# Patient Record
Sex: MALE | Race: WHITE | NOT HISPANIC OR LATINO | Employment: OTHER | URBAN - METROPOLITAN AREA
[De-identification: names, ages, dates, MRNs, and addresses within clinical notes are randomized per-mention and may not be internally consistent; named-entity substitution may affect disease eponyms.]

---

## 2017-02-01 DIAGNOSIS — E11.40 TYPE 2 DIABETES, CONTROLLED, WITH NEUROPATHY: ICD-10-CM

## 2017-02-03 DIAGNOSIS — E78.5 HYPERLIPIDEMIA, UNSPECIFIED HYPERLIPIDEMIA TYPE: ICD-10-CM

## 2017-02-03 DIAGNOSIS — E11.40 TYPE 2 DIABETES, CONTROLLED, WITH NEUROPATHY: ICD-10-CM

## 2017-02-03 RX ORDER — PRAVASTATIN SODIUM 40 MG/1
40 TABLET ORAL DAILY
Qty: 30 TABLET | Refills: 11 | Status: SHIPPED | OUTPATIENT
Start: 2017-02-03 | End: 2017-02-24 | Stop reason: SDUPTHER

## 2017-02-03 NOTE — TELEPHONE ENCOUNTER
----- Message from Melany Marinelli sent at 2/3/2017 11:42 AM CST -----  Contact: Mrs. Junior /wife: [phone]   Wife is asking if you could pls. Send a 30 day supply of Januvia and Pravastatin Sodium to [facility]:  [phone].    Pls call when this has been received at the pharmacy so they can get the medications.    Pls call.

## 2017-02-06 DIAGNOSIS — E11.40 TYPE 2 DIABETES, CONTROLLED, WITH NEUROPATHY: ICD-10-CM

## 2017-02-17 ENCOUNTER — LAB VISIT (OUTPATIENT)
Dept: LAB | Facility: HOSPITAL | Age: 72
End: 2017-02-17
Attending: INTERNAL MEDICINE
Payer: MEDICARE

## 2017-02-17 LAB
ALBUMIN SERPL BCP-MCNC: 3.6 G/DL
ALP SERPL-CCNC: 89 U/L
ALT SERPL W/O P-5'-P-CCNC: 12 U/L
ANION GAP SERPL CALC-SCNC: 8 MMOL/L
AST SERPL-CCNC: 14 U/L
BILIRUB SERPL-MCNC: 0.9 MG/DL
BUN SERPL-MCNC: 27 MG/DL
CALCIUM SERPL-MCNC: 9.6 MG/DL
CHLORIDE SERPL-SCNC: 103 MMOL/L
CO2 SERPL-SCNC: 27 MMOL/L
CREAT SERPL-MCNC: 1.2 MG/DL
EST. GFR  (AFRICAN AMERICAN): >60 ML/MIN/1.73 M^2
EST. GFR  (NON AFRICAN AMERICAN): >60 ML/MIN/1.73 M^2
GLUCOSE SERPL-MCNC: 124 MG/DL
POTASSIUM SERPL-SCNC: 4.6 MMOL/L
PROT SERPL-MCNC: 7.2 G/DL
SODIUM SERPL-SCNC: 138 MMOL/L
TSH SERPL DL<=0.005 MIU/L-ACNC: 1.56 UIU/ML

## 2017-02-17 PROCEDURE — 80053 COMPREHEN METABOLIC PANEL: CPT

## 2017-02-17 PROCEDURE — 83036 HEMOGLOBIN GLYCOSYLATED A1C: CPT

## 2017-02-17 PROCEDURE — 84443 ASSAY THYROID STIM HORMONE: CPT

## 2017-02-17 PROCEDURE — 36415 COLL VENOUS BLD VENIPUNCTURE: CPT | Mod: PO

## 2017-02-18 LAB
ESTIMATED AVG GLUCOSE: 171 MG/DL
HBA1C MFR BLD HPLC: 7.6 %

## 2017-02-24 ENCOUNTER — OFFICE VISIT (OUTPATIENT)
Dept: ENDOCRINOLOGY | Facility: CLINIC | Age: 72
End: 2017-02-24
Payer: MEDICARE

## 2017-02-24 VITALS
HEART RATE: 69 BPM | HEIGHT: 73 IN | BODY MASS INDEX: 31.93 KG/M2 | WEIGHT: 240.94 LBS | SYSTOLIC BLOOD PRESSURE: 126 MMHG | DIASTOLIC BLOOD PRESSURE: 74 MMHG

## 2017-02-24 DIAGNOSIS — H90.5 SENSORINEURAL HEARING LOSS, UNSPECIFIED LATERALITY: ICD-10-CM

## 2017-02-24 DIAGNOSIS — M17.11 OSTEOARTHRITIS OF RIGHT KNEE, UNSPECIFIED OSTEOARTHRITIS TYPE: ICD-10-CM

## 2017-02-24 DIAGNOSIS — E78.5 HYPERLIPIDEMIA, UNSPECIFIED HYPERLIPIDEMIA TYPE: ICD-10-CM

## 2017-02-24 DIAGNOSIS — E11.40 TYPE 2 DIABETES, CONTROLLED, WITH NEUROPATHY: Primary | ICD-10-CM

## 2017-02-24 DIAGNOSIS — I10 ESSENTIAL HYPERTENSION: ICD-10-CM

## 2017-02-24 DIAGNOSIS — E66.09 OBESITY DUE TO EXCESS CALORIES, UNSPECIFIED OBESITY SEVERITY: ICD-10-CM

## 2017-02-24 LAB
CREAT UR-MCNC: 90 MG/DL
MICROALBUMIN UR DL<=1MG/L-MCNC: 5 UG/ML
MICROALBUMIN/CREATININE RATIO: 5.6 UG/MG

## 2017-02-24 PROCEDURE — 99213 OFFICE O/P EST LOW 20 MIN: CPT | Mod: PBBFAC | Performed by: INTERNAL MEDICINE

## 2017-02-24 PROCEDURE — 82570 ASSAY OF URINE CREATININE: CPT

## 2017-02-24 PROCEDURE — 99214 OFFICE O/P EST MOD 30 MIN: CPT | Mod: S$PBB,,, | Performed by: INTERNAL MEDICINE

## 2017-02-24 PROCEDURE — 99999 PR PBB SHADOW E&M-EST. PATIENT-LVL III: CPT | Mod: PBBFAC,,, | Performed by: INTERNAL MEDICINE

## 2017-02-24 RX ORDER — INSULIN GLARGINE 100 [IU]/ML
INJECTION, SOLUTION SUBCUTANEOUS
Qty: 1 BOX | Refills: 6 | Status: SHIPPED | OUTPATIENT
Start: 2017-02-24 | End: 2017-09-06 | Stop reason: SDUPTHER

## 2017-02-24 RX ORDER — LOSARTAN POTASSIUM AND HYDROCHLOROTHIAZIDE 12.5; 5 MG/1; MG/1
1 TABLET ORAL DAILY
Qty: 30 TABLET | Refills: 9 | Status: SHIPPED | OUTPATIENT
Start: 2017-02-24 | End: 2017-09-06 | Stop reason: SDUPTHER

## 2017-02-24 RX ORDER — AMLODIPINE BESYLATE 10 MG/1
10 TABLET ORAL DAILY
Qty: 30 TABLET | Refills: 11 | Status: SHIPPED | OUTPATIENT
Start: 2017-02-24 | End: 2017-09-06 | Stop reason: SDUPTHER

## 2017-02-24 RX ORDER — METFORMIN HYDROCHLORIDE 500 MG/1
TABLET, EXTENDED RELEASE ORAL
Qty: 120 TABLET | Refills: 11 | Status: SHIPPED | OUTPATIENT
Start: 2017-02-24 | End: 2017-09-06 | Stop reason: SDUPTHER

## 2017-02-24 RX ORDER — PRAVASTATIN SODIUM 40 MG/1
40 TABLET ORAL DAILY
Qty: 30 TABLET | Refills: 11 | Status: SHIPPED | OUTPATIENT
Start: 2017-02-24 | End: 2017-09-06 | Stop reason: SDUPTHER

## 2017-02-24 RX ORDER — PEN NEEDLE, DIABETIC 29 G X1/2"
NEEDLE, DISPOSABLE MISCELLANEOUS
Qty: 50 EACH | Refills: 11 | Status: SHIPPED | OUTPATIENT
Start: 2017-02-24 | End: 2020-06-16 | Stop reason: SDUPTHER

## 2017-02-24 NOTE — PROGRESS NOTES
Subjective:      Patient ID: Des Junior is a 71 y.o. male.    Chief Complaint:  Diabetes Mellitus      History of Present Illness    Diabetes Type 2 uncontrolled with neuropathy  Metformin 500 bid  Invokana daily  Jenuvia daily  Home monitoring -130  Nocturia 3  Eyes Recent eye exam  Feet tingle    HTN well controlled  Chol on statin    Right knee a problem  Cane left hand    Hip doing better left    Review of Systems   Constitutional: Positive for fatigue. Negative for activity change and unexpected weight change.   HENT: Positive for hearing loss. Negative for postnasal drip.    Eyes: Negative for visual disturbance.   Respiratory: Negative for cough, chest tightness, shortness of breath and wheezing.    Cardiovascular: Negative for chest pain, palpitations and leg swelling.   Gastrointestinal: Negative for abdominal pain, constipation, diarrhea and nausea.   Genitourinary: Negative for difficulty urinating and hematuria.   Musculoskeletal: Negative for arthralgias, back pain and neck pain.   Neurological: Negative for tremors, seizures, weakness and headaches.   Hematological: Does not bruise/bleed easily.   Psychiatric/Behavioral: Negative for agitation and confusion. The patient is not nervous/anxious.        Objective:   Physical Exam   Constitutional: He is oriented to person, place, and time. He appears well-developed and well-nourished.   Neck: No thyromegaly present.   Cardiovascular: Normal rate and regular rhythm.    Murmur heard.  Pulmonary/Chest: Effort normal and breath sounds normal.   Neurological: He is alert and oriented to person, place, and time. He has normal reflexes.   Comprehensive foot exam  Left and Right Feet  No ulcers  Normal temperature  Vibration mild deficit  Pulses intact   Vitals reviewed.      Lab Review:   Results for orders placed or performed in visit on 02/17/17   Hemoglobin A1c   Result Value Ref Range    Hemoglobin A1C 7.6 (H) 4.5 - 6.2 %    Estimated Avg  Glucose 171 (H) 68 - 131 mg/dL   Comprehensive metabolic panel   Result Value Ref Range    Sodium 138 136 - 145 mmol/L    Potassium 4.6 3.5 - 5.1 mmol/L    Chloride 103 95 - 110 mmol/L    CO2 27 23 - 29 mmol/L    Glucose 124 (H) 70 - 110 mg/dL    BUN, Bld 27 (H) 8 - 23 mg/dL    Creatinine 1.2 0.5 - 1.4 mg/dL    Calcium 9.6 8.7 - 10.5 mg/dL    Total Protein 7.2 6.0 - 8.4 g/dL    Albumin 3.6 3.5 - 5.2 g/dL    Total Bilirubin 0.9 0.1 - 1.0 mg/dL    Alkaline Phosphatase 89 55 - 135 U/L    AST 14 10 - 40 U/L    ALT 12 10 - 44 U/L    Anion Gap 8 8 - 16 mmol/L    eGFR if African American >60.0 >60 mL/min/1.73 m^2    eGFR if non African American >60.0 >60 mL/min/1.73 m^2   TSH   Result Value Ref Range    TSH 1.565 0.400 - 4.000 uIU/mL         Assessment:     Diabetes Type 2 and improved glucose control  Increased LAntus to 24 Continue other meds  No hypoglycemia  Neuropathy mild and improved    Right knee is the limited factor    HTN and HLP controlled    Obesity weightlloss 1/2 lb lb per week    Plan:     Orders Placed This Encounter   Procedures    Microalbumin/creatinine urine ratio     Order Specific Question:   Specimen Source     Answer:   Urine    Hemoglobin A1c     Standing Status:   Future     Standing Expiration Date:   2/24/2018    Lipid panel     Standing Status:   Future     Standing Expiration Date:   2/24/2018    Renal function panel     Standing Status:   Future     Standing Expiration Date:   2/24/2018

## 2017-02-24 NOTE — PATIENT INSTRUCTIONS
Diabetes Type 2 and improved glucose control  Increased LAntus to 24 Continue other meds  No hypoglycemia  Neuropathy mild and improved    Right knee is the limited factor    HTN and HLP controlled    Obesity weightlloss 1/2 lb lb per week

## 2017-02-24 NOTE — MR AVS SNAPSHOT
"    [facility]  [address]  [facility] [address]  Phone: [phone]  Fax: [phone]                  Des Junior   2017 9:30 AM   Office Visit    Description:  Male : 1945   Provider:  Mark Mchugh MD   Department:  [facility]           Reason for Visit     Diabetes Mellitus           Diagnoses this Visit        Comments    Type 2 diabetes, controlled, with neuropathy    -  Primary     Sensorineural hearing loss, unspecified laterality         Hyperlipidemia, unspecified hyperlipidemia type         Obesity due to excess calories, unspecified obesity severity         Osteoarthritis of right knee, unspecified osteoarthritis type         Essential hypertension                To Do List           Goals (5 Years of Data)     None      Follow-Up and Disposition     Follow-up and Disposition History       These Medications        Disp Refills Start End    SITagliptan (JANUVIA) 100 MG Tab 30 tablet 10 2017     Take 1 tablet (100 mg total) by mouth once daily. - Oral    Pharmacy: [facility] [place] - [address] [address] Ph #: [phone]       pravastatin (PRAVACHOL) 40 MG tablet 30 tablet 11 2017     Take 1 tablet (40 mg total) by mouth once daily. - Oral    Pharmacy: [facility] - [place] - [address] [address] Ph #: [phone]       pen needle, diabetic 29 gauge x 1/2" Ndle 50 each 2017     Inject insulin daily    Pharmacy: [facility] - [place] - [address] [address] Ph #: [phone]       metformin (GLUCOPHAGE-XR) 500 MG 24 hr tablet 120 tablet 11 2017     TAKE 2 TABLETS (1,000 MG TOTAL) BY MOUTH 2 (TWO) TIMES DAILY WITH MEALS.    Pharmacy: [facility] - [place], [place] - [address] [address] Ph #: [phone]       losartan-hydrochlorothiazide 50-12.5 mg (HYZAAR) 50-12.5 mg per tablet 30 tablet 9 2017     Take 1 tablet by mouth once daily. - Oral    Pharmacy: [facility] - [place] - [address] [address] Ph #: [phone]       insulin glargine (LANTUS SOLOSTAR) 100 unit/mL (3 mL) InPn pen 1 Box 6 2/24/2017     12 units subcu every night and if FBS greater than 130 for 3 days in a row increase next night by 2 units    Pharmacy: [facility] - [place], [place] - [address] [address] Ph #: [phone]       canagliflozin (INVOKANA) 100 mg Tab 30 tablet 11 2/24/2017     Take 1 tablet (100 mg total) by mouth once daily. - Oral    Pharmacy: [facility] [place] - [address] [address] Ph #: [phone]       blood sugar diagnostic Strp 100 strip 11 2/24/2017     1 strip by Misc.(Non-Drug; Combo Route) route 2 (two) times daily with meals. - Misc.(Non-Drug; Combo Route)    Pharmacy: [facility] - [place] - [address] [address] Ph #: [phone]       amlodipine (NORVASC) 10 MG tablet 30 tablet 11 2/24/2017     Take 1 tablet (10 mg total) by mouth once daily. - Oral    Pharmacy: [facility] - [place] - [address] [address] Ph #: [phone]         [facility]     [facility] - 24/7 Assistance  Registered nurses in the [facility] provide clinical advisement, health education, appointment booking, and other advisory services.  Call for this free service at [phone].             Medications           Message regarding Medications     Verify the changes and/or additions to your medication regime listed below are the same as discussed with your clinician today.  If any of these changes or additions are incorrect, please notify your healthcare provider.        CHANGE how you are taking these medications     Start Taking Instead of    blood sugar diagnostic Strp blood sugar diagnostic Strp    Dosage:  1 strip by Misc.(Non-Drug; Combo Route) route 2 (two) times daily with meals. Dosage:  1 strip by Misc.(Non-Drug; Combo Route) route 3 (three) times daily.    Reason for Change:  Reorder     amlodipine  (NORVASC) 10 MG tablet amlodipine (NORVASC) 10 MG tablet    Dosage:  Take 1 tablet (10 mg total) by mouth once daily. Dosage:  TAKE ONE TABLET ONCE DAILY    Reason for Change:  Reorder            Verify that the below list of medications is an accurate representation of the medications you are currently taking.  If none reported, the list may be blank. If incorrect, please contact your healthcare provider. Carry this list with you in case of emergency.           Current Medications     amlodipine (NORVASC) 10 MG tablet Take 1 tablet (10 mg total) by mouth once daily.    blood sugar diagnostic Strp 1 strip by Misc.(Non-Drug; Combo Route) route 2 (two) times daily with meals.    canagliflozin (INVOKANA) 100 mg Tab Take 1 tablet (100 mg total) by mouth once daily.    cholecalciferol, vitamin D3, (VITAMIN D3) 1,000 unit capsule Take 1,000 Units by mouth once daily.    CIALIS 20 mg Tab 1 TABLET PRIOR TO SEX    cyanocobalamin (VITAMIN B-12) 500 MCG tablet Take 2 tablets (1,000 mcg total) by mouth once daily.    desonide (DESOWEN) 0.05 % cream     diclofenac sodium 1 % Gel Apply 2 g topically 4 (four) times daily.    doxycycline (MONODOX) 100 MG capsule Take 1 capsule (100 mg total) by mouth 2 (two) times daily.    hydrocodone-acetaminophen 5-325mg (NORCO) 5-325 mg per tablet Take 1 tablet by mouth every 6 (six) hours as needed for Pain.    insulin glargine (LANTUS SOLOSTAR) 100 unit/mL (3 mL) InPn pen 12 units subcu every night and if FBS greater than 130 for 3 days in a row increase next night by 2 units    LANCETS & BLOOD GLUCOSE STRIPS MISC * * * Twice a day .  check glucose twice a day    levmefolate-B6 phos-methyl-B12 3-35-2 mg Tab Take 1 tablet by mouth 2 (two) times daily.    losartan-hydrochlorothiazide 50-12.5 mg (HYZAAR) 50-12.5 mg per tablet Take 1 tablet by mouth once daily.    metformin (GLUCOPHAGE-XR) 500 MG 24 hr tablet TAKE 2 TABLETS (1,000 MG TOTAL) BY MOUTH 2 (TWO) TIMES DAILY WITH MEALS.    omega-3  "fatty acids-vitamin E (OMEGA-3 FISH OIL) 1,000-5 mg-unit Cap Take by mouth. 1 Capsule Oral Every day    omeprazole (PRILOSEC OTC) 20 MG tablet Take by mouth. 1 Tablet, Delayed Release (E.C.) Oral Every day    pen needle, diabetic 29 gauge x 1/2" Ndle Inject insulin daily    pravastatin (PRAVACHOL) 40 MG tablet Take 1 tablet (40 mg total) by mouth once daily.    SITagliptan (JANUVIA) 100 MG Tab Take 1 tablet (100 mg total) by mouth once daily.    acyclovir (ZOVIRAX) 400 MG tablet Take 1 tablet (400 mg total) by mouth 3 (three) times daily.           Clinical Reference Information           Your Vitals Were     BP                   126/74 (BP Location: Right arm, Patient Position: Sitting)           Blood Pressure          Most Recent Value    BP  126/74      Allergies as of 2/24/2017     Codeine    Benicar  [Olmesartan]      Immunizations Administered on Date of Encounter - 2/24/2017     None      Orders Placed During Today's Visit      Normal Orders This Visit    Microalbumin/creatinine urine ratio     Future Labs/Procedures Expected by Expires    Hemoglobin A1c  2/24/2017 2/24/2018    Lipid panel  2/24/2017 2/24/2018    Renal function panel  2/24/2017 2/24/2018      Instructions    Diabetes Type 2 and improved glucose control  Increased LAntus to 24 Continue other meds  No hypoglycemia  Neuropathy mild and improved    Right knee is the limited factor    HTN and HLP controlled    Obesity weightlloss 1/2 lb lb per week       Language Assistance Services     ATTENTION: Language assistance services are available, free of charge. Please call [phone].      ATENCIÓN: Si habla maida, tiene a mcadams disposición servicios gratuitos de asistencia lingüística. Llame al [phone].     TINY Ý: N?u b?n nói Ti?ng Vi?t, có các d?ch v? h? tr? ngôn ng? mi?n phí dành cho b?n. G?i s? [phone].         [facility] complies with applicable Federal civil rights laws and does not discriminate on the basis of " race, color, national origin, age, disability, or sex.

## 2017-02-25 RX ORDER — SITAGLIPTIN 100 MG/1
TABLET, FILM COATED ORAL
Qty: 30 TABLET | Refills: 0
Start: 2017-02-25 | End: 2018-03-20 | Stop reason: SDUPTHER

## 2017-03-17 ENCOUNTER — OFFICE VISIT (OUTPATIENT)
Dept: ORTHOPEDICS | Facility: CLINIC | Age: 72
End: 2017-03-17
Payer: MEDICARE

## 2017-03-17 ENCOUNTER — HOSPITAL ENCOUNTER (OUTPATIENT)
Dept: RADIOLOGY | Facility: HOSPITAL | Age: 72
Discharge: HOME | End: 2017-03-17
Attending: ORTHOPAEDIC SURGERY
Payer: MEDICARE

## 2017-03-17 VITALS
DIASTOLIC BLOOD PRESSURE: 89 MMHG | HEART RATE: 90 BPM | BODY MASS INDEX: 32.26 KG/M2 | SYSTOLIC BLOOD PRESSURE: 154 MMHG | HEIGHT: 73 IN | WEIGHT: 243.38 LBS

## 2017-03-17 DIAGNOSIS — M25.561 RIGHT KNEE PAIN, UNSPECIFIED CHRONICITY: ICD-10-CM

## 2017-03-17 DIAGNOSIS — M17.11 PRIMARY OSTEOARTHRITIS OF RIGHT KNEE: Primary | ICD-10-CM

## 2017-03-17 PROCEDURE — 20610 DRAIN/INJ JOINT/BURSA W/O US: CPT | Mod: PBBFAC | Performed by: PHYSICIAN ASSISTANT

## 2017-03-17 PROCEDURE — 99203 OFFICE O/P NEW LOW 30 MIN: CPT | Mod: 25,S$PBB,, | Performed by: PHYSICIAN ASSISTANT

## 2017-03-17 PROCEDURE — 99999 PR PBB SHADOW E&M-EST. PATIENT-LVL IV: CPT | Mod: PBBFAC,,, | Performed by: PHYSICIAN ASSISTANT

## 2017-03-17 PROCEDURE — 73562 X-RAY EXAM OF KNEE 3: CPT | Mod: 26,RT,, | Performed by: RADIOLOGY

## 2017-03-17 PROCEDURE — 73560 X-RAY EXAM OF KNEE 1 OR 2: CPT | Mod: 26,59,LT, | Performed by: RADIOLOGY

## 2017-03-17 PROCEDURE — 20610 DRAIN/INJ JOINT/BURSA W/O US: CPT | Mod: S$PBB,RT,, | Performed by: PHYSICIAN ASSISTANT

## 2017-03-17 PROCEDURE — 99214 OFFICE O/P EST MOD 30 MIN: CPT | Mod: PBBFAC | Performed by: PHYSICIAN ASSISTANT

## 2017-03-17 RX ORDER — TRIAMCINOLONE ACETONIDE 40 MG/ML
60 INJECTION, SUSPENSION INTRA-ARTICULAR; INTRAMUSCULAR
Status: COMPLETED | OUTPATIENT
Start: 2017-03-17 | End: 2017-03-17

## 2017-03-17 RX ADMIN — TRIAMCINOLONE ACETONIDE 60 MG: 40 INJECTION, SUSPENSION INTRA-ARTICULAR; INTRAMUSCULAR at 12:03

## 2017-03-17 NOTE — PROGRESS NOTES
"  SUBJECTIVE:     Chief Complaint : right knee pain     History of Present Illness:  Des Junior is a 72 y.o. male seen in clinic today with a chief complaint of chronic right knee pain. Patient was seen ~ 5 years ago and had cortisone injection in knee. He was doing very well until he fell several months ago and has now had knee pain.  He is a  and has to kneel. He uses knee pads but still has some pain while kneeling. Denies swelling, instability. Pain is mild-moderate. He uses topical ointment and knee brace.  He also recently started to use a cane.     Past Medical History:   Diagnosis Date    Allergy     Arthritis     Cataract     Diabetes mellitus     Diabetes mellitus type II     Hay fever     Hyperlipidemia     Hypertension     Joint pain     Obesity     Ulcer        Review of Systems:  Constitutional: no fever or chills  ENT: no nasal congestion or sore throat  Respiratory: no cough or shortness of breath  Cardiovascular: no chest pain or palpitations  Gastrointestinal: no nausea or vomiting, tolerating diet  Genitourinary: no hematuria or dysuria  Integument/Breast: no rash or pruritis  Hematologic/Lymphatic: no easy bruising or lymphadenopathy  Musculoskeletal: see HPI  Neurological: no seizures or tremors  Behavioral/Psych: no auditory or visual hallucinations    OBJECTIVE:     PHYSICAL EXAM:  Blood pressure (!) 154/89, pulse 90, height 6' 1" (1.854 m), weight 110.4 kg (243 lb 6.2 oz).   General Appearance: WDWN, NAD  Gait: Antalgic  Neuro/Psych: Mood & affect appropriate  Lungs: Respirations equal and unlabored.   CV: 2+ bilateral upper and lower extremity pulses.   Skin: Intact throughout LE  Extremities: No LE edema    Right Knee Exam  Range of Motion:0-120 active   Effusion:none  Condition of skin:intact  Location of tenderness:Medial joint line   Strength:5 of 5 quadriceps strength and 5 of 5 hamstring strength  Stability:stable to testing  Kyle: " negative/negative    Left Knee Exam  Range of Motion:0-125 active   Effusion:none  Condition of skin:intact  Location of tenderness:None   Strength:5 of 5 quadriceps strength and 5 of 5 hamstring strength  Stability:stable to testing  Kyle: negative    Alignment: Moderate varus    Right Hip Examination: no pain with PROM     RADIOGRAPHS: AP, lateral and merchant right knee x-rays ordered and images reviewed today by me reveal advanced degenerative changes of right knee; less severe on left     ASSESSMENT/PLAN:   Osteoarthritis right knee  - Continue cane, brace, knee pads, topical analgesics   - Steroid injection today. He will carefully monitor glucose levels.  - RTC PRN    Knee Injection Procedure Note    Pre-operative Diagnosis: right knee degenerative arthritis    Post-operative Diagnosis: same    Indications: right knee pain    Anesthesia: none    Procedure Details     Verbal consent was obtained for the procedure. The injection site was identified and the skin was prepared with alcohol. The right knee was injected from an anterolateral approach with 1.5 ml of Kenalog and 3 ml Lidocaine under sterile technique using a 22 gauge needle. The needle was removed and the area cleansed and dressed.    Complications:  None; patient tolerated the procedure well.    he was advised to rest the knee today, using ice and elevation as needed for comfort and swelling. he did receive immediate relief of the knee pain. he was told this would be short lived and is secondary to the lidocaine. he may have an increase in discomfort tonight followed by steady improvement over the next several days. It may take 1-3 weeks following the injection to get the full benefit of the medication.

## 2017-08-25 ENCOUNTER — OFFICE VISIT (OUTPATIENT)
Dept: OPTOMETRY | Facility: CLINIC | Age: 72
End: 2017-08-25
Payer: MEDICARE

## 2017-08-25 DIAGNOSIS — I10 ESSENTIAL HYPERTENSION: ICD-10-CM

## 2017-08-25 DIAGNOSIS — H02.836 DERMATOCHALASIS OF BOTH EYELIDS: ICD-10-CM

## 2017-08-25 DIAGNOSIS — H02.833 DERMATOCHALASIS OF BOTH EYELIDS: ICD-10-CM

## 2017-08-25 DIAGNOSIS — H52.4 PRESBYOPIA: ICD-10-CM

## 2017-08-25 DIAGNOSIS — H25.13 NS (NUCLEAR SCLEROSIS), BILATERAL: ICD-10-CM

## 2017-08-25 PROCEDURE — 99999 PR PBB SHADOW E&M-EST. PATIENT-LVL II: CPT | Mod: PBBFAC,,, | Performed by: OPTOMETRIST

## 2017-08-25 PROCEDURE — 92014 COMPRE OPH EXAM EST PT 1/>: CPT | Mod: S$PBB,,, | Performed by: OPTOMETRIST

## 2017-08-25 PROCEDURE — 99212 OFFICE O/P EST SF 10 MIN: CPT | Mod: PBBFAC | Performed by: OPTOMETRIST

## 2017-08-25 PROCEDURE — 92015 DETERMINE REFRACTIVE STATE: CPT | Mod: ,,, | Performed by: OPTOMETRIST

## 2017-08-25 NOTE — PROGRESS NOTES
HPI     Patient's age: 72 y.o.    Approximate date of last eye examination:  8/22/16  Name of last eye doctor seen: Dr. Dow    Pt states that he is here for annual diabetic eye exam, not having any   trouble with eyes.    Wears glasses? yes     Wears CLs?:  no                          Headaches?  no  Eye pain/discomfort?  no                                                                                     Flashes?  no  Floaters?  no  Diplopia/Double vision?  no    Patient's Ocular History:         Any eye surgeries? no         Family history of eye disease?  no    Significant patient medical history:         1. Diabetes?  yes       If yes, IDDM or NIDDM? both   2. HBP?  no                 ! OTC eyedrops currently using:  none   ! Prescription eye meds currently using:  None    Hemoglobin A1C       Date                     Value               Ref Range             Status                02/17/2017               7.6 (H)             4.5 - 6.2 %           Final                     09/09/2016               8.2 (H)             4.5 - 6.2 %           Final                     06/06/2016               9.5 (H)             4.5 - 6.2 %           Final            ----------           Last edited by Jennifer Whyte MA on 8/25/2017  9:36 AM. (History)            Assessment /Plan     For exam results, see Encounter Report.          Type 2 diabetes, controlled, with neuropathy  DM type 2 without retinopathy  Essential hypertension                        No retinopathy, monitor yearly     NS (nuclear sclerosis), bilateral                        Mild, not visually significant     Presbyopia                        Rx specs     Blepharitis, unspecified laterality                         Lid scrubs and artificial tears QHS     RTC 1 year, sooner PRN

## 2017-08-30 ENCOUNTER — LAB VISIT (OUTPATIENT)
Dept: LAB | Facility: HOSPITAL | Age: 72
End: 2017-08-30
Attending: INTERNAL MEDICINE
Payer: MEDICARE

## 2017-08-30 DIAGNOSIS — E11.40 TYPE 2 DIABETES, CONTROLLED, WITH NEUROPATHY: ICD-10-CM

## 2017-08-30 LAB
ALBUMIN SERPL BCP-MCNC: 3.4 G/DL
ANION GAP SERPL CALC-SCNC: 9 MMOL/L
BUN SERPL-MCNC: 21 MG/DL
CALCIUM SERPL-MCNC: 9.7 MG/DL
CHLORIDE SERPL-SCNC: 104 MMOL/L
CO2 SERPL-SCNC: 25 MMOL/L
CREAT SERPL-MCNC: 1.1 MG/DL
EST. GFR  (AFRICAN AMERICAN): >60 ML/MIN/1.73 M^2
EST. GFR  (NON AFRICAN AMERICAN): >60 ML/MIN/1.73 M^2
GLUCOSE SERPL-MCNC: 132 MG/DL
PHOSPHATE SERPL-MCNC: 3.1 MG/DL
POTASSIUM SERPL-SCNC: 4.2 MMOL/L
SODIUM SERPL-SCNC: 138 MMOL/L

## 2017-08-30 PROCEDURE — 83036 HEMOGLOBIN GLYCOSYLATED A1C: CPT

## 2017-08-30 PROCEDURE — 80069 RENAL FUNCTION PANEL: CPT

## 2017-08-31 LAB
ESTIMATED AVG GLUCOSE: 177 MG/DL
HBA1C MFR BLD HPLC: 7.8 %

## 2017-09-06 ENCOUNTER — OFFICE VISIT (OUTPATIENT)
Dept: ENDOCRINOLOGY | Facility: CLINIC | Age: 72
End: 2017-09-06
Payer: MEDICARE

## 2017-09-06 VITALS
DIASTOLIC BLOOD PRESSURE: 82 MMHG | WEIGHT: 254.19 LBS | SYSTOLIC BLOOD PRESSURE: 126 MMHG | HEART RATE: 66 BPM | BODY MASS INDEX: 33.69 KG/M2 | HEIGHT: 73 IN

## 2017-09-06 DIAGNOSIS — I10 ESSENTIAL HYPERTENSION: ICD-10-CM

## 2017-09-06 DIAGNOSIS — E11.40 TYPE 2 DIABETES, CONTROLLED, WITH NEUROPATHY: ICD-10-CM

## 2017-09-06 DIAGNOSIS — E78.5 HYPERLIPIDEMIA, UNSPECIFIED HYPERLIPIDEMIA TYPE: ICD-10-CM

## 2017-09-06 PROCEDURE — 4010F ACE/ARB THERAPY RXD/TAKEN: CPT | Mod: ,,, | Performed by: INTERNAL MEDICINE

## 2017-09-06 PROCEDURE — 3079F DIAST BP 80-89 MM HG: CPT | Mod: ,,, | Performed by: INTERNAL MEDICINE

## 2017-09-06 PROCEDURE — 99214 OFFICE O/P EST MOD 30 MIN: CPT | Mod: S$PBB,,, | Performed by: INTERNAL MEDICINE

## 2017-09-06 PROCEDURE — 1125F AMNT PAIN NOTED PAIN PRSNT: CPT | Mod: ,,, | Performed by: INTERNAL MEDICINE

## 2017-09-06 PROCEDURE — 99999 PR PBB SHADOW E&M-EST. PATIENT-LVL III: CPT | Mod: PBBFAC,,, | Performed by: INTERNAL MEDICINE

## 2017-09-06 PROCEDURE — 3074F SYST BP LT 130 MM HG: CPT | Mod: ,,, | Performed by: INTERNAL MEDICINE

## 2017-09-06 PROCEDURE — 3045F PR MOST RECENT HEMOGLOBIN A1C LEVEL 7.0-9.0%: CPT | Mod: ,,, | Performed by: INTERNAL MEDICINE

## 2017-09-06 PROCEDURE — 99213 OFFICE O/P EST LOW 20 MIN: CPT | Mod: PBBFAC | Performed by: INTERNAL MEDICINE

## 2017-09-06 PROCEDURE — 1159F MED LIST DOCD IN RCRD: CPT | Mod: ,,, | Performed by: INTERNAL MEDICINE

## 2017-09-06 RX ORDER — ASPIRIN 81 MG/1
81 TABLET ORAL DAILY
COMMUNITY
End: 2018-02-27

## 2017-09-06 RX ORDER — METFORMIN HYDROCHLORIDE 500 MG/1
TABLET, EXTENDED RELEASE ORAL
Qty: 120 TABLET | Refills: 11 | Status: SHIPPED | OUTPATIENT
Start: 2017-09-06 | End: 2018-09-10 | Stop reason: SDUPTHER

## 2017-09-06 RX ORDER — PRAVASTATIN SODIUM 40 MG/1
40 TABLET ORAL DAILY
Qty: 30 TABLET | Refills: 11 | Status: SHIPPED | OUTPATIENT
Start: 2017-09-06 | End: 2018-09-06 | Stop reason: SDUPTHER

## 2017-09-06 RX ORDER — AMLODIPINE BESYLATE 10 MG/1
10 TABLET ORAL DAILY
Qty: 30 TABLET | Refills: 11 | Status: SHIPPED | OUTPATIENT
Start: 2017-09-06 | End: 2018-09-10 | Stop reason: SDUPTHER

## 2017-09-06 RX ORDER — INSULIN GLARGINE 100 [IU]/ML
INJECTION, SOLUTION SUBCUTANEOUS
Qty: 1 BOX | Refills: 11 | Status: SHIPPED | OUTPATIENT
Start: 2017-09-06 | End: 2018-07-24 | Stop reason: SDUPTHER

## 2017-09-06 RX ORDER — LOSARTAN POTASSIUM AND HYDROCHLOROTHIAZIDE 12.5; 5 MG/1; MG/1
1 TABLET ORAL DAILY
Qty: 30 TABLET | Refills: 11 | Status: SHIPPED | OUTPATIENT
Start: 2017-09-06 | End: 2018-09-10 | Stop reason: SDUPTHER

## 2017-09-06 RX ORDER — TADALAFIL 20 MG/1
TABLET ORAL
Qty: 8 TABLET | Refills: 6 | Status: SHIPPED | OUTPATIENT
Start: 2017-09-06 | End: 2018-03-20

## 2017-09-06 NOTE — PATIENT INSTRUCTIONS
Please cut back on you portions.    Continue metformin, lantus 24 units at bedtime and januvia.    Stay active    We will repeat your A1C levels in three months.     Results for orders placed or performed in visit on 08/30/17   Lipid panel   Result Value Ref Range    Cholesterol 124 120 - 199 mg/dL    Triglycerides 64 30 - 150 mg/dL    HDL 39 (L) 40 - 75 mg/dL    LDL Cholesterol 72.2 63.0 - 159.0 mg/dL    HDL/Chol Ratio 31.5 20.0 - 50.0 %    Total Cholesterol/HDL Ratio 3.2 2.0 - 5.0    Non-HDL Cholesterol 85 mg/dL     Hemoglobin A1C   Date Value Ref Range Status   08/30/2017 7.8 (H) 4.0 - 5.6 % Final     Comment:     According to ADA guidelines, hemoglobin A1c <7.0% represents  optimal control in non-pregnant diabetic patients. Different  metrics may apply to specific patient populations.   Standards of Medical Care in Diabetes-2016.  For the purpose of screening for the presence of diabetes:  <5.7%     Consistent with the absence of diabetes  5.7-6.4%  Consistent with increasing risk for diabetes   (prediabetes)  >or=6.5%  Consistent with diabetes  Currently, no consensus exists for use of hemoglobin A1c  for diagnosis of diabetes for children.  This Hemoglobin A1c assay has significant interference with fetal   hemoglobin   (HbF). The results are invalid for patients with abnormal amounts of   HbF,   including those with known Hereditary Persistence   of Fetal Hemoglobin. Heterozygous hemoglobin variants (HbAS, HbAC,   HbAD, HbAE, HbA2) do not significantly interfere with this assay;   however, presence of multiple variants in a sample may impact the %   interference.     02/17/2017 7.6 (H) 4.5 - 6.2 % Final     Comment:     According to ADA guidelines, hemoglobin A1C <7.0% represents  optimal control in non-pregnant diabetic patients.  Different  metrics may apply to specific populations.   Standards of Medical Care in Diabetes - 2016.  For the purpose of screening for the presence of diabetes:  <5.7%      Consistent with the absence of diabetes  5.7-6.4%  Consistent with increasing risk for diabetes   (prediabetes)  >or=6.5%  Consistent with diabetes  Currently no consensus exists for use of hemoglobin A1C  for diagnosis of diabetes for children.     09/09/2016 8.2 (H) 4.5 - 6.2 % Final     Comment:     According to ADA guidelines, hemoglobin A1C <7.0% represents  optimal control in non-pregnant diabetic patients.  Different  metrics may apply to specific populations.   Standards of Medical Care in Diabetes - 2016.  For the purpose of screening for the presence of diabetes:  <5.7%     Consistent with the absence of diabetes  5.7-6.4%  Consistent with increasing risk for diabetes   (prediabetes)  >or=6.5%  Consistent with diabetes  Currently no consensus exists for use of hemoglobin A1C  for diagnosis of diabetes for children.

## 2017-09-06 NOTE — PROGRESS NOTES
Subjective:     Patient ID: Des Junior is a 72 y.o. male.    Chief Complaint: No chief complaint on file.    HPI:   Mr. Junior is a 72 y.o. male who is here for a follow-up visit for evaluation type 2 diabetes that is controlled but complicated by neuropathy. Diagnosed over 15 years ago.     Medication regimen:  canagliflozin 100 mg daily - stopped due yeast infections  januvia 100 mg daily - no difficulities  Metformin  mg two tablets twice a day - denies diarrhea/loose stools or abdominal pain.     Had bad summer cold; he is a master .     Nocturia 3 times at night. Denies dry mouth or blurred vision.      ADA STANDARDS of CARE:        ACE inhibitor of angiotensin II receptor blocker:  losartan        Statin drug:  Pravastatin 40 mg daily         Low dose ASA: baby ASA        Eye exam within last year:  This year        Dental exam:         Flu shot: annually        Pneumonia vaccine: 2014        Microalbumin: 2/2017 - normal.     Review of Systems   Constitutional: Negative for chills and fever.   HENT: Negative for congestion and sinus pressure.    Eyes: Negative for visual disturbance.   Respiratory: Negative for chest tightness and shortness of breath.    Cardiovascular: Negative for chest pain and palpitations.   Gastrointestinal: Negative for abdominal distention, diarrhea, nausea and vomiting.   Genitourinary: Negative for dysuria and flank pain.   Musculoskeletal: Negative for back pain.   Skin: Negative for rash.   Neurological: Negative for weakness.   Hematological: Does not bruise/bleed easily.   Psychiatric/Behavioral: Negative for sleep disturbance.        Objective:     Physical Exam   Constitutional: He is oriented to person, place, and time. He appears well-developed and well-nourished. No distress.   HENT:   Head: Normocephalic and atraumatic.   Nose: Nose normal.   Mouth/Throat: Oropharynx is clear and moist. No oropharyngeal exudate.   Eyes: Conjunctivae and EOM  "are normal. Pupils are equal, round, and reactive to light. No scleral icterus.   Neck: Normal range of motion. Neck supple. No tracheal deviation present. No thyromegaly present.   Cardiovascular: Normal rate, regular rhythm, normal heart sounds and intact distal pulses.    Pulmonary/Chest: Effort normal and breath sounds normal.   Abdominal: Soft. Bowel sounds are normal. He exhibits no distension. There is no tenderness.   Normal sites of insulin administration.   Musculoskeletal: Normal range of motion. He exhibits no edema or tenderness.   Lymphadenopathy:     He has no cervical adenopathy.   Neurological: He is alert and oriented to person, place, and time. He has normal reflexes.   Skin: Skin is warm and dry.   FOOT EXAM:  Visual inspection reveals no abrasions, bruises or calluses.  Vibratory sense is diminished b/l.  Microfilament test is intact b/l.  Distal pulses present b/l.   Psychiatric: He has a normal mood and affect.      Vitals:    09/06/17 1309   BP: 126/82   BP Location: Left arm   Patient Position: Sitting   BP Method: Medium (Manual)   Pulse: 66   Weight: 115.3 kg (254 lb 3.1 oz)   Height: 6' 1" (1.854 m)       Results for CHAYITO BARBER (MRN [id number]) as of 9/6/2017 13:10   Ref. Range 2/24/2017 09:46   Microalbum.,U,Random Latest Units: ug/mL 5.0   Microalb Creat Ratio Latest Ref Range: 0.0 - 30.0 ug/mg 5.6       Hemoglobin A1C   Date Value Ref Range Status   08/30/2017 7.8 (H) 4.0 - 5.6 % Final     Comment:     According to ADA guidelines, hemoglobin A1c <7.0% represents  optimal control in non-pregnant diabetic patients. Different  metrics may apply to specific patient populations.   Standards of Medical Care in Diabetes-2016.  For the purpose of screening for the presence of diabetes:  <5.7%     Consistent with the absence of diabetes  5.7-6.4%  Consistent with increasing risk for diabetes   (prediabetes)  >or=6.5%  Consistent with diabetes  Currently, no consensus exists for use of " hemoglobin A1c  for diagnosis of diabetes for children.  This Hemoglobin A1c assay has significant interference with fetal   hemoglobin   (HbF). The results are invalid for patients with abnormal amounts of   HbF,   including those with known Hereditary Persistence   of Fetal Hemoglobin. Heterozygous hemoglobin variants (HbAS, HbAC,   HbAD, HbAE, HbA2) do not significantly interfere with this assay;   however, presence of multiple variants in a sample may impact the %   interference.     02/17/2017 7.6 (H) 4.5 - 6.2 % Final     Comment:     According to ADA guidelines, hemoglobin A1C <7.0% represents  optimal control in non-pregnant diabetic patients.  Different  metrics may apply to specific populations.   Standards of Medical Care in Diabetes - 2016.  For the purpose of screening for the presence of diabetes:  <5.7%     Consistent with the absence of diabetes  5.7-6.4%  Consistent with increasing risk for diabetes   (prediabetes)  >or=6.5%  Consistent with diabetes  Currently no consensus exists for use of hemoglobin A1C  for diagnosis of diabetes for children.     09/09/2016 8.2 (H) 4.5 - 6.2 % Final     Comment:     According to ADA guidelines, hemoglobin A1C <7.0% represents  optimal control in non-pregnant diabetic patients.  Different  metrics may apply to specific populations.   Standards of Medical Care in Diabetes - 2016.  For the purpose of screening for the presence of diabetes:  <5.7%     Consistent with the absence of diabetes  5.7-6.4%  Consistent with increasing risk for diabetes   (prediabetes)  >or=6.5%  Consistent with diabetes  Currently no consensus exists for use of hemoglobin A1C  for diagnosis of diabetes for children.       Results for CHAYITO BARBER (MRN [id number]) as of 9/6/2017 13:33   Ref. Range 8/30/2017 09:51   Sodium Latest Ref Range: 136 - 145 mmol/L 138   Potassium Latest Ref Range: 3.5 - 5.1 mmol/L 4.2   Chloride Latest Ref Range: 95 - 110 mmol/L 104   CO2 Latest Ref Range:  23 - 29 mmol/L 25   Anion Gap Latest Ref Range: 8 - 16 mmol/L 9   BUN, Bld Latest Ref Range: 8 - 23 mg/dL 21   Creatinine Latest Ref Range: 0.5 - 1.4 mg/dL 1.1   eGFR if non African American Latest Ref Range: >60 mL/min/1.73 m^2 >60.0   eGFR if African American Latest Ref Range: >60 mL/min/1.73 m^2 >60.0   Glucose Latest Ref Range: 70 - 110 mg/dL 132 (H)   Calcium Latest Ref Range: 8.7 - 10.5 mg/dL 9.7   Phosphorus Latest Ref Range: 2.7 - 4.5 mg/dL 3.1   Albumin Latest Ref Range: 3.5 - 5.2 g/dL 3.4 (L)   Triglycerides Latest Ref Range: 30 - 150 mg/dL 64   Cholesterol Latest Ref Range: 120 - 199 mg/dL 124   HDL Latest Ref Range: 40 - 75 mg/dL 39 (L)   LDL Cholesterol Latest Ref Range: 63.0 - 159.0 mg/dL 72.2   Total Cholesterol/HDL Ratio Latest Ref Range: 2.0 - 5.0  3.2   Hemoglobin A1C Latest Ref Range: 4.0 - 5.6 % 7.8 (H)   Estimated Avg Glucose Latest Ref Range: 68 - 131 mg/dL 177 (H)       Assessment/Plan:       1. Type 2 diabetes, uncontrolled, with neuropathy  - no change to regimen at this time, however discussed needs to cut back on his portions  - A1C should be closer to 7.5%  - continue januvia and metformin - Creat normal  - continue lantus 24 units, no hypoglycemia  - Hemoglobin A1c; Future    2. Hyperlipidemia, unspecified hyperlipidemia type  - on pravastatin 40 mg daily   - LDL 72    3. Essential hypertension  - continue norvasc, losartan and HCTZ  - normal creat, and K    Labs in three months  F/u in six months  Needs PNA vaccine

## 2017-09-15 ENCOUNTER — OFFICE VISIT (OUTPATIENT)
Dept: DERMATOLOGY | Facility: CLINIC | Age: 72
End: 2017-09-15
Payer: MEDICARE

## 2017-09-15 DIAGNOSIS — L72.0 EPIDERMAL INCLUSION CYST: ICD-10-CM

## 2017-09-15 DIAGNOSIS — L57.0 AK (ACTINIC KERATOSIS): Primary | ICD-10-CM

## 2017-09-15 DIAGNOSIS — D22.9 BENIGN NEVUS: ICD-10-CM

## 2017-09-15 DIAGNOSIS — Z12.83 SCREENING FOR MALIGNANT NEOPLASM OF THE SKIN: ICD-10-CM

## 2017-09-15 PROCEDURE — 99999 PR PBB SHADOW E&M-EST. PATIENT-LVL II: CPT | Mod: PBBFAC,,, | Performed by: DERMATOLOGY

## 2017-09-15 PROCEDURE — 17003 DESTRUCT PREMALG LES 2-14: CPT | Mod: S$PBB,,, | Performed by: DERMATOLOGY

## 2017-09-15 PROCEDURE — 99212 OFFICE O/P EST SF 10 MIN: CPT | Mod: PBBFAC,25 | Performed by: DERMATOLOGY

## 2017-09-15 PROCEDURE — 17003 DESTRUCT PREMALG LES 2-14: CPT | Mod: PBBFAC | Performed by: DERMATOLOGY

## 2017-09-15 PROCEDURE — 17000 DESTRUCT PREMALG LESION: CPT | Mod: PBBFAC | Performed by: DERMATOLOGY

## 2017-09-15 PROCEDURE — 1159F MED LIST DOCD IN RCRD: CPT | Mod: ,,, | Performed by: DERMATOLOGY

## 2017-09-15 PROCEDURE — 1126F AMNT PAIN NOTED NONE PRSNT: CPT | Mod: ,,, | Performed by: DERMATOLOGY

## 2017-09-15 PROCEDURE — 17000 DESTRUCT PREMALG LESION: CPT | Mod: S$PBB,,, | Performed by: DERMATOLOGY

## 2017-09-15 PROCEDURE — 99213 OFFICE O/P EST LOW 20 MIN: CPT | Mod: 25,S$PBB,, | Performed by: DERMATOLOGY

## 2017-09-15 NOTE — PROGRESS NOTES
Subjective:       Patient ID:  Des Junior is a 72 y.o. male who presents for   Chief Complaint   Patient presents with    Skin Check     UBSE     History of Present Illness: The patient presents for follow up of skin check.    The patient was last seen on: 12/6/16 for skin check.  H/o ak's  No h/o nmsc  Other skin complaints: none          Review of Systems   Skin: Positive for wears hat (100%). Negative for daily sunscreen use, activity-related sunscreen use and recent sunburn.   Hematologic/Lymphatic: Does not bruise/bleed easily (on asa).        Objective:    Physical Exam   Constitutional: He appears well-developed and well-nourished. No distress.   Neurological: He is alert and oriented to person, place, and time. He is not disoriented.   Psychiatric: He has a normal mood and affect.   Skin:   Areas Examined (abnormalities noted in diagram):   Scalp / Hair Palpated and Inspected  Head / Face Inspection Performed  Neck Inspection Performed  Chest / Axilla Inspection Performed  Abdomen Inspection Performed  Back Inspection Performed  RUE Inspected  LUE Inspection Performed  Nails and Digits Inspection Performed                       Diagram Legend     Erythematous scaling macule/papule c/w actinic keratosis       Vascular papule c/w angioma      Pigmented verrucoid papule/plaque c/w seborrheic keratosis      Yellow umbilicated papule c/w sebaceous hyperplasia      Irregularly shaped tan macule c/w lentigo     1-2 mm smooth white papules consistent with Milia      Movable subcutaneous cyst with punctum c/w epidermal inclusion cyst      Subcutaneous movable cyst c/w pilar cyst      Firm pink to brown papule c/w dermatofibroma      Pedunculated fleshy papule(s) c/w skin tag(s)      Evenly pigmented macule c/w junctional nevus     Mildly variegated pigmented, slightly irregular-bordered macule c/w mildly atypical nevus      Flesh colored to evenly pigmented papule c/w intradermal nevus       Pink  pearly papule/plaque c/w basal cell carcinoma      Erythematous hyperkeratotic cursted plaque c/w SCC      Surgical scar with no sign of skin cancer recurrence      Open and closed comedones      Inflammatory papules and pustules      Verrucoid papule consistent consistent with wart     Erythematous eczematous patches and plaques     Dystrophic onycholytic nail with subungual debris c/w onychomycosis     Umbilicated papule    Erythematous-base heme-crusted tan verrucoid plaque consistent with inflamed seborrheic keratosis     Erythematous Silvery Scaling Plaque c/w Psoriasis     See annotation      Assessment / Plan:        AK (actinic keratosis)  -     Photodynamic Therapy; Future PDT face (oren periphery) 90 min    Epidermal inclusion cyst   - stable and chronic      Benign nevus   - stable and chronic      Screening for malignant neoplasm of the skin  Upper body skin examination performed today including at least 6 points as noted in physical examination. No lesions suspicious for malignancy noted.        AK (actinic keratosis)  Today's Plan:      Cryosurgery Procedure Note    Verbal consent from the patient is obtained and the patient is aware of the precancerous quality and need for treatment of these lesions. Liquid nitrogen cryosurgery is applied to the 5 actinic keratoses, as detailed in the physical exam, to produce a freeze injury. The patient is aware that blisters may form and is instructed on wound care with gentle cleansing and use of vaseline ointment to keep moist until healed. The patient is supplied a handout on cryosurgery and is instructed to call if lesions do not completely resolve.  And  PDT face (oren periphery) 90 min    Cont wear hat always      Return in about 3 months (around 12/15/2017).

## 2017-09-15 NOTE — ASSESSMENT & PLAN NOTE
Today's Plan:      Cryosurgery Procedure Note    Verbal consent from the patient is obtained and the patient is aware of the precancerous quality and need for treatment of these lesions. Liquid nitrogen cryosurgery is applied to the 5 actinic keratoses, as detailed in the physical exam, to produce a freeze injury. The patient is aware that blisters may form and is instructed on wound care with gentle cleansing and use of vaseline ointment to keep moist until healed. The patient is supplied a handout on cryosurgery and is instructed to call if lesions do not completely resolve.  And  PDT face (oren periphery) 90 min    Cont wear hat always

## 2017-09-15 NOTE — PATIENT INSTRUCTIONS

## 2017-09-22 ENCOUNTER — TELEPHONE (OUTPATIENT)
Dept: DERMATOLOGY | Facility: CLINIC | Age: 72
End: 2017-09-22

## 2017-10-10 ENCOUNTER — OFFICE VISIT (OUTPATIENT)
Dept: ORTHOPEDICS | Facility: CLINIC | Age: 72
End: 2017-10-10
Payer: MEDICARE

## 2017-10-10 ENCOUNTER — CLINICAL SUPPORT (OUTPATIENT)
Dept: DERMATOLOGY | Facility: CLINIC | Age: 72
End: 2017-10-10
Payer: MEDICARE

## 2017-10-10 VITALS — WEIGHT: 252.63 LBS | HEIGHT: 73 IN | BODY MASS INDEX: 33.48 KG/M2

## 2017-10-10 DIAGNOSIS — M25.562 CHRONIC PAIN OF BOTH KNEES: Primary | ICD-10-CM

## 2017-10-10 DIAGNOSIS — L57.0 AK (ACTINIC KERATOSIS): ICD-10-CM

## 2017-10-10 DIAGNOSIS — M25.561 CHRONIC PAIN OF BOTH KNEES: Primary | ICD-10-CM

## 2017-10-10 DIAGNOSIS — G89.29 CHRONIC PAIN OF BOTH KNEES: Primary | ICD-10-CM

## 2017-10-10 DIAGNOSIS — M17.0 PRIMARY OSTEOARTHRITIS OF BOTH KNEES: ICD-10-CM

## 2017-10-10 PROCEDURE — 99212 OFFICE O/P EST SF 10 MIN: CPT | Mod: PBBFAC | Performed by: ORTHOPAEDIC SURGERY

## 2017-10-10 PROCEDURE — 20610 DRAIN/INJ JOINT/BURSA W/O US: CPT | Mod: 50,PBBFAC | Performed by: ORTHOPAEDIC SURGERY

## 2017-10-10 PROCEDURE — 99999 PR PBB SHADOW E&M-EST. PATIENT-LVL II: CPT | Mod: PBBFAC,,, | Performed by: ORTHOPAEDIC SURGERY

## 2017-10-10 PROCEDURE — 99214 OFFICE O/P EST MOD 30 MIN: CPT | Mod: 25,S$PBB,, | Performed by: ORTHOPAEDIC SURGERY

## 2017-10-10 PROCEDURE — 99213 OFFICE O/P EST LOW 20 MIN: CPT | Mod: PBBFAC,27

## 2017-10-10 PROCEDURE — 96567 PDT DSTR PRMLG LES SKN: CPT | Mod: PBBFAC

## 2017-10-10 PROCEDURE — 99499 UNLISTED E&M SERVICE: CPT | Mod: S$PBB,,, | Performed by: DERMATOLOGY

## 2017-10-10 PROCEDURE — 99999 PR PBB SHADOW E&M-EST. PATIENT-LVL III: CPT | Mod: PBBFAC,,,

## 2017-10-10 RX ADMIN — AMINOLEVULINIC ACID HYDROCHLORIDE 1.5 ML: KIT at 01:10

## 2017-10-10 RX ADMIN — TRIAMCINOLONE HEXACETONIDE 40 MG: 20 INJECTION, SUSPENSION INTRA-ARTICULAR; PARENTERAL at 11:10

## 2017-10-10 NOTE — PROGRESS NOTES
Subjective:      Patient ID: Des Junior is a 72 y.o. male.    Chief Complaint: Pain of the Left Knee and Pain of the Right Knee    HPI   Des Junior is a 72 y.o. male who presents to clinic today for bilateral knee pain, Left> right today. Pt reports pain is worse with standing and walking, better at rest. He is a  and pain occasionally interferes with his work. Last visit in March patient received CSI to R knee with months of relief. He is taking instaflexx supplements which he reports have helped his nighttime pain. He is able to ambulate without assistance but walks with a cane for safety. He would like bilateral knee CSI today.     ROS   Denies fever, chills, night sweats, nausea, vomiting.     Current Outpatient Prescriptions on File Prior to Visit   Medication Sig Dispense Refill    amlodipine (NORVASC) 10 MG tablet Take 1 tablet (10 mg total) by mouth once daily. 30 tablet 11    aspirin (ECOTRIN) 81 MG EC tablet Take 81 mg by mouth once daily.      blood sugar diagnostic Strp 1 strip by Misc.(Non-Drug; Combo Route) route 2 (two) times daily with meals. 100 strip 11    cholecalciferol, vitamin D3, (VITAMIN D3) 1,000 unit capsule Take 1,000 Units by mouth once daily.      cyanocobalamin (VITAMIN B-12) 500 MCG tablet Take 2 tablets (1,000 mcg total) by mouth once daily. 60 tablet 3    insulin glargine (LANTUS SOLOSTAR) 100 unit/mL (3 mL) InPn pen 24 units every nights 1 Box 11    JANUVIA 100 mg Tab TAKE 1 TABLET BY MOUTH ONCE DAILY. 30 tablet 0    LANCETS & BLOOD GLUCOSE STRIPS MISC * * * Twice a day .  check glucose twice a day      losartan-hydrochlorothiazide 50-12.5 mg (HYZAAR) 50-12.5 mg per tablet Take 1 tablet by mouth once daily. 30 tablet 11    metformin (GLUCOPHAGE-XR) 500 MG 24 hr tablet TAKE 2 TABLETS (1,000 MG TOTAL) BY MOUTH 2 (TWO) TIMES DAILY WITH MEALS. 120 tablet 11    NAPROXEN SODIUM (ALEVE ORAL) Take 2 tablets by mouth once daily.      omega-3  "fatty acids-vitamin E (OMEGA-3 FISH OIL) 1,000-5 mg-unit Cap Take by mouth. 1 Capsule Oral Every day      omeprazole (PRILOSEC OTC) 20 MG tablet Take by mouth. 1 Tablet, Delayed Release (E.C.) Oral Every day      pen needle, diabetic 29 gauge x 1/2" Ndle Inject insulin daily 50 each 11    pravastatin (PRAVACHOL) 40 MG tablet Take 1 tablet (40 mg total) by mouth once daily. 30 tablet 11    SITagliptin (JANUVIA) 100 MG Tab Take 1 tablet (100 mg total) by mouth once daily. 30 tablet 11    tadalafil (CIALIS) 20 MG Tab 1 TABLET PRIOR TO SEX 8 tablet 6     No current facility-administered medications on file prior to visit.      Past Medical History:   Diagnosis Date    Allergy     Arthritis     Cataract     Diabetes mellitus     Diabetes mellitus type II     Hay fever     Hyperlipidemia     Hypertension     Joint pain     Obesity     Ulcer      Social History     Social History    Marital status:      Spouse name: N/A    Number of children: N/A    Years of education: N/A     Occupational History    Self emplyed      Social History Main Topics    Smoking status: Never Smoker    Smokeless tobacco: Never Used    Alcohol use No    Drug use: No    Sexual activity: Not on file     Other Topics Concern    Not on file     Social History Narrative    No narrative on file     Family History   Problem Relation Age of Onset    Hypertension Father     Cancer Brother      colon    Cataracts Mother     Glaucoma Mother     Melanoma Neg Hx     Amblyopia Neg Hx     Blindness Neg Hx     Macular degeneration Neg Hx     Retinal detachment Neg Hx     Strabismus Neg Hx     Diabetes Neg Hx      Past Surgical History:   Procedure Laterality Date    COLONOSCOPY N/A 10/5/2015    Procedure: COLONOSCOPY;  Surgeon: Pro Jang MD;  Location: [address];  Service: Endoscopy;  Laterality: N/A;    thumb surgery           Review of patient's allergies indicates:   Allergen Reactions    " Codeine Nausea And Vomiting     Other reaction(s): Nausea    Benicar  [olmesartan]      Other reaction(s): SPOTS IN FRONT OF EYES           Objective:      Gen: NAD, AAOx3  Chest: symmetric,nonlabored repsirations  CV: 2+ DP    Ortho/SPM Exam   BLE:  Minimal effusion  No TTP medial or lateral jt line  SILT  5/5 strength throughout lower extremity  R knee PROM: 5-130  L knee ROM 5-130        No new imaging today    Assessment:       Encounter Diagnosis   Name Primary?    Chronic pain of both knees Yes          Plan:      71 y/o M with severe bilateral OA  - CSI given to B knees today  - RTC as needed

## 2017-10-10 NOTE — PROGRESS NOTES
Photodynamic Therapy Note.    PDT ordered per Dr. Suh    Patient here today for 2 nd treatment of actinic keratoses using photodynamic therapy.  Risks including but not limited to burning, stinging, redness, swelling, crusting or blistering of the skin of the area treated were discussed with patient.  Patient elects to proceed with photodynamic therapy.    Treatment area:  Face  Treatment area cleaned with rubbing alcohol, Levulan Kerastick (1) applied evenly to entire surface and allowed to absorb for 90 minutes.  Patient then placed under Raj-U light for 16 minutes 40 seconds.    Patient tolerated treatment well with only mild but tolerable symptoms of discomfort.  Area washed gently with mild soap and water; Zinc oxide sunscreen applied.  Patient advised to avoid any significant light exposure (sun and artificial) for next 48 hours.    RTC:  In 1 month or sooner if concern arises.

## 2017-10-10 NOTE — PROCEDURES
Large Joint Aspiration/Injection  Date/Time: 10/10/2017 11:53 AM  Performed by: OCHSNER, JOHN L. JR  Authorized by: OCHSNER, JOHN L. JR     Consent Done?:  Yes (Verbal)  Indications:  Pain  Procedure site marked: Yes    Timeout: Prior to procedure the correct patient, procedure, and site was verified      Location:  Knee  Site:  R knee and L knee  Prep: Patient was prepped and draped in usual sterile fashion    Ultrasonic Guidance for needle placement: No  Needle size:  22 G  Approach:  Anteromedial  Medications:  40 mg triamcinolone hexacetonide 20 mg/mL  Patient tolerance:  Patient tolerated the procedure well with no immediate complications

## 2017-12-06 ENCOUNTER — LAB VISIT (OUTPATIENT)
Dept: LAB | Facility: HOSPITAL | Age: 72
End: 2017-12-06
Attending: INTERNAL MEDICINE
Payer: MEDICARE

## 2017-12-06 LAB
ESTIMATED AVG GLUCOSE: 169 MG/DL
HBA1C MFR BLD HPLC: 7.5 %

## 2017-12-06 PROCEDURE — 83036 HEMOGLOBIN GLYCOSYLATED A1C: CPT

## 2017-12-06 PROCEDURE — 36415 COLL VENOUS BLD VENIPUNCTURE: CPT | Mod: PO

## 2018-01-12 ENCOUNTER — OFFICE VISIT (OUTPATIENT)
Dept: DERMATOLOGY | Facility: CLINIC | Age: 73
End: 2018-01-12
Payer: MEDICARE

## 2018-01-12 DIAGNOSIS — L57.0 AK (ACTINIC KERATOSIS): ICD-10-CM

## 2018-01-12 PROCEDURE — 17000 DESTRUCT PREMALG LESION: CPT | Mod: PBBFAC | Performed by: DERMATOLOGY

## 2018-01-12 PROCEDURE — 99999 PR PBB SHADOW E&M-EST. PATIENT-LVL II: CPT | Mod: PBBFAC,,, | Performed by: DERMATOLOGY

## 2018-01-12 PROCEDURE — 99212 OFFICE O/P EST SF 10 MIN: CPT | Mod: PBBFAC,25 | Performed by: DERMATOLOGY

## 2018-01-12 PROCEDURE — 99499 UNLISTED E&M SERVICE: CPT | Mod: S$PBB,,, | Performed by: DERMATOLOGY

## 2018-01-12 PROCEDURE — 17000 DESTRUCT PREMALG LESION: CPT | Mod: S$PBB,,, | Performed by: DERMATOLOGY

## 2018-01-12 NOTE — ASSESSMENT & PLAN NOTE
Today's Plan:      Cryosurgery Procedure Note    Verbal consent from the patient is obtained and the patient is aware of the precancerous quality and need for treatment of these lesions. Liquid nitrogen cryosurgery is applied to the 1 actinic keratoses, as detailed in the physical exam, to produce a freeze injury. The patient is aware that blisters may form and is instructed on wound care with gentle cleansing and use of vaseline ointment to keep moist until healed. The patient is supplied a handout on cryosurgery and is instructed to call if lesions do not completely resolve.

## 2018-01-12 NOTE — PROGRESS NOTES
Subjective:       Patient ID:  Des Junior is a 72 y.o. male who presents for   Chief Complaint   Patient presents with    Skin Check     follow up PDT     Actinic Keratosis  - Follow-up  Symptom course: improving  Currently using: PDT face 90 min.  Affected locations: face  Signs / symptoms: asymptomatic        Review of Systems   Skin: Positive for wears hat (100%). Negative for daily sunscreen use, activity-related sunscreen use and recent sunburn.   Hematologic/Lymphatic: Does not bruise/bleed easily (on asa).        Objective:    Physical Exam   Constitutional: He appears well-developed and well-nourished. No distress.   Neurological: He is alert and oriented to person, place, and time. He is not disoriented.   Psychiatric: He has a normal mood and affect.   Skin:   Areas Examined (abnormalities noted in diagram):   Scalp / Hair Palpated and Inspected  Head / Face Inspection Performed              Diagram Legend     Erythematous scaling macule/papule c/w actinic keratosis       Vascular papule c/w angioma      Pigmented verrucoid papule/plaque c/w seborrheic keratosis      Yellow umbilicated papule c/w sebaceous hyperplasia      Irregularly shaped tan macule c/w lentigo     1-2 mm smooth white papules consistent with Milia      Movable subcutaneous cyst with punctum c/w epidermal inclusion cyst      Subcutaneous movable cyst c/w pilar cyst      Firm pink to brown papule c/w dermatofibroma      Pedunculated fleshy papule(s) c/w skin tag(s)      Evenly pigmented macule c/w junctional nevus     Mildly variegated pigmented, slightly irregular-bordered macule c/w mildly atypical nevus      Flesh colored to evenly pigmented papule c/w intradermal nevus       Pink pearly papule/plaque c/w basal cell carcinoma      Erythematous hyperkeratotic cursted plaque c/w SCC      Surgical scar with no sign of skin cancer recurrence      Open and closed comedones      Inflammatory papules and pustules      Verrucoid  papule consistent consistent with wart     Erythematous eczematous patches and plaques     Dystrophic onycholytic nail with subungual debris c/w onychomycosis     Umbilicated papule    Erythematous-base heme-crusted tan verrucoid plaque consistent with inflamed seborrheic keratosis     Erythematous Silvery Scaling Plaque c/w Psoriasis     See annotation      Assessment / Plan:        AK (actinic keratosis)      AK (actinic keratosis)  Today's Plan:      Cryosurgery Procedure Note    Verbal consent from the patient is obtained and the patient is aware of the precancerous quality and need for treatment of these lesions. Liquid nitrogen cryosurgery is applied to the 1 actinic keratoses, as detailed in the physical exam, to produce a freeze injury. The patient is aware that blisters may form and is instructed on wound care with gentle cleansing and use of vaseline ointment to keep moist until healed. The patient is supplied a handout on cryosurgery and is instructed to call if lesions do not completely resolve.        Return in about 6 months (around 7/12/2018).

## 2018-01-22 ENCOUNTER — TELEPHONE (OUTPATIENT)
Dept: ENDOCRINOLOGY | Facility: CLINIC | Age: 73
End: 2018-01-22

## 2018-01-22 NOTE — TELEPHONE ENCOUNTER
----- Message from Brooke Johnson sent at 1/22/2018  9:46 AM CST -----  Contact: Self [phone]  If needed , pls add orders and schedule lab at [facility] on March 13.

## 2018-01-30 ENCOUNTER — TELEPHONE (OUTPATIENT)
Dept: ENDOCRINOLOGY | Facility: CLINIC | Age: 73
End: 2018-01-30

## 2018-02-27 ENCOUNTER — HOSPITAL ENCOUNTER (OUTPATIENT)
Dept: RADIOLOGY | Facility: HOSPITAL | Age: 73
Discharge: HOME | End: 2018-02-27
Attending: INTERNAL MEDICINE
Payer: MEDICARE

## 2018-02-27 ENCOUNTER — OFFICE VISIT (OUTPATIENT)
Dept: INTERNAL MEDICINE | Facility: CLINIC | Age: 73
End: 2018-02-27
Payer: MEDICARE

## 2018-02-27 VITALS
WEIGHT: 250.69 LBS | SYSTOLIC BLOOD PRESSURE: 130 MMHG | DIASTOLIC BLOOD PRESSURE: 80 MMHG | OXYGEN SATURATION: 98 % | HEART RATE: 98 BPM | HEIGHT: 73 IN | TEMPERATURE: 98 F | BODY MASS INDEX: 33.22 KG/M2

## 2018-02-27 DIAGNOSIS — I48.19 PERSISTENT ATRIAL FIBRILLATION: Primary | ICD-10-CM

## 2018-02-27 DIAGNOSIS — R05.9 COUGH: ICD-10-CM

## 2018-02-27 DIAGNOSIS — I49.9 IRREGULAR HEART RATE: ICD-10-CM

## 2018-02-27 PROCEDURE — 1159F MED LIST DOCD IN RCRD: CPT | Mod: ,,, | Performed by: INTERNAL MEDICINE

## 2018-02-27 PROCEDURE — 1126F AMNT PAIN NOTED NONE PRSNT: CPT | Mod: ,,, | Performed by: INTERNAL MEDICINE

## 2018-02-27 PROCEDURE — 93010 ELECTROCARDIOGRAM REPORT: CPT | Mod: ,,, | Performed by: INTERNAL MEDICINE

## 2018-02-27 PROCEDURE — 71046 X-RAY EXAM CHEST 2 VIEWS: CPT | Mod: TC

## 2018-02-27 PROCEDURE — 71046 X-RAY EXAM CHEST 2 VIEWS: CPT | Mod: 26,,, | Performed by: RADIOLOGY

## 2018-02-27 PROCEDURE — 99214 OFFICE O/P EST MOD 30 MIN: CPT | Mod: PBBFAC,25 | Performed by: INTERNAL MEDICINE

## 2018-02-27 PROCEDURE — 99999 PR PBB SHADOW E&M-EST. PATIENT-LVL IV: CPT | Mod: PBBFAC,,, | Performed by: INTERNAL MEDICINE

## 2018-02-27 PROCEDURE — 93005 ELECTROCARDIOGRAM TRACING: CPT | Mod: PBBFAC | Performed by: INTERNAL MEDICINE

## 2018-02-27 PROCEDURE — 99214 OFFICE O/P EST MOD 30 MIN: CPT | Mod: S$PBB,,, | Performed by: INTERNAL MEDICINE

## 2018-02-27 RX ORDER — METOPROLOL SUCCINATE 25 MG/1
25 TABLET, EXTENDED RELEASE ORAL DAILY
Qty: 30 TABLET | Refills: 11 | Status: SHIPPED | OUTPATIENT
Start: 2018-02-27 | End: 2018-02-27 | Stop reason: SDUPTHER

## 2018-02-27 RX ORDER — METOPROLOL SUCCINATE 25 MG/1
25 TABLET, EXTENDED RELEASE ORAL DAILY
Qty: 30 TABLET | Refills: 11 | Status: SHIPPED | OUTPATIENT
Start: 2018-02-27 | End: 2018-06-05

## 2018-02-27 NOTE — PATIENT INSTRUCTIONS
Understanding Atrial Fibrillation    An arrhythmia is any problem with the speed or pattern of the heartbeat. Atrial fibrillation (AFib) is a common type of arrhythmia. It causes fast, chaotic electrical signals in the atria. This leads to poor functioning of the heart. It also affects how much blood your heart can pump out to the body.  Afib may occur once in a while and go away on its own. Or it may continue for longer periods and need treatment.  AFib can lead to serious problems, such as stroke. Your healthcare provider will need to monitor and manage it.  What happens during atrial fibrillation?   The heart has an electrical system that sends signals to control the heartbeat. As signals move through the heart, they tell the hearts upper chambers (atria) and lower chambers (ventricles) when to squeeze (contract) and relax. This lets blood move through the heart and out to the body and lungs.  With AFib, the atria receive abnormal signals. This causes them to contract in a fast and irregular way, and out of sync with the ventricles. When this happens, the atria also have a harder time moving blood into the ventricles. Blood may then pool in the atria, which increases the risk for blood clots and stroke. The ventricles also may contract too quickly and irregularly. As a result, they may not pump blood to the body and lungs as well as they should. This can weaken the heart muscle over time and cause heart failure.  What causes atrial fibrillation?  AFib is more common in older adults. It has many possible causes including:  · Coronary artery disease  · Heart valve disease  · Heart attack  · Heart surgery  · High blood pressure  · Thyroid disease  · Diabetes  · Lung disease  · Sleep apnea  · Heavy alcohol use  In some cases of AFib, doctors do not know the cause.  What are the symptoms of atrial fibrillation?  AFib may or may not cause symptoms. If symptoms do occur, they may include:  · A fast, pounding,  irregular heartbeat  · Shortness of breath  · Tiredness  · Dizziness or fainting  · Chest pain  How is atrial fibrillation treated?  Treatments for AFib can include any of the options below.  · Medicines. You may be prescribed:  ¨ Heart rate medicines to help slow down the heartbeat  ¨ Heart rhythm medicines to help the heart beat more regularly  ¨ Anti-clotting medicines to help reduce the risk for blood clots and stroke  · Electrical cardioversion. Your healthcare provider uses special pads or paddles to send one or more brief electrical shocks to the heart. This can help reset the heartbeat to normal.  · Ablation. Long, thin tubes called catheters are threaded through a blood vessel to the heart. There, the catheters send out hot or cold energy to the areas causing the abnormal signals. This energy destroys the problem tissue or cells. This improves the chances that your heart will stay in normal rhythm without using medicines. If your heart rate and rhythm cant be controlled, you may need ablation and a pacemaker. These will help control the heart rate and regularity of the heartbeat.  · Surgery. During surgery, your healthcare provider may use different methods to create scar tissue in the areas of the heart causing the abnormal signals. The scar tissue disrupts the abnormal signals and may stop AFib from occurring.  What are the complications of atrial fibrillation?  These can include:  · Blood clots  · Stroke  · Heart failure. This problem occurs when the heart muscle weakens so much that it can no longer pump blood well.  When should I call my healthcare provider?  Call your healthcare provider right away if you have any of these:  · Symptoms that dont get better with treatment, or get worse  · New symptoms   Date Last Reviewed: 5/1/2016  © 9226-3594 [employer]. [address] [place] [address], [place] [address]. All rights reserved. This information is not intended as a substitute for professional  medical care. Always follow your healthcare professional's instructions.        Atrial Fibrillation    Atrial fibrillation is a condition in which the heart beats in an irregular pattern. It is caused by a problem in the heart's electrical pathways. It can be a sign of heart disease or other health problems that affect the heart.  Heart palpitations are the most common symptom of atrial fibrillation. This is the feeling that your heart is fluttering, beating fast, hard, or irregular. When the heart beats too fast, it doesn't pump blood very well. This can cause other symptoms like anxiety, fatigue, shortness of breath, chest pain, dizziness, or fainting. Atrial fibrillation may come and go. It can last from a few hours to a couple of days. Or, it may become chronic, lasting for months at a time or even become permanent.  Atrial fibrillation may be caused by heart disease or other conditions in the body that affect the heart:  · Coronary artery disease (atherosclerosis)  · High blood pressure  · Disease of the heart valves  · Enlarged heart  · Heart failure  Atrial fibrillation can also occur without heart disease because of:  · Overactive thyroid (hyperthyroid)  · Chronic lung disease (COPD, emphysema, bronchitis)  · Heavy alcohol use  · Cardiac stimulants like cocaine, amphetamines, diet pills, certain decongestant cold medicines, caffeine, or nicotine  · Infection  · Blood clot in the lung (pulmonary embolus)  · Diabetes  · Chronic kidney disease  · Obesity  · Extreme athletic conditioning  Treating or removing these causes will help your treatment for atrial fibrillation. It will also make it less likely for the atrial fibrillation to come back.  Atrial fibrillation can alternate back and forth with another abnormal rhythm called atrial flutter. Atrial flutter is a more regular heart rhythm and is also associated with an increased stroke risk. Proper treatment can lower your risk for stroke.  Home care  Follow  these guidelines when caring for yourself at home:  · Go back to your usual activities as soon as you are feeling back to normal.  · If you smoke, stop smoking. Contact your healthcare provider or a local stop-smoking program for help.  · Don't use stimulants like alcohol, cocaine, amphetamines, diet pills, certain decongestant cold medicines, caffeine, or nicotine.  · If your provider prescribed medicine to stop atrial fibrillation from coming back, take it exactly as directed. Some medicines must be taken every day, not just when you have symptoms. This will help them work as they should.  · If you were prescribed warfarin to lower your risk for stroke, have your blood tested on a regular basis as advised by your provider. This will make sure you are getting the dose that is right for you. It also lower your risk for side effects.  Follow-up care  Follow up with your healthcare provider, or as advised.  When to seek medical advice  Call your healthcare provider right away if any of these following occur:  · Shortness of breath or swelling in the legs gets worse  · Unexpected weight gain  · Chest pain or the sense that your heart is fluttering or beating fast or hard (palpitations)  · Any sign of bleeding if you are on a blood thinner   · Pain, redness, or swelling in one leg  Also call your provider right away if you have these signs of stroke:  · Weakness of an arm or leg or one side of the face  · Difficulty with speech or vision  · Extreme drowsiness, confusion, dizziness, or fainting  Date Last Reviewed: 5/1/2016  © [id number] [employer]. [address] [place] [address], [place] [address]. All rights reserved. This information is not intended as a substitute for professional medical care. Always follow your healthcare professional's instructions.

## 2018-02-27 NOTE — PROGRESS NOTES
Subjective:       Patient ID: Des Junior is a 72 y.o. male.    Chief Complaint: Shortness of Breath    Here for cough, mucous and easily out of breath.  Out of breath couple weeks, 2 weeks, cough has been 2 months.    Remote hx of being seen for cough and told allergies.    No wt gain, does occ wake up sob, no PND. No f/c/ns, no blood in stools.    No hx of DVT/PE.      Review of Systems   Constitutional: Positive for activity change. Negative for diaphoresis, fever and unexpected weight change.   Respiratory: Positive for cough and shortness of breath. Negative for chest tightness.    Cardiovascular: Positive for leg swelling (stable). Negative for chest pain and palpitations.   Gastrointestinal: Negative for abdominal distention, abdominal pain, anal bleeding, blood in stool, nausea and vomiting.   Genitourinary: Negative for decreased urine volume.   Skin: Negative for rash and wound.   Neurological: Negative for tremors, syncope and weakness.   Hematological: Negative for adenopathy. Does not bruise/bleed easily.   Psychiatric/Behavioral: Negative for confusion.       Objective:      Physical Exam   Constitutional: He is oriented to person, place, and time. He appears well-developed and well-nourished. No distress.   Speaking full sentences, not dyspneic at rest   HENT:   Head: Normocephalic and atraumatic.   Mouth/Throat: No oropharyngeal exudate.   Eyes: EOM are normal. Pupils are equal, round, and reactive to light.   No scleral pallor     Neck: Normal range of motion. Neck supple.   Cardiovascular: Normal heart sounds.  Exam reveals no friction rub.    No murmur heard.  Irregularly irregular     Pulmonary/Chest: Effort normal. He has rales.   Mild basilar crackles   Abdominal: Soft. Bowel sounds are normal. He exhibits no distension and no mass. There is no tenderness. No hernia.   Musculoskeletal: He exhibits edema (1+ bilat edema, no calf tenderness).   Neurological: He is alert and oriented to  person, place, and time.   Skin: He is not diaphoretic.   Psychiatric: He has a normal mood and affect. His behavior is normal.       Assessment:       1. Irregular heart rate    2. Persistent atrial fibrillation    3. Cough        Plan:       Des was seen today for shortness of breath.    Diagnoses and all orders for this visit:    Irregular heart rate  -     EKG 12-lead; Future  afib    Persistent atrial fibrillation  -     metoprolol succinate (TOPROL-XL) 25 MG 24 hr tablet; Take 1 tablet (25 mg total) by mouth once daily.  -     CBC auto differential; Future  -     Comprehensive metabolic panel; Future  -     2D echo with color flow doppler; Future  -     rivaroxaban (XARELTO) 20 mg Tab; Take 1 tablet (20 mg total) by mouth daily with dinner or evening meal.  D/c ASA and nsaids  -     Brain natriuretic peptide; Future  -     Ambulatory Referral to Electrophysiology  -     D dimer, quantitative; Future  Lengthy discussion re afib.  Pt given handouts.    VELASCO from afib with rvr, possible CHF see w/u first, consider lasix.  If worsens to ED    Cough  -     X-Ray Chest PA And Lateral; Future  He may have an sanjay of CHFm, check labs first/tte    Follow-up in about 1 month (around 3/27/2018).    Over 40 minutes spent with patient and majority in counseling and patient education.

## 2018-02-28 ENCOUNTER — TELEPHONE (OUTPATIENT)
Dept: INTERNAL MEDICINE | Facility: CLINIC | Age: 73
End: 2018-02-28

## 2018-02-28 DIAGNOSIS — R07.9 ACUTE CHEST PAIN: ICD-10-CM

## 2018-02-28 DIAGNOSIS — R79.89 ELEVATED D-DIMER: Primary | ICD-10-CM

## 2018-02-28 NOTE — TELEPHONE ENCOUNTER
Hi please call him,  I called and left message. His blood work shows that he may have a blood clot in the lung, he needs a CT scan to make sure. I have ordered the scan, please have it done as soon as possible. He needs to stop metformin now, prior to the scan. He can restart it 2 days after the scan since metformin does not mix well with IV contract for the scan.  Let me know if patient has any questions.  Thank you, Christopher Cortez

## 2018-03-01 ENCOUNTER — HOSPITAL ENCOUNTER (OUTPATIENT)
Dept: RADIOLOGY | Facility: HOSPITAL | Age: 73
Discharge: HOME | DRG: 176 | End: 2018-03-01
Attending: INTERNAL MEDICINE
Payer: MEDICARE

## 2018-03-01 ENCOUNTER — HOSPITAL ENCOUNTER (INPATIENT)
Facility: HOSPITAL | Age: 73
LOS: 1 days | Discharge: HOME | DRG: 176 | End: 2018-03-02
Attending: INTERNAL MEDICINE | Admitting: HOSPITALIST
Payer: MEDICARE

## 2018-03-01 ENCOUNTER — TELEPHONE (OUTPATIENT)
Dept: INTERNAL MEDICINE | Facility: CLINIC | Age: 73
End: 2018-03-01

## 2018-03-01 DIAGNOSIS — I15.2 HYPERTENSION ASSOCIATED WITH DIABETES: ICD-10-CM

## 2018-03-01 DIAGNOSIS — I51.9 LV DYSFUNCTION: ICD-10-CM

## 2018-03-01 DIAGNOSIS — R07.9 ACUTE CHEST PAIN: ICD-10-CM

## 2018-03-01 DIAGNOSIS — E78.5 HYPERLIPIDEMIA ASSOCIATED WITH TYPE 2 DIABETES MELLITUS: ICD-10-CM

## 2018-03-01 DIAGNOSIS — I48.0 PAROXYSMAL ATRIAL FIBRILLATION: ICD-10-CM

## 2018-03-01 DIAGNOSIS — M17.11 PRIMARY OSTEOARTHRITIS OF RIGHT KNEE: ICD-10-CM

## 2018-03-01 DIAGNOSIS — E11.40 TYPE 2 DIABETES, CONTROLLED, WITH NEUROPATHY: ICD-10-CM

## 2018-03-01 DIAGNOSIS — R79.89 ELEVATED D-DIMER: ICD-10-CM

## 2018-03-01 DIAGNOSIS — I26.09 OTHER ACUTE PULMONARY EMBOLISM WITH ACUTE COR PULMONALE: ICD-10-CM

## 2018-03-01 DIAGNOSIS — Z77.090 ASBESTOS EXPOSURE: ICD-10-CM

## 2018-03-01 DIAGNOSIS — E11.59 HYPERTENSION ASSOCIATED WITH DIABETES: ICD-10-CM

## 2018-03-01 DIAGNOSIS — N17.9 AKI (ACUTE KIDNEY INJURY): ICD-10-CM

## 2018-03-01 DIAGNOSIS — I26.99 OTHER ACUTE PULMONARY EMBOLISM WITHOUT ACUTE COR PULMONALE: Primary | ICD-10-CM

## 2018-03-01 DIAGNOSIS — E66.09 CLASS 1 OBESITY DUE TO EXCESS CALORIES WITH SERIOUS COMORBIDITY AND BODY MASS INDEX (BMI) OF 34.0 TO 34.9 IN ADULT: ICD-10-CM

## 2018-03-01 DIAGNOSIS — R91.1 LUNG NODULE, SOLITARY: ICD-10-CM

## 2018-03-01 DIAGNOSIS — I82.431 ACUTE DEEP VEIN THROMBOSIS (DVT) OF POPLITEAL VEIN OF RIGHT LOWER EXTREMITY: ICD-10-CM

## 2018-03-01 DIAGNOSIS — J41.1 BRONCHITIS, CHRONIC, MUCOPURULENT: ICD-10-CM

## 2018-03-01 DIAGNOSIS — I48.91 A-FIB: ICD-10-CM

## 2018-03-01 DIAGNOSIS — E11.69 HYPERLIPIDEMIA ASSOCIATED WITH TYPE 2 DIABETES MELLITUS: ICD-10-CM

## 2018-03-01 PROBLEM — I48.19 PERSISTENT ATRIAL FIBRILLATION: Status: ACTIVE | Noted: 2018-03-01

## 2018-03-01 LAB
ANION GAP SERPL CALC-SCNC: 11 MMOL/L
APTT BLDCRRT: 36.2 SEC
BASOPHILS # BLD AUTO: 0.1 K/UL
BASOPHILS NFR BLD: 1.1 %
BUN SERPL-MCNC: 26 MG/DL
CALCIUM SERPL-MCNC: 9.4 MG/DL
CHLORIDE SERPL-SCNC: 107 MMOL/L
CO2 SERPL-SCNC: 23 MMOL/L
CREAT SERPL-MCNC: 1.2 MG/DL
DIFFERENTIAL METHOD: ABNORMAL
EOSINOPHIL # BLD AUTO: 0.3 K/UL
EOSINOPHIL NFR BLD: 2.6 %
ERYTHROCYTE [DISTWIDTH] IN BLOOD BY AUTOMATED COUNT: 15.4 %
EST. GFR  (AFRICAN AMERICAN): >60 ML/MIN/1.73 M^2
EST. GFR  (NON AFRICAN AMERICAN): >60 ML/MIN/1.73 M^2
ESTIMATED AVG GLUCOSE: 154 MG/DL
GLUCOSE SERPL-MCNC: 111 MG/DL
HBA1C MFR BLD HPLC: 7 %
HCT VFR BLD AUTO: 38.8 %
HGB BLD-MCNC: 12.5 G/DL
INR PPP: 1.3
LYMPHOCYTES # BLD AUTO: 1.5 K/UL
LYMPHOCYTES NFR BLD: 14.5 %
MCH RBC QN AUTO: 28.3 PG
MCHC RBC AUTO-ENTMCNC: 32.1 G/DL
MCV RBC AUTO: 88 FL
MONOCYTES # BLD AUTO: 0.5 K/UL
MONOCYTES NFR BLD: 4.8 %
NEUTROPHILS # BLD AUTO: 7.8 K/UL
NEUTROPHILS NFR BLD: 77 %
PLATELET # BLD AUTO: 201 K/UL
PMV BLD AUTO: 10.2 FL
POCT GLUCOSE: 189 MG/DL (ref 70–110)
POCT GLUCOSE: 192 MG/DL (ref 70–110)
POCT GLUCOSE: 195 MG/DL (ref 70–110)
POTASSIUM SERPL-SCNC: 4.1 MMOL/L
PROTHROMBIN TIME: 12.8 SEC
RBC # BLD AUTO: 4.41 M/UL
RETIRED EF AND QEF - SEE NOTES: 41 (ref 55–65)
SODIUM SERPL-SCNC: 141 MMOL/L
TRICUSPID VALVE REGURGITATION: ABNORMAL
WBC # BLD AUTO: 10.2 K/UL

## 2018-03-01 PROCEDURE — 80048 BASIC METABOLIC PNL TOTAL CA: CPT

## 2018-03-01 PROCEDURE — 36415 COLL VENOUS BLD VENIPUNCTURE: CPT

## 2018-03-01 PROCEDURE — 25500020 PHARM REV CODE 255: Performed by: INTERNAL MEDICINE

## 2018-03-01 PROCEDURE — 71275 CT ANGIOGRAPHY CHEST: CPT | Mod: 26,,, | Performed by: RADIOLOGY

## 2018-03-01 PROCEDURE — 85730 THROMBOPLASTIN TIME PARTIAL: CPT

## 2018-03-01 PROCEDURE — 12000002 HC ACUTE/MED SURGE SEMI-PRIVATE ROOM

## 2018-03-01 PROCEDURE — 99284 EMERGENCY DEPT VISIT MOD MDM: CPT

## 2018-03-01 PROCEDURE — 71275 CT ANGIOGRAPHY CHEST: CPT | Mod: TC

## 2018-03-01 PROCEDURE — 25500020 PHARM REV CODE 255

## 2018-03-01 PROCEDURE — 63600175 PHARM REV CODE 636 W HCPCS: Performed by: NURSE PRACTITIONER

## 2018-03-01 PROCEDURE — 83036 HEMOGLOBIN GLYCOSYLATED A1C: CPT

## 2018-03-01 PROCEDURE — 85025 COMPLETE CBC W/AUTO DIFF WBC: CPT

## 2018-03-01 PROCEDURE — 25000003 PHARM REV CODE 250: Performed by: HOSPITALIST

## 2018-03-01 PROCEDURE — 25000003 PHARM REV CODE 250: Performed by: EMERGENCY MEDICINE

## 2018-03-01 PROCEDURE — 63600175 PHARM REV CODE 636 W HCPCS: Performed by: HOSPITALIST

## 2018-03-01 PROCEDURE — 85610 PROTHROMBIN TIME: CPT

## 2018-03-01 RX ORDER — IBUPROFEN 200 MG
24 TABLET ORAL
Status: DISCONTINUED | OUTPATIENT
Start: 2018-03-01 | End: 2018-03-02 | Stop reason: HOSPADM

## 2018-03-01 RX ORDER — AMOXICILLIN 250 MG
1 CAPSULE ORAL 2 TIMES DAILY
Status: DISCONTINUED | OUTPATIENT
Start: 2018-03-01 | End: 2018-03-02 | Stop reason: HOSPADM

## 2018-03-01 RX ORDER — LANOLIN ALCOHOL/MO/W.PET/CERES
800 CREAM (GRAM) TOPICAL
Status: DISCONTINUED | OUTPATIENT
Start: 2018-03-01 | End: 2018-03-02 | Stop reason: HOSPADM

## 2018-03-01 RX ORDER — PANTOPRAZOLE SODIUM 40 MG/1
40 TABLET, DELAYED RELEASE ORAL DAILY
Status: DISCONTINUED | OUTPATIENT
Start: 2018-03-01 | End: 2018-03-02 | Stop reason: HOSPADM

## 2018-03-01 RX ORDER — AMLODIPINE BESYLATE 5 MG/1
10 TABLET ORAL DAILY
Status: DISCONTINUED | OUTPATIENT
Start: 2018-03-01 | End: 2018-03-02 | Stop reason: HOSPADM

## 2018-03-01 RX ORDER — LOSARTAN POTASSIUM AND HYDROCHLOROTHIAZIDE 12.5; 5 MG/1; MG/1
1 TABLET ORAL DAILY
Status: DISCONTINUED | OUTPATIENT
Start: 2018-03-01 | End: 2018-03-01 | Stop reason: SDUPTHER

## 2018-03-01 RX ORDER — HYDROCHLOROTHIAZIDE 12.5 MG/1
12.5 TABLET ORAL DAILY
Status: DISCONTINUED | OUTPATIENT
Start: 2018-03-01 | End: 2018-03-02 | Stop reason: HOSPADM

## 2018-03-01 RX ORDER — PRAVASTATIN SODIUM 40 MG/1
40 TABLET ORAL DAILY
Status: DISCONTINUED | OUTPATIENT
Start: 2018-03-01 | End: 2018-03-01 | Stop reason: SDUPTHER

## 2018-03-01 RX ORDER — PRAVASTATIN SODIUM 40 MG/1
40 TABLET ORAL DAILY
Status: DISCONTINUED | OUTPATIENT
Start: 2018-03-01 | End: 2018-03-02 | Stop reason: HOSPADM

## 2018-03-01 RX ORDER — LOSARTAN POTASSIUM 25 MG/1
50 TABLET ORAL DAILY
Status: DISCONTINUED | OUTPATIENT
Start: 2018-03-01 | End: 2018-03-02 | Stop reason: HOSPADM

## 2018-03-01 RX ORDER — ACETAMINOPHEN 325 MG/1
650 TABLET ORAL EVERY 6 HOURS PRN
Status: DISCONTINUED | OUTPATIENT
Start: 2018-03-01 | End: 2018-03-02 | Stop reason: HOSPADM

## 2018-03-01 RX ORDER — SODIUM CHLORIDE 0.9 % (FLUSH) 0.9 %
5 SYRINGE (ML) INJECTION
Status: DISCONTINUED | OUTPATIENT
Start: 2018-03-01 | End: 2018-03-02 | Stop reason: HOSPADM

## 2018-03-01 RX ORDER — IBUPROFEN 200 MG
16 TABLET ORAL
Status: DISCONTINUED | OUTPATIENT
Start: 2018-03-01 | End: 2018-03-02 | Stop reason: HOSPADM

## 2018-03-01 RX ORDER — ONDANSETRON 2 MG/ML
4 INJECTION INTRAMUSCULAR; INTRAVENOUS EVERY 8 HOURS PRN
Status: DISCONTINUED | OUTPATIENT
Start: 2018-03-01 | End: 2018-03-02 | Stop reason: HOSPADM

## 2018-03-01 RX ORDER — INSULIN ASPART 100 [IU]/ML
0-5 INJECTION, SOLUTION INTRAVENOUS; SUBCUTANEOUS
Status: DISCONTINUED | OUTPATIENT
Start: 2018-03-01 | End: 2018-03-02 | Stop reason: HOSPADM

## 2018-03-01 RX ORDER — POTASSIUM CHLORIDE 20 MEQ/15ML
40 SOLUTION ORAL
Status: DISCONTINUED | OUTPATIENT
Start: 2018-03-01 | End: 2018-03-02 | Stop reason: HOSPADM

## 2018-03-01 RX ORDER — RAMELTEON 8 MG/1
8 TABLET ORAL NIGHTLY PRN
Status: DISCONTINUED | OUTPATIENT
Start: 2018-03-01 | End: 2018-03-02 | Stop reason: HOSPADM

## 2018-03-01 RX ORDER — METOPROLOL SUCCINATE 25 MG/1
25 TABLET, EXTENDED RELEASE ORAL DAILY
Status: DISCONTINUED | OUTPATIENT
Start: 2018-03-01 | End: 2018-03-02 | Stop reason: HOSPADM

## 2018-03-01 RX ORDER — SODIUM CHLORIDE 9 MG/ML
INJECTION, SOLUTION INTRAVENOUS
Status: DISPENSED
Start: 2018-03-01 | End: 2018-03-01

## 2018-03-01 RX ORDER — GLUCAGON 1 MG
1 KIT INJECTION
Status: DISCONTINUED | OUTPATIENT
Start: 2018-03-01 | End: 2018-03-02 | Stop reason: HOSPADM

## 2018-03-01 RX ADMIN — AMLODIPINE BESYLATE 10 MG: 5 TABLET ORAL at 10:03

## 2018-03-01 RX ADMIN — SULFUR HEXAFLUORIDE 2.4 ML: KIT at 03:03

## 2018-03-01 RX ADMIN — HYDROCHLOROTHIAZIDE 12.5 MG: 12.5 TABLET ORAL at 10:03

## 2018-03-01 RX ADMIN — PRAVASTATIN SODIUM 40 MG: 40 TABLET ORAL at 05:03

## 2018-03-01 RX ADMIN — INSULIN DETEMIR 24 UNITS: 100 INJECTION, SOLUTION SUBCUTANEOUS at 08:03

## 2018-03-01 RX ADMIN — RIVAROXABAN 20 MG: 20 TABLET, FILM COATED ORAL at 05:03

## 2018-03-01 RX ADMIN — PANTOPRAZOLE SODIUM 40 MG: 40 TABLET, DELAYED RELEASE ORAL at 10:03

## 2018-03-01 RX ADMIN — LOSARTAN POTASSIUM 50 MG: 25 TABLET, FILM COATED ORAL at 10:03

## 2018-03-01 RX ADMIN — IOHEXOL 100 ML: 350 INJECTION, SOLUTION INTRAVENOUS at 07:03

## 2018-03-01 RX ADMIN — HUMAN ALBUMIN MICROSPHERES AND PERFLUTREN 0.11 MG: 10; .22 INJECTION, SOLUTION INTRAVENOUS at 03:03

## 2018-03-01 RX ADMIN — METOPROLOL SUCCINATE 25 MG: 25 TABLET, EXTENDED RELEASE ORAL at 10:03

## 2018-03-01 NOTE — PROGRESS NOTES
"Admission completed. Patient reports that he has been short of breath for a month. Had an CXR and his PCP Dr. Galicia diagnosed him with possible PE and told him to come the ED for a Ct. Patient confirmed to have a PE    Patient also is starting on Xeralto today at 5 pm. ]  Patient also reports that he sees a dermatologist, Dr. Louise Suh, for Q6-12 check ups for skin cancer. Last time skin cancer was removed was last year.  His diabetes doctor is Dr. BLANK Stallinsg. Patient states he has neuropathy in his feet.   Patient also states that he does not take the Detemir in the mornings, he takes in the evenings, and he takes the pravastin "around 6  Pm". Pharmacy has been notified.   "

## 2018-03-01 NOTE — SUBJECTIVE & OBJECTIVE
Past Medical History:   Diagnosis Date    A-fib     Allergy     Arthritis     Cataract     Diabetes mellitus     Diabetes mellitus type II     Hay fever     Hyperlipidemia     Hypertension     Joint pain     Obesity     Pulmonary emboli     Ulcer        Past Surgical History:   Procedure Laterality Date    COLONOSCOPY N/A 10/5/2015    Procedure: COLONOSCOPY;  Surgeon: Pro Jang MD;  Location: [address]);  Service: Endoscopy;  Laterality: N/A;    thumb surgery         Review of patient's allergies indicates:   Allergen Reactions    Codeine Nausea And Vomiting     Other reaction(s): Nausea    Benicar  [olmesartan]      Other reaction(s): SPOTS IN FRONT OF EYES       Current Facility-Administered Medications on File Prior to Encounter   Medication    [COMPLETED] omnipaque 350 iohexol 350 mg iodine/mL    sodium chloride 0.9% infusion    triamcinolone hexacetonide injection 40 mg     Current Outpatient Prescriptions on File Prior to Encounter   Medication Sig    amlodipine (NORVASC) 10 MG tablet Take 1 tablet (10 mg total) by mouth once daily.    blood sugar diagnostic Strp 1 strip by Misc.(Non-Drug; Combo Route) route 2 (two) times daily with meals.    cholecalciferol, vitamin D3, (VITAMIN D3) 1,000 unit capsule Take 1,000 Units by mouth once daily.    cyanocobalamin (VITAMIN B-12) 500 MCG tablet Take 2 tablets (1,000 mcg total) by mouth once daily.    insulin glargine (LANTUS SOLOSTAR) 100 unit/mL (3 mL) InPn pen 24 units every nights    JANUVIA 100 mg Tab TAKE 1 TABLET BY MOUTH ONCE DAILY.    LANCETS & BLOOD GLUCOSE STRIPS MISC * * * Twice a day .  check glucose twice a day    losartan-hydrochlorothiazide 50-12.5 mg (HYZAAR) 50-12.5 mg per tablet Take 1 tablet by mouth once daily.    metformin (GLUCOPHAGE-XR) 500 MG 24 hr tablet TAKE 2 TABLETS (1,000 MG TOTAL) BY MOUTH 2 (TWO) TIMES DAILY WITH MEALS.    metoprolol succinate (TOPROL-XL) 25 MG 24 hr tablet Take 1 tablet (25  "mg total) by mouth once daily.    omega-3 fatty acids-vitamin E (OMEGA-3 FISH OIL) 1,000-5 mg-unit Cap Take by mouth. 1 Capsule Oral Every day    omeprazole (PRILOSEC OTC) 20 MG tablet Take by mouth. 1 Tablet, Delayed Release (E.C.) Oral Every day    pen needle, diabetic 29 gauge x 1/2" Ndle Inject insulin daily    pravastatin (PRAVACHOL) 40 MG tablet Take 1 tablet (40 mg total) by mouth once daily.    rivaroxaban (XARELTO) 20 mg Tab Take 1 tablet (20 mg total) by mouth daily with dinner or evening meal.    SITagliptin (JANUVIA) 100 MG Tab Take 1 tablet (100 mg total) by mouth once daily.    tadalafil (CIALIS) 20 MG Tab 1 TABLET PRIOR TO SEX     Family History     Problem Relation (Age of Onset)    Cancer Brother, Sister, Brother    Cataracts Mother    Glaucoma Mother    Hypertension Father        Social History Main Topics    Smoking status: Never Smoker    Smokeless tobacco: Never Used    Alcohol use Yes      Comment: rare    Drug use: No    Sexual activity: Not on file     Review of Systems   Constitutional: Positive for fatigue. Negative for activity change and fever.   HENT: Positive for postnasal drip. Negative for ear discharge, facial swelling and sore throat.    Eyes: Negative for photophobia and visual disturbance.   Respiratory: Positive for apnea, cough and shortness of breath.    Cardiovascular: Positive for leg swelling. Negative for chest pain.   Gastrointestinal: Negative for abdominal pain and blood in stool.   Endocrine: Negative for cold intolerance and heat intolerance.   Genitourinary: Negative for dysuria and frequency.   Musculoskeletal: Negative for back pain and gait problem.   Skin: Negative for pallor and rash.   Neurological: Negative for speech difficulty and headaches.   Psychiatric/Behavioral: Negative for confusion, hallucinations and suicidal ideas.   All other systems reviewed and are negative.    Objective:     Vital Signs (Most Recent):  Temp: 97.8 °F (36.6 °C) " (03/01/18 1021)  Pulse: 87 (03/01/18 1021)  Resp: (!) 23 (03/01/18 1021)  BP: (!) 142/81 (03/01/18 1021)  SpO2: 97 % (03/01/18 1021) Vital Signs (24h Range):  Temp:  [97.6 °F (36.4 °C)-97.8 °F (36.6 °C)] 97.8 °F (36.6 °C)  Pulse:  [80-87] 87  Resp:  [18-23] 23  SpO2:  [95 %-97 %] 97 %  BP: (135-149)/(81-86) 142/81     Weight: 112.9 kg (248 lb 14.4 oz)  Body mass index is 32.84 kg/m².    Physical Exam   Constitutional: He is oriented to person, place, and time. He appears well-developed and well-nourished. No distress.   HENT:   Head: Normocephalic.   Mouth/Throat: Oropharynx is clear and moist.   Eyes: Conjunctivae are normal. Right eye exhibits no discharge. Left eye exhibits no discharge. No scleral icterus.   Neck: No JVD present.   Cardiovascular: Normal rate and intact distal pulses.  Exam reveals no friction rub (asm).    No murmur heard.  Irregular irregular  +1 pretib edema   Pulmonary/Chest: Effort normal and breath sounds normal. No respiratory distress. He has no rales.   Abdominal: Soft. Bowel sounds are normal. He exhibits no distension. There is no tenderness.   Musculoskeletal: Normal range of motion. He exhibits edema.        Right shoulder: He exhibits no swelling.   Lymphadenopathy:     He has no cervical adenopathy.   Neurological: He is alert and oriented to person, place, and time. He has normal reflexes.   Skin: Skin is warm and dry. No rash noted. He is not diaphoretic. No erythema.   Psychiatric: He has a normal mood and affect. His behavior is normal.   Nursing note and vitals reviewed.          Significant Labs:   A1C:   Recent Labs  Lab 12/06/17  1022   HGBA1C 7.5*     CBC:   Recent Labs  Lab 02/27/18  1605 03/01/18  0910   WBC 12.74* 10.20   HGB 12.7* 12.5*   HCT 38.4* 38.8*    201     CMP:   Recent Labs  Lab 02/27/18  1605 03/01/18  0910    141   K 4.6 4.1    107   CO2 25 23   * 111*   BUN 27* 26*   CREATININE 1.5* 1.2   CALCIUM 9.5 9.4   PROT 7.2  --    ALBUMIN  3.6  --    BILITOT 0.8  --    ALKPHOS 123  --    AST 30  --    ALT 52*  --    ANIONGAP 10 11   EGFRNONAA 46.2* >60       Significant Imaging: I have reviewed and interpreted all pertinent imaging results/findings within the past 24 hours.     CTA chest  Positive study demonstrating bilateral central and segmental pulmonary emboli, as detailed above.  Small pulmonary infarct involving the superior segment of the left lower lobe and possible small pulmonary infarcts involving the lateral segment of the right middle lobe also noted.  There is also flattening of the interventricular septum which may indicate developing right heart strain.    Small pulmonary nodules in the bilateral lungs measuring up to 6 mm.  Single 1 year follow up CT of the thorax is recommended for surveillance.    Incidental note of cholelithiasis.

## 2018-03-01 NOTE — HPI
Des Junior is a 72 year old male with PMH of DM type 2, hyperlipidemia, hypertension, and recent diagonosis of Pulmoanry embolic and atrial fibrillation. He reports shortness of breath for 3 weeks with ambulation ~ 25 feet and walking up stairs. He was evaluated by his PCP on 2/27 for same complaint, D-dimer and BNP ordered. He was noted to have elevated D-dimer therefore he was initiated on xarelto and underwent CTA chest this am. He took his first dose of Xarelto yesterday and am.  CTA chest demonstrated bilateral pulmoonay emboli with pulmonary infarcts prompting ED evaluation. He reports bilateral lower ext swelling which is unchanged. Denies recent road trips and flights. He was recently diagnosed with AFib and is scheduled for outpatient echo and Cardiology apt with Dr. Alicea at [facility].  Denies dizziness, lightheadedness, fever, and chills.

## 2018-03-01 NOTE — UM SECONDARY REVIEW
Physician Advisor External    Level of Care Issue    Approved Inpatient for admit 3/1/18 per dr lynn at [facility]....

## 2018-03-01 NOTE — ASSESSMENT & PLAN NOTE
Chronic resume statin.   accjuan carlos ac and HS. Insulin correction scale.   Resume home long acting insulin.   Hold oral hyperglycemic agents

## 2018-03-01 NOTE — ASSESSMENT & PLAN NOTE
Unknown etiology. Check US of BLE.   Initiated on OAC-Xarelto   Check echo.   Patient with pulmonary infarcts. Consult pulmonary  Oxygen to maintain O2 sat >92 %.

## 2018-03-01 NOTE — H&P
[facility] Medicine  History & Physical    Patient Name: Des Junior  MRN: [id number]  Admission Date: 3/1/2018  Attending Physician: Sam Yanes MD   Primary Care Provider: Mark Mchugh MD         Patient information was obtained from patient and ER records.     Subjective:     Principal Problem:Pulmonary embolus    Chief Complaint:   Chief Complaint   Patient presents with    Shortness of Breath     completed CT this morning outpatient, was advised by PCP to then present to ER for possible admission         HPI: Des Junior is a 72 year old male with PMH of DM type 2, hyperlipidemia, hypertension, and recent diagonosis of Pulmoanry embolic and atrial fibrillation. He reports shortness of breath for 3 weeks with ambulation ~ 25 feet and walking up stairs. He was evaluated by his PCP on 2/27 for same complaint, D-dimer and BNP ordered. He was noted to have elevated D-dimer therefore he was initiated on xarelto and underwent CTA chest this am. He took his first dose of Xarelto yesterday and am.  CTA chest demonstrated bilateral pulmoonay emboli with pulmonary infarcts prompting ED evaluation. He reports bilateral lower ext swelling which is unchanged. Denies recent road trips and flights. He was recently diagnosed with AFib and is scheduled for outpatient echo and Cardiology [place] [facility]  Denies dizziness, lightheadedness, fever, and chills.     Past Medical History:   Diagnosis Date    A-fib     Allergy     Arthritis     Cataract     Diabetes mellitus     Diabetes mellitus type II     Hay fever     Hyperlipidemia     Hypertension     Joint pain     Obesity     Pulmonary emboli     Ulcer        Past Surgical History:   Procedure Laterality Date    COLONOSCOPY N/A 10/5/2015    Procedure: COLONOSCOPY;  Surgeon: Pro Jang MD;  Location: [facility] ([address] [place] [address]);  Service: Endoscopy;  Laterality: N/A;    thumb surgery    "      Review of patient's allergies indicates:   Allergen Reactions    Codeine Nausea And Vomiting     Other reaction(s): Nausea    Benicar  [olmesartan]      Other reaction(s): SPOTS IN FRONT OF EYES       Current Facility-Administered Medications on File Prior to Encounter   Medication    [COMPLETED] omnipaque 350 iohexol 350 mg iodine/mL    sodium chloride 0.9% infusion    triamcinolone hexacetonide injection 40 mg     Current Outpatient Prescriptions on File Prior to Encounter   Medication Sig    amlodipine (NORVASC) 10 MG tablet Take 1 tablet (10 mg total) by mouth once daily.    blood sugar diagnostic Strp 1 strip by Misc.(Non-Drug; Combo Route) route 2 (two) times daily with meals.    cholecalciferol, vitamin D3, (VITAMIN D3) 1,000 unit capsule Take 1,000 Units by mouth once daily.    cyanocobalamin (VITAMIN B-12) 500 MCG tablet Take 2 tablets (1,000 mcg total) by mouth once daily.    insulin glargine (LANTUS SOLOSTAR) 100 unit/mL (3 mL) InPn pen 24 units every nights    JANUVIA 100 mg Tab TAKE 1 TABLET BY MOUTH ONCE DAILY.    LANCETS & BLOOD GLUCOSE STRIPS MISC * * * Twice a day .  check glucose twice a day    losartan-hydrochlorothiazide 50-12.5 mg (HYZAAR) 50-12.5 mg per tablet Take 1 tablet by mouth once daily.    metformin (GLUCOPHAGE-XR) 500 MG 24 hr tablet TAKE 2 TABLETS (1,000 MG TOTAL) BY MOUTH 2 (TWO) TIMES DAILY WITH MEALS.    metoprolol succinate (TOPROL-XL) 25 MG 24 hr tablet Take 1 tablet (25 mg total) by mouth once daily.    omega-3 fatty acids-vitamin E (OMEGA-3 FISH OIL) 1,000-5 mg-unit Cap Take by mouth. 1 Capsule Oral Every day    omeprazole (PRILOSEC OTC) 20 MG tablet Take by mouth. 1 Tablet, Delayed Release (E.C.) Oral Every day    pen needle, diabetic 29 gauge x 1/2" Ndle Inject insulin daily    pravastatin (PRAVACHOL) 40 MG tablet Take 1 tablet (40 mg total) by mouth once daily.    rivaroxaban (XARELTO) 20 mg Tab Take 1 tablet (20 mg total) by mouth daily with dinner " or evening meal.    SITagliptin (JANUVIA) 100 MG Tab Take 1 tablet (100 mg total) by mouth once daily.    tadalafil (CIALIS) 20 MG Tab 1 TABLET PRIOR TO SEX     Family History     Problem Relation (Age of Onset)    Cancer Brother, Sister, Brother    Cataracts Mother    Glaucoma Mother    Hypertension Father        Social History Main Topics    Smoking status: Never Smoker    Smokeless tobacco: Never Used    Alcohol use Yes      Comment: rare    Drug use: No    Sexual activity: Not on file     Review of Systems   Constitutional: Positive for fatigue. Negative for activity change and fever.   HENT: Positive for postnasal drip. Negative for ear discharge, facial swelling and sore throat.    Eyes: Negative for photophobia and visual disturbance.   Respiratory: Positive for apnea, cough and shortness of breath.    Cardiovascular: Positive for leg swelling. Negative for chest pain.   Gastrointestinal: Negative for abdominal pain and blood in stool.   Endocrine: Negative for cold intolerance and heat intolerance.   Genitourinary: Negative for dysuria and frequency.   Musculoskeletal: Negative for back pain and gait problem.   Skin: Negative for pallor and rash.   Neurological: Negative for speech difficulty and headaches.   Psychiatric/Behavioral: Negative for confusion, hallucinations and suicidal ideas.   All other systems reviewed and are negative.    Objective:     Vital Signs (Most Recent):  Temp: 97.8 °F (36.6 °C) (03/01/18 1021)  Pulse: 87 (03/01/18 1021)  Resp: (!) 23 (03/01/18 1021)  BP: (!) 142/81 (03/01/18 1021)  SpO2: 97 % (03/01/18 1021) Vital Signs (24h Range):  Temp:  [97.6 °F (36.4 °C)-97.8 °F (36.6 °C)] 97.8 °F (36.6 °C)  Pulse:  [80-87] 87  Resp:  [18-23] 23  SpO2:  [95 %-97 %] 97 %  BP: (135-149)/(81-86) 142/81     Weight: 112.9 kg (248 lb 14.4 oz)  Body mass index is 32.84 kg/m².    Physical Exam   Constitutional: He is oriented to person, place, and time. He appears well-developed and  well-nourished. No distress.   HENT:   Head: Normocephalic.   Mouth/Throat: Oropharynx is clear and moist.   Eyes: Conjunctivae are normal. Right eye exhibits no discharge. Left eye exhibits no discharge. No scleral icterus.   Neck: No JVD present.   Cardiovascular: Normal rate and intact distal pulses.  Exam reveals no friction rub (asm).    No murmur heard.  Irregular irregular  +1 pretib edema   Pulmonary/Chest: Effort normal and breath sounds normal. No respiratory distress. He has no rales.   Abdominal: Soft. Bowel sounds are normal. He exhibits no distension. There is no tenderness.   Musculoskeletal: Normal range of motion. He exhibits edema.        Right shoulder: He exhibits no swelling.   Lymphadenopathy:     He has no cervical adenopathy.   Neurological: He is alert and oriented to person, place, and time. He has normal reflexes.   Skin: Skin is warm and dry. No rash noted. He is not diaphoretic. No erythema.   Psychiatric: He has a normal mood and affect. His behavior is normal.   Nursing note and vitals reviewed.          Significant Labs:   A1C:   Recent Labs  Lab 12/06/17  1022   HGBA1C 7.5*     CBC:   Recent Labs  Lab 02/27/18  1605 03/01/18  0910   WBC 12.74* 10.20   HGB 12.7* 12.5*   HCT 38.4* 38.8*    201     CMP:   Recent Labs  Lab 02/27/18  1605 03/01/18  0910    141   K 4.6 4.1    107   CO2 25 23   * 111*   BUN 27* 26*   CREATININE 1.5* 1.2   CALCIUM 9.5 9.4   PROT 7.2  --    ALBUMIN 3.6  --    BILITOT 0.8  --    ALKPHOS 123  --    AST 30  --    ALT 52*  --    ANIONGAP 10 11   EGFRNONAA 46.2* >60       Significant Imaging: I have reviewed and interpreted all pertinent imaging results/findings within the past 24 hours.     CTA chest  Positive study demonstrating bilateral central and segmental pulmonary emboli, as detailed above.  Small pulmonary infarct involving the superior segment of the left lower lobe and possible small pulmonary infarcts involving the lateral  segment of the right middle lobe also noted.  There is also flattening of the interventricular septum which may indicate developing right heart strain.    Small pulmonary nodules in the bilateral lungs measuring up to 6 mm.  Single 1 year follow up CT of the thorax is recommended for surveillance.    Incidental note of cholelithiasis.    Assessment/Plan:     * Pulmonary embolus    Unknown etiology. Check US of BLE.   Initiated on OAC-Xarelto   Check echo.   Patient with pulmonary infarcts. Consult pulmonary  Oxygen to maintain O2 sat >92 %.             Persistent atrial fibrillation    New onset of afib.  telemetry.   Check echo  Resume xarelto and  metoprolol  Consult cardiology.           Type 2 diabetes, controlled, with neuropathy    Chronic. As evidence by examination.         Obesity    Body mass index is 32.84 kg/m².  Recommend diabetic/cardiac diet.        Hypertension associated with diabetes    Chronic. Resume home arb/Hctz and metoprolol           Osteoarthritis of right knee              Hyperlipidemia associated with type 2 diabetes mellitus    Chronic resume statin.   accucheck ac and HS. Insulin correction scale.   Resume home long acting insulin.   Hold oral hyperglycemic agents            VTE Risk Mitigation         Ordered     rivaroxaban tablet 20 mg  With dinner     Route:  Oral        03/01/18 1011     Reason for No Pharmacological VTE Prophylaxis  Once      03/01/18 1357     Medium Risk of VTE  Once      03/01/18 1357         discussed POC with patient and wife.   Time spent seeing patient( greater than 1/2 spent in direct contact) :  65 min    Lou Alcala NP  Department of Hospital Medicine   [facility]

## 2018-03-01 NOTE — ASSESSMENT & PLAN NOTE
New onset of afib.  telemetry.   Check echo  Resume xarelto and  metoprolol  Consult cardiology.

## 2018-03-01 NOTE — ED NOTES
Pt reports sob on exertion for the past 3 weeks, denies chest pain, pt reports he had CT done today and was referred to ED after the exam, pt denies abd pain, denies n/v/d, family in room with pt

## 2018-03-01 NOTE — ED PROVIDER NOTES
Encounter Date: 3/1/2018    SCRIBE #1 NOTE: I, Christine Kamara, am scribing for, and in the presence of, Dr. Coreas.       History     Chief Complaint   Patient presents with    Shortness of Breath     completed CT this morning outpatient, was advised by PCP to then present to ER for possible admission        03/01/2018  8:59 AM    Chief Complaint: SOB      The patient is a 72 y.o. male who presents with gradual onset of SOB for 3 weeks. Pt states he experiences SOB only with exertion. Pt completed outpatient CT this morning and was advised by his PCP to present to the ER. Pt has bilateral PEs with pulmonary infarcts. Pt was placed on xarelto yesterday. Pt was recently diagnosed with A-fib. Denies leg swelling, chest pain, cough, recent surgery. PMHx includes diabetes mellitus, HLD, and HTN. PSHx includes colonoscopy.        The history is provided by the patient, the spouse and medical records.     Review of patient's allergies indicates:   Allergen Reactions    Codeine Nausea And Vomiting     Other reaction(s): Nausea    Benicar  [olmesartan]      Other reaction(s): SPOTS IN FRONT OF EYES     Past Medical History:   Diagnosis Date    Allergy     Arthritis     Cataract     Diabetes mellitus     Diabetes mellitus type II     Hay fever     Hyperlipidemia     Hypertension     Joint pain     Obesity     Ulcer      Past Surgical History:   Procedure Laterality Date    COLONOSCOPY N/A 10/5/2015    Procedure: COLONOSCOPY;  Surgeon: Pro Jang MD;  Location: [facility] ([address] [place] [address]);  Service: Endoscopy;  Laterality: N/A;    thumb surgery       Family History   Problem Relation Age of Onset    Hypertension Father     Cancer Brother      colon    Cataracts Mother     Glaucoma Mother     Cancer Sister      lymphoma    Cancer Brother      prostate ce    Melanoma Neg Hx     Amblyopia Neg Hx     Blindness Neg Hx     Macular degeneration Neg Hx     Retinal detachment Neg Hx     Strabismus Neg Hx      Diabetes Neg Hx      Social History   Substance Use Topics    Smoking status: Never Smoker    Smokeless tobacco: Never Used    Alcohol use Yes      Comment: rare     Review of Systems   Constitutional: Negative for activity change, appetite change, chills, fatigue and fever.   Eyes: Negative for visual disturbance.   Respiratory: Positive for shortness of breath. Negative for apnea and cough.    Cardiovascular: Negative for chest pain, palpitations and leg swelling.   Gastrointestinal: Negative for abdominal distention and abdominal pain.   Genitourinary: Negative for difficulty urinating.   Musculoskeletal: Negative for neck pain.   Skin: Negative for pallor and rash.   Neurological: Negative for headaches.   Hematological: Does not bruise/bleed easily.   Psychiatric/Behavioral: Negative for agitation.       Physical Exam     Initial Vitals [03/01/18 0841]   BP Pulse Resp Temp SpO2   (!) 149/82 85 18 97.6 °F (36.4 °C) 95 %      MAP       104.33         Physical Exam    Nursing note and vitals reviewed.  Constitutional: He appears well-developed and well-nourished.   HENT:   Head: Normocephalic and atraumatic.   Eyes: Conjunctivae are normal.   Neck: Normal range of motion. Neck supple.   Cardiovascular: Normal rate, regular rhythm and normal heart sounds. Exam reveals no gallop and no friction rub.    No murmur heard.  Pulmonary/Chest: Breath sounds normal. No respiratory distress. He has no wheezes. He has no rhonchi. He has no rales.   Abdominal: Soft.   Musculoskeletal: Normal range of motion.   Neurological: He is alert and oriented to person, place, and time.   Skin: Skin is warm and dry.   1 cm sebaceous cyst on upper back.   Psychiatric: He has a normal mood and affect.         ED Course   Procedures  Labs Reviewed - No data to display          Medical Decision Making:   History:   Old Medical Records: I decided to obtain old medical records.  ED Management:  Des Junior is a 72 y.o. male who  presents with  bilateral pulmonary emboli diagnosed immediately prior to arrival by CTA of the chest.  He has had shortness of breath for the past 3 weeks and is found with new onset atrial fibrillation.  He was begun on L acquits yesterday which will be continued.  He has evidence of right ventricular strain requiring hospital admission.  Discussed with Dr. Yanes who will admit the patient       APC / Resident Notes:   I, Dr. Piotr Coreas III, personally performed the services described in this documentation. All medical record entries made by the scribe were at my direction and in my presence.  I have reviewed the chart and agree that the record reflects my personal performance and is accurate and complete. Piotr Coreas III, MD.  3:10 PM 03/01/2018       Scribe Attestation:   Scribe #1: I performed the above scribed service and the documentation accurately describes the services I performed. I attest to the accuracy of the note.               Clinical Impression:   No diagnosis found.    Disposition:   Disposition: Admitted                        Piotr Coreas III, MD  03/01/18 9552

## 2018-03-02 ENCOUNTER — TELEPHONE (OUTPATIENT)
Dept: CARDIOLOGY | Facility: CLINIC | Age: 73
End: 2018-03-02

## 2018-03-02 VITALS
HEIGHT: 73 IN | RESPIRATION RATE: 18 BRPM | BODY MASS INDEX: 32.37 KG/M2 | DIASTOLIC BLOOD PRESSURE: 67 MMHG | TEMPERATURE: 96 F | OXYGEN SATURATION: 97 % | SYSTOLIC BLOOD PRESSURE: 114 MMHG | WEIGHT: 244.25 LBS | HEART RATE: 83 BPM

## 2018-03-02 PROBLEM — Z77.090 ASBESTOS EXPOSURE: Status: ACTIVE | Noted: 2018-03-02

## 2018-03-02 PROBLEM — N17.9 AKI (ACUTE KIDNEY INJURY): Status: ACTIVE | Noted: 2018-03-02

## 2018-03-02 PROBLEM — I82.431 ACUTE DEEP VEIN THROMBOSIS (DVT) OF POPLITEAL VEIN OF RIGHT LOWER EXTREMITY: Status: ACTIVE | Noted: 2018-03-02

## 2018-03-02 PROBLEM — I51.9 LV DYSFUNCTION: Status: ACTIVE | Noted: 2018-03-02

## 2018-03-02 PROBLEM — J41.1 BRONCHITIS, CHRONIC, MUCOPURULENT: Status: ACTIVE | Noted: 2018-03-02

## 2018-03-02 PROBLEM — I48.91 A-FIB: Status: ACTIVE | Noted: 2018-03-01

## 2018-03-02 PROBLEM — R91.1 LUNG NODULE, SOLITARY: Status: ACTIVE | Noted: 2018-03-02

## 2018-03-02 LAB
ALBUMIN SERPL BCP-MCNC: 3.1 G/DL
ALP SERPL-CCNC: 103 U/L
ALT SERPL W/O P-5'-P-CCNC: 38 U/L
ANION GAP SERPL CALC-SCNC: 7 MMOL/L
AST SERPL-CCNC: 21 U/L
BASOPHILS # BLD AUTO: 0 K/UL
BASOPHILS NFR BLD: 0.4 %
BILIRUB SERPL-MCNC: 0.8 MG/DL
BUN SERPL-MCNC: 20 MG/DL
CALCIUM SERPL-MCNC: 9.4 MG/DL
CHLORIDE SERPL-SCNC: 107 MMOL/L
CHOLEST SERPL-MCNC: 94 MG/DL
CHOLEST/HDLC SERPL: 3.8 {RATIO}
CO2 SERPL-SCNC: 25 MMOL/L
COMPLEXED PSA SERPL-MCNC: 0.52 NG/ML
CREAT SERPL-MCNC: 1.3 MG/DL
DIFFERENTIAL METHOD: ABNORMAL
EOSINOPHIL # BLD AUTO: 0.4 K/UL
EOSINOPHIL NFR BLD: 4.2 %
ERYTHROCYTE [DISTWIDTH] IN BLOOD BY AUTOMATED COUNT: 15.2 %
EST. GFR  (AFRICAN AMERICAN): >60 ML/MIN/1.73 M^2
EST. GFR  (NON AFRICAN AMERICAN): 55 ML/MIN/1.73 M^2
GLUCOSE SERPL-MCNC: 141 MG/DL
HCT VFR BLD AUTO: 35.8 %
HDLC SERPL-MCNC: 25 MG/DL
HDLC SERPL: 26.6 %
HGB BLD-MCNC: 11.8 G/DL
LDLC SERPL CALC-MCNC: 57.2 MG/DL
LYMPHOCYTES # BLD AUTO: 1.7 K/UL
LYMPHOCYTES NFR BLD: 16.7 %
MAGNESIUM SERPL-MCNC: 1.7 MG/DL
MCH RBC QN AUTO: 28.6 PG
MCHC RBC AUTO-ENTMCNC: 32.8 G/DL
MCV RBC AUTO: 87 FL
MONOCYTES # BLD AUTO: 0.7 K/UL
MONOCYTES NFR BLD: 6.7 %
NEUTROPHILS # BLD AUTO: 7.3 K/UL
NEUTROPHILS NFR BLD: 72 %
NONHDLC SERPL-MCNC: 69 MG/DL
PHOSPHATE SERPL-MCNC: 3.1 MG/DL
PLATELET # BLD AUTO: 184 K/UL
PMV BLD AUTO: 9.8 FL
POCT GLUCOSE: 142 MG/DL (ref 70–110)
POCT GLUCOSE: 208 MG/DL (ref 70–110)
POTASSIUM SERPL-SCNC: 4.8 MMOL/L
PROT SERPL-MCNC: 6.2 G/DL
RBC # BLD AUTO: 4.12 M/UL
SODIUM SERPL-SCNC: 139 MMOL/L
TRIGL SERPL-MCNC: 59 MG/DL
TSH SERPL DL<=0.005 MIU/L-ACNC: 1.7 UIU/ML
WBC # BLD AUTO: 10.1 K/UL

## 2018-03-02 PROCEDURE — 87070 CULTURE OTHR SPECIMN AEROBIC: CPT

## 2018-03-02 PROCEDURE — 84100 ASSAY OF PHOSPHORUS: CPT

## 2018-03-02 PROCEDURE — 85025 COMPLETE CBC W/AUTO DIFF WBC: CPT

## 2018-03-02 PROCEDURE — 25000003 PHARM REV CODE 250: Performed by: HOSPITALIST

## 2018-03-02 PROCEDURE — 94761 N-INVAS EAR/PLS OXIMETRY MLT: CPT

## 2018-03-02 PROCEDURE — 99223 1ST HOSP IP/OBS HIGH 75: CPT | Mod: ,,, | Performed by: INTERNAL MEDICINE

## 2018-03-02 PROCEDURE — 25000003 PHARM REV CODE 250: Performed by: NURSE PRACTITIONER

## 2018-03-02 PROCEDURE — 83735 ASSAY OF MAGNESIUM: CPT

## 2018-03-02 PROCEDURE — 84443 ASSAY THYROID STIM HORMONE: CPT

## 2018-03-02 PROCEDURE — 27000221 HC OXYGEN, UP TO 24 HOURS

## 2018-03-02 PROCEDURE — 25000003 PHARM REV CODE 250: Performed by: EMERGENCY MEDICINE

## 2018-03-02 PROCEDURE — 80053 COMPREHEN METABOLIC PANEL: CPT

## 2018-03-02 PROCEDURE — 87205 SMEAR GRAM STAIN: CPT

## 2018-03-02 PROCEDURE — 87116 MYCOBACTERIA CULTURE: CPT

## 2018-03-02 PROCEDURE — 84153 ASSAY OF PSA TOTAL: CPT

## 2018-03-02 PROCEDURE — 87015 SPECIMEN INFECT AGNT CONCNTJ: CPT

## 2018-03-02 PROCEDURE — 80061 LIPID PANEL: CPT

## 2018-03-02 PROCEDURE — 36415 COLL VENOUS BLD VENIPUNCTURE: CPT

## 2018-03-02 PROCEDURE — 87206 SMEAR FLUORESCENT/ACID STAI: CPT

## 2018-03-02 RX ORDER — ALBUTEROL SULFATE 1.25 MG/3ML
1.25 SOLUTION RESPIRATORY (INHALATION) EVERY 6 HOURS PRN
Qty: 1 BOX | Refills: 0 | Status: SHIPPED | OUTPATIENT
Start: 2018-03-02 | End: 2018-03-22 | Stop reason: SDUPTHER

## 2018-03-02 RX ORDER — CETIRIZINE HYDROCHLORIDE 10 MG/1
10 TABLET ORAL DAILY
Status: DISCONTINUED | OUTPATIENT
Start: 2018-03-02 | End: 2018-03-02 | Stop reason: HOSPADM

## 2018-03-02 RX ORDER — IPRATROPIUM BROMIDE AND ALBUTEROL SULFATE 2.5; .5 MG/3ML; MG/3ML
3 SOLUTION RESPIRATORY (INHALATION) EVERY 8 HOURS
Status: DISCONTINUED | OUTPATIENT
Start: 2018-03-02 | End: 2018-03-02 | Stop reason: HOSPADM

## 2018-03-02 RX ORDER — LEVOFLOXACIN 500 MG/1
500 TABLET, FILM COATED ORAL DAILY
Qty: 10 TABLET | Refills: 1 | Status: SHIPPED | OUTPATIENT
Start: 2018-03-02 | End: 2018-03-20

## 2018-03-02 RX ORDER — CETIRIZINE HYDROCHLORIDE 10 MG/1
10 TABLET ORAL DAILY
Refills: 0 | COMMUNITY
Start: 2018-03-02 | End: 2020-12-22

## 2018-03-02 RX ADMIN — Medication 800 MG: at 10:03

## 2018-03-02 RX ADMIN — METOPROLOL SUCCINATE 25 MG: 25 TABLET, EXTENDED RELEASE ORAL at 08:03

## 2018-03-02 RX ADMIN — HYDROCHLOROTHIAZIDE 12.5 MG: 12.5 TABLET ORAL at 08:03

## 2018-03-02 RX ADMIN — PANTOPRAZOLE SODIUM 40 MG: 40 TABLET, DELAYED RELEASE ORAL at 08:03

## 2018-03-02 RX ADMIN — AMLODIPINE BESYLATE 10 MG: 5 TABLET ORAL at 08:03

## 2018-03-02 RX ADMIN — LOSARTAN POTASSIUM 50 MG: 25 TABLET, FILM COATED ORAL at 08:03

## 2018-03-02 RX ADMIN — CETIRIZINE HYDROCHLORIDE 10 MG: 10 TABLET, FILM COATED ORAL at 10:03

## 2018-03-02 RX ADMIN — STANDARDIZED SENNA CONCENTRATE AND DOCUSATE SODIUM 1 TABLET: 8.6; 5 TABLET, FILM COATED ORAL at 08:03

## 2018-03-02 NOTE — TELEPHONE ENCOUNTER
----- Message from Leslie Chapman NP sent at 3/2/2018 10:27 AM CST -----  Regarding: follow up   Patient seen in hospital for PE and new onset paroxsymal atrial fibrillation.  He has an appointment with EP, Dr. Alicea at [facility] on 03/20/2018 but needs a general cards f/u appt with Dr. Barajas in 3 weeks.     Thank you  Leslie

## 2018-03-02 NOTE — TELEPHONE ENCOUNTER
Pt scheduled for hospital follow up on 3/15/2018 at 1:00pm. Pt verbalized understanding. No further issues discussed.

## 2018-03-02 NOTE — CONSULTS
Went over brochure with pt and spouse.  They did not want to view the DVD on CHF.  Questions answered and Pt verbalizes understanding.  Also offered DVD for later if they wish.

## 2018-03-02 NOTE — TELEPHONE ENCOUNTER
----- Message from Kaylee Miller RT sent at 3/2/2018  3:00 PM CST -----  Contact: pt   Called pod, pt , returned missed call, thanks.

## 2018-03-02 NOTE — PLAN OF CARE
03/01/18 1930   Patient Assessment/Suction   Level of Consciousness (AVPU) alert   PRE-TX-O2-ETCO2   O2 Device (Oxygen Therapy) nasal cannula   Flow (L/min) 2   Oxygen Concentration (%) 28   SpO2 97 %   Pulse Oximetry Type Intermittent   Ready to Wean/Extubation Screen   FIO2<=50 (chart decimal) 0.28

## 2018-03-02 NOTE — CONSULTS
"[facility]  Cardiology  Consult Note    Patient Name: Des Junior  MRN: [id number]  Admission Date: 3/1/2018  Hospital Length of Stay: 1 days  Code Status: Full Code   Attending Provider: Sam Yanes MD   Consulting Provider: Leslie Chapman NP  Primary Care Physician: Mark Mchugh MD  Principal Problem:Pulmonary embolus    Patient information was obtained from patient, spouse, and medical record.     Inpatient consult to Cardiology  Consult performed by: LESLIE CHAPMAN  Consult ordered by: CARRIE MCKEON  Reason for consult: new onset PAF       This is a new patient to our group.   Subjective:   Endocrine: Dr. Mark Mchugh, has also been seeing for primary care.     Patient lives with his wife, Christian (nonsmoker).  Patient is a never smoker, denies ETOH consumption.   Patient works part-time as master .     Chief Complaint:  SOB, new/recent diagnosis of pulmonary emboli and atrial fibrillation     HPI: 73 y/o male with a h/o type 2 DM, HLD, HTN and recent diagnosis of pulmonary emboli noted on chest CTA done 03/01/2018 and new onset atrial fibrillation which was an incidental finding noted on EKG during IM clinic visit on 02/27/2018 and which point patient was started on Xarelto and Toprol and reports compliance with medications. He reports that he had been experiencing a 3 week h/o gradually worsening SOB.  SOB was present at rest and was worsened by activity especially after walking about 25 feet and up stairs.  He endorses a long history of chronic BLE swelling that is currently at baseline for him. He also admits to a long history of a chronic cough that seems to have worsened over the past several months. Cough is occasionally productive with "clear, white, or green" sputum.  He mentions that he was taking [employer] Cold and Flu OTC medication for his cough. He denies orthopnea, weight gain, palpitations, CP, color changes in skin, hemoptysis, " lightheadedness and syncope. Reports that he snores and has been experiencing daytime fatigue and occasional PND chronically.     Patient works part-time as a master ; however, wife stating that he has been more sedentary over the past year.      No known h/o CAD but patient denies prior cardiac workup. He has an appointment to see EP cardiology, Dr. Alicea this month 2018 at 11:00am.     Patient was maintaining sinus rhythm until last night when he converted to atrial fibrillation around 9pm.  Rate has been controlled on home BB regimen of Toprol 25mg daily that was started on 2018. Xarelto has been continued.  Echo done yesterday shows mildly to moderately depressed LV function (EF 40-45%).   Pulmonology following for PE.  BLE venous US done yesterday shows a nonocclusive thrombus in the right popliteal vein.    Past Medical History:   Diagnosis Date    A-fib     Allergy     Arthritis     Cataract     Diabetes mellitus     Diabetes mellitus type II     Hay fever     Hyperlipidemia     Hypertension     Joint pain     Obesity     Pulmonary emboli     Ulcer        Past Surgical History:   Procedure Laterality Date    COLONOSCOPY N/A 10/5/2015    Procedure: COLONOSCOPY;  Surgeon: Pro Jang MD;  Location: [facility] ([address] [place] [address]);  Service: Endoscopy;  Laterality: N/A;    thumb surgery         Review of patient's allergies indicates:   Allergen Reactions    Codeine Nausea And Vomiting     Other reaction(s): Nausea    Benicar  [olmesartan]      Other reaction(s): SPOTS IN FRONT OF EYES       Current Facility-Administered Medications on File Prior to Encounter   Medication    [] sodium chloride 0.9% infusion    triamcinolone hexacetonide injection 40 mg     Current Outpatient Prescriptions on File Prior to Encounter   Medication Sig    amlodipine (NORVASC) 10 MG tablet Take 1 tablet (10 mg total) by mouth once daily.    blood sugar diagnostic Strp 1 strip by  "Misc.(Non-Drug; Combo Route) route 2 (two) times daily with meals.    cholecalciferol, vitamin D3, (VITAMIN D3) 1,000 unit capsule Take 1,000 Units by mouth once daily.    cyanocobalamin (VITAMIN B-12) 500 MCG tablet Take 2 tablets (1,000 mcg total) by mouth once daily.    insulin glargine (LANTUS SOLOSTAR) 100 unit/mL (3 mL) InPn pen 24 units every nights    JANUVIA 100 mg Tab TAKE 1 TABLET BY MOUTH ONCE DAILY.    LANCETS & BLOOD GLUCOSE STRIPS MISC * * * Twice a day .  check glucose twice a day    losartan-hydrochlorothiazide 50-12.5 mg (HYZAAR) 50-12.5 mg per tablet Take 1 tablet by mouth once daily.    metformin (GLUCOPHAGE-XR) 500 MG 24 hr tablet TAKE 2 TABLETS (1,000 MG TOTAL) BY MOUTH 2 (TWO) TIMES DAILY WITH MEALS.    metoprolol succinate (TOPROL-XL) 25 MG 24 hr tablet Take 1 tablet (25 mg total) by mouth once daily.    omega-3 fatty acids-vitamin E (OMEGA-3 FISH OIL) 1,000-5 mg-unit Cap Take by mouth. 1 Capsule Oral Every day    omeprazole (PRILOSEC OTC) 20 MG tablet Take by mouth. 1 Tablet, Delayed Release (E.C.) Oral Every day    pen needle, diabetic 29 gauge x 1/2" Ndle Inject insulin daily    pravastatin (PRAVACHOL) 40 MG tablet Take 1 tablet (40 mg total) by mouth once daily.    rivaroxaban (XARELTO) 20 mg Tab Take 1 tablet (20 mg total) by mouth daily with dinner or evening meal.    SITagliptin (JANUVIA) 100 MG Tab Take 1 tablet (100 mg total) by mouth once daily.    tadalafil (CIALIS) 20 MG Tab 1 TABLET PRIOR TO SEX     Family History     Problem Relation (Age of Onset)    Cancer Brother, Sister, Brother    Cataracts Mother    Glaucoma Mother    Heart disease Father    Hypertension Father    Pneumonia Mother        Social History Main Topics    Smoking status: Never Smoker    Smokeless tobacco: Never Used    Alcohol use Yes      Comment: rare    Drug use: No    Sexual activity: Not on file     Review of Systems   Constitution: Positive for malaise/fatigue. Negative for chills, " diaphoresis, weakness and weight gain.   HENT: Negative for congestion and sore throat.    Eyes:        No acute vision changes    Cardiovascular: Positive for dyspnea on exertion, leg swelling (chronic, at baseline) and paroxysmal nocturnal dyspnea. Negative for chest pain, irregular heartbeat, near-syncope, orthopnea, palpitations and syncope.   Respiratory: Positive for cough, shortness of breath, snoring, sputum production and wheezing (3 weeks ago, now resolved ). Negative for hemoptysis.    Hematologic/Lymphatic: Does not bruise/bleed easily.   Skin: Negative for color change and rash.   Musculoskeletal: Negative for back pain, joint swelling and neck pain.   Gastrointestinal: Negative for abdominal pain, constipation, heartburn, hematochezia, nausea and vomiting.   Genitourinary: Negative for dysuria and hematuria.   Neurological: Negative for aphonia, dizziness, focal weakness, headaches, light-headedness, numbness, paresthesias and sensory change.   Psychiatric/Behavioral: Negative for altered mental status. The patient is not nervous/anxious.    Allergic/Immunologic: Negative for persistent infections.     Objective:     Vital Signs (Most Recent):  Temp: 98 °F (36.7 °C) (03/02/18 0831)  Pulse: 92 (03/02/18 0831)  Resp: 18 (03/02/18 0831)  BP: (!) 148/92 (03/02/18 0831)  SpO2: (!) 94 % (03/02/18 0831) Vital Signs (24h Range):  Temp:  [96.3 °F (35.7 °C)-98.3 °F (36.8 °C)] 98 °F (36.7 °C)  Pulse:  [74-93] 92  Resp:  [16-23] 18  SpO2:  [94 %-97 %] 94 %  BP: (118-148)/(72-92) 148/92     Weight: 110.8 kg (244 lb 4.3 oz)  Body mass index is 32.23 kg/m².    SpO2: (!) 94 %  O2 Device (Oxygen Therapy): nasal cannula      Intake/Output Summary (Last 24 hours) at 03/02/18 0856  Last data filed at 03/02/18 0557   Gross per 24 hour   Intake              480 ml   Output                0 ml   Net              480 ml       Lines/Drains/Airways     Peripheral Intravenous Line                 Peripheral IV - Single Lumen  03/01/18 0902 Left Antecubital less than 1 day                Physical Exam   Constitutional: He is oriented to person, place, and time. He appears well-developed and well-nourished. No distress.   HENT:   Head: Normocephalic and atraumatic.   Eyes: Conjunctivae and EOM are normal. Right eye exhibits no discharge. Left eye exhibits no discharge. No scleral icterus.   Neck: Normal range of motion. Normal carotid pulses and no JVD present. Carotid bruit is not present.   Cardiovascular: Normal rate with an irregular rhythm present. Exam reveals no friction rub.  No murmur heard.  Pulses:       Radial pulses are 2+ on the right side, and 2+ on the left side.        Dorsalis pedis pulses are 1+ on the right side, and 1+ on the left side.   Pulmonary/Chest: Effort normal and breath sounds normal. He has no wheezes. He has no rales. He exhibits no tenderness.   Abdominal: Soft. Bowel sounds are normal. There is no tenderness. There is no guarding.   Musculoskeletal: Normal range of motion. 1+ Pitting edema right and left lower legs    Neurological: He is alert and oriented to person, place, and time. Follows commands, moves all extremities.    Skin: Skin is warm and dry. He is not diaphoretic. No cyanosis. Nails show no clubbing.   Psychiatric: He has a normal mood and affect. His behavior is normal.   Vitals reviewed.      Significant Labs:   BMP:   Recent Labs  Lab 03/01/18  0910 03/02/18  0518   * 141*    139   K 4.1 4.8    107   CO2 23 25   BUN 26* 20   CREATININE 1.2 1.3   CALCIUM 9.4 9.4   MG  --  1.7   , CMP   Recent Labs  Lab 03/01/18  0910 03/02/18 0518    139   K 4.1 4.8    107   CO2 23 25   * 141*   BUN 26* 20   CREATININE 1.2 1.3   CALCIUM 9.4 9.4   PROT  --  6.2   ALBUMIN  --  3.1*   BILITOT  --  0.8   ALKPHOS  --  103   AST  --  21   ALT  --  38   ANIONGAP 11 7*   ESTGFRAFRICA >60 >60   EGFRNONAA >60 55*   , CBC   Recent Labs  Lab 03/01/18  0910 03/02/18 0518   WBC  10.20 10.10   HGB 12.5* 11.8*   HCT 38.8* 35.8*    184   , INR   Recent Labs  Lab 03/01/18  0910   INR 1.3*   , Lipid Panel No results for input(s): CHOL, HDL, LDLCALC, TRIG, CHOLHDL in the last 48 hours. and Troponin No results for input(s): TROPONINI in the last 48 hours.    Significant Imaging:  Echo 03/01/2018:  CONCLUSIONS     1 - Mildly to moderately depressed left ventricular systolic function (EF 40-45%).     2 - Indeterminate LV diastolic function.     3 - Intermediate central venous pressure.     4 - Difficult windows, Lumason contrast used for wall motion analysis.     5 - Deterioration of LV function from Echo in 7/2009.     Bilateral Lower Extremity Venous US 03/01/2018:  Nonocclusive thrombus in the right popliteal vein in this patient with known pulmonary thromboemboli.    Chest CTA non-coronary 02/28/2018:  Positive study demonstrating bilateral central and segmental pulmonary emboli, as detailed above.  Small pulmonary infarct involving the superior segment of the left lower lobe and possible small pulmonary infarcts involving the lateral segment of the right middle lobe also noted.  There is also flattening of the interventricular septum which may indicate developing right heart strain.    Small pulmonary nodules in the bilateral lungs measuring up to 6 mm.  Single 1 year follow up CT of the thorax is recommended for surveillance.    Incidental note of cholelithiasis.    EKG 02/27/2018:  Atrial fibrillation with premature ventricular or aberrantly conducted complexes  Right bundle branch block  Abnormal ECG  When compared with ECG of 07-JUL-2009 10:46,  Atrial fibrillation has replaced Sinus rhythm  Right bundle branch block is now Present  Confirmed by Erwin Kinney MD ([id number]) on 2/28/2018 10:42:05 AM        Assessment and Plan:     Active Hospital Problems    Diagnosis    *Pulmonary embolus- large involved central PA, unprovoked.    Asbestos exposure    Lung nodule, solitary- 5 mm rll     Mucopurulent chronic bronchitis- mild bronchiectasis suggested ct 3/1/18    Acute deep vein thrombosis (DVT) of popliteal vein of right lower extremity    Persistent atrial fibrillation    Type 2 diabetes, controlled, with neuropathy    Hyperlipidemia associated with type 2 diabetes mellitus    Osteoarthritis of right knee    Hypertension associated with diabetes    Obesity       New onset paroxysmal atrial fibrillation with CVR on po BB, pulmonary emboli - pulmonology following, RLE nonocclusive DVT, anticoagulated on xarelto, h/o HTN - controlled.     -  High [id number] Score of at least 6 for age and history of HTN, DM, thromboembolism, and vascular history (atheroscerolsis noted on imagining) - continue Xarelto   - Rate control: Continue po BB   - Monitor on telemetry  - Monitor lytes Keep K > 4.0 and Mg > 2.0 - electrolyte SS as per primary team   - outpatient MULU eval could also be helpful   - Check TSH  - Check stool for OB and PSA   -  Patient already has EP cardiology appt scheduled this month on 03/20/2018 at 11:00AM with Dr. Alicea at [facility].   - Patient can follow up with Dr. Barajas for general cardiology in 3 weeks. Can discuss ischemic eval/stress testing at that time.     This patient has been discussed with and evaluated by my collaborating physician, Dr. Barajas.  Please see Dr. Barajas's MD attestation above for additional information/recommendations.       Leslie Chapman NP  Cardiology   [facility]

## 2018-03-02 NOTE — PLAN OF CARE
Problem: Patient Care Overview  Goal: Plan of Care Review  Outcome: Ongoing (interventions implemented as appropriate)   03/02/18 1976   Coping/Psychosocial   Plan Of Care Reviewed With patient     POC reviewed with pt, understanding verbalized. NAD noted. Alert and oriented. VS monitored.denies pain, sob,n/v. Spouse at bedside all shift. Urinal at bedside. BG monitored. Free of fall or injury. Side rail up x2. Call light in reach. Will continue to monitor.

## 2018-03-02 NOTE — PLAN OF CARE
1200  PCP is Dr Galicia; updated in epic.  Verified insurance as medicare and [employer].  Pharmacy is [facility].  Lives with spouse; independent with ADL's.  Denies HH.  Informed patient that home oxygen has been ordered and that it usually takes a few hours for delivery; verbalized understanding.  Discharge plan is home.       03/02/18 1226   Discharge Assessment   Assessment Type Discharge Planning Assessment   Confirmed/corrected address and phone number on facesheet? Yes   Assessment information obtained from? Patient;Other  (spouse)   Prior to hospitilization cognitive status: Alert/Oriented   Prior to hospitalization functional status: Independent;Assistive Equipment   Current cognitive status: Alert/Oriented   Current Functional Status: Independent;Assistive Equipment   Lives With spouse   Able to Return to Prior Arrangements yes   Is patient able to care for self after discharge? Yes   Patient's perception of discharge disposition home or selfcare   Readmission Within The Last 30 Days no previous admission in last 30 days   Patient currently being followed by outpatient case management? No   Patient currently receives any other outside agency services? No   Equipment Currently Used at Home glucometer;cane, straight   Do you have any problems affording any of your prescribed medications? No   Is the patient taking medications as prescribed? yes   Does the patient have transportation home? Yes   Transportation Available family or friend will provide   Does the patient receive services at the [facility]? No   Discharge Plan A Home   Patient/Family In Agreement With Plan yes

## 2018-03-02 NOTE — SUBJECTIVE & OBJECTIVE
Past Medical History:   Diagnosis Date    A-fib     Allergy     Arthritis     Cataract     Diabetes mellitus     Diabetes mellitus type II     Hay fever     Hyperlipidemia     Hypertension     Joint pain     Obesity     Pulmonary emboli     Ulcer        Past Surgical History:   Procedure Laterality Date    COLONOSCOPY N/A 10/5/2015    Procedure: COLONOSCOPY;  Surgeon: Pro Jang MD;  Location: [address]);  Service: Endoscopy;  Laterality: N/A;    thumb surgery         Review of patient's allergies indicates:   Allergen Reactions    Codeine Nausea And Vomiting     Other reaction(s): Nausea    Benicar  [olmesartan]      Other reaction(s): SPOTS IN FRONT OF EYES       Current Facility-Administered Medications on File Prior to Encounter   Medication    [] sodium chloride 0.9% infusion    triamcinolone hexacetonide injection 40 mg     Current Outpatient Prescriptions on File Prior to Encounter   Medication Sig    amlodipine (NORVASC) 10 MG tablet Take 1 tablet (10 mg total) by mouth once daily.    blood sugar diagnostic Strp 1 strip by Misc.(Non-Drug; Combo Route) route 2 (two) times daily with meals.    cholecalciferol, vitamin D3, (VITAMIN D3) 1,000 unit capsule Take 1,000 Units by mouth once daily.    cyanocobalamin (VITAMIN B-12) 500 MCG tablet Take 2 tablets (1,000 mcg total) by mouth once daily.    insulin glargine (LANTUS SOLOSTAR) 100 unit/mL (3 mL) InPn pen 24 units every nights    JANUVIA 100 mg Tab TAKE 1 TABLET BY MOUTH ONCE DAILY.    LANCETS & BLOOD GLUCOSE STRIPS MISC * * * Twice a day .  check glucose twice a day    losartan-hydrochlorothiazide 50-12.5 mg (HYZAAR) 50-12.5 mg per tablet Take 1 tablet by mouth once daily.    metformin (GLUCOPHAGE-XR) 500 MG 24 hr tablet TAKE 2 TABLETS (1,000 MG TOTAL) BY MOUTH 2 (TWO) TIMES DAILY WITH MEALS.    metoprolol succinate (TOPROL-XL) 25 MG 24 hr tablet Take 1 tablet (25 mg total) by mouth once daily.    omega-3  "fatty acids-vitamin E (OMEGA-3 FISH OIL) 1,000-5 mg-unit Cap Take by mouth. 1 Capsule Oral Every day    omeprazole (PRILOSEC OTC) 20 MG tablet Take by mouth. 1 Tablet, Delayed Release (E.C.) Oral Every day    pen needle, diabetic 29 gauge x 1/2" Ndle Inject insulin daily    pravastatin (PRAVACHOL) 40 MG tablet Take 1 tablet (40 mg total) by mouth once daily.    rivaroxaban (XARELTO) 20 mg Tab Take 1 tablet (20 mg total) by mouth daily with dinner or evening meal.    SITagliptin (JANUVIA) 100 MG Tab Take 1 tablet (100 mg total) by mouth once daily.    tadalafil (CIALIS) 20 MG Tab 1 TABLET PRIOR TO SEX     Family History     Problem Relation (Age of Onset)    Cancer Brother, Sister, Brother    Cataracts Mother    Glaucoma Mother    Heart disease Father    Hypertension Father    Pneumonia Mother        Social History Main Topics    Smoking status: Never Smoker    Smokeless tobacco: Never Used    Alcohol use Yes      Comment: rare    Drug use: No    Sexual activity: Not on file     Review of Systems   Constitution: Positive for malaise/fatigue. Negative for chills, diaphoresis, weakness and weight gain.   HENT: Negative for congestion and sore throat.    Eyes:        No acute vision changes    Cardiovascular: Positive for dyspnea on exertion, leg swelling (chronic, at baseline) and paroxysmal nocturnal dyspnea. Negative for chest pain, irregular heartbeat, near-syncope, orthopnea, palpitations and syncope.   Respiratory: Positive for cough, shortness of breath, snoring, sputum production and wheezing (3 weeks ago, now resolved ). Negative for hemoptysis.    Hematologic/Lymphatic: Does not bruise/bleed easily.   Skin: Negative for color change and rash.   Musculoskeletal: Negative for back pain, joint swelling and neck pain.   Gastrointestinal: Negative for abdominal pain, constipation, heartburn, hematochezia, nausea and vomiting.   Genitourinary: Negative for dysuria and hematuria.   Neurological: Negative " for aphonia, dizziness, focal weakness, headaches, light-headedness, numbness, paresthesias and sensory change.   Psychiatric/Behavioral: Negative for altered mental status. The patient is not nervous/anxious.    Allergic/Immunologic: Negative for persistent infections.     Objective:     Vital Signs (Most Recent):  Temp: 98 °F (36.7 °C) (03/02/18 0831)  Pulse: 92 (03/02/18 0831)  Resp: 18 (03/02/18 0831)  BP: (!) 148/92 (03/02/18 0831)  SpO2: (!) 94 % (03/02/18 0831) Vital Signs (24h Range):  Temp:  [96.3 °F (35.7 °C)-98.3 °F (36.8 °C)] 98 °F (36.7 °C)  Pulse:  [74-93] 92  Resp:  [16-23] 18  SpO2:  [94 %-97 %] 94 %  BP: (118-148)/(72-92) 148/92     Weight: 110.8 kg (244 lb 4.3 oz)  Body mass index is 32.23 kg/m².    SpO2: (!) 94 %  O2 Device (Oxygen Therapy): nasal cannula      Intake/Output Summary (Last 24 hours) at 03/02/18 0856  Last data filed at 03/02/18 0557   Gross per 24 hour   Intake              480 ml   Output                0 ml   Net              480 ml       Lines/Drains/Airways     Peripheral Intravenous Line                 Peripheral IV - Single Lumen 03/01/18 0902 Left Antecubital less than 1 day                Physical Exam   Constitutional: He is oriented to person, place, and time. He appears well-developed and well-nourished. No distress.   HENT:   Head: Normocephalic and atraumatic.   Eyes: Conjunctivae and EOM are normal. Right eye exhibits no discharge. Left eye exhibits no discharge. No scleral icterus.   Neck: Normal range of motion. Normal carotid pulses and no JVD present. Carotid bruit is not present.   Cardiovascular: Normal rate and normal heart sounds.  An irregular rhythm present. Exam reveals no friction rub.    No murmur heard.  Pulses:       Radial pulses are 2+ on the right side, and 2+ on the left side.        Dorsalis pedis pulses are 1+ on the right side, and 1+ on the left side.   Pulmonary/Chest: Effort normal and breath sounds normal. He has no wheezes. He has no rales.  He exhibits no tenderness.   Abdominal: Soft. Bowel sounds are normal. There is no tenderness. There is no guarding.   Musculoskeletal: Normal range of motion.   1+ Pitting edema right and left lower legs    Neurological: He is alert and oriented to person, place, and time.   Follows commands, moves all extremities.    Skin: Skin is warm and dry. He is not diaphoretic. No cyanosis. Nails show no clubbing.   Psychiatric: He has a normal mood and affect. His behavior is normal.   Vitals reviewed.      Significant Labs:   BMP:   Recent Labs  Lab 03/01/18  0910 03/02/18  0518   * 141*    139   K 4.1 4.8    107   CO2 23 25   BUN 26* 20   CREATININE 1.2 1.3   CALCIUM 9.4 9.4   MG  --  1.7   , CMP   Recent Labs  Lab 03/01/18  0910 03/02/18  0518    139   K 4.1 4.8    107   CO2 23 25   * 141*   BUN 26* 20   CREATININE 1.2 1.3   CALCIUM 9.4 9.4   PROT  --  6.2   ALBUMIN  --  3.1*   BILITOT  --  0.8   ALKPHOS  --  103   AST  --  21   ALT  --  38   ANIONGAP 11 7*   ESTGFRAFRICA >60 >60   EGFRNONAA >60 55*   , CBC   Recent Labs  Lab 03/01/18  0910 03/02/18  0518   WBC 10.20 10.10   HGB 12.5* 11.8*   HCT 38.8* 35.8*    184   , INR   Recent Labs  Lab 03/01/18 0910   INR 1.3*   , Lipid Panel No results for input(s): CHOL, HDL, LDLCALC, TRIG, CHOLHDL in the last 48 hours. and Troponin No results for input(s): TROPONINI in the last 48 hours.    Significant Imaging:  Echo 03/01/2018:  CONCLUSIONS     1 - Mildly to moderately depressed left ventricular systolic function (EF 40-45%).     2 - Indeterminate LV diastolic function.     3 - Intermediate central venous pressure.     4 - Difficult windows, Lumason contrast used for wall motion analysis.     5 - Deterioration of LV function from Echo in 7/2009.     Bilateral Lower Extremity Venous US 03/01/2018:  Nonocclusive thrombus in the right popliteal vein in this patient with known pulmonary thromboemboli.    Chest CTA non-coronary  02/28/2018:  Positive study demonstrating bilateral central and segmental pulmonary emboli, as detailed above.  Small pulmonary infarct involving the superior segment of the left lower lobe and possible small pulmonary infarcts involving the lateral segment of the right middle lobe also noted.  There is also flattening of the interventricular septum which may indicate developing right heart strain.    Small pulmonary nodules in the bilateral lungs measuring up to 6 mm.  Single 1 year follow up CT of the thorax is recommended for surveillance.    Incidental note of cholelithiasis.    EKG 02/27/2018:  Atrial fibrillation with premature ventricular or aberrantly conducted complexes  Right bundle branch block  Abnormal ECG  When compared with ECG of 07-JUL-2009 10:46,  Atrial fibrillation has replaced Sinus rhythm  Right bundle branch block is now Present  Confirmed by Nirmal LEAL, Erwin TENA ([id number]) on 2/28/2018 10:42:05 AM

## 2018-03-02 NOTE — PLAN OF CARE
Problem: Patient Care Overview  Goal: Plan of Care Review  Outcome: Ongoing (interventions implemented as appropriate)  Patient safe and free from falls. NSR with 1st degree block and BBB with frequent PVCs on monitor. Amb without assist to BR. Denies c/o pain. No SOB noted. CBG monitored. Bed in lowest position, wheels locked, SR raised, call light in reach, spouse at bedside.

## 2018-03-02 NOTE — TELEPHONE ENCOUNTER
Returned pts call. Pt needed to make a hospital follow up with Dr. Barajas. Pt scheduled for 3/15/2018 at 1:00pm. Pt verbalized understanding. No further issues discussed.

## 2018-03-02 NOTE — TELEPHONE ENCOUNTER
----- Message from Leslie Chapman NP sent at 3/2/2018 10:27 AM CST -----  Regarding: follow up   Patient seen in hospital for PE and new onset paroxsymal atrial fibrillation.  He has an appointment with EP, Dr. Alicea at [facility] on 03/20/2018 but needs a general cards f/u appt with Dr. Barajas in 3 weeks.     Thank you  Leslie

## 2018-03-02 NOTE — PROGRESS NOTES
SSC faxed patient information (facesheet, orders, h&p, MD note, home O2 eval) to [facility] [phone] for home O2 w/portable tank and nebulizer.  Fax confirmation received.    1:20pm SSC spoke to Mitali with [facility] [phone] regarding home O2 w/portable tank and nebulizer.  Patient lives in [place] therefore they will supply patient with home O2 and nebulizer.  Per Mitali, portable tank will be delivered to hospital by [facility].      2:15pm SSC spoke to Steffanie with [facility] [phone] regarding ETA of portable tank.  Per Steffanie ETA 30 mins.ASH Keane

## 2018-03-02 NOTE — PROGRESS NOTES
03/02/18 0745   Patient Assessment/Suction   Level of Consciousness (AVPU) alert   PRE-TX-O2-ETCO2   O2 Device (Oxygen Therapy) nasal cannula   $ Is the patient on Low Flow Oxygen? Yes   Flow (L/min) 2   SpO2 97 %   Pulse Oximetry Type Intermittent   $ Pulse Oximetry - Multiple Charge Pulse Oximetry - Multiple

## 2018-03-02 NOTE — CONSULTS
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  Pt never smoker.  No leg trauma nor leg swelling nor pain legs.  Pt had intermittent cough with purulent mucous production last yr- has had seasonal allergies with occ nocturnal wheezes.  No inhaler use nor sob til lately.  Pt does travel with 3-4 immobilization between stops.  Fh + only in nephew with clotts in his early 50's.    Pt developed small climbing to his elevated home last 3 weeks, no syncope, no chest pain nor palpitations.  Pt was evaluated by pcp and a fib and pe found.      Pt rm air sat 94% today.        PFSH:  Past Medical History:   Diagnosis Date    A-fib     Allergy     Arthritis     Cataract     Diabetes mellitus     Diabetes mellitus type II     Hay fever     Hyperlipidemia     Hypertension     Joint pain     Obesity     Pulmonary emboli     Ulcer      Past Surgical History:   Procedure Laterality Date    COLONOSCOPY N/A 10/5/2015    Procedure: COLONOSCOPY;  Surgeon: Pro Jang MD;  Location: [facility] ([address] [place] [address]);  Service: Endoscopy;  Laterality: N/A;    thumb surgery       Social History   Substance Use Topics    Smoking status: Never Smoker    Smokeless tobacco: Never Used    Alcohol use Yes      Comment: rare     Family History   Problem Relation Age of Onset    Hypertension Father     Heart disease Father      chf    Cancer Brother      colon    Cataracts Mother     Glaucoma Mother     Pneumonia Mother     Cancer Sister      lymphoma    Cancer Brother      prostate ce    Melanoma Neg Hx     Amblyopia Neg Hx      "Blindness Neg Hx     Macular degeneration Neg Hx     Retinal detachment Neg Hx     Strabismus Neg Hx     Diabetes Neg Hx      Review of patient's allergies indicates:   Allergen Reactions    Codeine Nausea And Vomiting     Other reaction(s): Nausea    Benicar  [olmesartan]      Other reaction(s): SPOTS IN FRONT OF EYES       Performance Status:Performance Status:The patient's activity level is no limits with regular activity.    Review of Systems:  a review of eleven systems covering constitutional, Psych, Eye, HEENT, Respiratory, Cardiac, GI, , Musculoskeletal, Endocrine, Dermatologicwas negative except the above mentioned abnormalities and for any pertinent findings as listed below:  pertinent positive as above, rest is good         Exam:Comprehensive exam done. BP (!) 148/92 (Patient Position: Sitting)   Pulse 92   Temp 98 °F (36.7 °C) (Oral)   Resp 18   Ht 6' 1" (1.854 m)   Wt 110.8 kg (244 lb 4.3 oz)   SpO2 (!) 94%   BMI 32.23 kg/m²   Exam included Vitals as listed, and patient's appearance and affect and alertness and mood, oral exam for yeast and hygiene and pharynx lesions and Mallapatti (M) score, neck with inspection for jvd and masses and thyroid abnormalities and lymph nodes (supraclavicular and infraclavicular nodes also examined and noted if abn), chest exam included symmetry and effort and fremitus and percussion and auscultation, cardiac exam included rhythm and gallops and murmur and rubs and jvd and edema, abdominal exam for mass and hepatosplenomegaly and tenderness and hernias and bowel sounds, Musculoskeletal exam with muscle tone and posture and mobility/gait and  strenght, and skin for rashes and cyanosis and pallor and turgor, extremity for clubbing.  Findings were normal except as listed below:  M3, no oral nor nasal lesion, thyroid nl, no neck/arm nodes. No masses neck, chest is symmetric, no distress, normal percussion, normal fremitus and good normal breath sounds.  RRR " with no murmur nor gallop nor edema.  No hs megaly nor mass, no clubbing    Radiographs reviewed: view by direct vision  Very large pulm emboli bilat main arteries, 5 mm rll solid fairly smooth nodule, very min mild post lung marking increase, no calcified plaques seen, lower lungs with thickened bronchi and perhaps early bronchiectasis         Labs       Recent Labs  Lab 03/02/18  0518   WBC 10.10   HGB 11.8*   HCT 35.8*          Recent Labs  Lab 03/02/18  0518      K 4.8      CO2 25   BUN 20   CREATININE 1.3   *   CALCIUM 9.4   MG 1.7   PHOS 3.1   AST 21   ALT 38   ALKPHOS 103   BILITOT 0.8   PROT 6.2   ALBUMIN 3.1*   No results for input(s): PH, PCO2, PO2, HCO3 in the last 24 hours.  Microbiology Results (last 7 days)     Procedure Component Value Units Date/Time    AFB Culture & Smear [[id number]]     Order Status:  No result Specimen:  Respiratory from Sputum, Expectorated     Culture, Respiratory with Gram Stain [[id number]]     Order Status:  No result Specimen:  Respiratory from Sputum, Expectorated           Impression:  Active Hospital Problems    Diagnosis  POA    *Pulmonary embolus [I26.99]  Yes    Asbestos exposure [Z77.090]  Not Applicable    Lung nodule, solitary- 5 mm rll [R91.1]  Unknown    Mucopurulent chronic bronchitis- mild bronchiectasis suggested ct 3/1/18 [J41.1]  Unknown    Persistent atrial fibrillation [I48.1]  Yes    Type 2 diabetes, controlled, with neuropathy [E11.40]  Yes    Hyperlipidemia associated with type 2 diabetes mellitus [E11.69, E78.5]  Yes    Osteoarthritis of right knee [M17.11]  Yes    Hypertension associated with diabetes [E11.59, I10]  Yes    Obesity [E66.9]  Yes      Resolved Hospital Problems    Diagnosis Date Resolved POA   No resolved problems to display.         Plan: pt is stable for home soon on xarelto but would recommend bed to chair activity for next week. dvt and pe are unprovoked.  Given large clots seen and delayed  presentation- anticoagg 6 month is min but recommend life long.  hypercoagulable chahal will not change length of rx.  Would ask what nephew chris showed if done.    Pt has nodule with 1 of 200 risk lung ca by Brandyn Model.   F/u ct with airway dz may be reasonable in a year?  Asbestos exposure - no impressive asbestosis seen.    Pt has symptomatic chr mucopurulent bronchitis.  Coverage for meds is limited on his insurance.  Would give course levaquin and cultures and afb x 3 for geraldo and neb rx prn 1/2 dose albuterol    Suggest f/u in 3 months or so (GERALDO can take 2 months to culture).     pe likely ppt a fib?

## 2018-03-02 NOTE — PLAN OF CARE
03/02/18 1230   Final Note   Assessment Type Final Discharge Note   Discharge Disposition Home

## 2018-03-02 NOTE — PROGRESS NOTES
03/02/18 1122   Home Oxygen Qualification   Room Air SpO2 At Rest 97 %   Room Air SpO2 on Exertion (!) 83 %   SpO2 During Exertion on O2 93 %   Heart Rate on O2 133 bpm   Exertion O2 LPM 2 LPM   SpO2 on Recovery 99 %   Recovery Heart Rate 85 bpm   Recovery O2 LPM 2 LPM   Home O2 Eval Comments pt does qualify for home O2

## 2018-03-02 NOTE — PROGRESS NOTES
Pt cleared for d/c. IV and tele removed.  Education provided to pt and wife regarding home medication regimen, follow up appts, when to seek medical attention, pulmonary embolism, afib, antibiotic use, pradaxa and DM-2.  Verbalized understanding.  O2 tank delivered by [employer]  O2 and 2L/NC in use.  Pt to be driven home by spouse.  Pt safe.

## 2018-03-02 NOTE — HPI
"Endocrine: Dr. Mark Mchugh    Patient lives with his wife, Ellen (nonsmoker).  Patient is a never smoker, denies ETOH consumption.     71 y/o male with a h/o type 2 DM, HLD, HTN and recent diagnosis of pulmonary emboli noted on chest CTA done 03/01/2018 and new onset atrial fibrillation which was an incidental finding noted on EKG during IM clinic visit on 02/27/2018. Patient was started on Xarelto and Toprol and reports compliance with medications. He reports that he had been experiencing a 3 week h/o gradually worsening SOB.  SOB was present at rest and was worsened by activity. Patient reports that he was feeling SOB after walking about 25 feet and up stairs.  He endorses a long history of chronic BLE swelling that is currently at baseline for him. He also admits to a long history of a chronic cough that seems to have worsened over the past several months. Cough is occasionally productive with "clear, white, or green" sputum.  He mentions that he was taking [facility] Cold and Flu OTC medication for his cough. He denies orthopnea, weight gain, palpitations, CP, color changes in skin, hemoptysis, lightheadedness and syncope. Reports that he snores and has been experiencing daytime fatigue and occasional PND chronically.     No known h/o CAD, patient denies prior cardiac workup. He has an appointment to see EP cardiology, Dr. Alicea this month 03/20/2018 at 11:00am.     Patient was maintaining sinus rhythm until last night when he converted to atrial fibrillation around 9pm.  Rate has been controlled on home BB regimen of Toprol 25mg daily that was started on 02/27/2018. Xarelto has been continued. Pulmonology following for PE.  BLE venous US done yesterday shows a nonocclusive thrombus in the right popliteal vein.       "

## 2018-03-03 NOTE — HOSPITAL COURSE
Patient monitored on telemetry during hospitalizations and remained with atrial fib CVR. Pulmonary and cardiology consulted. Pulmonary recommended sputum culture, initiation of oral abx, and home bronchodilators. He was evaluated for home oxygen and qualified with oxygen sat 83% exertional. Echo obtained. See results below. US of lower ext demonstrated thrombus of right popliteal likely attributing to pulmonary emboli. Patient stable for DC from pulmonary and cardiology standpoint. Discussed importance of outpatient follow up. Will continue OAC as previously prescribed by PCP.     Chest: CTA  Heart irregular irregular

## 2018-03-03 NOTE — DISCHARGE SUMMARY
[facility] Medicine  Discharge Summary      Patient Name: Des Junior  MRN: [id number]  Admission Date: 3/1/2018  Hospital Length of Stay: 1 days  Discharge Date and Time: 3/2/2018  3:38 PM  Attending Physician: No att. providers found   Discharging Provider: Lou Alcala NP  Primary Care Provider: Rupa Notinsystem      HPI:   Des Junior is a 72 year old male with PMH of DM type 2, hyperlipidemia, hypertension, and recent diagonosis of Pulmoanry embolic and atrial fibrillation. He reports shortness of breath for 3 weeks with ambulation ~ 25 feet and walking up stairs. He was evaluated by his PCP on 2/27 for same complaint, D-dimer and BNP ordered. He was noted to have elevated D-dimer therefore he was initiated on xarelto and underwent CTA chest this am. He took his first dose of Xarelto yesterday and am.  CTA chest demonstrated bilateral pulmoonay emboli with pulmonary infarcts prompting ED evaluation. He reports bilateral lower ext swelling which is unchanged. Denies recent road trips and flights. He was recently diagnosed with AFib and is scheduled for outpatient echo and Cardiology apt with Dr. Alicea at [facility].  Denies dizziness, lightheadedness, fever, and chills.     * No surgery found *      Hospital Course:   Patient monitored on telemetry during hospitalizations and remained with atrial fib CVR. Pulmonary and cardiology consulted. Pulmonary recommended sputum culture, initiation of oral abx, and home bronchodilators. He was evaluated for home oxygen and qualified with oxygen sat 83% exertional. Echo obtained. See results below. US of lower ext demonstrated thrombus of right popliteal likely attributing to pulmonary emboli. Patient stable for DC from pulmonary and cardiology standpoint. Discussed importance of outpatient follow up. Will continue OAC as previously prescribed by PCP.     Chest: CTA  Heart irregular irregular      Consults:   Consults          Status Ordering Provider     Inpatient consult to Cardiac Teaching  Once     Provider:  (Not yet assigned)    Completed CARRIE MCKEON     Inpatient consult to Cardiology  Once     Provider:  Josue Barajas MD    Completed CARRIE MCKEON     Inpatient consult to Pulmonology  Once     Provider:  (Not yet assigned)    CARRIE Yates          No new Assessment & Plan notes have been filed under this hospital service since the last note was generated.  Service: Hospital Medicine    Final Active Diagnoses:    Diagnosis Date Noted POA    PRINCIPAL PROBLEM:  Pulmonary embolus- large involved central PA, unprovoked. [I26.99] 03/01/2018 Yes    Asbestos exposure [Z77.090] 03/02/2018 Not Applicable    Lung nodule, solitary- 5 mm rll [R91.1] 03/02/2018 Unknown    Bronchitis, chronic, mucopurulent [J41.1] 03/02/2018 Yes    Acute deep vein thrombosis (DVT) of popliteal vein of right lower extremity [I82.431] 03/02/2018 Yes    CASPER (acute kidney injury) [N17.9] 03/02/2018 Yes    LV dysfunction [I51.9] 03/02/2018 Yes    A-fib [I48.91] 03/01/2018 Yes    Type 2 diabetes, controlled, with neuropathy [E11.40] 01/20/2014 Yes    Hyperlipidemia associated with type 2 diabetes mellitus [E11.69, E78.5] 11/28/2012 Yes    Osteoarthritis of right knee [M17.11] 11/28/2012 Yes    Hypertension associated with diabetes [E11.59, I10] 11/28/2012 Yes    Obesity, BMI 32+ [E66.9] 11/28/2012 Yes      Problems Resolved During this Admission:    Diagnosis Date Noted Date Resolved POA       Discharged Condition: stable    Disposition: Home or Self Care    Follow Up:  Follow-up Information     Erwin Alicea MD On 3/20/2018.    Specialties:  Electrophysiology, Cardiovascular Disease  Why:  EP Cardiology appointment for atrial fibrillatin on 03/20/2018 at 11:00 AM   Contact information:  [address]  [facility] [address]  [phone]             Josue Barajas MD.    Specialty:  Cardiology  Why:  General  "cardiology follow up in 3 weeks   Contact information:  [address]  [address]  [place] [address]  [phone]             Christopher Cortez MD In 1 week.    Specialty:  Internal Medicine  Why:  hospital follow up  Contact information:  [address]  [facility] [address]  [phone]             Eladio Sandoval MD In 2 weeks.    Specialty:  Pulmonary Disease  Why:  hospital follow up  Contact information:  [address]  [address]  [address]  [place] [address]  [phone]             [facility].    Specialty:  DME Provider  Why:  DME- home O2 w/portable tank, Nebulizer  Contact information:  [address] [facility] [address]  [phone]                 Patient Instructions:     NEBULIZER FOR HOME USE   Order Specific Question Answer Comments   Height: 6' 1" (1.854 m)    Weight: 110.8 kg (244 lb 4.3 oz)    Length of need (1-99 months): 99      OXYGEN FOR HOME USE   Order Specific Question Answer Comments   Liter Flow 2    Duration Continuous    Qualifying SpO2: 83    Testing done at: Exercise/Activity    Route nasal cannula    Portable mode: continuous    Device home concentrator portable tank   Length of need (in months): 99 mos    Patient condition with qualifying saturation other chronic pulmonary condition pulmonary emboli   Height: 6' 1" (1.854 m)    Weight: 110.8 kg (244 lb 4.3 oz)    Does patient have medical equipment at home? nebulizer    Alternative treatment measures have been tried or considered and deemed clinically ineffective. Yes      AFB Culture & Smear   Standing Status: Standing Number of Occurrences: 3 Standing Exp. Date: 07/02/18     Diet Cardiac     Diet diabetic     Activity as tolerated     Notify your health care provider if you experience any of the following:  difficulty breathing or increased cough     Notify your health care provider if you experience any of the following:  severe uncontrolled pain     Notify your health care provider if you experience any of the " following:  persistent dizziness, light-headedness, or visual disturbances         Significant Diagnostic Studies: Labs:   BMP:   Recent Labs  Lab 03/02/18  0518   *      K 4.8      CO2 25   BUN 20   CREATININE 1.3   CALCIUM 9.4   MG 1.7    and CMP   Recent Labs  Lab 03/02/18  0518      K 4.8      CO2 25   *   BUN 20   CREATININE 1.3   CALCIUM 9.4   PROT 6.2   ALBUMIN 3.1*   BILITOT 0.8   ALKPHOS 103   AST 21   ALT 38   ANIONGAP 7*   ESTGFRAFRICA >60   EGFRNONAA 55*     Radiology:    US Lower Extremity Veins Bilateral [[id number]] Resulted: 03/01/18 1708   Order Status: Completed Updated: 03/01/18 1711   Narrative:     EXAMINATION:  US LOWER EXTREMITY VEINS BILATERAL    CLINICAL HISTORY:  swelling;    TECHNIQUE:  Duplex and color flow Doppler evaluation of the bilateral lower extremity veins was performed.    COMPARISON:  None    FINDINGS:  There is nonocclusive thrombus in the right popliteal vein.  The remainder of the deep veins of the right lower extremity are patent.  The deep veins of the left lower extremity are also patent from common femoral through calf veins.   Impression:       Nonocclusive thrombus in the right popliteal vein in this patient with known pulmonary thromboemboli.      Electronically signed by: Kelvin Mars MD  Date: 03/01/2018  Time: 17:08          Cardiac Graphics: Echocardiogram:   2D echo with color flow doppler:   Results for orders placed or performed during the hospital encounter of 03/01/18   2D echo with color flow doppler   Result Value Ref Range    EF 41 (A) 55 - 65    Tricuspid Valve Regurgitation TRIVIAL        Pending Diagnostic Studies:     None         Medications:  Reconciled Home Medications:   Discharge Medication List as of 3/2/2018  3:05 PM      START taking these medications    Details   albuterol (ACCUNEB) 1.25 mg/3 mL Nebu Take 3 mLs (1.25 mg total) by nebulization every 6 (six) hours as needed. Rescue, Starting Fri 3/2/2018,  Until Sat 3/2/2019, Normal      cetirizine (ZYRTEC) 10 MG tablet Take 1 tablet (10 mg total) by mouth once daily., Starting Fri 3/2/2018, Until Sat 3/2/2019, OTC      levoFLOXacin (LEVAQUIN) 500 MG tablet Take 1 tablet (500 mg total) by mouth once daily., Starting Fri 3/2/2018, Normal         CONTINUE these medications which have NOT CHANGED    Details   amlodipine (NORVASC) 10 MG tablet Take 1 tablet (10 mg total) by mouth once daily., Starting Wed 9/6/2017, Normal      blood sugar diagnostic Strp 1 strip by Misc.(Non-Drug; Combo Route) route 2 (two) times daily with meals., Starting 2/24/2017, Until Discontinued, Normal      cholecalciferol, vitamin D3, (VITAMIN D3) 1,000 unit capsule Take 1,000 Units by mouth once daily., Until Discontinued, Historical Med      cyanocobalamin (VITAMIN B-12) 500 MCG tablet Take 2 tablets (1,000 mcg total) by mouth once daily., Starting 1/20/2014, Until Discontinued, Normal      insulin glargine (LANTUS SOLOSTAR) 100 unit/mL (3 mL) InPn pen 24 units every nights, Normal      !! JANUVIA 100 mg Tab TAKE 1 TABLET BY MOUTH ONCE DAILY., No Print      LANCETS & BLOOD GLUCOSE STRIPS MISC * * * Twice a day .  check glucose twice a day, Until Discontinued, Historical Med      losartan-hydrochlorothiazide 50-12.5 mg (HYZAAR) 50-12.5 mg per tablet Take 1 tablet by mouth once daily., Starting Wed 9/6/2017, Normal      metformin (GLUCOPHAGE-XR) 500 MG 24 hr tablet TAKE 2 TABLETS (1,000 MG TOTAL) BY MOUTH 2 (TWO) TIMES DAILY WITH MEALS., Normal      metoprolol succinate (TOPROL-XL) 25 MG 24 hr tablet Take 1 tablet (25 mg total) by mouth once daily., Starting Tue 2/27/2018, Print      omega-3 fatty acids-vitamin E (OMEGA-3 FISH OIL) 1,000-5 mg-unit Cap Take by mouth. 1 Capsule Oral Every day, Until Discontinued, Historical Med      omeprazole (PRILOSEC OTC) 20 MG tablet Take by mouth. 1 Tablet, Delayed Release (E.C.) Oral Every day, Until Discontinued, Historical Med      pen needle, diabetic 29  "gauge x 1/2" Ndle Inject insulin daily, Normal      pravastatin (PRAVACHOL) 40 MG tablet Take 1 tablet (40 mg total) by mouth once daily., Starting Wed 9/6/2017, Normal      rivaroxaban (XARELTO) 20 mg Tab Take 1 tablet (20 mg total) by mouth daily with dinner or evening meal., Starting Tue 2/27/2018, Print      !! SITagliptin (JANUVIA) 100 MG Tab Take 1 tablet (100 mg total) by mouth once daily., Starting Wed 9/6/2017, Normal      tadalafil (CIALIS) 20 MG Tab 1 TABLET PRIOR TO SEX, Normal       !! - Potential duplicate medications found. Please discuss with provider.          Indwelling Lines/Drains at time of discharge:   Lines/Drains/Airways          No matching active lines, drains, or airways          Time spent on the discharge of patient: 45 minutes  Patient was seen and examined on the date of discharge and determined to be suitable for discharge.         Lou Alcala NP  Department of Hospital Medicine  [facility]  "

## 2018-03-05 ENCOUNTER — PATIENT OUTREACH (OUTPATIENT)
Dept: ADMINISTRATIVE | Facility: CLINIC | Age: 73
End: 2018-03-05

## 2018-03-05 ENCOUNTER — TELEPHONE (OUTPATIENT)
Dept: MEDSURG UNIT | Facility: HOSPITAL | Age: 73
End: 2018-03-05

## 2018-03-05 LAB
BACTERIA SPEC AEROBE CULT: NORMAL
GRAM STN SPEC: NORMAL

## 2018-03-07 ENCOUNTER — PATIENT MESSAGE (OUTPATIENT)
Dept: INTERNAL MEDICINE | Facility: CLINIC | Age: 73
End: 2018-03-07

## 2018-03-07 NOTE — TELEPHONE ENCOUNTER
Patient was currently in hospital and needs 2 week f/u with Dr. Sandoval.   Please call patient to schedule an appointment---thanks

## 2018-03-13 ENCOUNTER — CLINICAL SUPPORT (OUTPATIENT)
Dept: CARDIOLOGY | Facility: CLINIC | Age: 73
End: 2018-03-13
Attending: INTERNAL MEDICINE
Payer: MEDICARE

## 2018-03-13 ENCOUNTER — LAB VISIT (OUTPATIENT)
Dept: LAB | Facility: HOSPITAL | Age: 73
End: 2018-03-13
Attending: INTERNAL MEDICINE
Payer: MEDICARE

## 2018-03-13 DIAGNOSIS — I48.19 PERSISTENT ATRIAL FIBRILLATION: ICD-10-CM

## 2018-03-13 DIAGNOSIS — I49.1 ATRIAL PREMATURE DEPOLARIZATION: ICD-10-CM

## 2018-03-13 DIAGNOSIS — I48.91 ATRIAL FIBRILLATION, UNSPECIFIED TYPE: Primary | ICD-10-CM

## 2018-03-13 LAB
ALBUMIN SERPL BCP-MCNC: 3.4 G/DL
ALP SERPL-CCNC: 74 U/L
ALT SERPL W/O P-5'-P-CCNC: 12 U/L
ANION GAP SERPL CALC-SCNC: 10 MMOL/L
AST SERPL-CCNC: 14 U/L
BILIRUB SERPL-MCNC: 0.7 MG/DL
BUN SERPL-MCNC: 30 MG/DL
CALCIUM SERPL-MCNC: 10.1 MG/DL
CHLORIDE SERPL-SCNC: 107 MMOL/L
CO2 SERPL-SCNC: 24 MMOL/L
CREAT SERPL-MCNC: 1.3 MG/DL
DIASTOLIC DYSFUNCTION: YES
EST. GFR  (AFRICAN AMERICAN): >60 ML/MIN/1.73 M^2
EST. GFR  (NON AFRICAN AMERICAN): 54.9 ML/MIN/1.73 M^2
ESTIMATED AVG GLUCOSE: 154 MG/DL
ESTIMATED PA SYSTOLIC PRESSURE: 28.79
GLUCOSE SERPL-MCNC: 121 MG/DL
HBA1C MFR BLD HPLC: 7 %
MITRAL VALVE REGURGITATION: ABNORMAL
POTASSIUM SERPL-SCNC: 4.2 MMOL/L
PROT SERPL-MCNC: 6.7 G/DL
RETIRED EF AND QEF - SEE NOTES: 43 (ref 55–65)
SODIUM SERPL-SCNC: 141 MMOL/L
TRICUSPID VALVE REGURGITATION: ABNORMAL

## 2018-03-13 PROCEDURE — 80053 COMPREHEN METABOLIC PANEL: CPT

## 2018-03-13 PROCEDURE — 36415 COLL VENOUS BLD VENIPUNCTURE: CPT | Mod: PO

## 2018-03-13 PROCEDURE — 93306 TTE W/DOPPLER COMPLETE: CPT | Mod: PBBFAC,PO | Performed by: INTERNAL MEDICINE

## 2018-03-13 PROCEDURE — 83036 HEMOGLOBIN GLYCOSYLATED A1C: CPT

## 2018-03-15 ENCOUNTER — OFFICE VISIT (OUTPATIENT)
Dept: CARDIOLOGY | Facility: CLINIC | Age: 73
End: 2018-03-15
Payer: MEDICARE

## 2018-03-15 VITALS
OXYGEN SATURATION: 99 % | BODY MASS INDEX: 31.35 KG/M2 | WEIGHT: 236.56 LBS | HEIGHT: 73 IN | HEART RATE: 86 BPM | DIASTOLIC BLOOD PRESSURE: 74 MMHG | SYSTOLIC BLOOD PRESSURE: 106 MMHG

## 2018-03-15 DIAGNOSIS — E78.5 HYPERLIPIDEMIA, UNSPECIFIED HYPERLIPIDEMIA TYPE: Primary | ICD-10-CM

## 2018-03-15 DIAGNOSIS — I48.19 PERSISTENT ATRIAL FIBRILLATION: Primary | ICD-10-CM

## 2018-03-15 DIAGNOSIS — E11.69 HYPERLIPIDEMIA ASSOCIATED WITH TYPE 2 DIABETES MELLITUS: ICD-10-CM

## 2018-03-15 DIAGNOSIS — Z99.81 ON HOME OXYGEN THERAPY: ICD-10-CM

## 2018-03-15 DIAGNOSIS — I51.9 LV DYSFUNCTION: ICD-10-CM

## 2018-03-15 DIAGNOSIS — E11.59 HYPERTENSION ASSOCIATED WITH DIABETES: ICD-10-CM

## 2018-03-15 DIAGNOSIS — I82.431 ACUTE DEEP VEIN THROMBOSIS (DVT) OF POPLITEAL VEIN OF RIGHT LOWER EXTREMITY: ICD-10-CM

## 2018-03-15 DIAGNOSIS — I26.99 OTHER ACUTE PULMONARY EMBOLISM WITHOUT ACUTE COR PULMONALE: ICD-10-CM

## 2018-03-15 DIAGNOSIS — E78.5 HYPERLIPIDEMIA, UNSPECIFIED HYPERLIPIDEMIA TYPE: ICD-10-CM

## 2018-03-15 DIAGNOSIS — E66.09 CLASS 1 OBESITY DUE TO EXCESS CALORIES WITH SERIOUS COMORBIDITY AND BODY MASS INDEX (BMI) OF 31.0 TO 31.9 IN ADULT: ICD-10-CM

## 2018-03-15 DIAGNOSIS — E65 ABDOMINAL OBESITY: ICD-10-CM

## 2018-03-15 DIAGNOSIS — R94.31 ABNORMAL ECG: ICD-10-CM

## 2018-03-15 DIAGNOSIS — G47.19 EXCESSIVE DAYTIME SLEEPINESS: ICD-10-CM

## 2018-03-15 DIAGNOSIS — I15.2 HYPERTENSION ASSOCIATED WITH DIABETES: ICD-10-CM

## 2018-03-15 DIAGNOSIS — E78.5 HYPERLIPIDEMIA ASSOCIATED WITH TYPE 2 DIABETES MELLITUS: ICD-10-CM

## 2018-03-15 PROCEDURE — 93010 ELECTROCARDIOGRAM REPORT: CPT | Mod: S$PBB,,, | Performed by: INTERNAL MEDICINE

## 2018-03-15 PROCEDURE — 99215 OFFICE O/P EST HI 40 MIN: CPT | Mod: S$PBB,,, | Performed by: INTERNAL MEDICINE

## 2018-03-15 PROCEDURE — 93005 ELECTROCARDIOGRAM TRACING: CPT | Mod: PBBFAC,PO | Performed by: INTERNAL MEDICINE

## 2018-03-15 PROCEDURE — 99213 OFFICE O/P EST LOW 20 MIN: CPT | Mod: PBBFAC,PO | Performed by: INTERNAL MEDICINE

## 2018-03-15 PROCEDURE — 99999 PR PBB SHADOW E&M-EST. PATIENT-LVL III: CPT | Mod: PBBFAC,,, | Performed by: INTERNAL MEDICINE

## 2018-03-15 RX ORDER — METHYLPREDNISOLONE 4 MG/1
TABLET ORAL
COMMUNITY
Start: 2018-03-06 | End: 2018-03-20

## 2018-03-15 NOTE — PROGRESS NOTES
"Subjective:    Patient ID:  Des Junior is a 72 y.o. male who presents for evaluation of No chief complaint on file.  For post hospital review, acute PE, DVT, new onset AF  PCP: Dr. Cortez, [facility], first seen 2 weeks ago  Endocrine: Dr. Mark Mchugh, has also been seeing for primary care. Now with Dr. Stallings, [facility]  Pulmonary: Dr. Sandoval  EP: Dr. Alicea, appointment 3/20 for AF     Patient lives with his wife, Christian (nonsmoker).  Patient is a never smoker, denies ETOH consumption.   Patient works part-time as master .     DC from [facility] on 3/2/2018    HPI  DCS - "72 year old male with PMH of DM type 2, hyperlipidemia, hypertension, and recent diagonosis of Pulmoanry embolic and atrial fibrillation. He reports shortness of breath for 3 weeks with ambulation ~ 25 feet and walking up stairs. He was evaluated by his PCP on 2/27 for same complaint, D-dimer and BNP ordered. He was noted to have elevated D-dimer therefore he was initiated on xarelto and underwent CTA chest this am. He took his first dose of Xarelto yesterday and am.  CTA chest demonstrated bilateral pulmoonay emboli with pulmonary infarcts prompting ED evaluation. He reports bilateral lower ext swelling which is unchanged. Denies recent road trips and flights. He was recently diagnosed with AFib and is scheduled for outpatient echo and Cardiology apt with Dr. Alicea at [facility].  Denies dizziness, lightheadedness, fever, and chills.      * No surgery found *       Hospital Course:   Patient monitored on telemetry during hospitalizations and remained with atrial fib CVR. Pulmonary and cardiology consulted. Pulmonary recommended sputum culture, initiation of oral abx, and home bronchodilators. He was evaluated for home oxygen and qualified with oxygen sat 83% exertional. Echo obtained. See results below. US of lower ext demonstrated thrombus of right popliteal likely attributing to pulmonary emboli. Patient stable for DC from " "pulmonary and cardiology standpoint. Discussed importance of outpatient follow up. Will continue OAC as previously prescribed by PCP."    Seen by me - noted: WM with no clear risk factor for thrombophlebitis; although sits a lot while working, admitted with recent onset of SOB, found thrombosis of the leg, new onset AF and significant PE with new RBBB on ECG. No recent prolong traveling. Feeling better on NOAC with stable vital signs. Echo shows no evidence for RV strain nor pulmonary HTN. Telemetry demonstrated PAF on low BB with rate controlled. Will be reviewed by EP soon. OK for DC from CV standpoint. Plans as listed by Ms. Ari NP. Have mild LV dysfunction and high ASCVD event risk, will need future ischemic evaluation.    Workup - Echo 03/01/2018:  CONCLUSIONS     1 - Mildly to moderately depressed left ventricular systolic function (EF 40-45%).     2 - Indeterminate LV diastolic function.     3 - Intermediate central venous pressure.     4 - Difficult windows, Lumason contrast used for wall motion analysis.     5 - Deterioration of LV function from Echo in 7/2009.      Bilateral Lower Extremity Venous US 03/01/2018:  Nonocclusive thrombus in the right popliteal vein in this patient with known pulmonary thromboemboli.     Chest CTA non-coronary 02/28/2018:  Positive study demonstrating bilateral central and segmental pulmonary emboli, as detailed above.  Small pulmonary infarct involving the superior segment of the left lower lobe and possible small pulmonary infarcts involving the lateral segment of the right middle lobe also noted.  There is also flattening of the interventricular septum which may indicate developing right heart strain.    Small pulmonary nodules in the bilateral lungs measuring up to 6 mm.  Single 1 year follow up CT of the thorax is recommended for surveillance.    Incidental note of cholelithiasis.    Since home, back building boat 4-5 hours daily, no problem, no SOB helped with " breathing Rx, on home O2 uses at night, no CP, do not feel limited. ECG remains in AF, rate 94, frequent PVC, RVCD, possible previous MI. LDL in 3/2018 57.2, baseline 115.    ROS    Constitution: no further malaise/fatigue, have 6 lbs weigh loss over the past 2 week due to hospital. Negative for chills, diaphoresis, weakness and weight gain.   HENT: Negative for congestion and sore throat.    Eyes:        No acute vision changes    Cardiovascular: Positive for dyspnea on exertion, leg swelling (chronic, at baseline) and no further paroxysmal nocturnal dyspnea. Negative for chest pain, irregular heartbeat, near-syncope, orthopnea, palpitations and syncope.   Respiratory: Positive for cough, no shortness of breath, snoring with daytime sleepiness, sputum production and wheezing (5 weeks ago, now resolved ). Negative for hemoptysis. Ada score 13   Hematologic/Lymphatic: Does not bruise/bleed easily.   Skin: Negative for color change and rash.   Musculoskeletal: Negative for back pain, joint swelling and neck pain. Positive for joint pain, knees  Gastrointestinal: Negative for abdominal pain, constipation, heartburn, hematochezia, nausea and vomiting. Some bowel habit change, diarrhea in the morning likely due to medications.  Genitourinary: Negative for dysuria and hematuria.   Neurological: Negative for aphonia, dizziness, focal weakness, headaches, light-headedness, numbness, paresthesias and sensory change.   Psychiatric/Behavioral: Negative for altered mental status. The patient is not nervous/anxious.    Allergic/Immunologic: Negative for persistent infections.     Objective:    Physical Exam    Constitutional: He is oriented to person, place, and time. He appears well-developed and well-nourished. No distress.   HENT:   Head: Normocephalic and atraumatic.   Eyes: Conjunctivae and EOM are normal. Right eye exhibits no discharge. Left eye exhibits no discharge. No scleral icterus.   Neck: Normal range of  "motion. Normal carotid pulses and no JVD present. Carotid bruit is not present. Circumference 15.25"  Cardiovascular: Normal rate with an irregular rhythm present. Exam reveals no friction rub.  No murmur heard.  Pulses:       Radial pulses are 2+ on the right side, and 2+ on the left side.        Dorsalis pedis pulses are 1+ on the right side, and 1+ on the left side.   Pulmonary/Chest: Effort normal and breath sounds normal. He has no wheezes. He has no rales. He exhibits no tenderness.   Abdominal: Soft. Bowel sounds are normal. There is no tenderness. There is no guarding. Waist 43", hip 47"  Musculoskeletal: Normal range of motion. 1+ Pitting edema right and left lower legs    Neurological: He is alert and oriented to person, place, and time. Follows commands, moves all extremities.    Skin: Skin is warm and dry. He is not diaphoretic. No cyanosis. Nails show no clubbing.   Psychiatric: He has a normal mood and affect. His behavior is normal.   Vitals reviewed.    Assessment:       1. Persistent atrial fibrillation    2. Hyperlipidemia, unspecified hyperlipidemia type    3. Type 2 diabetes, uncontrolled, with neuropathy, onset 2013    4. Hyperlipidemia associated with type 2 diabetes mellitus, baseline     5. Hypertension associated with diabetes    6. Class 1 obesity due to excess calories with serious comorbidity and body mass index (BMI) of 31.0 to 31.9 in adult    7. Other acute pulmonary embolism without acute cor pulmonale    8. Acute deep vein thrombosis (DVT) of popliteal vein of right lower extremity    9. LV dysfunction    10. On home oxygen therapy    11. Abnormal ECG    12. Excessive daytime sleepiness    13. Abdominal obesity         Plan:       Diagnoses and all orders for this visit:    Persistent atrial fibrillation  -     Polysomnogram (CPAP will be added if patient meets diagnostic criteria.); Future  -     NM Myocardial Perfusion Spect Multi Pharmacologic; Future  -     NM Multi Pharm " Stress Cardiac Component; Future    Hyperlipidemia, unspecified hyperlipidemia type  -     EKG 12-lead    Type 2 diabetes, uncontrolled, with neuropathy, onset 2013    Hyperlipidemia associated with type 2 diabetes mellitus, baseline     Hypertension associated with diabetes    Class 1 obesity due to excess calories with serious comorbidity and body mass index (BMI) of 31.0 to 31.9 in adult    Other acute pulmonary embolism without acute cor pulmonale    Acute deep vein thrombosis (DVT) of popliteal vein of right lower extremity    LV dysfunction  -     NM Myocardial Perfusion Spect Multi Pharmacologic; Future  -     NM Multi Pharm Stress Cardiac Component; Future    On home oxygen therapy    Abnormal ECG  -     NM Myocardial Perfusion Spect Multi Pharmacologic; Future  -     NM Multi Pharm Stress Cardiac Component; Future    Excessive daytime sleepiness  -     Polysomnogram (CPAP will be added if patient meets diagnostic criteria.); Future    Abdominal obesity  -     Polysomnogram (CPAP will be added if patient meets diagnostic criteria.); Future    - Instruction for Mediterranean diet and heart healthy exercise given.  - Weigh twice weekly, try to lose 1-2 lbs per week  - Consider DCC after 3 full weeks of NOAC  - Will follow up in 2 weeks to check efficacy      Patient Active Problem List   Diagnosis    AK (actinic keratosis)    Type 2 diabetes, uncontrolled, with neuropathy, onset 2003    Hyperlipidemia associated with type 2 diabetes mellitus, baseline     Osteoarthritis of right knee    Hypertension associated with diabetes    ED (erectile dysfunction)    Obesity, BMI 32+, down to 31.2    Hearing loss, sensorineural    Type 2 diabetes mellitus, controlled    Type 2 diabetes, controlled, with neuropathy    Colon adenoma    History of adenomatous polyp of colon    Cough    Pulmonary embolus- large involved central PA, unprovoked.    A-fib    Asbestos exposure    Lung nodule,  solitary- 5 mm rll    Bronchitis, chronic, mucopurulent    Acute deep vein thrombosis (DVT) of popliteal vein of right lower extremity    CASPER (acute kidney injury)    LV dysfunction    On home oxygen therapy    Abnormal ECG    Excessive daytime sleepiness    Abdominal obesity     Total face-to-face time with the patient was 40 minutes and greater than 50% was spent in counseling and coordination of care. The above assessment and plan have been discussed at length. Hospital physician's note reviewed. Labs and procedure over the last 6 months reviewed. Problem List updated. Asked to bring in all active medications / pills bottles with next visit.

## 2018-03-19 NOTE — PROGRESS NOTES
"CHIEF COMPLAINT: Type 2 Diabetes     HPI: Mr. Des Junior is a 73 y.o. male who was diagnosed with Type 2 DM > 10-11 years.   Pt was last seen by Dr. Mchugh and then Dr. Stallings.  He is now being seen by me for the first time.  Lab Results   Component Value Date    HGBA1C 7.0 (H) 03/13/2018     Pt's a1c is at goal. He recently was admitted for pulmonary embolism, and was seen by cardiology for persistent afib.  He has an appt with cardiology today.   Has h/o blood clots, on anticoagulants.   Has f/u with pulmonary as well.    PREVIOUS DIABETES MEDICATIONS TRIED  lantus  januvia  metformin  invokana- yeast infection  CAM  CURRENT DIABETIC MEDS: lantus 24 units at night, januvia 100 mg daily, metformin 1000 mg bid     Pt is monitoring blood glucose readings 2 times a day.  Needs >100 strips per month related to fluctuations with blood glucose reading, a1c trends, and activity level.     this am  120s-160s-morning readings  109(lows), 190s-highest    Last Podiatry Exam:  none    REVIEW OF SYSTEMS  General: no weakness, fatigue, or + weight changes fluctuation 5-15 lbs.   Eyes: no double or blurred vision, eye pain, or redness; Last Eye Exam=8/25/17  Cardiovascular: no chest pain, palpitations, + pedal edema, or murmurs.   Respiratory: no cough or dyspnea. +SOB- oxygen at home  GI: no heartburn, nausea, or +diarrhea (early morning); good appetite.   Skin: no rashes, dryness, itching, or reactions at insulin injection sites.  Neuro: + mild numbness, tingling, tremors, or vertigo.   Endocrine: no polyuria, polydipsia, polyphagia, heat or cold intolerance.     Vital Signs  /74 (BP Location: Left arm, Patient Position: Sitting)   Pulse 67   Ht 6' 1" (1.854 m)   Wt 98.6 kg (217 lb 4.8 oz)   BMI 28.67 kg/m²     Hemoglobin A1C   Date Value Ref Range Status   03/13/2018 7.0 (H) 4.0 - 5.6 % Final     Comment:     According to ADA guidelines, hemoglobin A1c <7.0% represents  optimal control in " non-pregnant diabetic patients. Different  metrics may apply to specific patient populations.   Standards of Medical Care in Diabetes-2016.  For the purpose of screening for the presence of diabetes:  <5.7%     Consistent with the absence of diabetes  5.7-6.4%  Consistent with increasing risk for diabetes   (prediabetes)  >or=6.5%  Consistent with diabetes  Currently, no consensus exists for use of hemoglobin A1c  for diagnosis of diabetes for children.  This Hemoglobin A1c assay has significant interference with fetal   hemoglobin   (HbF). The results are invalid for patients with abnormal amounts of   HbF,   including those with known Hereditary Persistence   of Fetal Hemoglobin. Heterozygous hemoglobin variants (HbAS, HbAC,   HbAD, HbAE, HbA2) do not significantly interfere with this assay;   however, presence of multiple variants in a sample may impact the %   interference.     03/01/2018 7.0 (H) 4.0 - 5.6 % Final     Comment:     According to ADA guidelines, hemoglobin A1c <7.0% represents  optimal control in non-pregnant diabetic patients. Different  metrics may apply to specific patient populations.   Standards of Medical Care in Diabetes-2016.  For the purpose of screening for the presence of diabetes:  <5.7%     Consistent with the absence of diabetes  5.7-6.4%  Consistent with increasing risk for diabetes   (prediabetes)  >or=6.5%  Consistent with diabetes  Currently, no consensus exists for use of hemoglobin A1c  for diagnosis of diabetes for children.  This Hemoglobin A1c assay has significant interference with fetal   hemoglobin   (HbF). The results are invalid for patients with abnormal amounts of   HbF,   including those with known Hereditary Persistence   of Fetal Hemoglobin. Heterozygous hemoglobin variants (HbAS, HbAC,   HbAD, HbAE, HbA2) do not significantly interfere with this assay;   however, presence of multiple variants in a sample may impact the %   interference.     12/06/2017 7.5 (H) 4.0 -  5.6 % Final     Comment:     According to ADA guidelines, hemoglobin A1c <7.0% represents  optimal control in non-pregnant diabetic patients. Different  metrics may apply to specific patient populations.   Standards of Medical Care in Diabetes-2016.  For the purpose of screening for the presence of diabetes:  <5.7%     Consistent with the absence of diabetes  5.7-6.4%  Consistent with increasing risk for diabetes   (prediabetes)  >or=6.5%  Consistent with diabetes  Currently, no consensus exists for use of hemoglobin A1c  for diagnosis of diabetes for children.  This Hemoglobin A1c assay has significant interference with fetal   hemoglobin   (HbF). The results are invalid for patients with abnormal amounts of   HbF,   including those with known Hereditary Persistence   of Fetal Hemoglobin. Heterozygous hemoglobin variants (HbAS, HbAC,   HbAD, HbAE, HbA2) do not significantly interfere with this assay;   however, presence of multiple variants in a sample may impact the %   interference.          Chemistry        Component Value Date/Time     03/13/2018 1048    K 4.2 03/13/2018 1048     03/13/2018 1048    CO2 24 03/13/2018 1048    BUN 30 (H) 03/13/2018 1048    CREATININE 1.3 03/13/2018 1048     (H) 03/13/2018 1048        Component Value Date/Time    CALCIUM 10.1 03/13/2018 1048    ALKPHOS 74 03/13/2018 1048    AST 14 03/13/2018 1048    ALT 12 03/13/2018 1048    BILITOT 0.7 03/13/2018 1048    ESTGFRAFRICA >60.0 03/13/2018 1048    EGFRNONAA 54.9 (A) 03/13/2018 1048           Lab Results   Component Value Date    TSH 1.701 03/02/2018      Lab Results   Component Value Date    CHOL 94 (L) 03/02/2018    CHOL 124 08/30/2017    CHOL 136 07/28/2015     Lab Results   Component Value Date    HDL 25 (L) 03/02/2018    HDL 39 (L) 08/30/2017    HDL 42 07/28/2015     Lab Results   Component Value Date    LDLCALC 57.2 (L) 03/02/2018    LDLCALC 72.2 08/30/2017    LDLCALC 81.8 07/28/2015     Lab Results   Component  Value Date    TRIG 59 03/02/2018    TRIG 64 08/30/2017    TRIG 61 07/28/2015     Lab Results   Component Value Date    CHOLHDL 26.6 03/02/2018    CHOLHDL 31.5 08/30/2017    CHOLHDL 30.9 07/28/2015         PHYSICAL EXAMINATION  Constitutional: Appears well, no distress. Wears glasses  Neck: Supple, trachea midline.   Respiratory: No wheezes, even and unlabored.  Cardiovascular: RRR,+1 BLE/pedal edema.   Lymph: no lymphadenopathy   Skin: warm and dry; no injection site reactions, no acanthosis nigracans observed.  Neuro:patient alert and cooperative, normal affect; steady gait-uses walker.    Diabetes Foot Exam:   deferred      Assessment/Plan  1. Type 2 diabetes, uncontrolled, with neuropathy, onset 2003  F/u in 3 mos  Continue regimen above  a1c at goal  Goal 7% or less      2. Hyperlipidemia associated with type 2 diabetes mellitus, baseline   Lab Results   Component Value Date    LDLCALC 57.2 (L) 03/02/2018     At goal    3. Hypertension associated with diabetes  Controlled, continue med(s)   4. Class 1 obesity due to excess calories with serious comorbidity and body mass index (BMI) of 31.0 to 31.9 in adult  Body mass index is 28.67 kg/m². may increase insulin resistance  Wt loss 5-10% in the next 3-6 mos          FOLLOW UP  Follow-up in about 4 months (around 7/20/2018).

## 2018-03-20 ENCOUNTER — HOSPITAL ENCOUNTER (OUTPATIENT)
Dept: CARDIOLOGY | Facility: CLINIC | Age: 73
Discharge: HOME | End: 2018-03-20
Payer: MEDICARE

## 2018-03-20 ENCOUNTER — INITIAL CONSULT (OUTPATIENT)
Dept: ELECTROPHYSIOLOGY | Facility: CLINIC | Age: 73
End: 2018-03-20
Payer: MEDICARE

## 2018-03-20 ENCOUNTER — TELEPHONE (OUTPATIENT)
Dept: ENDOCRINOLOGY | Facility: CLINIC | Age: 73
End: 2018-03-20

## 2018-03-20 ENCOUNTER — OFFICE VISIT (OUTPATIENT)
Dept: INTERNAL MEDICINE | Facility: CLINIC | Age: 73
End: 2018-03-20
Payer: MEDICARE

## 2018-03-20 ENCOUNTER — OFFICE VISIT (OUTPATIENT)
Dept: ENDOCRINOLOGY | Facility: CLINIC | Age: 73
End: 2018-03-20
Payer: MEDICARE

## 2018-03-20 ENCOUNTER — CLINICAL SUPPORT (OUTPATIENT)
Dept: INTERNAL MEDICINE | Facility: CLINIC | Age: 73
End: 2018-03-20
Payer: MEDICARE

## 2018-03-20 VITALS
HEIGHT: 73 IN | HEART RATE: 58 BPM | SYSTOLIC BLOOD PRESSURE: 130 MMHG | OXYGEN SATURATION: 97 % | WEIGHT: 248.69 LBS | DIASTOLIC BLOOD PRESSURE: 76 MMHG | BODY MASS INDEX: 32.96 KG/M2

## 2018-03-20 VITALS
HEIGHT: 73 IN | BODY MASS INDEX: 28.8 KG/M2 | WEIGHT: 217.31 LBS | HEART RATE: 67 BPM | DIASTOLIC BLOOD PRESSURE: 74 MMHG | SYSTOLIC BLOOD PRESSURE: 123 MMHG

## 2018-03-20 VITALS
BODY MASS INDEX: 32.98 KG/M2 | DIASTOLIC BLOOD PRESSURE: 77 MMHG | WEIGHT: 248.88 LBS | SYSTOLIC BLOOD PRESSURE: 127 MMHG | HEIGHT: 73 IN | HEART RATE: 82 BPM

## 2018-03-20 DIAGNOSIS — Z23 NEED FOR PNEUMOCOCCAL VACCINATION: ICD-10-CM

## 2018-03-20 DIAGNOSIS — I15.2 HYPERTENSION ASSOCIATED WITH DIABETES: Primary | ICD-10-CM

## 2018-03-20 DIAGNOSIS — I51.9 LV DYSFUNCTION: ICD-10-CM

## 2018-03-20 DIAGNOSIS — I48.19 PERSISTENT ATRIAL FIBRILLATION: ICD-10-CM

## 2018-03-20 DIAGNOSIS — E11.59 HYPERTENSION ASSOCIATED WITH DIABETES: ICD-10-CM

## 2018-03-20 DIAGNOSIS — E66.09 CLASS 1 OBESITY DUE TO EXCESS CALORIES WITH SERIOUS COMORBIDITY AND BODY MASS INDEX (BMI) OF 31.0 TO 31.9 IN ADULT: ICD-10-CM

## 2018-03-20 DIAGNOSIS — I26.99 OTHER ACUTE PULMONARY EMBOLISM WITHOUT ACUTE COR PULMONALE: Primary | ICD-10-CM

## 2018-03-20 DIAGNOSIS — E78.5 HYPERLIPIDEMIA ASSOCIATED WITH TYPE 2 DIABETES MELLITUS: ICD-10-CM

## 2018-03-20 DIAGNOSIS — E11.69 HYPERLIPIDEMIA ASSOCIATED WITH TYPE 2 DIABETES MELLITUS: ICD-10-CM

## 2018-03-20 DIAGNOSIS — I82.431 ACUTE DEEP VEIN THROMBOSIS (DVT) OF POPLITEAL VEIN OF RIGHT LOWER EXTREMITY: ICD-10-CM

## 2018-03-20 DIAGNOSIS — I48.91 ATRIAL FIBRILLATION, UNSPECIFIED TYPE: ICD-10-CM

## 2018-03-20 DIAGNOSIS — E11.59 HYPERTENSION ASSOCIATED WITH DIABETES: Primary | ICD-10-CM

## 2018-03-20 DIAGNOSIS — M17.11 PRIMARY OSTEOARTHRITIS OF RIGHT KNEE: ICD-10-CM

## 2018-03-20 DIAGNOSIS — I15.2 HYPERTENSION ASSOCIATED WITH DIABETES: ICD-10-CM

## 2018-03-20 DIAGNOSIS — E11.40 TYPE 2 DIABETES, CONTROLLED, WITH NEUROPATHY: ICD-10-CM

## 2018-03-20 PROCEDURE — 99999 PR PBB SHADOW E&M-EST. PATIENT-LVL III: CPT | Mod: PBBFAC,,, | Performed by: INTERNAL MEDICINE

## 2018-03-20 PROCEDURE — 99214 OFFICE O/P EST MOD 30 MIN: CPT | Mod: PBBFAC | Performed by: NURSE PRACTITIONER

## 2018-03-20 PROCEDURE — G0009 ADMIN PNEUMOCOCCAL VACCINE: HCPCS | Mod: PBBFAC

## 2018-03-20 PROCEDURE — 99999 PR PBB SHADOW E&M-EST. PATIENT-LVL IV: CPT | Mod: PBBFAC,,, | Performed by: NURSE PRACTITIONER

## 2018-03-20 PROCEDURE — 93010 ELECTROCARDIOGRAM REPORT: CPT | Mod: S$PBB,,, | Performed by: INTERNAL MEDICINE

## 2018-03-20 PROCEDURE — 99214 OFFICE O/P EST MOD 30 MIN: CPT | Mod: S$PBB,,, | Performed by: INTERNAL MEDICINE

## 2018-03-20 PROCEDURE — 99214 OFFICE O/P EST MOD 30 MIN: CPT | Mod: S$PBB,,, | Performed by: NURSE PRACTITIONER

## 2018-03-20 PROCEDURE — 99213 OFFICE O/P EST LOW 20 MIN: CPT | Mod: PBBFAC,27,25 | Performed by: INTERNAL MEDICINE

## 2018-03-20 PROCEDURE — 93005 ELECTROCARDIOGRAM TRACING: CPT | Mod: PBBFAC | Performed by: INTERNAL MEDICINE

## 2018-03-20 PROCEDURE — 99213 OFFICE O/P EST LOW 20 MIN: CPT | Mod: PBBFAC,27 | Performed by: INTERNAL MEDICINE

## 2018-03-20 PROCEDURE — 99205 OFFICE O/P NEW HI 60 MIN: CPT | Mod: S$PBB,,, | Performed by: INTERNAL MEDICINE

## 2018-03-20 NOTE — PATIENT INSTRUCTIONS
Snacks can be an important part of a balanced, healthy meal plan. They allow you to eat more frequently, feeling full and satisfied throughout the day. Also, they allow you to spread carbohydrates evenly, which may stabilize blood sugars.  Plus, snacks are enjoyable!     The amount of carbohydrate needed at snacks varies. Generally, about 15-30 grams of carbohydrate per snack is recommended.  Below you will find some tasty treats.       0-5 gm carb   Crystal Light   Vitamin Water Zero   Herbal tea, unsweetened   2 tsp peanut butter on celery   1./2 cup sugar-free jell-o   1 sugar-free popsicle   ¼ cup blueberries   8oz Blue Bridget unsweetened almond milk   5 baby carrots & celery sticks, cucumbers, bell peppers dipped in ¼ cup salsa, 2Tbsp light ranch dressing or 2Tbsp plain Greek yogurt   10 Goldfish crackers   ½ oz low-fat cheese or string cheese   1 closed handful of nuts, unsalted   1 Tbsp of sunflower seeds, unsalted   1 cup Smart Pop popcorn   1 whole grain brown rice cake        15 gm carb   1 small piece of fruit or ½ banana or 1/2 cup lite canned fruit   3 lynette cracker squares   3 cups Smart Pop popcorn, top spray butter, Conroy lite salt or cinnamon and Truvia   5 Vanilla Wafers   ½ cup low fat, no added sugar ice cream or frozen yogurt (Blue bell, Blue Bunny, Weight Watchers, Skinny Cow)   ½ turkey, ham, or chicken sandwich   ½ c fruit with ½ c Cottage cheese   4-6 unsalted wheat crackers with 1 oz low fat cheese or 1 tbsp peanut butter    30-45 goldfish crackers (depending on flavor)    7-8 Samaritan mini brown rice cakes (caramel, apple cinnamon, chocolate)    12 Samaritan mini brown rice cakes (cheddar, bbq, ranch)    1/3 cup hummus dip with raw veg   1/2 whole wheat mauricio, 1Tbsp hummus   Mini Pizza (1/2 whole wheat English muffin, low-fat  cheese, tomato sauce)   100 calorie snack pack (Oreo, Chips Ahoy, Ritz Mix, Baked Cheetos)   4-6 oz. light or Greek Style yogurt  (Luisana Perez, Gloria, [facility])   ½ cup sugar-free pudding     6 in. wheat tortilla or mauricio oven toasted chips (topped with spray butter flavoring, cinnamon, Truvia OR spray butter, garlic powder, chili powder)    18 BBQ Popchips (available at [employer], Whole Foods, Fresh Market)                   Diabetes Support Group Meetings         Date: Topic:   February 8 Health Promotion/Cooking Demo   March 8 Taking Care of Your Kidneys   April 12 Taking Care of Your Feet   May 10 Ease Your Mind with Diabetes   Imelda 14 Summer Treats/Cooking Demo   July 12 Super Market Sweep   August 9 Taking Care of Your Eyes   Sept 13 Technology/ADA updates   October 11 Recipes & Treats/Cooking Demo   November 8 Heart Health/Pump it up!   December 13 Year-End Close Out        Meetings are held in the [facility] (A) of the [facility] located at [address] [place] [address]. Please call [phone] for additional information.    Free service, offered every 2nd Thursday of every month! Family members and/or friends are welcome as well!  Support group is for patients with type 1 or type 2 diabetes.    From 3:30p to 4:30p

## 2018-03-20 NOTE — LETTER
March 20, 2018      Christopher Cortez MD  [address]  [facility] [address]           [facility] - Arrhythmia  [address]  [facility] [address]  Phone: [phone]  Fax: [phone]          Patient: Des Junior   MR Number: [id number]   YOB: 1945   Date of Visit: 3/20/2018       Dear Dr. Christopher Cortez:    Thank you for referring Des Junior to me for evaluation. Attached you will find relevant portions of my assessment and plan of care.    If you have questions, please do not hesitate to call me. I look forward to following Des Junior along with you.    Sincerely,    Erwin Alicea MD    Enclosure  CC:  No Recipients    If you would like to receive this communication electronically, please contact [email or web address] or [phone] to request more information on [employer] Link access.    For providers and/or their staff who would like to refer a patient to [facility], please contact us through our one-stop-shop provider referral line, [facility], at [phone].    If you feel you have received this communication in error or would no longer like to receive these types of communications, please e-mail [email or web address]

## 2018-03-20 NOTE — TELEPHONE ENCOUNTER
Please contact pt to schedule with Dr. Stallings in July for a 4 month follow up. , scheduled labs prior.

## 2018-03-20 NOTE — PROGRESS NOTES
Subjective:       Patient ID: Des Junior is a 73 y.o. male.    Chief Complaint: Follow-up    Here for hosp f/u.  Breathing much better, no pleuritic pains now. giovany blood thinner med. Not feeling heart racing.    No fam or personal hx of VTE, no clear RF for VTE as well.    Knees hurt when standing.    Feet swollen.    Peeling skin rash on hands and feet, not severe.      Review of Systems   Constitutional: Negative for activity change and unexpected weight change.   HENT: Negative for hearing loss, rhinorrhea and trouble swallowing.    Eyes: Negative for discharge and visual disturbance.   Respiratory: Negative for chest tightness and wheezing.    Cardiovascular: Negative for chest pain and palpitations.   Gastrointestinal: Negative for blood in stool, constipation, diarrhea and vomiting.   Endocrine: Negative for polydipsia and polyuria.   Genitourinary: Negative for difficulty urinating, hematuria and urgency.   Musculoskeletal: Positive for arthralgias (knees). Negative for joint swelling and neck pain.   Neurological: Negative for weakness and headaches.   Psychiatric/Behavioral: Negative for confusion and dysphoric mood.       Objective:      Physical Exam   Constitutional: He is oriented to person, place, and time. He appears well-developed and well-nourished. No distress.   Much better appearing than last time   HENT:   Head: Normocephalic and atraumatic.   Mouth/Throat: No oropharyngeal exudate.   Eyes: EOM are normal. Pupils are equal, round, and reactive to light.   No scleral pallor     Neck: Normal range of motion. Neck supple.   Cardiovascular: Normal rate and normal heart sounds.  Exam reveals no friction rub.    No murmur heard.  Irregularly irregular     Pulmonary/Chest: Effort normal and breath sounds normal. No respiratory distress. He has no wheezes. He has no rales.   Mild basilar crackles   Abdominal: Soft. Bowel sounds are normal. He exhibits no distension and no mass. There is no  tenderness. No hernia.   Musculoskeletal: He exhibits edema (1+ bilat edema, no calf tenderness).   bilat knee crepitus but rom is near normal.   Neurological: He is alert and oriented to person, place, and time.   Skin: He is not diaphoretic.   Psychiatric: He has a normal mood and affect. His behavior is normal.       Assessment:       1. Need for pneumococcal vaccination        Plan:         Here for post hosp f/u.    PE -- symptoms have resolved, he will remain on xarelto. utd colo.    afib -- not sure if from PE or HTN/dm2, continue xarelto, now has good rate control, will see cardiologist today.    dm2 has been controlled.    HTN controlled.    bilat knee OA -- he will see ortho. Explained that he cannot get knee surgery at least for 6 mos due to PE.  Discussed that 10 pound wt loss will help his knee pains. Taking tylenol and stopped nsaids.  Des was seen today for follow-up.    Diagnoses and all orders for this visit:    Need for pneumococcal vaccination  -     Pneumococcal Conjugate Vaccine (13 Valent) (IM)    Acute deep vein thrombosis (DVT) of popliteal vein of right lower extremity    Other acute pulmonary embolism without acute cor pulmonale    Persistent atrial fibrillation    Type 2 diabetes, controlled, with neuropathy    Primary osteoarthritis of right knee        Health Maintenance       Date Due Completion Date    Hepatitis C Screening 1945 ---    TETANUS VACCINE 03/16/1964 ---    Zoster Vaccine 03/16/2006 ---    Pneumococcal (65+) (2 of 2 - PCV13) 01/20/2015 1/20/2014    Influenza Vaccine 08/01/2017 10/5/2015    Eye Exam 08/25/2018 8/25/2017    Hemoglobin A1c 09/13/2018 3/13/2018    Lipid Panel 03/02/2019 3/2/2018    Urine Microalbumin 03/13/2019 3/13/2018    Low Dose Statin 03/20/2019 3/20/2018    Foot Exam 03/20/2019 3/20/2018    Colonoscopy 10/05/2025 10/5/2015      Hep C testing next time and shingles.    Follow-up in about 6 months (around 9/20/2018).

## 2018-03-20 NOTE — PROGRESS NOTES
Subjective:    Patient ID:  Des Junior is a 73 y.o. male who presents for evaluation of Atrial Fibrillation      73 yoM persistent AF, HTN, DM here for AF management. He had HF symptoms- VELASCO and fatigue- leading to evaluation 2/18. He was found to be in AF. He was also found to have bilateral PEs. Xarelto was started for PE therapy and CVA prophylaxis. His EF was 40-45% in AF. He was not cardioverted. He was placed on metoprolol for rate control. He is due for a stress test by Dr Barajas 3/26/18.     Echo 3/18 (In AF):  CONCLUSIONS     1 - Concentric remodeling.     2 - Mildly to moderately depressed left ventricular systolic function (EF 40-45%).     3 - Trivial to mild mitral regurgitation.     4 - Restrictive LV filling pattern, indicating markedly elevated LAP (grade 3 diastolic dysfunction).     5 - Right atrial enlargement.     6 - Right ventricle is upper limit of normal in size with mildly depressed systolic function.     7 - The estimated PA systolic pressure is 29 mmHg.     8 - Trivial to mild tricuspid regurgitation.     9 - Intermediate central venous pressure.     10 - No definite change from Echo on 3/1/2018.     Dr Cortez    Past Medical History:  No date: A-fib  No date: Allergy  No date: Arthritis  No date: Cataract  No date: Diabetes mellitus  No date: Diabetes mellitus type II  No date: Hay fever  No date: Hyperlipidemia  No date: Hypertension  No date: Joint pain  No date: Obesity  No date: Pulmonary emboli  No date: Ulcer    Past Surgical History:  10/5/2015: COLONOSCOPY N/A      Comment: Procedure: COLONOSCOPY;  Surgeon: Pro Jang MD;  Location: [address];                 Service: Endoscopy;  Laterality: N/A;  No date: thumb surgery    Social History    Marital status:              Spouse name:                       Years of education:                 Number of children:               Occupational History  Occupation          Employer             Comment               Self emplyed                                Social History Main Topics    Smoking status: Never Smoker                                                                Smokeless tobacco: Never Used                        Alcohol use: Yes                Comment: rare    Drug use: No              Sexual activity: Not on file          Other Topics            Concern    None on file    Social History Narrative    Master . 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    Review of patient's family history indicates:    Hypertension                   Father                    Heart disease                  Father                      Comment: chf    Cancer                         Brother                     Comment: colon    Cataracts                      Mother                    Glaucoma                       Mother                    Pneumonia                      Mother                    Cancer                         Sister                      Comment: lymphoma    Cancer                         Brother                     Comment: prostate ce    Melanoma                       Neg Hx                    Amblyopia                      Neg Hx                    Blindness                      Neg Hx                    Macular degeneration           Neg Hx                    Retinal detachment             Neg Hx                    Strabismus                     Neg Hx                    Diabetes                       Neg Hx                          Review of Systems   Constitution: Negative.   HENT: Negative.    Eyes: Negative.    Cardiovascular: Positive for dyspnea on exertion. Negative for chest pain, leg swelling, near-syncope, palpitations and syncope.   Respiratory: Negative.  Negative for shortness of breath.    Endocrine: Negative.    Hematologic/Lymphatic: Negative.    Skin: Negative.    Musculoskeletal: Negative.    Gastrointestinal: Negative.    Genitourinary: Negative.     Neurological: Negative.  Negative for dizziness and light-headedness.   Psychiatric/Behavioral: Negative.    Allergic/Immunologic: Negative.         Objective:    Physical Exam   Constitutional: He is oriented to person, place, and time. He appears well-developed and well-nourished. No distress.   HENT:   Head: Normocephalic and atraumatic.   Eyes: EOM are normal. Pupils are equal, round, and reactive to light.   Neck: Normal range of motion. No JVD present. No thyromegaly present.   Cardiovascular: Normal rate, S1 normal, S2 normal and normal heart sounds.  An irregularly irregular rhythm present. PMI is not displaced.  Exam reveals no gallop and no friction rub.    No murmur heard.  Pulmonary/Chest: Effort normal and breath sounds normal. No respiratory distress. He has no wheezes. He has no rales.   Abdominal: Soft. Bowel sounds are normal. He exhibits no distension. There is no tenderness. There is no rebound and no guarding.   Musculoskeletal: Normal range of motion. He exhibits no edema or tenderness.   Neurological: He is alert and oriented to person, place, and time. No cranial nerve deficit.   Skin: Skin is warm and dry. No rash noted. No erythema.   Psychiatric: He has a normal mood and affect. His behavior is normal. Judgment and thought content normal.     ECG: AF with controlled V rate, RBBB        Assessment:       1. Hypertension associated with diabetes    2. Atrial fibrillation, unspecified type    3. Persistent atrial fibrillation    4. LV dysfunction         Plan:       73 yoM, HTN, DM, LV dysfunction EF 40-45%, persistent AF here for AF management. I discussed AF and its basic pathophysiology, including its health implications and treatment options with the patient. Specifically, I addressed the need for CVA prophylaxis as well as the goal to reduce symptomatic arrhythmic episodes by pharmacologic and/or procedural methods. He has symptoms in AF as well as a depressed EF of 40-45%. He is due  for a stress test next week. If his stress test is negative, I would offer LEO/CV to restore normal rhythm and hopefully restore his LV function. If his stress test leads to an angiogram, I would delay his LEO/CV until after his [facility]. Continue current therapy for now. Will await the results of his stress test.

## 2018-03-21 ENCOUNTER — PATIENT MESSAGE (OUTPATIENT)
Dept: INTERNAL MEDICINE | Facility: CLINIC | Age: 73
End: 2018-03-21

## 2018-03-22 ENCOUNTER — OFFICE VISIT (OUTPATIENT)
Dept: PULMONOLOGY | Facility: CLINIC | Age: 73
End: 2018-03-22
Payer: MEDICARE

## 2018-03-22 ENCOUNTER — PATIENT MESSAGE (OUTPATIENT)
Dept: INTERNAL MEDICINE | Facility: CLINIC | Age: 73
End: 2018-03-22

## 2018-03-22 VITALS
HEART RATE: 107 BPM | HEIGHT: 73 IN | OXYGEN SATURATION: 80 % | SYSTOLIC BLOOD PRESSURE: 138 MMHG | BODY MASS INDEX: 32.87 KG/M2 | DIASTOLIC BLOOD PRESSURE: 85 MMHG | WEIGHT: 248 LBS

## 2018-03-22 DIAGNOSIS — R91.1 LUNG NODULE, SOLITARY: ICD-10-CM

## 2018-03-22 DIAGNOSIS — J47.9 BRONCHIECTASIS WITHOUT COMPLICATION: ICD-10-CM

## 2018-03-22 DIAGNOSIS — R05.9 COUGH: Primary | ICD-10-CM

## 2018-03-22 DIAGNOSIS — J41.1 BRONCHITIS, CHRONIC, MUCOPURULENT: ICD-10-CM

## 2018-03-22 PROCEDURE — 99214 OFFICE O/P EST MOD 30 MIN: CPT | Mod: S$PBB,,, | Performed by: INTERNAL MEDICINE

## 2018-03-22 PROCEDURE — 99999 PR PBB SHADOW E&M-EST. PATIENT-LVL III: CPT | Mod: PBBFAC,,, | Performed by: INTERNAL MEDICINE

## 2018-03-22 PROCEDURE — 99213 OFFICE O/P EST LOW 20 MIN: CPT | Mod: PBBFAC,PO | Performed by: INTERNAL MEDICINE

## 2018-03-22 RX ORDER — BUDESONIDE AND FORMOTEROL FUMARATE DIHYDRATE 160; 4.5 UG/1; UG/1
2 AEROSOL RESPIRATORY (INHALATION) EVERY 12 HOURS
Qty: 1 INHALER | Refills: 11 | Status: SHIPPED | OUTPATIENT
Start: 2018-03-22 | End: 2019-02-25 | Stop reason: SDUPTHER

## 2018-03-22 RX ORDER — ALBUTEROL SULFATE 90 UG/1
AEROSOL, METERED RESPIRATORY (INHALATION)
Qty: 1 INHALER | Refills: 11 | Status: SHIPPED | OUTPATIENT
Start: 2018-03-22 | End: 2019-02-25 | Stop reason: SDUPTHER

## 2018-03-22 RX ORDER — ALBUTEROL SULFATE 1.25 MG/3ML
1.25 SOLUTION RESPIRATORY (INHALATION) EVERY 4 HOURS PRN
Qty: 120 VIAL | Refills: 11 | Status: SHIPPED | OUTPATIENT
Start: 2018-03-22 | End: 2019-02-25 | Stop reason: SDUPTHER

## 2018-03-22 NOTE — PATIENT INSTRUCTIONS
Bronchiectasis and lung nodules seen on ct chest   Use symbicort 2 twice daily- use with spacer - 24/7 protection   Use albuterol inhaler or nebulizer as needed up to every 4 hrs.     Use aerobilka to help clear mucous - at least 2-3 times daily. Need to check for chronic lung infection.    Would check ct chest in 6 months but nodules look like chronic infection.  Might consider scope if nodules worsen and no diagnosis by sputum.    Do sputum check x 3-4.      Would use anticoagg for rest of life if able.  Lung should be clear by May for knee procedure-- need anticoagg asap post procedure.

## 2018-03-22 NOTE — PROGRESS NOTES
3/22/2018    [employer] [facility] [employer] Follow Up    Chief Complaint   Patient presents with    Hospital Follow Up    Sputum Production     green       HPI: pt seen with massive pe, also has chr lung dz seen on ct with massive clots.         From my ct review:  Radiographs reviewed: view by direct vision  Very large pulm emboli bilat main arteries, 5 mm rll solid fairly smooth nodule, very min mild post lung marking increase, no calcified plaques seen, lower lungs with thickened bronchi and perhaps early bronchiectasis      from  Consult hpi and rec:    03/02/2018                                                  Admit Date: 3/1/2018  [facility]  New Patient Consult          Chief Complaint   Patient presents with    Shortness of Breath       completed CT this morning outpatient, was advised by PCP to then present to ER for possible admission          History of Present Illness:  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  Pt never smoker.  No leg trauma nor leg swelling nor pain legs.  Pt had intermittent cough with purulent mucous production last yr- has had seasonal allergies with occ nocturnal wheezes.  No inhaler use nor sob til lately.  Pt does travel with 3-4 immobilization between stops.  Fh + only in nephew with clotts in his early 50's.   Pt developed small climbing to his elevated home last 3 weeks, no syncope, no chest pain nor palpitations.  Pt was evaluated by pcp and a fib and pe found.     Pt rm air sat 94% today.          Plan: pt is stable for home soon on xarelto but would recommend bed to chair activity for next week. dvt and pe are unprovoked.  Given large clots seen and delayed presentation- anticoagg 6 month is min but recommend life long.  hypercoagulable chahal will not change length of rx.  Would ask what nephew eval showed if done.   Pt has nodule with 1 of 200  risk lung ca by Brandyn Model.   F/u ct with airway dz may be reasonable in a year?  Asbestos exposure - no impressive asbestosis seen.   Pt has symptomatic chr mucopurulent bronchitis.  Coverage for meds is limited on his insurance.  Would give course levaquin and cultures and afb x 3 for geraldo and neb rx prn 1/2 dose albuterol    Suggest f/u in 3 months or so (GERALDO can take 2 months to culture).    pe likely ppt a fib?        The chief compliant  problem is varies with instablilty at time   PFSH:  Past Medical History:   Diagnosis Date    A-fib     Allergy     Arthritis     Cataract     Diabetes mellitus     Diabetes mellitus type II     Hay fever     Hyperlipidemia     Hypertension     Joint pain     Obesity     Pulmonary emboli     Ulcer          Past Surgical History:   Procedure Laterality Date    COLONOSCOPY N/A 10/5/2015    Procedure: COLONOSCOPY;  Surgeon: Pro Jang MD;  Location: [facility] ([address] [place] [address]);  Service: Endoscopy;  Laterality: N/A;    thumb surgery       Social History   Substance Use Topics    Smoking status: Never Smoker    Smokeless tobacco: Never Used    Alcohol use Yes      Comment: rare     Family History   Problem Relation Age of Onset    Hypertension Father     Heart disease Father      chf    Cancer Brother      colon    Cataracts Mother     Glaucoma Mother     Pneumonia Mother     Cancer Sister      lymphoma    Cancer Brother      prostate ce    Melanoma Neg Hx     Amblyopia Neg Hx     Blindness Neg Hx     Macular degeneration Neg Hx     Retinal detachment Neg Hx     Strabismus Neg Hx     Diabetes Neg Hx      Review of patient's allergies indicates:   Allergen Reactions    Codeine Nausea And Vomiting     Other reaction(s): Nausea    Benicar  [olmesartan]      Other reaction(s): SPOTS IN FRONT OF EYES       Performance Status:The patient's activity level is functions out of house.      Review of Systems:  a review of eleven systems covering  "constitutional, Eye, HEENT, Psych, Respiratory, Cardiac, GI, , Musculoskeletal, Endocrine, Dermatologic was negative except for pertinent findings as listed ABOVE and below:  pertinent positive as above, rest is good       Exam:Comprehensive exam done. /85 (BP Location: Right arm, Patient Position: Sitting)   Pulse 107   Ht 6' 1" (1.854 m)   Wt 112.5 kg (248 lb 0.3 oz)   SpO2 (!) 80%   BMI 32.72 kg/m²   Exam included Vitals as listed, and patient's appearance and affect and alertness and mood, oral exam for yeast and hygiene and pharynx lesions and Mallapatti (M) score, neck with inspection for jvd and masses and thyroid abnormalities and lymph nodes (supraclavicular and infraclavicular nodes and axillary also examined and noted if abn), chest exam included symmetry and effort and fremitus and percussion and auscultation, cardiac exam included rhythm and gallops and murmur and rubs and jvd and edema, abdominal exam for mass and hepatosplenomegaly and tenderness and hernias and bowel sounds, Musculoskeletal exam with muscle tone and posture and mobility/gait and  strength, and skin for rashes and cyanosis and pallor and turgor, extremity for clubbing.  Findings were normal except for pertinent findings listed below:  M3, chest is symmetric, no distress, normal percussion, normal fremitus and good normal breath sounds  Rrr, no murmur , mild edema +2    Radiographs (ct chest and cxr) reviewed: view by direct vision  lg pe on ct earlier march  CTA Chest Non-Coronary   Status: Final result   MyChart Results Release     [facility] Status: Active Results Release   PACS Images     Show images for CTA Chest Non-Coronary   Reviewed By Medina Cortez MD on 3/1/2018 16:40   External Result Report     External Result Report   Narrative     EXAMINATION:  CTA CHEST NON CORONARY    CLINICAL HISTORY:  Chest pain, acute, PE suspected, intermed prob, positive D-dimer;Chest pain, unspecified    TECHNIQUE:  Low " dose axial images, sagittal and coronal reformations were obtained from the thoracic inlet to the lung bases.  Timing was optimized to evaluate the pulmonary arteries.    COMPARISON:  Chest radiographs 02/27/2018    FINDINGS:  The visualized thyroid gland is unremarkable.  No supraclavicular lymphadenopathy is seen.  No mediastinal or hilar lymphadenopathy is seen.  No axillary lymphadenopathy is seen.  No acute esophageal abnormality is seen.    The thoracic aorta is normal in caliber.  There is mild atherosclerosis in the aortic arch.  Moderate coronary artery calcification is also noted.    Pulmonary arterial bolus timing is good.  The study is positive for pulmonary embolism.  There are filling defects within the central pulmonary arteries bilaterally with involvement of the distal aspect of the main left pulmonary artery as well as the descending right and left pulmonary artery branches.  Segmental branches involving the right middle lobe, right upper lobe, left upper lobe, lingula and bilateral lower lobes are also affected.    There is a small wedge-shaped opacity in the superior segment of the left lower lobe on axial image 108 consistent with a small pulmonary infarct.  There is flattening of the interventricular septum.  No focal alveolar consolidation is seen.  There is an approximately 5 mm pulmonary nodule within the anterior basal segment of the right lower lobe.  No pleural effusion is seen.  There is an approximately 6 mm nodule along the lateral segment of the right middle lobe.  5 mm pulmonary nodule noted in the superior segment of the left lower lobe.  Small ground-glass opacities in the right middle lobe laterally may represent additional small pulmonary infarcts.    No acute findings are noted in the visualized upper abdomen.  There is cholelithiasis.  Nonspecific perinephric stranding is seen bilaterally, likely chronic.    No acute osseous abnormality is seen.  There is multilevel thoracic  spondylosis.  Calcific tendinitis involving the left rotator cuff is also noted.   Impression       Positive study demonstrating bilateral central and segmental pulmonary emboli, as detailed above.  Small pulmonary infarct involving the superior segment of the left lower lobe and possible small pulmonary infarcts involving the lateral segment of the right middle lobe also noted.  There is also flattening of the interventricular septum which may indicate developing right heart strain.    Small pulmonary nodules in the bilateral lungs measuring up to 6 mm.  Single 1 year follow up CT of the thorax is recommended for surveillance.    Incidental note of cholelithiasis.    This report was flagged in Epic as abnormal on 3/1/2018 at 8:14 am.    COMMUNICATION  This critical result was discovered/received at 8:15 a.m. on 03/01/2018.  The critical information above was relayed directly by me by telephone to Dr. Christopher Cortez on 03/01/2018 at 8:30 a.m..      Electronically signed by: Tita Alves MD  Date: 03/01/2018  Time: 08:32    Encounter     View Encounter              Labs reviewed    PFT was not done       Plan:  Clinical impression is apparently straight forward and impression with management as below.    Des was seen today for hospital follow up and sputum production.    Diagnoses and all orders for this visit:    Cough  -     albuterol 90 mcg/actuation inhaler; 2 puffs every 4 hours as needed for cough, wheeze, or shortness of breath  -     budesonide-formoterol 160-4.5 mcg (SYMBICORT) 160-4.5 mcg/actuation HFAA; Inhale 2 puffs into the lungs every 12 (twelve) hours. Controller  -     albuterol (ACCUNEB) 1.25 mg/3 mL Nebu; Take 3 mLs (1.25 mg total) by nebulization every 4 (four) hours as needed (cough or wheeze). Rescue    Bronchitis, chronic, mucopurulent  -     albuterol 90 mcg/actuation inhaler; 2 puffs every 4 hours as needed for cough, wheeze, or shortness of breath  -     budesonide-formoterol 160-4.5  mcg (SYMBICORT) 160-4.5 mcg/actuation HFAA; Inhale 2 puffs into the lungs every 12 (twelve) hours. Controller  -     AFB Culture & Smear; Standing  -     albuterol (ACCUNEB) 1.25 mg/3 mL Nebu; Take 3 mLs (1.25 mg total) by nebulization every 4 (four) hours as needed (cough or wheeze). Rescue    Lung nodule, solitary- 5 mm rll  -     albuterol 90 mcg/actuation inhaler; 2 puffs every 4 hours as needed for cough, wheeze, or shortness of breath  -     budesonide-formoterol 160-4.5 mcg (SYMBICORT) 160-4.5 mcg/actuation HFAA; Inhale 2 puffs into the lungs every 12 (twelve) hours. Controller  -     AFB Culture & Smear; Standing  -     CT Chest Without Contrast; Future    Bronchiectasis without complication  -     albuterol 90 mcg/actuation inhaler; 2 puffs every 4 hours as needed for cough, wheeze, or shortness of breath  -     budesonide-formoterol 160-4.5 mcg (SYMBICORT) 160-4.5 mcg/actuation HFAA; Inhale 2 puffs into the lungs every 12 (twelve) hours. Controller  -     AFB Culture & Smear; Standing  -     CT Chest Without Contrast; Future  -     albuterol (ACCUNEB) 1.25 mg/3 mL Nebu; Take 3 mLs (1.25 mg total) by nebulization every 4 (four) hours as needed (cough or wheeze). Rescue        Follow-up in about 6 months (around 9/22/2018), or if symptoms worsen or fail to improve.    Discussed with patient above for education the following:      Patient Instructions   Bronchiectasis and lung nodules seen on ct chest   Use symbicort 2 twice daily- use with spacer - 24/7 protection   Use albuterol inhaler or nebulizer as needed up to every 4 hrs.     Use aerobilka to help clear mucous - at least 2-3 times daily. Need to check for chronic lung infection.    Would check ct chest in 6 months but nodules look like chronic infection.  Might consider scope if nodules worsen and no diagnosis by sputum.    Do sputum check x 3-4.      Would use anticoagg for rest of life if able.  Lung should be clear by May for knee procedure-- need  sundar asap post procedure.

## 2018-03-26 ENCOUNTER — HOSPITAL ENCOUNTER (OUTPATIENT)
Dept: RADIOLOGY | Facility: HOSPITAL | Age: 73
Discharge: HOME | End: 2018-03-26
Attending: INTERNAL MEDICINE
Payer: MEDICARE

## 2018-03-26 ENCOUNTER — HOSPITAL ENCOUNTER (OUTPATIENT)
Dept: CARDIOLOGY | Facility: HOSPITAL | Age: 73
Discharge: HOME | End: 2018-03-26
Attending: INTERNAL MEDICINE
Payer: MEDICARE

## 2018-03-26 DIAGNOSIS — R94.31 ABNORMAL ECG: ICD-10-CM

## 2018-03-26 DIAGNOSIS — I51.9 LV DYSFUNCTION: ICD-10-CM

## 2018-03-26 DIAGNOSIS — I48.19 PERSISTENT ATRIAL FIBRILLATION: ICD-10-CM

## 2018-03-26 LAB — DIASTOLIC DYSFUNCTION: NO

## 2018-03-26 PROCEDURE — 93017 CV STRESS TEST TRACING ONLY: CPT

## 2018-03-26 PROCEDURE — 78452 HT MUSCLE IMAGE SPECT MULT: CPT | Mod: 26,,, | Performed by: INTERNAL MEDICINE

## 2018-03-26 PROCEDURE — 93016 CV STRESS TEST SUPVJ ONLY: CPT | Mod: ,,, | Performed by: INTERNAL MEDICINE

## 2018-03-26 PROCEDURE — 63600175 PHARM REV CODE 636 W HCPCS

## 2018-03-26 PROCEDURE — 93018 CV STRESS TEST I&R ONLY: CPT | Mod: ,,, | Performed by: INTERNAL MEDICINE

## 2018-03-26 PROCEDURE — A9502 TC99M TETROFOSMIN: HCPCS

## 2018-03-26 RX ORDER — REGADENOSON 0.08 MG/ML
INJECTION, SOLUTION INTRAVENOUS
Status: DISPENSED
Start: 2018-03-26 | End: 2018-03-26

## 2018-03-29 ENCOUNTER — OFFICE VISIT (OUTPATIENT)
Dept: CARDIOLOGY | Facility: CLINIC | Age: 73
End: 2018-03-29
Payer: MEDICARE

## 2018-03-29 VITALS
WEIGHT: 251.56 LBS | DIASTOLIC BLOOD PRESSURE: 67 MMHG | RESPIRATION RATE: 16 BRPM | OXYGEN SATURATION: 95 % | BODY MASS INDEX: 33.34 KG/M2 | HEART RATE: 96 BPM | SYSTOLIC BLOOD PRESSURE: 119 MMHG | HEIGHT: 73 IN

## 2018-03-29 DIAGNOSIS — Z99.81 ON HOME OXYGEN THERAPY: ICD-10-CM

## 2018-03-29 DIAGNOSIS — I82.431 ACUTE DEEP VEIN THROMBOSIS (DVT) OF POPLITEAL VEIN OF RIGHT LOWER EXTREMITY: ICD-10-CM

## 2018-03-29 DIAGNOSIS — E11.59 HYPERTENSION ASSOCIATED WITH DIABETES: ICD-10-CM

## 2018-03-29 DIAGNOSIS — I26.99 OTHER ACUTE PULMONARY EMBOLISM WITHOUT ACUTE COR PULMONALE: ICD-10-CM

## 2018-03-29 DIAGNOSIS — E78.5 HYPERLIPIDEMIA ASSOCIATED WITH TYPE 2 DIABETES MELLITUS: ICD-10-CM

## 2018-03-29 DIAGNOSIS — E66.09 CLASS 1 OBESITY DUE TO EXCESS CALORIES WITH SERIOUS COMORBIDITY AND BODY MASS INDEX (BMI) OF 33.0 TO 33.9 IN ADULT: ICD-10-CM

## 2018-03-29 DIAGNOSIS — I15.2 HYPERTENSION ASSOCIATED WITH DIABETES: ICD-10-CM

## 2018-03-29 DIAGNOSIS — E11.69 HYPERLIPIDEMIA ASSOCIATED WITH TYPE 2 DIABETES MELLITUS: ICD-10-CM

## 2018-03-29 DIAGNOSIS — I48.19 PERSISTENT ATRIAL FIBRILLATION: Primary | ICD-10-CM

## 2018-03-29 DIAGNOSIS — E78.5 HYPERLIPIDEMIA, UNSPECIFIED HYPERLIPIDEMIA TYPE: ICD-10-CM

## 2018-03-29 PROBLEM — G47.19 EXCESSIVE DAYTIME SLEEPINESS: Status: RESOLVED | Noted: 2018-03-15 | Resolved: 2018-03-29

## 2018-03-29 PROCEDURE — 93005 ELECTROCARDIOGRAM TRACING: CPT | Mod: PBBFAC,PO | Performed by: INTERNAL MEDICINE

## 2018-03-29 PROCEDURE — 99214 OFFICE O/P EST MOD 30 MIN: CPT | Mod: S$PBB,,, | Performed by: INTERNAL MEDICINE

## 2018-03-29 PROCEDURE — 93010 ELECTROCARDIOGRAM REPORT: CPT | Mod: S$PBB,,, | Performed by: INTERNAL MEDICINE

## 2018-03-29 PROCEDURE — 99214 OFFICE O/P EST MOD 30 MIN: CPT | Mod: PBBFAC,PO,25 | Performed by: INTERNAL MEDICINE

## 2018-03-29 PROCEDURE — 99999 PR PBB SHADOW E&M-EST. PATIENT-LVL IV: CPT | Mod: PBBFAC,,, | Performed by: INTERNAL MEDICINE

## 2018-03-29 RX ORDER — IBUTILIDE FUMARATE 0.1 MG/ML
1 INJECTION, SOLUTION INTRAVENOUS ONCE
Status: DISCONTINUED | OUTPATIENT
Start: 2018-03-29 | End: 2018-04-09

## 2018-03-29 RX ORDER — SODIUM CHLORIDE 9 MG/ML
INJECTION, SOLUTION INTRAVENOUS CONTINUOUS
Status: CANCELLED | OUTPATIENT
Start: 2018-03-29

## 2018-03-29 NOTE — PROGRESS NOTES
"Subjective:    Patient ID:  Des Junior is a 73 y.o. male who presents for evaluation of Follow-up (2 weeks)  For acute PE, DVT, new onset AF, for DCC set up  PCP: Dr. Cortez, [facility]  Endocrine: Dr. Mark Mchugh, has also been seeing for primary care. Now with Dr. Stallings, [facility]  Pulmonary: Dr. Sandoval  EP: Dr. Alicea, appointment 3/20 for AF     Patient lives with his wife, Christian (nonsmoker).  Patient is a never smoker, denies ETOH consumption.   Patient works part-time as master .     DC from [facility] on 3/2/2018    HPI  DCS - "72 year old male with PMH of DM type 2, hyperlipidemia, hypertension, and recent diagonosis of Pulmoanry embolic and atrial fibrillation. He reports shortness of breath for 3 weeks with ambulation ~ 25 feet and walking up stairs. He was evaluated by his PCP on 2/27 for same complaint, D-dimer and BNP ordered. He was noted to have elevated D-dimer therefore he was initiated on xarelto and underwent CTA chest this am. He took his first dose of Xarelto yesterday and am.  CTA chest demonstrated bilateral pulmoonay emboli with pulmonary infarcts prompting ED evaluation. He reports bilateral lower ext swelling which is unchanged. Denies recent road trips and flights. He was recently diagnosed with AFib and is scheduled for outpatient echo and Cardiology apt with Dr. Alicea at [facility].  Denies dizziness, lightheadedness, fever, and chills.      * No surgery found *       Hospital Course:   Patient monitored on telemetry during hospitalizations and remained with atrial fib CVR. Pulmonary and cardiology consulted. Pulmonary recommended sputum culture, initiation of oral abx, and home bronchodilators. He was evaluated for home oxygen and qualified with oxygen sat 83% exertional. Echo obtained. See results below. US of lower ext demonstrated thrombus of right popliteal likely attributing to pulmonary emboli. Patient stable for DC from pulmonary and cardiology standpoint. " "Discussed importance of outpatient follow up. Will continue OAC as previously prescribed by PCP."    Seen by me - noted: WM with no clear risk factor for thrombophlebitis; although sits a lot while working, admitted with recent onset of SOB, found thrombosis of the leg, new onset AF and significant PE with new RBBB on ECG. No recent prolong traveling. Feeling better on NOAC with stable vital signs. Echo shows no evidence for RV strain nor pulmonary HTN. Telemetry demonstrated PAF on low BB with rate controlled. Will be reviewed by EP soon. OK for DC from CV standpoint. Plans as listed by Ms. Ari NP. Have mild LV dysfunction and high ASCVD event risk, will need future ischemic evaluation.    Workup - Echo 03/01/2018:  CONCLUSIONS     1 - Mildly to moderately depressed left ventricular systolic function (EF 40-45%).     2 - Indeterminate LV diastolic function.     3 - Intermediate central venous pressure.     4 - Difficult windows, Lumason contrast used for wall motion analysis.     5 - Deterioration of LV function from Echo in 7/2009.      Bilateral Lower Extremity Venous US 03/01/2018:  Nonocclusive thrombus in the right popliteal vein in this patient with known pulmonary thromboemboli.     Chest CTA non-coronary 02/28/2018:  Positive study demonstrating bilateral central and segmental pulmonary emboli, as detailed above.  Small pulmonary infarct involving the superior segment of the left lower lobe and possible small pulmonary infarcts involving the lateral segment of the right middle lobe also noted.  There is also flattening of the interventricular septum which may indicate developing right heart strain.    Small pulmonary nodules in the bilateral lungs measuring up to 6 mm.  Single 1 year follow up CT of the thorax is recommended for surveillance.    Incidental note of cholelithiasis.    Since home, back building boat 4-5 hours daily, no problem, no SOB helped with breathing Rx, on home O2 uses at night, no " CP, do not feel limited. ECG remains in AF, rate 94, frequent PVC, RVCD, possible previous MI. LDL in 3/2018 57.2, baseline 115.    Lexiscan 3/2018 - Nuclear Quantitative Functional Analysis:   LVEF: 39 % (normal is 47 - 59)  LVED Volume: 127 ml (normal is 91 - 155)  LVES Volume: 74 ml (normal is 40 - 78)    Impression: NORMAL MYOCARDIAL PERFUSION  1. The perfusion scan is free of evidence for myocardial ischemia or injury.   2. There is a mild intensity fixed defect in the apical and inferior walls of the left ventricle, secondary to soft tissue attenuation.   3. There is abnormal wall motion at rest showing moderate hypokinesis of the anterior and lateral walls of the left ventricle, and severe hypokinesis of the inferior and septal walls of the left ventricle.   4. There is resting LV dysfunction with a reduced ejection fraction of 39 %.  (normal is 47 - 59)  5. The ventricular volumes are normal at rest and stress.   6. The extracardiac distribution of radioactivity is normal.       In 3/2018, feeling a lot better, no SOB / VELASCO, no limitation except for OA of both knees, here for possible DCC. ECG in AF, rate 110, PVC, QTc 487 msec. No problem with medications and no bleeding.    ROS    Constitution: no further malaise/fatigue, have 10 lbs weigh gain over the past 2 week due to sedentary status. Negative for chills, diaphoresis, weakness and weight gain.   HENT: Negative for congestion and sore throat.    Eyes:        No acute vision changes    Cardiovascular: Less dyspnea on exertion, leg swelling (chronic, at baseline) and no further paroxysmal nocturnal dyspnea. Negative for chest pain, irregular heartbeat, near-syncope, orthopnea, palpitations and syncope.   Respiratory: Positive for cough, no shortness of breath, snoring with daytime sleepiness, sputum production and wheezing (5 weeks ago, now resolved ). Negative for hemoptysis. [place] score 13   Hematologic/Lymphatic: Does not bruise/bleed easily.  "  Skin: Negative for color change and rash.   Musculoskeletal: Negative for back pain, joint swelling and neck pain. Positive for joint pain, knees  Gastrointestinal: Negative for abdominal pain, constipation, heartburn, hematochezia, nausea and vomiting. Some bowel habit change, diarrhea in the morning likely due to medications.  Genitourinary: Negative for dysuria and hematuria.   Neurological: Negative for aphonia, dizziness, focal weakness, headaches, light-headedness, numbness, paresthesias and sensory change.   Psychiatric/Behavioral: Negative for altered mental status. The patient is not nervous/anxious.    Allergic/Immunologic: Negative for persistent infections.     Objective:    Physical Exam    Constitutional: He is oriented to person, place, and time. He appears well-developed and well-nourished. No distress.   HENT:   Head: Normocephalic and atraumatic.   Eyes: Conjunctivae and EOM are normal. Right eye exhibits no discharge. Left eye exhibits no discharge. No scleral icterus.   Neck: Normal range of motion. Normal carotid pulses and no JVD present. Carotid bruit is not present. Circumference 15.25"  Cardiovascular: Normal rate with an irregular rhythm present. Exam reveals no friction rub.  No murmur heard.  Pulses:       Radial pulses are 2+ on the right side, and 2+ on the left side.        Dorsalis pedis pulses are 1+ on the right side, and 1+ on the left side.   Pulmonary/Chest: Effort normal and breath sounds normal. He has no wheezes. He has no rales. He exhibits no tenderness.   Abdominal: Soft. Bowel sounds are normal. There is no tenderness. There is no guarding. Waist 43", hip 47"  Musculoskeletal: Normal range of motion. 1+ Pitting edema right and left lower legs    Neurological: He is alert and oriented to person, place, and time. Follows commands, moves all extremities.    Skin: Skin is warm and dry. He is not diaphoretic. No cyanosis. Nails show no clubbing.   Psychiatric: He has a normal " mood and affect. His behavior is normal.   Vitals reviewed.    Assessment:       1. Persistent atrial fibrillation    2. Hyperlipidemia, unspecified hyperlipidemia type    3. Type 2 diabetes, uncontrolled, with neuropathy, onset 2003    4. Hyperlipidemia associated with type 2 diabetes mellitus, baseline     5. Hypertension associated with diabetes    6. Class 1 obesity due to excess calories with serious comorbidity and body mass index (BMI) of 33.0 to 33.9 in adult    7. Other acute pulmonary embolism without acute cor pulmonale    8. Acute deep vein thrombosis (DVT) of popliteal vein of right lower extremity    9. On home oxygen therapy         Plan:       Des was seen today for follow-up.    Diagnoses and all orders for this visit:    Persistent atrial fibrillation  -     Case Request-Cath Lab: Cardioversion; Standing  -     Vital signs per unit routine; Standing  -     Cardiac Monitoring - Adult; Standing  -     Height and weight; Standing  -     Void on call to cath lab; Standing  -     Verify informed consent; Standing  -     Notify physician Chest Pain; Standing  -     Diet NPO; Standing  -     Place in Outpatient; Standing  -     Case Request-Cath Lab: Cardioversion    Hyperlipidemia, unspecified hyperlipidemia type  -     EKG 12-lead    Type 2 diabetes, uncontrolled, with neuropathy, onset 2003    Hyperlipidemia associated with type 2 diabetes mellitus, baseline     Hypertension associated with diabetes    Class 1 obesity due to excess calories with serious comorbidity and body mass index (BMI) of 33.0 to 33.9 in adult    Other acute pulmonary embolism without acute cor pulmonale    Acute deep vein thrombosis (DVT) of popliteal vein of right lower extremity    On home oxygen therapy    Other orders  -     0.9%  NaCl infusion; Inject into the vein continuous.  -     ibutilide injection 1 mg; Inject 10 mLs (1 mg total) into the vein once.    - Instruction for Mediterranean diet and heart  healthy exercise given.  - Weigh twice weekly, try to lose 1-2 lbs per week      Patient Active Problem List   Diagnosis    AK (actinic keratosis)    Type 2 diabetes, uncontrolled, with neuropathy, onset 2003    Hyperlipidemia associated with type 2 diabetes mellitus, baseline     Osteoarthritis of right knee    Hypertension associated with diabetes    ED (erectile dysfunction)    Obesity, BMI 32+, down to 31.2    Hearing loss, sensorineural    Type 2 diabetes mellitus, controlled    Type 2 diabetes, controlled, with neuropathy    Colon adenoma    History of adenomatous polyp of colon    Cough    Pulmonary embolus- large involved central PA, unprovoked.    Persistent atrial fibrillation    Asbestos exposure    Lung nodule, solitary- 5 mm rll    Bronchitis, chronic, mucopurulent    Acute deep vein thrombosis (DVT) of popliteal vein of right lower extremity    CASPER (acute kidney injury)    LV dysfunction    On home oxygen therapy    Abnormal ECG    Abdominal obesity     Total face-to-face time with the patient was 25 minutes and greater than 50% was spent in counseling and coordination of care. The above assessment and plan have been discussed at length. Hospital physician's note reviewed. Labs and procedure over the last 6 months reviewed. Problem List updated. Asked to bring in all active medications / pills bottles with next visit.

## 2018-03-29 NOTE — PATIENT INSTRUCTIONS
Chemical Cardioversion    You have had a cardioversion today. In this procedure, a medicine was put into one of your veins or given orally. This caused your heart to return to a normal rhythm. In most cases, you should feel back to normal by the time you go home.  Home care  Follow these guidelines when caring for yourself at home:  · You may go back to your usual activities once you feel back to normal.  · Your healthcare provider will prescribed medicines to stop the abnormal heart rhythm from coming back. Take these medicines as directed. Expect to take blood thinners for at least 4 weeks after a cardioversion. If you can avoid it, do not schedule any invasive procedures during this time to avoid interruption in the blood thinners as your risk for having a stroke can be increased.  Follow-up care  Follow up with your healthcare provider, or as advised. You may need additional testing such as an echocardiogram or even monitor to further monitor your heart rhythm. Not all people with irregular heart rhythms have symptoms of their heart beating abnormally.     Call 911  Call 911 and get emergency medical attention if you have:   · Pain in your chest, arm, shoulder, neck, or upper back  · You have problems speaking or seeing  · Weakness in an arm or leg  · Inability to move your extremities on one side of your body  When to call your healthcare provider  Call your healthcare provider right away if any of these occur:  · Weakness, dizziness, lightheadedness, or fainting  · Shortness of breath  · You feel like your heart is fluttering or beating fast, hard, or irregularly (palpitations)  Date Last Reviewed: 1/1/2017 © 2000-2017 [employer]. [address] [place] [address], [place] [address]. All rights reserved. This information is not intended as a substitute for professional medical care. Always follow your healthcare professional's instructions.        Electrical Cardioversion     Cut away image of heart  showing SA node, right atrium, AV node, and left atrium   You had a cardioversion today. This is an electrical shock applied to the chest. The shock reset your heart rhythm back to normal. Your chest wall and chest muscles may feel sore for a few days. Some redness may appear on the skin on your chest where the cardioversion patches were applied. That will go away within a week.  To get ready for this procedure, you may have been given medicine to help you relax and to reduce pain. Depending on the medicine used, it could take up to 8 hours for the effect to wear off.  Home care  Follow these guidelines when caring for yourself at home:  · You should be watched by a responsible adult for the next 8 hours. This is in case your condition gets worse.  · Dont take any oral medicine for pain or sleep during the next 4 hours. These medicines might react with the medicines you were given in the hospital. This can cause a much stronger response than usual.  · Dont drink any alcohol for the next 24 hours.  · Dont drive or operate dangerous machinery during the next 24 hours.  · Your healthcare provider will have prescribed medicines to stop the abnormal heart rhythm from coming back. Take these medicines as directed. Expect to take blood thinners for at least 4 weeks. This course of blood thinners should not be interrupted. Do not schedule other invasive procedures during this time. Interrupting this medicine could increase your risk for a stroke after a cardioversion.  · You may use acetaminophen or ibuprofen to control pain, unless another pain medicine was prescribed. If you have chronic liver or kidney disease, talk with your provider before taking these medicines. Also talk with your provider if youve had a stomach ulcer or gastrointestinal bleeding.  Follow-up care  Follow up with your healthcare provider, or as advised, if you arent alert and back to your usual level of activity within 12 hours.   Call 911  Call  911 or seek emergency medical attention if you have:   · Pain in your chest, arm, shoulder, neck or upper back  · You have problems speaking or seeing  · Weakness in an arm or leg  · You are unable to move your arm or leg on one side of your body  · You have uncrontrolled bleeding from blood thinning medicines   When to seek medical advice  Call your healthcare provider right away if any of these occur:  · Weakness, dizziness, lightheadedness, or fainting  · Shortness of breath  · You feel like your heart is fluttering or beating fast, hard, or irregularly (palpitations)  · More than minor skin discomfort or redness where the cardioversion pads were placed   Date Last Reviewed: 1/1/2017  © 9169-0175 [employer]. [address] [place] [address], [place] [address]. All rights reserved. This information is not intended as a substitute for professional medical care. Always follow your healthcare professional's instructions.

## 2018-04-09 RX ORDER — SODIUM CHLORIDE 9 MG/ML
INJECTION, SOLUTION INTRAVENOUS CONTINUOUS
Status: CANCELLED | OUTPATIENT
Start: 2018-04-09

## 2018-04-09 NOTE — H&P
"Patient ID:  Des Junior is a 73 y.o. male who presents for evaluation of Follow-up (2 weeks)  For acute PE, DVT, new onset AF, for DCC set up  PCP: Dr. Cortez, [facility]  Endocrine: Dr. Mark Mchugh, has also been seeing for primary care. Now with Dr. Stallings, [facility]  Pulmonary: Dr. Sandoval  EP: Dr. Alicea, appointment 3/20 for AF     Patient lives with his wife, Christian (nonsmoker).  Patient is a never smoker, denies ETOH consumption.   Patient works part-time as master .      DC from [facility] on 3/2/2018     HPI  DCS - "72 year old male with PMH of DM type 2, hyperlipidemia, hypertension, and recent diagonosis of Pulmoanry embolic and atrial fibrillation. He reports shortness of breath for 3 weeks with ambulation ~ 25 feet and walking up stairs. He was evaluated by his PCP on 2/27 for same complaint, D-dimer and BNP ordered. He was noted to have elevated D-dimer therefore he was initiated on xarelto and underwent CTA chest this am. He took his first dose of Xarelto yesterday and am.  CTA chest demonstrated bilateral pulmoonay emboli with pulmonary infarcts prompting ED evaluation. He reports bilateral lower ext swelling which is unchanged. Denies recent road trips and flights. He was recently diagnosed with AFib and is scheduled for outpatient echo and Cardiology apt with Dr. Alicae at [facility].  Denies dizziness, lightheadedness, fever, and chills.      * No surgery found *       Hospital Course:   Patient monitored on telemetry during hospitalizations and remained with atrial fib CVR. Pulmonary and cardiology consulted. Pulmonary recommended sputum culture, initiation of oral abx, and home bronchodilators. He was evaluated for home oxygen and qualified with oxygen sat 83% exertional. Echo obtained. See results below. US of lower ext demonstrated thrombus of right popliteal likely attributing to pulmonary emboli. Patient stable for DC from pulmonary and cardiology standpoint. Discussed importance " "of outpatient follow up. Will continue OAC as previously prescribed by PCP."     Seen by me - noted: WM with no clear risk factor for thrombophlebitis; although sits a lot while working, admitted with recent onset of SOB, found thrombosis of the leg, new onset AF and significant PE with new RBBB on ECG. No recent prolong traveling. Feeling better on NOAC with stable vital signs. Echo shows no evidence for RV strain nor pulmonary HTN. Telemetry demonstrated PAF on low BB with rate controlled. Will be reviewed by EP soon. OK for DC from CV standpoint. Plans as listed by Ms. Ari NP. Have mild LV dysfunction and high ASCVD event risk, will need future ischemic evaluation.     Workup - Echo 03/01/2018:  CONCLUSIONS     1 - Mildly to moderately depressed left ventricular systolic function (EF 40-45%).     2 - Indeterminate LV diastolic function.     3 - Intermediate central venous pressure.     4 - Difficult windows, Lumason contrast used for wall motion analysis.     5 - Deterioration of LV function from Echo in 7/2009.      Bilateral Lower Extremity Venous US 03/01/2018:  Nonocclusive thrombus in the right popliteal vein in this patient with known pulmonary thromboemboli.     Chest CTA non-coronary 02/28/2018:  Positive study demonstrating bilateral central and segmental pulmonary emboli, as detailed above.  Small pulmonary infarct involving the superior segment of the left lower lobe and possible small pulmonary infarcts involving the lateral segment of the right middle lobe also noted.  There is also flattening of the interventricular septum which may indicate developing right heart strain.    Small pulmonary nodules in the bilateral lungs measuring up to 6 mm.  Single 1 year follow up CT of the thorax is recommended for surveillance.    Incidental note of cholelithiasis.     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 ECG remains in AF, rate 94, frequent PVC, RVCD, possible previous MI. LDL in 3/2018 57.2, baseline 115.     Lexiscan 3/2018 - Nuclear Quantitative Functional Analysis:   LVEF: 39 % (normal is 47 - 59)  LVED Volume: 127 ml (normal is 91 - 155)  LVES Volume: 74 ml (normal is 40 - 78)    Impression: NORMAL MYOCARDIAL PERFUSION  1. The perfusion scan is free of evidence for myocardial ischemia or injury.   2. There is a mild intensity fixed defect in the apical and inferior walls of the left ventricle, secondary to soft tissue attenuation.   3. There is abnormal wall motion at rest showing moderate hypokinesis of the anterior and lateral walls of the left ventricle, and severe hypokinesis of the inferior and septal walls of the left ventricle.   4. There is resting LV dysfunction with a reduced ejection fraction of 39 %.  (normal is 47 - 59)  5. The ventricular volumes are normal at rest and stress.   6. The extracardiac distribution of radioactivity is normal.         In 3/2018, feeling a lot better, no SOB / VELASCO, no limitation except for OA of both knees, here for possible DCC. ECG in AF, rate 110, PVC, QTc 487 msec. No problem with medications and no bleeding.     ROS    Constitution: no further malaise/fatigue, have 10 lbs weigh gain over the past 2 week due to sedentary status. Negative for chills, diaphoresis, weakness and weight gain.   HENT: Negative for congestion and sore throat.    Eyes:        No acute vision changes    Cardiovascular: Less dyspnea on exertion, leg swelling (chronic, at baseline) and no further paroxysmal nocturnal dyspnea. Negative for chest pain, irregular heartbeat, near-syncope, orthopnea, palpitations and syncope.   Respiratory: Positive for cough, no shortness of breath, snoring with daytime sleepiness, sputum production and wheezing (5 weeks ago, now resolved ). Negative for hemoptysis. Wisner score 13   Hematologic/Lymphatic: Does not bruise/bleed easily.   Skin: Negative  "for color change and rash.   Musculoskeletal: Negative for back pain, joint swelling and neck pain. Positive for joint pain, knees  Gastrointestinal: Negative for abdominal pain, constipation, heartburn, hematochezia, nausea and vomiting. Some bowel habit change, diarrhea in the morning likely due to medications.  Genitourinary: Negative for dysuria and hematuria.   Neurological: Negative for aphonia, dizziness, focal weakness, headaches, light-headedness, numbness, paresthesias and sensory change.   Psychiatric/Behavioral: Negative for altered mental status. The patient is not nervous/anxious.    Allergic/Immunologic: Negative for persistent infections.      Objective:    Physical Exam    Constitutional: He is oriented to person, place, and time. He appears well-developed and well-nourished. No distress.   HENT:   Head: Normocephalic and atraumatic.   Eyes: Conjunctivae and EOM are normal. Right eye exhibits no discharge. Left eye exhibits no discharge. No scleral icterus.   Neck: Normal range of motion. Normal carotid pulses and no JVD present. Carotid bruit is not present. Circumference 15.25"  Cardiovascular: Normal rate with an irregular rhythm present. Exam reveals no friction rub.  No murmur heard.  Pulses:       Radial pulses are 2+ on the right side, and 2+ on the left side.        Dorsalis pedis pulses are 1+ on the right side, and 1+ on the left side.   Pulmonary/Chest: Effort normal and breath sounds normal. He has no wheezes. He has no rales. He exhibits no tenderness.   Abdominal: Soft. Bowel sounds are normal. There is no tenderness. There is no guarding. Waist 43", hip 47"  Musculoskeletal: Normal range of motion. 1+ Pitting edema right and left lower legs    Neurological: He is alert and oriented to person, place, and time. Follows commands, moves all extremities.    Skin: Skin is warm and dry. He is not diaphoretic. No cyanosis. Nails show no clubbing.   Psychiatric: He has a normal mood and " affect. His behavior is normal.   Vitals reviewed.     Assessment:       1. Persistent atrial fibrillation    2. Hyperlipidemia, unspecified hyperlipidemia type    3. Type 2 diabetes, uncontrolled, with neuropathy, onset 2003    4. Hyperlipidemia associated with type 2 diabetes mellitus, baseline     5. Hypertension associated with diabetes    6. Class 1 obesity due to excess calories with serious comorbidity and body mass index (BMI) of 33.0 to 33.9 in adult    7. Other acute pulmonary embolism without acute cor pulmonale    8. Acute deep vein thrombosis (DVT) of popliteal vein of right lower extremity    9. On home oxygen therapy       Plan:       Des was seen today for follow-up.     Diagnoses and all orders for this visit:     Persistent atrial fibrillation  -     Case Request-Cath Lab: Cardioversion; Standing  -     Vital signs per unit routine; Standing  -     Cardiac Monitoring - Adult; Standing  -     Height and weight; Standing  -     Void on call to cath lab; Standing  -     Verify informed consent; Standing  -     Notify physician Chest Pain; Standing  -     Diet NPO; Standing  -     Place in Outpatient; Standing  -     Case Request-Cath Lab: Cardioversion     Hyperlipidemia, unspecified hyperlipidemia type  -     EKG 12-lead     Type 2 diabetes, uncontrolled, with neuropathy, onset 2003     Hyperlipidemia associated with type 2 diabetes mellitus, baseline      Hypertension associated with diabetes     Class 1 obesity due to excess calories with serious comorbidity and body mass index (BMI) of 33.0 to 33.9 in adult     Other acute pulmonary embolism without acute cor pulmonale     Acute deep vein thrombosis (DVT) of popliteal vein of right lower extremity     On home oxygen therapy     Other orders  -     0.9%  NaCl infusion; Inject into the vein continuous.  -     ibutilide injection 1 mg; Inject 10 mLs (1 mg total) into the vein once.     -           Instruction for Mediterranean diet  and heart healthy exercise given.  -           Weigh twice weekly, try to lose 1-2 lbs per week    Patient Active Problem List   Diagnosis    AK (actinic keratosis)    Type 2 diabetes, uncontrolled, with neuropathy, onset 2003    Hyperlipidemia associated with type 2 diabetes mellitus, baseline     Osteoarthritis of right knee    Hypertension associated with diabetes    ED (erectile dysfunction)    Obesity, BMI 32+, down to 31.2    Hearing loss, sensorineural    Type 2 diabetes mellitus, controlled    Type 2 diabetes, controlled, with neuropathy    Colon adenoma    History of adenomatous polyp of colon    Cough    Pulmonary embolus- large involved central PA, unprovoked.    Persistent atrial fibrillation    Asbestos exposure    Lung nodule, solitary- 5 mm rll    Bronchitis, chronic, mucopurulent    Acute deep vein thrombosis (DVT) of popliteal vein of right lower extremity    CASPER (acute kidney injury)    LV dysfunction    On home oxygen therapy    Abnormal ECG    Abdominal obesity

## 2018-04-10 ENCOUNTER — HOSPITAL ENCOUNTER (OUTPATIENT)
Facility: HOSPITAL | Age: 73
Discharge: HOME | End: 2018-04-10
Attending: INTERNAL MEDICINE | Admitting: INTERNAL MEDICINE
Payer: MEDICARE

## 2018-04-10 ENCOUNTER — SURGERY (OUTPATIENT)
Age: 73
End: 2018-04-10

## 2018-04-10 ENCOUNTER — TELEPHONE (OUTPATIENT)
Dept: CARDIOLOGY | Facility: CLINIC | Age: 73
End: 2018-04-10

## 2018-04-10 VITALS
DIASTOLIC BLOOD PRESSURE: 87 MMHG | RESPIRATION RATE: 18 BRPM | HEIGHT: 73 IN | TEMPERATURE: 98 F | WEIGHT: 251 LBS | OXYGEN SATURATION: 98 % | HEART RATE: 92 BPM | BODY MASS INDEX: 33.27 KG/M2 | SYSTOLIC BLOOD PRESSURE: 120 MMHG

## 2018-04-10 DIAGNOSIS — I48.19 PERSISTENT ATRIAL FIBRILLATION: Primary | ICD-10-CM

## 2018-04-10 DIAGNOSIS — I48.91 ATRIAL FIBRILLATION: ICD-10-CM

## 2018-04-10 PROCEDURE — 99153 MOD SED SAME PHYS/QHP EA: CPT | Performed by: INTERNAL MEDICINE

## 2018-04-10 PROCEDURE — 93005 ELECTROCARDIOGRAM TRACING: CPT | Mod: 59

## 2018-04-10 PROCEDURE — 99152 MOD SED SAME PHYS/QHP 5/>YRS: CPT | Performed by: INTERNAL MEDICINE

## 2018-04-10 PROCEDURE — 92960 CARDIOVERSION ELECTRIC EXT: CPT

## 2018-04-10 PROCEDURE — 92960 CARDIOVERSION ELECTRIC EXT: CPT | Mod: ,,, | Performed by: INTERNAL MEDICINE

## 2018-04-10 PROCEDURE — 25000003 PHARM REV CODE 250: Performed by: INTERNAL MEDICINE

## 2018-04-10 PROCEDURE — 99900104 DSU ONLY-NO CHARGE-EA ADD'L HR (STAT): Performed by: INTERNAL MEDICINE

## 2018-04-10 PROCEDURE — 99900103 DSU ONLY-NO CHARGE-INITIAL HR (STAT): Performed by: INTERNAL MEDICINE

## 2018-04-10 PROCEDURE — 63600175 PHARM REV CODE 636 W HCPCS: Performed by: INTERNAL MEDICINE

## 2018-04-10 PROCEDURE — 93010 ELECTROCARDIOGRAM REPORT: CPT | Mod: 76,59,ICN, | Performed by: INTERNAL MEDICINE

## 2018-04-10 PROCEDURE — 93010 ELECTROCARDIOGRAM REPORT: CPT | Mod: 59,ICN,, | Performed by: INTERNAL MEDICINE

## 2018-04-10 RX ORDER — SODIUM CHLORIDE 9 MG/ML
INJECTION, SOLUTION INTRAVENOUS CONTINUOUS
Status: DISCONTINUED | OUTPATIENT
Start: 2018-04-10 | End: 2018-04-10 | Stop reason: HOSPADM

## 2018-04-10 RX ORDER — FENTANYL CITRATE 50 UG/ML
INJECTION, SOLUTION INTRAMUSCULAR; INTRAVENOUS
Status: DISCONTINUED | OUTPATIENT
Start: 2018-04-10 | End: 2018-04-10 | Stop reason: HOSPADM

## 2018-04-10 RX ORDER — IBUTILIDE FUMARATE 0.1 MG/ML
1 INJECTION, SOLUTION INTRAVENOUS ONCE
Status: DISCONTINUED | OUTPATIENT
Start: 2018-04-10 | End: 2018-04-10 | Stop reason: SDUPTHER

## 2018-04-10 RX ORDER — MIDAZOLAM HYDROCHLORIDE 1 MG/ML
INJECTION, SOLUTION INTRAMUSCULAR; INTRAVENOUS
Status: DISCONTINUED | OUTPATIENT
Start: 2018-04-10 | End: 2018-04-10 | Stop reason: HOSPADM

## 2018-04-10 RX ORDER — SODIUM CHLORIDE 9 MG/ML
20 INJECTION, SOLUTION INTRAVENOUS CONTINUOUS
Status: DISCONTINUED | OUTPATIENT
Start: 2018-04-10 | End: 2018-04-10 | Stop reason: HOSPADM

## 2018-04-10 RX ADMIN — FENTANYL CITRATE 25 MCG: 50 INJECTION INTRAMUSCULAR; INTRAVENOUS at 09:04

## 2018-04-10 RX ADMIN — MIDAZOLAM 1 MG: 1 INJECTION INTRAMUSCULAR; INTRAVENOUS at 09:04

## 2018-04-10 RX ADMIN — SODIUM CHLORIDE 1 MG: 9 INJECTION, SOLUTION INTRAVENOUS at 08:04

## 2018-04-10 NOTE — PLAN OF CARE
Call to make the appt for Dr Barajas to see the patient and I was not able to schedule this visit  There is a nurse visit scheduled for April 17, 2018 and appt desk is going to message this also    I discussed with the pt and his wife that if an appt is not made that the office will be calling them on their phone

## 2018-04-10 NOTE — TELEPHONE ENCOUNTER
----- Message from Maxine Mcnamara sent at 4/10/2018 12:13 PM CDT -----  Contact: Amairani, post op [phone]  Calling to schedule 1 week post op visit. Patient has a nurse visit on 4/17/18, Amairani was wondering if that was scheduled as the post op.

## 2018-04-10 NOTE — NURSING
Synchronized cardioversion completed with Dr. Barajas. Patient converted to SR with 1st degree block with 120 joules synchronized shock. VSS. Patient tolerated well, no complaints of pain or discomfort. Wife, Ellen, updated and now at bedside. Patient to post op recovery via stretcher and report given to ANUJA Bales.

## 2018-04-10 NOTE — DISCHARGE SUMMARY
[facility]  Discharge Note  Short Stay    Admit Date: 4/10/2018    Discharge Date and Time: 4/10/2018, 1300    Attending Physician: Josue Barajas MD     Discharge Provider: Josue Barajas    Diagnoses:  Active Hospital Problems    Diagnosis  POA    *Persistent atrial fibrillation [I48.1]  Yes    Type 2 diabetes, uncontrolled, with neuropathy, onset 2003 [E11.40, E11.65]  Yes      Resolved Hospital Problems    Diagnosis Date Resolved POA   No resolved problems to display.       Discharged Condition: good    Hospital Course: Patient was admitted for an outpatient procedure and tolerated the procedure well with no complications.    Final Diagnoses: Same as principal problem.    Disposition: Home or Self Care    Follow up/Patient Instructions:    Medications:  Reconciled Home Medications:      Medication List      CONTINUE taking these medications    * albuterol 90 mcg/actuation inhaler  2 puffs every 4 hours as needed for cough, wheeze, or shortness of breath     * albuterol 1.25 mg/3 mL Nebu  Commonly known as:  ACCUNEB  Take 3 mLs (1.25 mg total) by nebulization every 4 (four) hours as needed (cough or wheeze). Rescue     amLODIPine 10 MG tablet  Commonly known as:  NORVASC  Take 1 tablet (10 mg total) by mouth once daily.     blood sugar diagnostic Strp  1 strip by Misc.(Non-Drug; Combo Route) route 2 (two) times daily with meals.     budesonide-formoterol 160-4.5 mcg 160-4.5 mcg/actuation Hfaa  Commonly known as:  SYMBICORT  Inhale 2 puffs into the lungs every 12 (twelve) hours. Controller     cetirizine 10 MG tablet  Commonly known as:  ZYRTEC  Take 1 tablet (10 mg total) by mouth once daily.     insulin glargine 100 unit/mL (3 mL) Inpn pen  Commonly known as:  LANTUS SOLOSTAR U-100 INSULIN  24 units every nights     LANCETS & BLOOD GLUCOSE STRIPS MISC     losartan-hydrochlorothiazide 50-12.5 mg 50-12.5 mg per tablet  Commonly known as:  HYZAAR  Take 1 tablet by mouth once daily.     metFORMIN 500 MG 24  "hr tablet  Commonly known as:  GLUCOPHAGE-XR  TAKE 2 TABLETS (1,000 MG TOTAL) BY MOUTH 2 (TWO) TIMES DAILY WITH MEALS.     metoprolol succinate 25 MG 24 hr tablet  Commonly known as:  TOPROL-XL  Take 1 tablet (25 mg total) by mouth once daily.     OMEGA-3 FISH OIL 1,000-5 mg-unit Cap  Generic drug:  omega-3 fatty acids-vitamin E     pen needle, diabetic 29 gauge x 1/2" Ndle  Inject insulin daily     pravastatin 40 MG tablet  Commonly known as:  PRAVACHOL  Take 1 tablet (40 mg total) by mouth once daily.     PriLOSEC OTC 20 MG tablet  Generic drug:  omeprazole     rivaroxaban 20 mg Tab  Commonly known as:  XARELTO  Take 1 tablet (20 mg total) by mouth daily with dinner or evening meal.     SITagliptin 100 MG Tab  Commonly known as:  JANUVIA  Take 1 tablet (100 mg total) by mouth once daily.     VITAMIN D3 1,000 unit capsule  Generic drug:  cholecalciferol (vitamin D3)        * This list has 2 medication(s) that are the same as other medications prescribed for you. Read the directions carefully, and ask your doctor or other care provider to review them with you.                Discharge Procedure Orders  Diet general     Activity as tolerated       Follow-up Information     Josue Barajas MD In 1 week.    Specialty:  Cardiology  Why:  ECG  Contact information:  [address] [facility]  [address]  [facility] [address] [phone]                   Discharge Procedure Orders (must include Diet, Follow-up, Activity):    Discharge Procedure Orders (must include Diet, Follow-up, Activity)  Diet general     Activity as tolerated        "

## 2018-04-10 NOTE — PLAN OF CARE
0715- Admitted to DSU. Warm blankets and socks provided.   0725- EKG complete and notified Dr. Barajas that it was ready in epic.   0740- Ok to proceed with corvert.   0816- Corvert completed per pump. VSS. No problems noted.   0820- Wife at bedside. Pt resting with eyes closed.

## 2018-04-10 NOTE — DISCHARGE INSTRUCTIONS
Discharge Instructions for Cardioversion  Your healthcare provider performed a procedure called cardioversion. Your healthcare provider used a controlled electric shock or a medicine to briefly stop all electrical activity in your heart. This helped restore your hearts normal rhythm. Here are some instructions to follow while you recover.  Home care  · Because cardioversion typically requires sedation, you won't be able to drive home. You will need a ride. Wait at least 24 hours before driving a car or operating heavy machinery after receiving sedating medicines.  · Dont be alarmed if the skin on your chest is irritated or feels like it is sunburned. Your healthcare provider may prescribe a soothing lotion to relieve this discomfort. These minor symptoms will go away in a few days.  · Ask your healthcare provider about medicines to keep your heart rhythm steady.  · If you were prescribed medicine, take it as instructed by your healthcare provider. Dont skip doses or take double doses. Cardioversion requires blood thinners for at least 4 weeks to prevent a delayed risk of stroke when treating atrial fibrillation or atrial flutter. Be sure you discuss which medicine you are taking to prevent stroke. Ask when you need to have your medicine levels checked, and whether you may be able to stop taking it in the future or whether it is recommended that you take it for life. Some of these blood-thinning medicines will have the dose adjusted, and interact with other medicines or foods. Your healthcare team will give you full instructions on what to watch out for. Report bleeding or symptoms of stroke immediately to your healthcare team and seek emergency medical attention.  · Learn to take your own pulse. Keep a record of your results. Ask your healthcare provider when you should seek emergency medical attention. He or she will tell you which pulse rate reading is dangerous.   · Keep in mind this procedure may need to be  repeated if the abnormal heart rhythm returns. After the procedure, your healthcare provider will tell you if the treatment worked or if you will need further treatments or medication.  Follow-up care  Make a follow-up appointment, or as directed by our staff.     When to call your healthcare provider  Call 911 right away if you have:  · Chest pain  · Shortness of breath  · Loss of vision, speech, or strength or coordination in any body part  Otherwise, call your healthcare provider immediately if you have:  · Fainting, dizzy, or lightheaded  · Chest pain with increased activity  · Irregular heartbeat or fast pulse  · Bleeding issues from blood-thinning medicines   Date Last Reviewed: 3/1/2017  © 7914-3366 [employer]. [address] [place] [address], [place] [address]. All rights reserved. This information is not intended as a substitute for professional medical care. Always follow your healthcare professional's instructions.          General Information:    1.  Do not drink alcoholic beverages including beer for 24 hours or as long as you are on pain medication..  2.  Do not drive a motor vehicle, operate machinery or power tools, or signs legal papers for 24 hours or as long as you are on pain medication.   3.  You may experience light-headedness, dizziness, and sleepiness following surgery. Please do not stay alone. A responsible adult should be with you for this 24 hour period.  4.  Go home and rest.    5. Progress slowly to a normal diet unless instructed.  Otherwise, begin with liquids such as soft drinks, then soup and crackers working up to solid foods. Drink plenty of nonalcoholic fluids.  6.  Certain anesthetics and pain medications produce nausea and vomiting in certain       individuals. If nausea becomes a problem at home, call you doctor.    7. A nurse will be calling you sometime after surgery. Do not be alarmed. This is our way of finding out how you are doing.    8. Several times every hour  while you are awake, take 2-3 deep breaths and cough. If you had stomach surgery hold a pillow or rolled towel firmly against your stomach before you cough. This will help with any pain the cough might cause.  9. Several times every hour while you are awake, pump and flex your feet 5-6 times and do foot circles. This will help prevent blood clots.    10.Call your doctor for severe pain, bleeding, fever, or signs or symptoms of infection (pain, swelling, redness, foul odor, drainage).      Recovery After Procedural Sedation (Adult)  You have been given medicine by vein to make you sleep during your surgery. This may have included both a pain medicine and sleeping medicine. Most of the effects have worn off. But you may still have some drowsiness for the next 6 to 8 hours.  Home care  Follow these guidelines when you get home:  · For the next 8 hours, you should be watched by a responsible adult. This person should make sure your condition is not getting worse.  · Don't drink any alcohol for the next 24 hours.  · Don't drive, operate dangerous machinery, or make important business or personal decisions during the next 24 hours.  Note: Your healthcare provider may tell you not to take any medicine by mouth for pain or sleep in the next 4 hours. These medicines may react with the medicines you were given in the hospital. This could cause a much stronger response than usual.  Follow-up care  Follow up with your healthcare provider if you are not alert and back to your usual level of activity within 12 hours.  When to seek medical advice  Call your healthcare provider right away if any of these occur:  · Drowsiness gets worse  · Weakness or dizziness gets worse  · Repeated vomiting  · You can't be awakened   Date Last Reviewed: 10/18/2016  © 3016-7451 [employer]. [address] [place] [address], [place] [address]. All rights reserved. This information is not intended as a substitute for professional medical care.  Always follow your healthcare professional's instructions.      Post op instructions for prevention of DVT  What is deep vein thrombosis?  Deep vein thrombosis (DVT) is the medical term for blood clots in the deep veins of the leg.  These blood clots can be dangerous.  A DVT can block a blood vessel and keep blood from getting where it needs to go.  Another problem is that the clot can travel to other parts of the body such as the lungs.  A clot that travels to the lungs is called a pulmonary embolus (PE) and can cause serious problems with breathing which can lead to death.  Am I at risk for DVT/PE?  If you are not very active, you are at risk of DVT.  Anyone confined to bed, sitting for long periods of time, recovering from surgery, etc. increases the risk of DVT.  Other risk factors are cancer diagnosis, certain medications, estrogen replacement in any form,older age, obesity, pregnancy, smoking, history of clotting disorders, and dehydration.  How will I know if I have a DVT?   Swelling in the lower leg   Pain   Warmth, redness, hardness or bulging of the vein  If you have any of these symptoms, call your doctors office right away.  Some people will not have any symptoms until the clot moves to the lungs.  What are the symptoms of a PE?   Panting, shortness of breath, or trouble breathing   Sharp, knife-like chest pain when you breathe   Coughing or coughing up blood   Rapid heartbeat  If you have any of these symptoms or get worse quickly, call 911 for emergency treatment.  How can I prevent a DVT?   Avoid long periods of inactivity and dont cross your legs--get up and walk around every hour or so.   Stay active--walking after surgery is highly encouraged.  This means you should get out of the house and walk in the neighborhood.  Going up and down stairs will not impair healing (unless advised against such activity by your doctor).     Drink plenty of noncaffeinated, nonalcoholic fluids each day to  prevent dehydration.   Wear special support stockings, if they have been advised by your doctor.   If you travel, stop at least once an hour and walk around.   Avoid smoking (assistance with stopping is available through your healthcare provider)  Always notify your doctor if you are not able to follow the post operative instructions that are given to you at the time of discharge.  It may be necessary to prescribe one of the medications available to prevent DVT.      We hope your stay was comfortable as you heal now, mend and rest.    For we have enjoyed taking care of you by giving your our best.    And as you get better, by regaining your health and strength;   We count it as a privilege to have served you and hope your time at [facility] was well spent.      Thank  You!!!

## 2018-04-10 NOTE — TELEPHONE ENCOUNTER
Returned call to post op, Amairani. Advised her that the appt scheduled for 4/17 is the 1 week follow up that is ordered. Understanding verbalized. No further issues discussed.

## 2018-04-11 NOTE — H&P (VIEW-ONLY)
"Patient ID:  Des Junior is a 73 y.o. male who presents for evaluation of Follow-up (2 weeks)  For acute PE, DVT, new onset AF, for DCC set up  PCP: Dr. Cortez, [facility]  Endocrine: Dr. Mark Mchugh, has also been seeing for primary care. Now with Dr. Stallings, [facility]  Pulmonary: Dr. Sandoval  EP: Dr. Alicea, appointment 3/20 for AF     Patient lives with his wife, Christian (nonsmoker).  Patient is a never smoker, denies ETOH consumption.   Patient works part-time as master .      DC from [facility] on 3/2/2018     HPI  DCS - "72 year old male with PMH of DM type 2, hyperlipidemia, hypertension, and recent diagonosis of Pulmoanry embolic and atrial fibrillation. He reports shortness of breath for 3 weeks with ambulation ~ 25 feet and walking up stairs. He was evaluated by his PCP on 2/27 for same complaint, D-dimer and BNP ordered. He was noted to have elevated D-dimer therefore he was initiated on xarelto and underwent CTA chest this am. He took his first dose of Xarelto yesterday and am.  CTA chest demonstrated bilateral pulmoonay emboli with pulmonary infarcts prompting ED evaluation. He reports bilateral lower ext swelling which is unchanged. Denies recent road trips and flights. He was recently diagnosed with AFib and is scheduled for outpatient echo and Cardiology apt with Dr. Alicea at [facility].  Denies dizziness, lightheadedness, fever, and chills.      * No surgery found *       Hospital Course:   Patient monitored on telemetry during hospitalizations and remained with atrial fib CVR. Pulmonary and cardiology consulted. Pulmonary recommended sputum culture, initiation of oral abx, and home bronchodilators. He was evaluated for home oxygen and qualified with oxygen sat 83% exertional. Echo obtained. See results below. US of lower ext demonstrated thrombus of right popliteal likely attributing to pulmonary emboli. Patient stable for DC from pulmonary and cardiology standpoint. Discussed importance " "of outpatient follow up. Will continue OAC as previously prescribed by PCP."     Seen by me - noted: WM with no clear risk factor for thrombophlebitis; although sits a lot while working, admitted with recent onset of SOB, found thrombosis of the leg, new onset AF and significant PE with new RBBB on ECG. No recent prolong traveling. Feeling better on NOAC with stable vital signs. Echo shows no evidence for RV strain nor pulmonary HTN. Telemetry demonstrated PAF on low BB with rate controlled. Will be reviewed by EP soon. OK for DC from CV standpoint. Plans as listed by Ms. Ari NP. Have mild LV dysfunction and high ASCVD event risk, will need future ischemic evaluation.     Workup - Echo 03/01/2018:  CONCLUSIONS     1 - Mildly to moderately depressed left ventricular systolic function (EF 40-45%).     2 - Indeterminate LV diastolic function.     3 - Intermediate central venous pressure.     4 - Difficult windows, Lumason contrast used for wall motion analysis.     5 - Deterioration of LV function from Echo in 7/2009.      Bilateral Lower Extremity Venous US 03/01/2018:  Nonocclusive thrombus in the right popliteal vein in this patient with known pulmonary thromboemboli.     Chest CTA non-coronary 02/28/2018:  Positive study demonstrating bilateral central and segmental pulmonary emboli, as detailed above.  Small pulmonary infarct involving the superior segment of the left lower lobe and possible small pulmonary infarcts involving the lateral segment of the right middle lobe also noted.  There is also flattening of the interventricular septum which may indicate developing right heart strain.    Small pulmonary nodules in the bilateral lungs measuring up to 6 mm.  Single 1 year follow up CT of the thorax is recommended for surveillance.    Incidental note of cholelithiasis.     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 ECG remains in AF, rate 94, frequent PVC, RVCD, possible previous MI. LDL in 3/2018 57.2, baseline 115.     Lexiscan 3/2018 - Nuclear Quantitative Functional Analysis:   LVEF: 39 % (normal is 47 - 59)  LVED Volume: 127 ml (normal is 91 - 155)  LVES Volume: 74 ml (normal is 40 - 78)    Impression: NORMAL MYOCARDIAL PERFUSION  1. The perfusion scan is free of evidence for myocardial ischemia or injury.   2. There is a mild intensity fixed defect in the apical and inferior walls of the left ventricle, secondary to soft tissue attenuation.   3. There is abnormal wall motion at rest showing moderate hypokinesis of the anterior and lateral walls of the left ventricle, and severe hypokinesis of the inferior and septal walls of the left ventricle.   4. There is resting LV dysfunction with a reduced ejection fraction of 39 %.  (normal is 47 - 59)  5. The ventricular volumes are normal at rest and stress.   6. The extracardiac distribution of radioactivity is normal.         In 3/2018, feeling a lot better, no SOB / VELASCO, no limitation except for OA of both knees, here for possible DCC. ECG in AF, rate 110, PVC, QTc 487 msec. No problem with medications and no bleeding.     ROS    Constitution: no further malaise/fatigue, have 10 lbs weigh gain over the past 2 week due to sedentary status. Negative for chills, diaphoresis, weakness and weight gain.   HENT: Negative for congestion and sore throat.    Eyes:        No acute vision changes    Cardiovascular: Less dyspnea on exertion, leg swelling (chronic, at baseline) and no further paroxysmal nocturnal dyspnea. Negative for chest pain, irregular heartbeat, near-syncope, orthopnea, palpitations and syncope.   Respiratory: Positive for cough, no shortness of breath, snoring with daytime sleepiness, sputum production and wheezing (5 weeks ago, now resolved ). Negative for hemoptysis. [place] score 13   Hematologic/Lymphatic: Does not bruise/bleed easily.   Skin: Negative  "for color change and rash.   Musculoskeletal: Negative for back pain, joint swelling and neck pain. Positive for joint pain, knees  Gastrointestinal: Negative for abdominal pain, constipation, heartburn, hematochezia, nausea and vomiting. Some bowel habit change, diarrhea in the morning likely due to medications.  Genitourinary: Negative for dysuria and hematuria.   Neurological: Negative for aphonia, dizziness, focal weakness, headaches, light-headedness, numbness, paresthesias and sensory change.   Psychiatric/Behavioral: Negative for altered mental status. The patient is not nervous/anxious.    Allergic/Immunologic: Negative for persistent infections.      Objective:    Physical Exam    Constitutional: He is oriented to person, place, and time. He appears well-developed and well-nourished. No distress.   HENT:   Head: Normocephalic and atraumatic.   Eyes: Conjunctivae and EOM are normal. Right eye exhibits no discharge. Left eye exhibits no discharge. No scleral icterus.   Neck: Normal range of motion. Normal carotid pulses and no JVD present. Carotid bruit is not present. Circumference 15.25"  Cardiovascular: Normal rate with an irregular rhythm present. Exam reveals no friction rub.  No murmur heard.  Pulses:       Radial pulses are 2+ on the right side, and 2+ on the left side.        Dorsalis pedis pulses are 1+ on the right side, and 1+ on the left side.   Pulmonary/Chest: Effort normal and breath sounds normal. He has no wheezes. He has no rales. He exhibits no tenderness.   Abdominal: Soft. Bowel sounds are normal. There is no tenderness. There is no guarding. Waist 43", hip 47"  Musculoskeletal: Normal range of motion. 1+ Pitting edema right and left lower legs    Neurological: He is alert and oriented to person, place, and time. Follows commands, moves all extremities.    Skin: Skin is warm and dry. He is not diaphoretic. No cyanosis. Nails show no clubbing.   Psychiatric: He has a normal mood and " affect. His behavior is normal.   Vitals reviewed.     Assessment:       1. Persistent atrial fibrillation    2. Hyperlipidemia, unspecified hyperlipidemia type    3. Type 2 diabetes, uncontrolled, with neuropathy, onset 2003    4. Hyperlipidemia associated with type 2 diabetes mellitus, baseline     5. Hypertension associated with diabetes    6. Class 1 obesity due to excess calories with serious comorbidity and body mass index (BMI) of 33.0 to 33.9 in adult    7. Other acute pulmonary embolism without acute cor pulmonale    8. Acute deep vein thrombosis (DVT) of popliteal vein of right lower extremity    9. On home oxygen therapy       Plan:       Des was seen today for follow-up.     Diagnoses and all orders for this visit:     Persistent atrial fibrillation  -     Case Request-Cath Lab: Cardioversion; Standing  -     Vital signs per unit routine; Standing  -     Cardiac Monitoring - Adult; Standing  -     Height and weight; Standing  -     Void on call to cath lab; Standing  -     Verify informed consent; Standing  -     Notify physician Chest Pain; Standing  -     Diet NPO; Standing  -     Place in Outpatient; Standing  -     Case Request-Cath Lab: Cardioversion     Hyperlipidemia, unspecified hyperlipidemia type  -     EKG 12-lead     Type 2 diabetes, uncontrolled, with neuropathy, onset 2003     Hyperlipidemia associated with type 2 diabetes mellitus, baseline      Hypertension associated with diabetes     Class 1 obesity due to excess calories with serious comorbidity and body mass index (BMI) of 33.0 to 33.9 in adult     Other acute pulmonary embolism without acute cor pulmonale     Acute deep vein thrombosis (DVT) of popliteal vein of right lower extremity     On home oxygen therapy     Other orders  -     0.9%  NaCl infusion; Inject into the vein continuous.  -     ibutilide injection 1 mg; Inject 10 mLs (1 mg total) into the vein once.     -           Instruction for Mediterranean diet  and heart healthy exercise given.  -           Weigh twice weekly, try to lose 1-2 lbs per week    Patient Active Problem List   Diagnosis    AK (actinic keratosis)    Type 2 diabetes, uncontrolled, with neuropathy, onset 2003    Hyperlipidemia associated with type 2 diabetes mellitus, baseline     Osteoarthritis of right knee    Hypertension associated with diabetes    ED (erectile dysfunction)    Obesity, BMI 32+, down to 31.2    Hearing loss, sensorineural    Type 2 diabetes mellitus, controlled    Type 2 diabetes, controlled, with neuropathy    Colon adenoma    History of adenomatous polyp of colon    Cough    Pulmonary embolus- large involved central PA, unprovoked.    Persistent atrial fibrillation    Asbestos exposure    Lung nodule, solitary- 5 mm rll    Bronchitis, chronic, mucopurulent    Acute deep vein thrombosis (DVT) of popliteal vein of right lower extremity    CASPER (acute kidney injury)    LV dysfunction    On home oxygen therapy    Abnormal ECG    Abdominal obesity

## 2018-04-11 NOTE — INTERVAL H&P NOTE
The patient has been examined and the H&P has been reviewed:    I concur with the findings and no changes have occurred since H&P was written.    Anesthesia/Surgery risks, benefits and alternative options discussed and understood by patient/family.          Active Hospital Problems    Diagnosis  POA    *Persistent atrial fibrillation [I48.1]  Yes    Type 2 diabetes, uncontrolled, with neuropathy, onset 2003 [E11.40, E11.65]  Yes      Resolved Hospital Problems    Diagnosis Date Resolved POA   No resolved problems to display.

## 2018-04-17 ENCOUNTER — CLINICAL SUPPORT (OUTPATIENT)
Dept: CARDIOLOGY | Facility: CLINIC | Age: 73
End: 2018-04-17
Payer: MEDICARE

## 2018-04-17 DIAGNOSIS — I48.0 PAROXYSMAL ATRIAL FIBRILLATION: ICD-10-CM

## 2018-04-17 PROCEDURE — 93010 ELECTROCARDIOGRAM REPORT: CPT | Mod: S$PBB,,, | Performed by: INTERNAL MEDICINE

## 2018-04-17 PROCEDURE — 93005 ELECTROCARDIOGRAM TRACING: CPT | Mod: PBBFAC,PO | Performed by: INTERNAL MEDICINE

## 2018-04-23 ENCOUNTER — OFFICE VISIT (OUTPATIENT)
Dept: CARDIOLOGY | Facility: CLINIC | Age: 73
End: 2018-04-23
Payer: MEDICARE

## 2018-04-23 VITALS
HEART RATE: 59 BPM | SYSTOLIC BLOOD PRESSURE: 128 MMHG | BODY MASS INDEX: 32.73 KG/M2 | RESPIRATION RATE: 16 BRPM | DIASTOLIC BLOOD PRESSURE: 73 MMHG | HEIGHT: 73 IN | OXYGEN SATURATION: 97 % | WEIGHT: 246.94 LBS

## 2018-04-23 DIAGNOSIS — E11.40 TYPE 2 DIABETES, CONTROLLED, WITH NEUROPATHY: ICD-10-CM

## 2018-04-23 DIAGNOSIS — Z79.01 ANTICOAGULANT LONG-TERM USE: ICD-10-CM

## 2018-04-23 DIAGNOSIS — I48.19 PERSISTENT ATRIAL FIBRILLATION: Primary | ICD-10-CM

## 2018-04-23 DIAGNOSIS — E78.5 HYPERLIPIDEMIA, UNSPECIFIED HYPERLIPIDEMIA TYPE: ICD-10-CM

## 2018-04-23 DIAGNOSIS — Z99.81 ON HOME OXYGEN THERAPY: ICD-10-CM

## 2018-04-23 DIAGNOSIS — I51.9 ASYMPTOMATIC LV DYSFUNCTION: ICD-10-CM

## 2018-04-23 PROCEDURE — 99213 OFFICE O/P EST LOW 20 MIN: CPT | Mod: S$PBB,,, | Performed by: INTERNAL MEDICINE

## 2018-04-23 PROCEDURE — 99213 OFFICE O/P EST LOW 20 MIN: CPT | Mod: PBBFAC,PO,25 | Performed by: INTERNAL MEDICINE

## 2018-04-23 PROCEDURE — 93005 ELECTROCARDIOGRAM TRACING: CPT | Mod: PBBFAC,PO | Performed by: INTERNAL MEDICINE

## 2018-04-23 PROCEDURE — 93010 ELECTROCARDIOGRAM REPORT: CPT | Mod: S$PBB,,, | Performed by: INTERNAL MEDICINE

## 2018-04-23 PROCEDURE — 99999 PR PBB SHADOW E&M-EST. PATIENT-LVL III: CPT | Mod: PBBFAC,,, | Performed by: INTERNAL MEDICINE

## 2018-04-23 NOTE — PROGRESS NOTES
"Subjective:    Patient ID:  Des Junior is a 73 y.o. male who presents for evaluation of Follow-up (discuss DCCV per Dr. Barajas)  For acute PE, DVT, new onset AF, post DCC with short lived NSR  PCP: Dr. Cortez, [facility] [place] main  Endocrine: Dr. Mark Mchugh, has also been seeing for primary care. Now with Dr. Stallings, [facility] [place] main  Pulmonary: Dr. Sandoval  EP: Dr. Alicea, appointment 3/20 for AF  Orthopedic: Dr. Rivera  Patient lives with his wife, Christian (nonsmoker), here with patient.  Patient is a never smoker, denies ETOH consumption.   Patient works part-time as master .     DC from Located within [facility] on 3/2/2018    HPI  DCS - "72 year old male with PMH of DM type 2, hyperlipidemia, hypertension, and recent diagonosis of Pulmoanry embolic and atrial fibrillation. He reports shortness of breath for 3 weeks with ambulation ~ 25 feet and walking up stairs. He was evaluated by his PCP on 2/27 for same complaint, D-dimer and BNP ordered. He was noted to have elevated D-dimer therefore he was initiated on xarelto and underwent CTA chest this am. He took his first dose of Xarelto yesterday and am.  CTA chest demonstrated bilateral pulmoonay emboli with pulmonary infarcts prompting ED evaluation. He reports bilateral lower ext swelling which is unchanged. Denies recent road trips and flights. He was recently diagnosed with AFib and is scheduled for outpatient echo and Cardiology apt with Dr. Alicea at [facility].  Denies dizziness, lightheadedness, fever, and chills.      * No surgery found *       Hospital Course:   Patient monitored on telemetry during hospitalizations and remained with atrial fib CVR. Pulmonary and cardiology consulted. Pulmonary recommended sputum culture, initiation of oral abx, and home bronchodilators. He was evaluated for home oxygen and qualified with oxygen sat 83% exertional. Echo obtained. See results below. US of lower ext demonstrated thrombus of right popliteal likely attributing to pulmonary emboli. " "Patient stable for DC from pulmonary and cardiology standpoint. Discussed importance of outpatient follow up. Will continue OAC as previously prescribed by PCP."    Seen by me - noted: WM with no clear risk factor for thrombophlebitis; although sits a lot while working, admitted with recent onset of SOB, found thrombosis of the leg, new onset AF and significant PE with new RBBB on ECG. No recent prolong traveling. Feeling better on NOAC with stable vital signs. Echo shows no evidence for RV strain nor pulmonary HTN. Telemetry demonstrated PAF on low BB with rate controlled. Will be reviewed by EP soon. OK for DC from CV standpoint. Plans as listed by Ms. Ari NP. Have mild LV dysfunction and high ASCVD event risk, will need future ischemic evaluation.    Workup - Echo 03/01/2018:  CONCLUSIONS     1 - Mildly to moderately depressed left ventricular systolic function (EF 40-45%).     2 - Indeterminate LV diastolic function.     3 - Intermediate central venous pressure.     4 - Difficult windows, Lumason contrast used for wall motion analysis.     5 - Deterioration of LV function from Echo in 7/2009.      Bilateral Lower Extremity Venous US 03/01/2018:  Nonocclusive thrombus in the right popliteal vein in this patient with known pulmonary thromboemboli.     Chest CTA non-coronary 02/28/2018:  Positive study demonstrating bilateral central and segmental pulmonary emboli, as detailed above.  Small pulmonary infarct involving the superior segment of the left lower lobe and possible small pulmonary infarcts involving the lateral segment of the right middle lobe also noted.  There is also flattening of the interventricular septum which may indicate developing right heart strain.    Small pulmonary nodules in the bilateral lungs measuring up to 6 mm.  Single 1 year follow up CT of the thorax is recommended for surveillance.    Incidental note of cholelithiasis.    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 ECG remains in AF, rate 94, frequent PVC, RVCD, possible previous MI. LDL in 3/2018 57.2, baseline 115.    Lexiscan 3/2018 - Nuclear Quantitative Functional Analysis:   LVEF: 39 % (normal is 47 - 59)  LVED Volume: 127 ml (normal is 91 - 155)  LVES Volume: 74 ml (normal is 40 - 78)    Impression: NORMAL MYOCARDIAL PERFUSION  1. The perfusion scan is free of evidence for myocardial ischemia or injury.   2. There is a mild intensity fixed defect in the apical and inferior walls of the left ventricle, secondary to soft tissue attenuation.   3. There is abnormal wall motion at rest showing moderate hypokinesis of the anterior and lateral walls of the left ventricle, and severe hypokinesis of the inferior and septal walls of the left ventricle.   4. There is resting LV dysfunction with a reduced ejection fraction of 39 %.  (normal is 47 - 59)  5. The ventricular volumes are normal at rest and stress.   6. The extracardiac distribution of radioactivity is normal.     In 3/2018, feeling a lot better, no SOB / VELASCO, no limitation except for OA of both knees, here for possible DCC. ECG in AF, rate 110, PVC, QTc 487 msec. No problem with medications and no bleeding.    In 4/2018, post DCC, "feels ready to go to work more". Limited again by the knees. ECG a week post DCC was in AF, same as today, with some v-couplets. Have intermediate LVEF 40%. Options discussed will refer to EP.      ROS    Constitution: no further malaise/fatigue, have 10 lbs weigh gain over the past 2 week due to sedentary status. Negative for chills, diaphoresis, weakness and weight gain.   HENT: Negative for congestion and sore throat.    Eyes:        No acute vision changes    Cardiovascular: Less dyspnea on exertion, leg swelling (chronic, at baseline) and no further paroxysmal nocturnal dyspnea. Negative for chest pain, irregular heartbeat, near-syncope, " "orthopnea, palpitations and syncope.   Respiratory: Positive for cough, no shortness of breath, snoring with daytime sleepiness, sputum production and wheezing (5 weeks ago, now resolved ). Negative for hemoptysis. Dayton score 13   Hematologic/Lymphatic: Does not bruise/bleed easily.   Skin: Negative for color change and rash.   Musculoskeletal: Negative for back pain, joint swelling and neck pain. Positive for joint pain, knees  Gastrointestinal: Negative for abdominal pain, constipation, heartburn, hematochezia, nausea and vomiting. Some bowel habit change, diarrhea in the morning likely due to medications.  Genitourinary: Negative for dysuria and hematuria.   Neurological: Negative for aphonia, dizziness, focal weakness, headaches, light-headedness, numbness, paresthesias and sensory change.   Psychiatric/Behavioral: Negative for altered mental status. The patient is not nervous/anxious.    Allergic/Immunologic: Negative for persistent infections.     Objective:    Physical Exam    Constitutional: He is oriented to person, place, and time. He appears well-developed and well-nourished. No distress.   HENT:   Head: Normocephalic and atraumatic.   Eyes: Conjunctivae and EOM are normal. Right eye exhibits no discharge. Left eye exhibits no discharge. No scleral icterus.   Neck: Normal range of motion. Normal carotid pulses and no JVD present. Carotid bruit is not present. Circumference 15.25"  Cardiovascular: Normal rate with an irregular rhythm present. Exam reveals no friction rub.  No murmur heard.  Pulses:       Radial pulses are 2+ on the right side, and 2+ on the left side.        Dorsalis pedis pulses are 1+ on the right side, and 1+ on the left side.   Pulmonary/Chest: Effort normal and breath sounds normal. He has no wheezes. He has no rales. He exhibits no tenderness.   Abdominal: Soft. Bowel sounds are normal. There is no tenderness. There is no guarding. Waist 43", hip 47"  Musculoskeletal: Normal range " of motion. 1+ Pitting edema right and left lower legs    Neurological: He is alert and oriented to person, place, and time. Follows commands, moves all extremities.    Skin: Skin is warm and dry. He is not diaphoretic. No cyanosis. Nails show no clubbing.   Psychiatric: He has a normal mood and affect. His behavior is normal.   Vitals reviewed.    Assessment:       1. Persistent atrial fibrillation    2. Hyperlipidemia, unspecified hyperlipidemia type    3. Type 2 diabetes, controlled, with neuropathy    4. Asymptomatic LV dysfunction    5. On home oxygen therapy    6. Anticoagulant long-term use         Plan:       Des was seen today for follow-up.    Diagnoses and all orders for this visit:    Persistent atrial fibrillation  -     Ambulatory consult to Electrophysiology    Hyperlipidemia, unspecified hyperlipidemia type  -     EKG 12-lead    Type 2 diabetes, controlled, with neuropathy    Asymptomatic LV dysfunction  -     Ambulatory consult to Electrophysiology    On home oxygen therapy    Anticoagulant long-term use    - Instruction for Mediterranean diet and heart healthy exercise given.  - Weigh twice weekly, try to lose 1-2 lbs per week  - Recommend at least biannual cardiovascular evaluation in view of his significant risk factors.  - May need orthopedic operation, consider diagnostic LHC before.      Patient Active Problem List   Diagnosis    AK (actinic keratosis)    Type 2 diabetes, uncontrolled, with neuropathy, onset 2003    Hyperlipidemia associated with type 2 diabetes mellitus, baseline     Osteoarthritis of right knee    Hypertension associated with diabetes    ED (erectile dysfunction)    Obesity, BMI 32+, down to 31.2    Hearing loss, sensorineural    Type 2 diabetes mellitus, controlled    Type 2 diabetes, controlled, with neuropathy    Colon adenoma    History of adenomatous polyp of colon    Cough    Pulmonary embolus- large involved central PA, unprovoked.     Persistent atrial fibrillation    Asbestos exposure    Lung nodule, solitary- 5 mm rll    Bronchitis, chronic, mucopurulent    Acute deep vein thrombosis (DVT) of popliteal vein of right lower extremity    CASPER (acute kidney injury)    Asymptomatic LV dysfunction    On home oxygen therapy    Abnormal ECG    Abdominal obesity    Anticoagulant long-term use     Total face-to-face time with the patient was 20 minutes and greater than 50% was spent in counseling and coordination of care. The above assessment and plan have been discussed at length. Labs and procedure over the last 6 months reviewed. Problem List updated. Asked to bring in all active medications / pills bottles with next visit.

## 2018-05-01 DIAGNOSIS — I48.0 PAROXYSMAL ATRIAL FIBRILLATION: Primary | ICD-10-CM

## 2018-05-02 ENCOUNTER — INITIAL CONSULT (OUTPATIENT)
Dept: CARDIOLOGY | Facility: CLINIC | Age: 73
End: 2018-05-02
Payer: MEDICARE

## 2018-05-02 VITALS
HEART RATE: 91 BPM | DIASTOLIC BLOOD PRESSURE: 82 MMHG | BODY MASS INDEX: 32.49 KG/M2 | SYSTOLIC BLOOD PRESSURE: 124 MMHG | WEIGHT: 245.13 LBS | HEIGHT: 73 IN

## 2018-05-02 DIAGNOSIS — I51.9 ASYMPTOMATIC LV DYSFUNCTION: ICD-10-CM

## 2018-05-02 DIAGNOSIS — E11.59 HYPERTENSION ASSOCIATED WITH DIABETES: ICD-10-CM

## 2018-05-02 DIAGNOSIS — I82.431 ACUTE DEEP VEIN THROMBOSIS (DVT) OF POPLITEAL VEIN OF RIGHT LOWER EXTREMITY: ICD-10-CM

## 2018-05-02 DIAGNOSIS — Z79.01 ANTICOAGULANT LONG-TERM USE: ICD-10-CM

## 2018-05-02 DIAGNOSIS — Z86.711 HISTORY OF PULMONARY EMBOLISM: ICD-10-CM

## 2018-05-02 DIAGNOSIS — E78.5 HYPERLIPIDEMIA ASSOCIATED WITH TYPE 2 DIABETES MELLITUS: ICD-10-CM

## 2018-05-02 DIAGNOSIS — I48.19 PERSISTENT ATRIAL FIBRILLATION: Primary | ICD-10-CM

## 2018-05-02 DIAGNOSIS — E11.69 HYPERLIPIDEMIA ASSOCIATED WITH TYPE 2 DIABETES MELLITUS: ICD-10-CM

## 2018-05-02 DIAGNOSIS — E78.5 HYPERLIPIDEMIA, UNSPECIFIED HYPERLIPIDEMIA TYPE: ICD-10-CM

## 2018-05-02 DIAGNOSIS — R91.1 LUNG NODULE, SOLITARY: ICD-10-CM

## 2018-05-02 DIAGNOSIS — I15.2 HYPERTENSION ASSOCIATED WITH DIABETES: ICD-10-CM

## 2018-05-02 DIAGNOSIS — E78.5 HYPERLIPIDEMIA, UNSPECIFIED HYPERLIPIDEMIA TYPE: Primary | ICD-10-CM

## 2018-05-02 PROCEDURE — 93010 ELECTROCARDIOGRAM REPORT: CPT | Mod: S$PBB,,, | Performed by: INTERNAL MEDICINE

## 2018-05-02 PROCEDURE — 99999 PR PBB SHADOW E&M-EST. PATIENT-LVL III: CPT | Mod: PBBFAC,,, | Performed by: INTERNAL MEDICINE

## 2018-05-02 PROCEDURE — 93005 ELECTROCARDIOGRAM TRACING: CPT | Mod: PBBFAC,PO | Performed by: INTERNAL MEDICINE

## 2018-05-02 PROCEDURE — 99214 OFFICE O/P EST MOD 30 MIN: CPT | Mod: S$PBB,,, | Performed by: INTERNAL MEDICINE

## 2018-05-02 PROCEDURE — 99213 OFFICE O/P EST LOW 20 MIN: CPT | Mod: PBBFAC,PO | Performed by: INTERNAL MEDICINE

## 2018-05-02 RX ORDER — SOTALOL HYDROCHLORIDE 80 MG/1
80 TABLET ORAL 2 TIMES DAILY
Qty: 60 TABLET | Refills: 11 | Status: SHIPPED | OUTPATIENT
Start: 2018-05-02 | End: 2019-08-29 | Stop reason: SDUPTHER

## 2018-05-02 NOTE — PROGRESS NOTES
Subjective:     HPI    Cardiologist: Josue Barajas MD  PCP: Christopher Cortez MD    I had the pleasure of seeing Des Junior in consultation at your request for the evaluation of atrial fibrillation. He is a 73 year old male with a history of HTN, HLD, DM2, and unprovoked DVT/PE in 2/2018, whose history of AF dates back to 2/2018 when he was seen by his PCP for a several week history of worsening VELASCO. He was found to be in AF at 91 bpm. He was started on Xarelto around this time. In 4/2018, an electrical cardioversion was performed, which restored sinus rhythm. Post-cardioversion, he was noted to have profound 1st degree AV block (304 ms). At 1 week follow-up he was back in AF. He presents to me today to discuss management options.    A Regadenoson nuclear stress test done in 3/2018 showed no evidence of ischemia or infarction, abnormal wall motion at rest showing moderate hypokinesis of the anterior and lateral walls of the left ventricle and severe hypokinesis of the inferior and septal walls of the left ventricle, and an EF of 39 %. An echo done in 3/2018 showed an EF of 40-45%, septal diameter 1.2 cm, normal LA size, grade 3 diastolic dysfunction, and no significant valvular disease.    I reviewed all ECGs in the EMR dating back to 1998. ECGs from 2/2018 and 3/2018 show AF with rates in the 80-110s bpm range. A post-cardioversion ECG done on 4/17/2018 showed sinus rhythm at 76 bpm with a OH of 304 ms and RBBB. All subsequent ECGs show rate controlled AF.    My interpretation of today's ECG is atrial fibrillation at 91 bpm with occasional PVCs (less likely aberrantly conducted beats).    Review of Systems   Constitution: Negative for decreased appetite, malaise/fatigue, weight gain and weight loss.   HENT: Negative for sore throat.    Eyes: Negative for blurred vision.   Cardiovascular: Negative for chest pain, dyspnea on exertion, irregular heartbeat, leg swelling, near-syncope, orthopnea, palpitations,  paroxysmal nocturnal dyspnea and syncope.   Respiratory: Negative for shortness of breath.    Skin: Negative for rash.   Musculoskeletal: Positive for joint pain. Negative for arthritis.   Gastrointestinal: Negative for abdominal pain.   Neurological: Negative for focal weakness.   Psychiatric/Behavioral: Negative for altered mental status.        Objective:    Physical Exam   Constitutional: He is oriented to person, place, and time. He appears well-developed and well-nourished. No distress.   HENT:   Head: Normocephalic and atraumatic.   Mouth/Throat: Oropharynx is clear and moist.   Eyes: Pupils are equal, round, and reactive to light. No scleral icterus.   Neck: Neck supple. No thyromegaly present.   Cardiovascular: Normal heart sounds and normal pulses.  An irregularly irregular rhythm present. Exam reveals no gallop and no friction rub.    No murmur heard.  Pulmonary/Chest: Effort normal and breath sounds normal. He has no rales.   Abdominal: Soft. Bowel sounds are normal. He exhibits no distension. There is no tenderness.   Musculoskeletal: He exhibits no edema.   Neurological: He is alert and oriented to person, place, and time.   Skin: Skin is warm and dry. No rash noted.   Psychiatric: He has a normal mood and affect. His behavior is normal.   Vitals reviewed.        Assessment:       1. Persistent atrial fibrillation    2. Hypertension associated with diabetes    3. Hyperlipidemia associated with type 2 diabetes mellitus, baseline     4. Type 2 diabetes, uncontrolled, with neuropathy, onset 2003    5. Anticoagulant long-term use    6. Acute deep vein thrombosis (DVT) of popliteal vein of right lower extremity    7. History of pulmonary embolism    8. Asymptomatic LV dysfunction    9. Lung nodule, solitary- 5 mm rll         Plan:       In summary, Des Junior is a 73 year old male with recently diagnosed persistent AF. His [id number] Score is 3 (age, HTN, DM2), which corresponds to a  yearly risk of stroke without anticoagulation estimated at 3.2%. He should continue Xarelto indefinitely. We discussed in detail the pathophysiology of AF as well as the treatment options available to manage it. I explained that Mr. Junior's cardiomyopathy may be in part or completely related to the presence of AF, and that another attempt at DCCV is warranted. The plan is for repeat DCCV. Sotalol 80 mg bid will be started 3 days prior. Post-DCCV, an ECG will be performed to confirm QTc is within normal range. He will follow-up with me 6 weeks following cardioversion. After considering his options he has agreed to proceed.    Thank you for allowing me to participate in the care of this patient. Please do not hesitate to call me with any questions or concerns.

## 2018-05-03 ENCOUNTER — TELEPHONE (OUTPATIENT)
Dept: CARDIOLOGY | Facility: CLINIC | Age: 73
End: 2018-05-03

## 2018-05-03 DIAGNOSIS — I48.19 PERSISTENT ATRIAL FIBRILLATION: Primary | ICD-10-CM

## 2018-05-03 RX ORDER — SODIUM CHLORIDE 9 MG/ML
INJECTION, SOLUTION INTRAVENOUS CONTINUOUS
Status: CANCELLED | OUTPATIENT
Start: 2018-05-03

## 2018-05-03 NOTE — H&P
HPI     Cardiologist: Josue Barajas MD  PCP: Christopher Cortez MD     I had the pleasure of seeing Des Junior in consultation at your request for the evaluation of atrial fibrillation. He is a 73 year old male with a history of HTN, HLD, DM2, and unprovoked DVT/PE in 2/2018, whose history of AF dates back to 2/2018 when he was seen by his PCP for a several week history of worsening VELASCO. He was found to be in AF at 91 bpm. He was started on Xarelto around this time. In 4/2018, an electrical cardioversion was performed, which restored sinus rhythm. Post-cardioversion, he was noted to have profound 1st degree AV block (304 ms). At 1 week follow-up he was back in AF. He presents to me today to discuss management options.     A Regadenoson nuclear stress test done in 3/2018 showed no evidence of ischemia or infarction, abnormal wall motion at rest showing moderate hypokinesis of the anterior and lateral walls of the left ventricle and severe hypokinesis of the inferior and septal walls of the left ventricle, and an EF of 39 %. An echo done in 3/2018 showed an EF of 40-45%, septal diameter 1.2 cm, normal LA size, grade 3 diastolic dysfunction, and no significant valvular disease.     I reviewed all ECGs in the EMR dating back to 1998. ECGs from 2/2018 and 3/2018 show AF with rates in the 80-110s bpm range. A post-cardioversion ECG done on 4/17/2018 showed sinus rhythm at 76 bpm with a CA of 304 ms and RBBB. All subsequent ECGs show rate controlled AF.     My interpretation of today's ECG is atrial fibrillation at 91 bpm with occasional PVCs (less likely aberrantly conducted beats).     Review of Systems   Constitution: Negative for decreased appetite, malaise/fatigue, weight gain and weight loss.   HENT: Negative for sore throat.    Eyes: Negative for blurred vision.   Cardiovascular: Negative for chest pain, dyspnea on exertion, irregular heartbeat, leg swelling, near-syncope, orthopnea, palpitations, paroxysmal  nocturnal dyspnea and syncope.   Respiratory: Negative for shortness of breath.    Skin: Negative for rash.   Musculoskeletal: Positive for joint pain. Negative for arthritis.   Gastrointestinal: Negative for abdominal pain.   Neurological: Negative for focal weakness.   Psychiatric/Behavioral: Negative for altered mental status.      Objective:    Physical Exam   Constitutional: He is oriented to person, place, and time. He appears well-developed and well-nourished. No distress.   HENT:   Head: Normocephalic and atraumatic.   Mouth/Throat: Oropharynx is clear and moist.   Eyes: Pupils are equal, round, and reactive to light. No scleral icterus.   Neck: Neck supple. No thyromegaly present.   Cardiovascular: Normal heart sounds and normal pulses.  An irregularly irregular rhythm present. Exam reveals no gallop and no friction rub.    No murmur heard.  Pulmonary/Chest: Effort normal and breath sounds normal. He has no rales.   Abdominal: Soft. Bowel sounds are normal. He exhibits no distension. There is no tenderness.   Musculoskeletal: He exhibits no edema.   Neurological: He is alert and oriented to person, place, and time.   Skin: Skin is warm and dry. No rash noted.   Psychiatric: He has a normal mood and affect. His behavior is normal.   Vitals reviewed.         Assessment:       1. Persistent atrial fibrillation    2. Hypertension associated with diabetes    3. Hyperlipidemia associated with type 2 diabetes mellitus, baseline     4. Type 2 diabetes, uncontrolled, with neuropathy, onset 2003    5. Anticoagulant long-term use    6. Acute deep vein thrombosis (DVT) of popliteal vein of right lower extremity    7. History of pulmonary embolism    8. Asymptomatic LV dysfunction    9. Lung nodule, solitary- 5 mm rll       Plan:       In summary, Des Junior is a 73 year old male with recently diagnosed persistent AF. His [id number] Score is 3 (age, HTN, DM2), which corresponds to a yearly risk of  stroke without anticoagulation estimated at 3.2%. He should continue Xarelto indefinitely. We discussed in detail the pathophysiology of AF as well as the treatment options available to manage it. I explained that Mr. Junior's cardiomyopathy may be in part or completely related to the presence of AF, and that another attempt at DCCV is warranted. The plan is for repeat DCCV. Sotalol 80 mg bid will be started 3 days prior. Post-DCCV, an ECG will be performed to confirm QTc is within normal range. He will follow-up with me 6 weeks following cardioversion. After considering his options he has agreed to proceed.    Procedure, risks, and alternatives discussed with patient with full comprehension. All questions answered. He wishes to proceed.

## 2018-05-03 NOTE — TELEPHONE ENCOUNTER
Called pt to see which day would be good for him to have his cardioversion. Pt said any day would be good for him. Reminded pt per Dr. Palma he has to be on his Sotalol for 3 days prior to having his cardioversion. Pt scheduled for May 9th. Pt verbalized understanding. No further issues discussed.

## 2018-05-03 NOTE — TELEPHONE ENCOUNTER
----- Message from Josue Barajas MD sent at 5/2/2018  5:01 PM CDT -----  Patient's choice, I am out May 8th and 29th, avoid Thursdays. He need to start on Sotalol before the DCC.    Thanks,  Dr. Barajas     ----- Message -----  From: Shweta Briggs LPN  Sent: 5/2/2018   3:18 PM  To: MD Dr. Karl Mar,    Dr. Palma came to me and said that you were going to do a cardioversion on Mr. Junior. Can you please put the order in and let me know a date and time that is good for you to do it so I can call Eve to schedule it?    Thank you,  Shweta

## 2018-05-04 LAB
ACID FAST MOD KINY STN SPEC: NORMAL
MYCOBACTERIUM SPEC QL CULT: NORMAL

## 2018-05-07 ENCOUNTER — HOSPITAL ENCOUNTER (OUTPATIENT)
Dept: RADIOLOGY | Facility: HOSPITAL | Age: 73
Discharge: HOME | End: 2018-05-07
Attending: ORTHOPAEDIC SURGERY
Payer: MEDICARE

## 2018-05-07 ENCOUNTER — OFFICE VISIT (OUTPATIENT)
Dept: ORTHOPEDICS | Facility: CLINIC | Age: 73
End: 2018-05-07
Payer: MEDICARE

## 2018-05-07 DIAGNOSIS — M25.561 PAIN IN BOTH KNEES, UNSPECIFIED CHRONICITY: ICD-10-CM

## 2018-05-07 DIAGNOSIS — M17.0 PRIMARY OSTEOARTHRITIS OF BOTH KNEES: ICD-10-CM

## 2018-05-07 DIAGNOSIS — M25.562 PAIN IN BOTH KNEES, UNSPECIFIED CHRONICITY: ICD-10-CM

## 2018-05-07 DIAGNOSIS — M25.561 PAIN IN BOTH KNEES, UNSPECIFIED CHRONICITY: Primary | ICD-10-CM

## 2018-05-07 DIAGNOSIS — M25.562 PAIN IN BOTH KNEES, UNSPECIFIED CHRONICITY: Primary | ICD-10-CM

## 2018-05-07 PROCEDURE — 20610 DRAIN/INJ JOINT/BURSA W/O US: CPT | Mod: 50,PBBFAC | Performed by: ORTHOPAEDIC SURGERY

## 2018-05-07 PROCEDURE — 20610 DRAIN/INJ JOINT/BURSA W/O US: CPT | Mod: 50,S$PBB,, | Performed by: ORTHOPAEDIC SURGERY

## 2018-05-07 PROCEDURE — 99213 OFFICE O/P EST LOW 20 MIN: CPT | Mod: 25,S$PBB,, | Performed by: ORTHOPAEDIC SURGERY

## 2018-05-07 PROCEDURE — 99211 OFF/OP EST MAY X REQ PHY/QHP: CPT | Mod: PBBFAC,25 | Performed by: ORTHOPAEDIC SURGERY

## 2018-05-07 PROCEDURE — 99999 PR PBB SHADOW E&M-EST. PATIENT-LVL I: CPT | Mod: PBBFAC,,, | Performed by: ORTHOPAEDIC SURGERY

## 2018-05-07 RX ADMIN — Medication 40 MG: at 10:05

## 2018-05-07 NOTE — PROGRESS NOTES
CHIEF COMPLAINT: Bilateral knee pain.                                                          HISTORY OF PRESENT ILLNESS:  The patient is a 73 y.o. male  who presents  for evaluation of his bilateral knee pain.     Pain Duration: many years  Pain Quality: burning  Pain Context:worsening  Pain Timing: constant  Pain Location: global  Pain Severity: severe  Modifying Factors: cannot take NSAID's due to anticoagulation;  Failed NSAID's.  Associated Signs and Symptoms: recurrent swelling.    He  presents for further treatment recommendations.    He denies radicular pain or low back pain.  He denies distal paresthesias, lower extremity edema, or calf pain concerning for vascular claudication.  He has no history of discreet prior trauma.                                                                                                                       PAST MEDICAL HISTORY:    Past Medical History:   Diagnosis Date    A-fib     Allergy     Arthritis     Cataract     Diabetes mellitus     Diabetes mellitus type II     Hay fever     Hyperlipidemia     Hypertension     Joint pain     Obesity     Pulmonary emboli     Ulcer                                                                                                             PAST SURGICAL HISTORY:    Past Surgical History:   Procedure Laterality Date    COLONOSCOPY N/A 10/5/2015    Procedure: COLONOSCOPY;  Surgeon: Pro Jang MD;  Location: [address]);  Service: Endoscopy;  Laterality: N/A;    thumb surgery                                                                                                                 SOCIAL HISTORY:  Reviewed per EPIC history for tobacco or alcohol use and he   is an active  73 y.o.  male                                                                             FAMILY MEDICAL HISTORY:  family history includes Cancer in his brother, brother, and sister; Cataracts in his mother; Glaucoma in his mother;  Heart disease in his father; Hypertension in his father; Pneumonia in his mother.                                                                                                                                                          PHYSICAL EXAMINATION:                                                        GENERAL:  A well-developed, well-nourished 73 y.o. male who is alert and       oriented in no acute distress.      Gait: He  walks with a walker.                   EXTREMITIES:  Examination of lower extremities reveals there is no visible mass or deformity.    Left knee:  ROM     Ligamentously stable to varus/valgus stress. Medial crepiation    Anterior and posterior drawers negative.    No pain over pes bursa.    No warmth    No erythema    Effusion Yes    Right knee:  ROM     Ligamentously stable to varus/valgus stress. Medial crepiation    Anterior and posterior drawers negative.    No pain over pes bursa.    No warmth    No erythema    Effusion Yes    The skin over both lower extremities is normal and unremarkable.  He has a  painless range of motion of the hips and ankles bilaterally.   Sensation is intact in both lower extremities.    There are no motor deficits in the lower extremities bilaterally.   Pedal pulses are palpable distally bilaterally.    He has no calf tenderness to palpation nor edema.                                      He has imaging which was reviewed with the patient and shows severe osteoarthritis.                                                                               IMPRESSION: Osteoarthritis bilateral knee.                             The conservative options including NSAIDs, activity modification, physical therapy, corticosteroid injection, and viscosupplimentation were discussed.  He is interested in knee replacement later this year but wants to have his cardiac issues stabilized first.  He wishes to proceed with euflexxa in the meantime.  IMPRESSION:  Osteoarthritis of the knees    PLAN: Euflexxa injection    After time out was performed and patient ID, side, and site were verified, both left and right knees were sterilly prepped in the standard fashion.  A 22-gauge needle was introduced into the joint from an infero-medial site without complication. Each knee was then injected with 2 cc euflexxa.  Sterile dressing was applied.  The patient was informed that they may resume activities as tolerated and may notice increased symptoms the day of the injection.     Follow-up next week for 2nd injection or call office if symptoms worsen.

## 2018-05-09 ENCOUNTER — HOSPITAL ENCOUNTER (OUTPATIENT)
Facility: HOSPITAL | Age: 73
Discharge: HOME | End: 2018-05-09
Attending: INTERNAL MEDICINE | Admitting: INTERNAL MEDICINE
Payer: MEDICARE

## 2018-05-09 ENCOUNTER — SURGERY (OUTPATIENT)
Age: 73
End: 2018-05-09

## 2018-05-09 VITALS
HEART RATE: 63 BPM | TEMPERATURE: 98 F | SYSTOLIC BLOOD PRESSURE: 140 MMHG | BODY MASS INDEX: 32.47 KG/M2 | OXYGEN SATURATION: 100 % | HEIGHT: 73 IN | RESPIRATION RATE: 16 BRPM | WEIGHT: 245 LBS | DIASTOLIC BLOOD PRESSURE: 74 MMHG

## 2018-05-09 DIAGNOSIS — I48.19 PERSISTENT ATRIAL FIBRILLATION: ICD-10-CM

## 2018-05-09 PROCEDURE — 93005 ELECTROCARDIOGRAM TRACING: CPT

## 2018-05-09 PROCEDURE — 93010 ELECTROCARDIOGRAM REPORT: CPT | Mod: ,,, | Performed by: INTERNAL MEDICINE

## 2018-05-09 RX ORDER — SODIUM CHLORIDE 9 MG/ML
INJECTION, SOLUTION INTRAVENOUS CONTINUOUS
Status: DISCONTINUED | OUTPATIENT
Start: 2018-05-09 | End: 2018-05-09 | Stop reason: HOSPADM

## 2018-05-09 NOTE — DISCHARGE INSTRUCTIONS
Understanding Atrial Fibrillation    An arrhythmia is any problem with the speed or pattern of the heartbeat. Atrial fibrillation (AFib) is a common type of arrhythmia. It causes fast, chaotic electrical signals in the atria. This leads to poor functioning of the heart. It also affects how much blood your heart can pump out to the body.  Afib may occur once in a while and go away on its own. Or it may continue for longer periods and need treatment.  AFib can lead to serious problems, such as stroke. Your healthcare provider will need to monitor and manage it.  What happens during atrial fibrillation?   The heart has an electrical system that sends signals to control the heartbeat. As signals move through the heart, they tell the hearts upper chambers (atria) and lower chambers (ventricles) when to squeeze (contract) and relax. This lets blood move through the heart and out to the body and lungs.  With AFib, the atria receive abnormal signals. This causes them to contract in a fast and irregular way, and out of sync with the ventricles. When this happens, the atria also have a harder time moving blood into the ventricles. Blood may then pool in the atria, which increases the risk for blood clots and stroke. The ventricles also may contract too quickly and irregularly. As a result, they may not pump blood to the body and lungs as well as they should. This can weaken the heart muscle over time and cause heart failure.  What causes atrial fibrillation?  AFib is more common in older adults. It has many possible causes including:  · Coronary artery disease  · Heart valve disease  · Heart attack  · Heart surgery  · High blood pressure  · Thyroid disease  · Diabetes  · Lung disease  · Sleep apnea  · Heavy alcohol use  In some cases of AFib, doctors do not know the cause.  What are the symptoms of atrial fibrillation?  AFib may or may not cause symptoms. If symptoms do occur, they may include:  · A fast, pounding,  irregular heartbeat  · Shortness of breath  · Tiredness  · Dizziness or fainting  · Chest pain  How is atrial fibrillation treated?  Treatments for AFib can include any of the options below.  · Medicines. You may be prescribed:  ¨ Heart rate medicines to help slow down the heartbeat  ¨ Heart rhythm medicines to help the heart beat more regularly  ¨ Anti-clotting medicines to help reduce the risk for blood clots and stroke  · Electrical cardioversion. Your healthcare provider uses special pads or paddles to send one or more brief electrical shocks to the heart. This can help reset the heartbeat to normal.  · Ablation. Long, thin tubes called catheters are threaded through a blood vessel to the heart. There, the catheters send out hot or cold energy to the areas causing the abnormal signals. This energy destroys the problem tissue or cells. This improves the chances that your heart will stay in normal rhythm without using medicines. If your heart rate and rhythm cant be controlled, you may need ablation and a pacemaker. These will help control the heart rate and regularity of the heartbeat.  · Surgery. During surgery, your healthcare provider may use different methods to create scar tissue in the areas of the heart causing the abnormal signals. The scar tissue disrupts the abnormal signals and may stop AFib from occurring.  What are the complications of atrial fibrillation?  These can include:  · Blood clots  · Stroke  · Heart failure. This problem occurs when the heart muscle weakens so much that it can no longer pump blood well.  When should I call my healthcare provider?  Call your healthcare provider right away if you have any of these:  · Symptoms that dont get better with treatment, or get worse  · New symptoms   Date Last Reviewed: 5/1/2016  © 7646-5391 [employer]. [address] [place] [address], [place] [address]. All rights reserved. This information is not intended as a substitute for professional  medical care. Always follow your healthcare professional's instructions.

## 2018-05-09 NOTE — NURSING
EKG shows sinus rhythm with 1st dergree block. Notified Dr. Barajas. Cancel cardioversion and patient may be discharged. Notified ANUJA Aaron.

## 2018-05-09 NOTE — PROGRESS NOTES
Pt instructed to resume home medications and to follow up with Dr Barajas with any problems.  Voices understanding

## 2018-05-09 NOTE — PROGRESS NOTES
Call received from Peggy in cardiology, who spoke with Dr Barajas.  Procedure cancelled and pt to be discharged.  Pt informed and voices understanding.

## 2018-05-14 ENCOUNTER — OFFICE VISIT (OUTPATIENT)
Dept: ORTHOPEDICS | Facility: CLINIC | Age: 73
End: 2018-05-14
Payer: MEDICARE

## 2018-05-14 VITALS — WEIGHT: 248 LBS | BODY MASS INDEX: 32.87 KG/M2 | HEIGHT: 73 IN

## 2018-05-14 DIAGNOSIS — M17.0 OSTEOARTHRITIS OF BOTH KNEES, UNSPECIFIED OSTEOARTHRITIS TYPE: Primary | ICD-10-CM

## 2018-05-14 PROCEDURE — 20610 DRAIN/INJ JOINT/BURSA W/O US: CPT | Mod: 50,PBBFAC,GC | Performed by: ORTHOPAEDIC SURGERY

## 2018-05-14 PROCEDURE — 20610 DRAIN/INJ JOINT/BURSA W/O US: CPT | Mod: 50,S$PBB,GC, | Performed by: ORTHOPAEDIC SURGERY

## 2018-05-14 PROCEDURE — 99999 PR PBB SHADOW E&M-EST. PATIENT-LVL II: CPT | Mod: PBBFAC,,, | Performed by: ORTHOPAEDIC SURGERY

## 2018-05-14 PROCEDURE — 99212 OFFICE O/P EST SF 10 MIN: CPT | Mod: PBBFAC,25 | Performed by: ORTHOPAEDIC SURGERY

## 2018-05-14 PROCEDURE — 99499 UNLISTED E&M SERVICE: CPT | Mod: S$PBB,,, | Performed by: ORTHOPAEDIC SURGERY

## 2018-05-14 RX ADMIN — Medication 40 MG: at 10:05

## 2018-05-14 RX ADMIN — Medication 40 MG: at 11:05

## 2018-05-14 NOTE — PROGRESS NOTES
Des Junior presents for repeat viscosupplimentation.  He reports no adverse reaction to prior injection.    IMPRESSION: Osteoarthritis of the knees    PLAN: Euflexxa injection    PE:  No warrmth, erythema, or effusions noted in the knees.    After time out was performed and patient ID, side, and site were verified, both left and right knees were sterilly prepped in the standard fashion.  A 22-gauge needle was introduced into the joint from an infero-medial site without complication. Each knee was then injected with 2 cc euflexxa.  Sterile dressing was applied.  The patient was informed that they may resume activities as tolerated and may notice increased symptoms the day of the injection.     Follow-up next week for 3nd injection or call office if symptoms worsen.

## 2018-05-16 ENCOUNTER — CLINICAL SUPPORT (OUTPATIENT)
Dept: CARDIOLOGY | Facility: CLINIC | Age: 73
End: 2018-05-16
Payer: MEDICARE

## 2018-05-16 DIAGNOSIS — I48.0 PAROXYSMAL ATRIAL FIBRILLATION: ICD-10-CM

## 2018-05-16 PROCEDURE — 93005 ELECTROCARDIOGRAM TRACING: CPT | Mod: PBBFAC,PO | Performed by: INTERNAL MEDICINE

## 2018-05-16 PROCEDURE — 93010 ELECTROCARDIOGRAM REPORT: CPT | Mod: S$PBB,,, | Performed by: INTERNAL MEDICINE

## 2018-05-17 DIAGNOSIS — I10 HYPERTENSION, UNSPECIFIED TYPE: Primary | ICD-10-CM

## 2018-05-21 ENCOUNTER — OFFICE VISIT (OUTPATIENT)
Dept: ORTHOPEDICS | Facility: CLINIC | Age: 73
End: 2018-05-21
Payer: MEDICARE

## 2018-05-21 VITALS — DIASTOLIC BLOOD PRESSURE: 75 MMHG | HEART RATE: 67 BPM | SYSTOLIC BLOOD PRESSURE: 135 MMHG

## 2018-05-21 DIAGNOSIS — M17.0 PRIMARY OSTEOARTHRITIS OF BOTH KNEES: ICD-10-CM

## 2018-05-21 PROCEDURE — 20610 DRAIN/INJ JOINT/BURSA W/O US: CPT | Mod: 50,PBBFAC | Performed by: NURSE PRACTITIONER

## 2018-05-21 PROCEDURE — 99213 OFFICE O/P EST LOW 20 MIN: CPT | Mod: PBBFAC | Performed by: NURSE PRACTITIONER

## 2018-05-21 PROCEDURE — 99999 PR PBB SHADOW E&M-EST. PATIENT-LVL III: CPT | Mod: PBBFAC,,, | Performed by: NURSE PRACTITIONER

## 2018-05-21 PROCEDURE — 99499 UNLISTED E&M SERVICE: CPT | Mod: S$PBB,,, | Performed by: NURSE PRACTITIONER

## 2018-05-21 PROCEDURE — 20610 DRAIN/INJ JOINT/BURSA W/O US: CPT | Mod: 50,S$PBB,, | Performed by: NURSE PRACTITIONER

## 2018-05-21 RX ADMIN — Medication 40 MG: at 03:05

## 2018-05-21 NOTE — PROGRESS NOTES
Des Junior presents to clinic today for the third bilateral knee Euflexxa injection.    Exam demonstrates the no effusion in the  bilateral knee, and the skin is intact.    Diagnosis: osteoarthritis knee    After time out was performed and patient ID, side, and site were verified, the  bilateral  knee was sterilly prepped in the standard fashion.  A 22-gauge needle was introduced into bilateral knee joint from an david-lateral site without complication and knee was then injected with 2 ml of Euflexxa.  Sterile dressing was applied.  The patient was instructed to resume activities as tolerated and to call with any problems.     Pt reports some relief and will f/u as needed.

## 2018-06-05 ENCOUNTER — TELEPHONE (OUTPATIENT)
Dept: INTERNAL MEDICINE | Facility: CLINIC | Age: 73
End: 2018-06-05

## 2018-06-05 NOTE — TELEPHONE ENCOUNTER
Hi, please call his pharmacy and the patient and let them know to stop metoprolol that I have been prescribing since his heart rhythm doctor has prescribed a similar medicine named sotalol.  Let me know if patient or pharmacist has any questions.  Thank you, Christopher Cortez

## 2018-06-12 DIAGNOSIS — I10 HYPERTENSION, UNSPECIFIED TYPE: Primary | ICD-10-CM

## 2018-06-13 ENCOUNTER — OFFICE VISIT (OUTPATIENT)
Dept: CARDIOLOGY | Facility: CLINIC | Age: 73
End: 2018-06-13
Payer: MEDICARE

## 2018-06-13 VITALS
SYSTOLIC BLOOD PRESSURE: 130 MMHG | DIASTOLIC BLOOD PRESSURE: 64 MMHG | HEIGHT: 73 IN | WEIGHT: 243.81 LBS | HEART RATE: 68 BPM | BODY MASS INDEX: 32.31 KG/M2

## 2018-06-13 DIAGNOSIS — Z86.711 HISTORY OF PULMONARY EMBOLISM: ICD-10-CM

## 2018-06-13 DIAGNOSIS — E78.5 HYPERLIPIDEMIA ASSOCIATED WITH TYPE 2 DIABETES MELLITUS: ICD-10-CM

## 2018-06-13 DIAGNOSIS — I10 HYPERTENSION, UNSPECIFIED TYPE: ICD-10-CM

## 2018-06-13 DIAGNOSIS — Z79.899 ENCOUNTER FOR MONITORING SOTALOL THERAPY: ICD-10-CM

## 2018-06-13 DIAGNOSIS — I15.2 HYPERTENSION ASSOCIATED WITH DIABETES: ICD-10-CM

## 2018-06-13 DIAGNOSIS — I48.19 PERSISTENT ATRIAL FIBRILLATION: Primary | ICD-10-CM

## 2018-06-13 DIAGNOSIS — Z51.81 ENCOUNTER FOR MONITORING SOTALOL THERAPY: ICD-10-CM

## 2018-06-13 DIAGNOSIS — E11.59 HYPERTENSION ASSOCIATED WITH DIABETES: ICD-10-CM

## 2018-06-13 DIAGNOSIS — I42.8 NONISCHEMIC CARDIOMYOPATHY: ICD-10-CM

## 2018-06-13 DIAGNOSIS — E11.69 HYPERLIPIDEMIA ASSOCIATED WITH TYPE 2 DIABETES MELLITUS: ICD-10-CM

## 2018-06-13 DIAGNOSIS — Z79.01 ANTICOAGULANT LONG-TERM USE: ICD-10-CM

## 2018-06-13 PROCEDURE — 99214 OFFICE O/P EST MOD 30 MIN: CPT | Mod: S$PBB,,, | Performed by: INTERNAL MEDICINE

## 2018-06-13 PROCEDURE — 93005 ELECTROCARDIOGRAM TRACING: CPT | Mod: PBBFAC,PO | Performed by: INTERNAL MEDICINE

## 2018-06-13 PROCEDURE — 93010 ELECTROCARDIOGRAM REPORT: CPT | Mod: S$PBB,,, | Performed by: INTERNAL MEDICINE

## 2018-06-13 PROCEDURE — 99999 PR PBB SHADOW E&M-EST. PATIENT-LVL III: CPT | Mod: PBBFAC,,, | Performed by: INTERNAL MEDICINE

## 2018-06-13 PROCEDURE — 99213 OFFICE O/P EST LOW 20 MIN: CPT | Mod: PBBFAC,PO,25 | Performed by: INTERNAL MEDICINE

## 2018-06-13 NOTE — PROGRESS NOTES
Subjective:     Atrial Fibrillation   Symptoms are negative for chest pain, palpitations, shortness of breath and syncope. Past medical history includes atrial fibrillation.     Cardiologist: Josue Barajas MD  PCP: Christopher Cortez MD    I had the pleasure of seeing Des Junior in follow up for his history of atrial fibrillation. He is a 73 year old male with a history of HTN, HLD, DM2, and unprovoked DVT/PE in 2/2018, whose history of AF dates back to 2/2018 when he was seen by his PCP for a several week history of worsening VELASCO. He was found to be in AF at 91 bpm. He was started on Xarelto around this time. In 4/2018, an electrical cardioversion was performed, which restored sinus rhythm. Post-cardioversion, he was noted to have profound 1st degree AV block (304 ms). At 1 week follow-up he was back in AF.     A Regadenoson nuclear stress test done in 3/2018 showed no evidence of ischemia or infarction, abnormal wall motion at rest showing moderate hypokinesis of the anterior and lateral walls of the left ventricle and severe hypokinesis of the inferior and septal walls of the left ventricle, and an EF of 39 %. An echo done in 3/2018 showed an EF of 40-45%, septal diameter 1.2 cm, normal LA size, grade 3 diastolic dysfunction, and no significant valvular disease.    I reviewed all ECGs in the EMR dating back to 1998 at his initial visit. ECGs from 2/2018 and 3/2018 showed AF with rates in the 80-110s bpm range. A post-cardioversion ECG done on 4/17/2018 showed sinus rhythm at 76 bpm with a TN of 304 ms and RBBB. All subsequent ECGs showed rate controlled AF.    When I initially saw Mr. Junior I was concerned his cardiomyopathy may be related to his AF. The plan was for him to start Sotalol, and undergo another DCCV. He chemically converted to sinus rhythm shortly after starting Sotalol, however. He has noted an improvement in his palpitations. He is not sure whether his energy level has improved as he hardly  ambulates due to chronic knee pains. He is scheduled to undergo knee surgery in the next few months.    My interpretation of today's ECG is sinus rhythm at 68 bpm with RBBB and a CO of 244 ms and a QTc of 489 ms.    Review of Systems   Constitution: Negative for decreased appetite, malaise/fatigue, weight gain and weight loss.   HENT: Negative for sore throat.    Eyes: Negative for blurred vision.   Cardiovascular: Negative for chest pain, dyspnea on exertion, irregular heartbeat, leg swelling, near-syncope, orthopnea, palpitations, paroxysmal nocturnal dyspnea and syncope.   Respiratory: Negative for shortness of breath.    Skin: Negative for rash.   Musculoskeletal: Positive for joint pain. Negative for arthritis.   Gastrointestinal: Negative for abdominal pain.   Neurological: Negative for focal weakness.   Psychiatric/Behavioral: Negative for altered mental status.        Objective:    Physical Exam   Constitutional: He is oriented to person, place, and time. He appears well-developed and well-nourished. No distress.   HENT:   Head: Normocephalic and atraumatic.   Mouth/Throat: Oropharynx is clear and moist.   Eyes: Pupils are equal, round, and reactive to light. No scleral icterus.   Neck: Neck supple. No thyromegaly present.   Cardiovascular: Normal rate, regular rhythm, normal heart sounds and normal pulses.  Exam reveals no gallop and no friction rub.    No murmur heard.  Pulmonary/Chest: Effort normal and breath sounds normal. He has no rales.   Abdominal: Soft. Bowel sounds are normal. He exhibits no distension. There is no tenderness.   Musculoskeletal: He exhibits no edema.   Neurological: He is alert and oriented to person, place, and time.   Skin: Skin is warm and dry. No rash noted.   Psychiatric: He has a normal mood and affect. His behavior is normal.   Vitals reviewed.        Assessment:       1. Persistent atrial fibrillation    2. Hypertension, unspecified type    3. Hyperlipidemia associated  with type 2 diabetes mellitus, baseline     4. Hypertension associated with diabetes    5. Anticoagulant long-term use    6. History of pulmonary embolism    7. Type 2 diabetes, uncontrolled, with neuropathy, onset 2003    8. Encounter for monitoring sotalol therapy         Plan:       In summary, Des Junior is a 73 year old male with recently diagnosed persistent AF. His [id number] Score is 3 (age, HTN, DM2), which corresponds to a yearly risk of stroke without anticoagulation estimated at 3.2%. He should continue Xarelto indefinitely. At this point he is maintaining sinus rhythm on Sotalol. The plan is to see me again 3 months. An echo will be performed prior to that visit. If his EF has normalized, we will discuss the pros and cons of PVI at his next visit with me.    There is no EP contraindication to Mr. Junior's upcoming knee surgeries.    Thank you for allowing me to participate in the care of this patient. Please do not hesitate to call me with any questions or concerns.

## 2018-07-02 ENCOUNTER — PATIENT MESSAGE (OUTPATIENT)
Dept: INTERNAL MEDICINE | Facility: CLINIC | Age: 73
End: 2018-07-02

## 2018-07-02 ENCOUNTER — OFFICE VISIT (OUTPATIENT)
Dept: INTERNAL MEDICINE | Facility: CLINIC | Age: 73
End: 2018-07-02
Payer: MEDICARE

## 2018-07-02 ENCOUNTER — HOSPITAL ENCOUNTER (OUTPATIENT)
Dept: RADIOLOGY | Facility: HOSPITAL | Age: 73
Discharge: HOME | End: 2018-07-02
Attending: INTERNAL MEDICINE
Payer: MEDICARE

## 2018-07-02 VITALS
HEART RATE: 64 BPM | WEIGHT: 230.81 LBS | DIASTOLIC BLOOD PRESSURE: 78 MMHG | HEIGHT: 73 IN | BODY MASS INDEX: 30.59 KG/M2 | SYSTOLIC BLOOD PRESSURE: 146 MMHG | OXYGEN SATURATION: 97 %

## 2018-07-02 DIAGNOSIS — R07.81 RIB PAIN ON RIGHT SIDE: Primary | ICD-10-CM

## 2018-07-02 DIAGNOSIS — R07.81 RIB PAIN ON RIGHT SIDE: ICD-10-CM

## 2018-07-02 PROBLEM — I26.99 PULMONARY EMBOLUS: Status: RESOLVED | Noted: 2018-03-01 | Resolved: 2018-07-02

## 2018-07-02 PROBLEM — R91.1 LUNG NODULE, SOLITARY: Status: RESOLVED | Noted: 2018-03-02 | Resolved: 2018-07-02

## 2018-07-02 PROBLEM — N17.9 AKI (ACUTE KIDNEY INJURY): Status: RESOLVED | Noted: 2018-03-02 | Resolved: 2018-07-02

## 2018-07-02 PROBLEM — R94.31 ABNORMAL ECG: Status: RESOLVED | Noted: 2018-03-15 | Resolved: 2018-07-02

## 2018-07-02 PROCEDURE — 99213 OFFICE O/P EST LOW 20 MIN: CPT | Mod: PBBFAC,25 | Performed by: INTERNAL MEDICINE

## 2018-07-02 PROCEDURE — 71100 X-RAY EXAM RIBS UNI 2 VIEWS: CPT | Mod: TC

## 2018-07-02 PROCEDURE — 71100 X-RAY EXAM RIBS UNI 2 VIEWS: CPT | Mod: 26,,, | Performed by: RADIOLOGY

## 2018-07-02 PROCEDURE — 99999 PR PBB SHADOW E&M-EST. PATIENT-LVL III: CPT | Mod: PBBFAC,,, | Performed by: INTERNAL MEDICINE

## 2018-07-02 PROCEDURE — 99214 OFFICE O/P EST MOD 30 MIN: CPT | Mod: S$PBB,,, | Performed by: INTERNAL MEDICINE

## 2018-07-02 RX ORDER — DEXAMETHASONE 4 MG/1
4 TABLET ORAL EVERY 12 HOURS
Qty: 10 TABLET | Refills: 0 | Status: SHIPPED | OUTPATIENT
Start: 2018-07-02 | End: 2018-07-07

## 2018-07-02 NOTE — PROGRESS NOTES
INTERNAL MEDICINE CLINIC - SAME DAY APPOINTMENT  Progress Note    PRESENTING HISTORY     PCP: Christopher Cortez MD  Chief Complaint/Reason for Visit:     Chief Complaint   Patient presents with    Flank Pain     History of Present Illness & ROS : Mr. Des Junior is a 73 y.o. male.      He complains of pain to right lower rib cage.  No fall or trauma.    One week in history.  Pain and numbness.  Pain with touch. Worse with cough and sneeze.    No fever or chill. No cough. No URI.    Only took Tylenol for his knees.    PAST HISTORY:     Past Medical History:   Diagnosis Date    Abdominal obesity 3/15/2018    Acute deep vein thrombosis (DVT) of popliteal vein of right lower extremity 3/2/2018    AK (actinic keratosis) 11/15/2012    S/p 1st PDT face (80 min incubation) - did great! S/p PDT face - 90 min  - 12/15 - did great S/p PDT face - 90 min - 10/17 - great response    Asbestos exposure 3/2/2018    Asymptomatic LV dysfunction 3/2/2018    Bronchitis, chronic, mucopurulent 3/2/2018    Cataract     Colon adenoma 8/20/2014    Current use of long term anticoagulation 4/23/2018    ED (erectile dysfunction) 11/28/2012    Encounter for monitoring sotalol therapy 6/13/2018    Hay fever     Hearing loss, sensorineural 6/25/2013    History of adenomatous polyp of colon 10/5/2015    History of pulmonary embolism 5/2/2018    Hyperlipidemia associated with type 2 diabetes mellitus, baseline  11/28/2012    Hypertension associated with diabetes 11/28/2012    Lung nodule, solitary- 5 mm rll 3/2/2018    On home oxygen therapy 3/15/2018    Osteoarthritis of right knee 11/28/2012    Persistent atrial fibrillation 3/1/2018    Pulmonary embolus- large involved central PA, unprovoked. 3/1/2018    Type 2 diabetes mellitus, controlled 7/16/2013    Type 2 diabetes, controlled, with neuropathy 1/20/2014    Type 2 diabetes, uncontrolled, with neuropathy, onset 2003 11/28/2012       Past Surgical  History:   Procedure Laterality Date    COLONOSCOPY N/A 10/5/2015    Procedure: COLONOSCOPY;  Surgeon: Pro Jang MD;  Location: [facility] ([address] [place] [address]);  Service: Endoscopy;  Laterality: N/A;    thumb surgery         Family History   Problem Relation Age of Onset    Hypertension Father     Heart disease Father         chf    Cancer Brother         colon    Cataracts Mother     Glaucoma Mother     Pneumonia Mother     Cancer Sister         lymphoma    Cancer Brother         prostate ce    Melanoma Neg Hx     Amblyopia Neg Hx     Blindness Neg Hx     Macular degeneration Neg Hx     Retinal detachment Neg Hx     Strabismus Neg Hx     Diabetes Neg Hx        Social History     Social History    Marital status:      Spouse name: N/A    Number of children: N/A    Years of education: N/A     Occupational History    Self emplyed      Social History Main Topics    Smoking status: Never Smoker    Smokeless tobacco: Never Used    Alcohol use Yes      Comment: rare    Drug use: No    Sexual activity: Not Asked     Other Topics Concern    None     Social History Narrative    Master . 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       MEDICATIONS & ALLERGIES:     Current Outpatient Prescriptions on File Prior to Visit   Medication Sig Dispense Refill    albuterol (ACCUNEB) 1.25 mg/3 mL Nebu Take 3 mLs (1.25 mg total) by nebulization every 4 (four) hours as needed (cough or wheeze). Rescue 120 vial 11    albuterol 90 mcg/actuation inhaler 2 puffs every 4 hours as needed for cough, wheeze, or shortness of breath 1 Inhaler 11    amlodipine (NORVASC) 10 MG tablet Take 1 tablet (10 mg total) by mouth once daily. 30 tablet 11    blood sugar diagnostic Strp 1 strip by Misc.(Non-Drug; Combo Route) route 2 (two) times daily with meals. 100 strip 11    budesonide-formoterol 160-4.5 mcg (SYMBICORT) 160-4.5 mcg/actuation HFAA Inhale 2 puffs into the lungs every 12 (twelve) hours.  "Controller 1 Inhaler 11    cetirizine (ZYRTEC) 10 MG tablet Take 1 tablet (10 mg total) by mouth once daily.  0    cholecalciferol, vitamin D3, (VITAMIN D3) 1,000 unit capsule Take 1,000 Units by mouth once daily.      insulin glargine (LANTUS SOLOSTAR) 100 unit/mL (3 mL) InPn pen 24 units every nights 1 Box 11    LANCETS & BLOOD GLUCOSE STRIPS MISC * * * Twice a day .  check glucose twice a day      losartan-hydrochlorothiazide 50-12.5 mg (HYZAAR) 50-12.5 mg per tablet Take 1 tablet by mouth once daily. 30 tablet 11    metformin (GLUCOPHAGE-XR) 500 MG 24 hr tablet TAKE 2 TABLETS (1,000 MG TOTAL) BY MOUTH 2 (TWO) TIMES DAILY WITH MEALS. 120 tablet 11    omega-3 fatty acids-vitamin E (OMEGA-3 FISH OIL) 1,000-5 mg-unit Cap Take by mouth. 1 Capsule Oral Every day      omeprazole (PRILOSEC OTC) 20 MG tablet Take by mouth. 1 Tablet, Delayed Release (E.C.) Oral Every day      pen needle, diabetic 29 gauge x 1/2" Ndle Inject insulin daily 50 each 11    pravastatin (PRAVACHOL) 40 MG tablet Take 1 tablet (40 mg total) by mouth once daily. 30 tablet 11    rivaroxaban (XARELTO) 20 mg Tab Take 1 tablet (20 mg total) by mouth daily with dinner or evening meal. 30 tablet 11    SITagliptin (JANUVIA) 100 MG Tab Take 1 tablet (100 mg total) by mouth once daily. 30 tablet 11    sotalol (BETAPACE) 80 MG tablet Take 1 tablet (80 mg total) by mouth 2 (two) times daily. Start Sotalol 3 days before cardioversion 60 tablet 11     Current Facility-Administered Medications on File Prior to Visit   Medication Dose Route Frequency Provider Last Rate Last Dose    triamcinolone hexacetonide injection 40 mg  40 mg Intra-articular  John L. Ochsner Jr., MD   40 mg at 10/10/17 1153        Review of patient's allergies indicates:   Allergen Reactions    Codeine Nausea And Vomiting     Other reaction(s): Nausea    Benicar  [olmesartan]      Other reaction(s): SPOTS IN FRONT OF EYES       Medications Reconciliation:   I have reconciled " the patient's home medications with the patient/family. I have updated all changes.  Refer to After-Visit Medication List.    OBJECTIVE:     Vital Signs:  Vitals:    07/02/18 1354   BP: (!) 146/78   Pulse: 64     Wt Readings from Last 1 Encounters:   07/02/18 1354 104.7 kg (230 lb 13.2 oz)     Body mass index is 30.45 kg/m².     Physical Exam:  General: Well developed, well nourished. No distress.  HEENT: Head is normocephalic, atraumatic; ears are normal.    Eyes: Clear conjunctiva.  Neck: Supple, symmetrical neck; trachea midline.  Lungs: Clear to auscultation bilaterally and normal respiratory effort.  Tender to right lower rib area. No bruise or mass  Cardiovascular: Heart with regular rate and rhythm.    Abdomen: Abdomen is soft, non-tender non-distended with normal bowel sounds.  Skin: Skin color, texture, turgor normal. No rashes.  Musculoskeletal: Normal gait.   Psychiatric: Normal affect. Alert.    Laboratory  Lab Results   Component Value Date    WBC 10.10 03/02/2018    HGB 11.8 (L) 03/02/2018    HCT 35.8 (L) 03/02/2018     03/02/2018    CHOL 94 (L) 03/02/2018    TRIG 59 03/02/2018    HDL 25 (L) 03/02/2018    ALT 12 03/13/2018    AST 14 03/13/2018     03/13/2018    K 4.2 03/13/2018     03/13/2018    CREATININE 1.3 03/13/2018    BUN 30 (H) 03/13/2018    CO2 24 03/13/2018    TSH 1.701 03/02/2018    PSA 0.52 03/02/2018    INR 1.3 (H) 03/01/2018    HGBA1C 7.0 (H) 03/13/2018       Diagnostic Results:  CTA 3-1-2018  Positive study demonstrating bilateral central and segmental pulmonary emboli, as detailed above.  Small pulmonary infarct involving the superior segment of the left lower lobe and possible small pulmonary infarcts involving the lateral segment of the right middle lobe also noted.  There is also flattening of the interventricular septum which may indicate developing right heart strain.  Small pulmonary nodules in the bilateral lungs measuring up to 6 mm.  Single 1 year follow up CT  "of the thorax is recommended for surveillance.      ASSESSMENT & PLAN:     Rib pain on right side  - Most likely from muscle strain.  He has been doing more work on ship models.    Plan:  -     X-Ray Ribs 2 View Right; Future; Expected date: 07/02/2018: "(          (Negative.  Discussed with the patient on the phone at 3:15 pm)    Rx:  No NSAID due to anticoagulation. Plan to give steroid short course which will also relieve his severe OA of knees.  -     dexamethasone (DECADRON) 4 MG Tab; Take 1 tablet (4 mg total) by mouth every 12 (twelve) hours. for 5 days  Dispense: 10 tablet; Refill: 0    Scheduled Follow-up :  Future Appointments  Date Time Provider Department Center   7/2/2018 2:30 PM [facility] [id number] 485 LB LIMIT [facility] XRAY IM [facility]   7/13/2018 10:00 AM LAB, [facility] LAB [place]   7/24/2018 9:30 AM Misty Stallings MD [facility]   8/6/2018 9:20 AM Louise Suh MD [facility] DERM [facility]   9/4/2018 10:00 AM [facility] CT1 LIMIT 400 LBS [facility] CT SCAN [place] [facility]   9/19/2018 1:00 PM Jose D Palma MD [facility] ARRHYTH [place] [facility]   10/24/2018 1:40 PM Josue Barajas MD [facility] CARDIO [place] [facility]       After Visit Medication List :     Medication List          Accurate as of 7/2/18  2:13 PM. If you have any questions, ask your nurse or doctor.               START taking these medications    dexamethasone 4 MG Tab  Commonly known as:  DECADRON  Take 1 tablet (4 mg total) by mouth every 12 (twelve) hours. for 5 days  Started by:  Carlos Lafleur MD        CONTINUE taking these medications    * albuterol 90 mcg/actuation inhaler  2 puffs every 4 hours as needed for cough, wheeze, or shortness of breath     * albuterol 1.25 mg/3 mL Nebu  Commonly known as:  ACCUNEB  Take 3 mLs (1.25 mg total) by nebulization every 4 (four) hours as needed (cough or wheeze). Rescue     amLODIPine 10 MG tablet  Commonly known as:  NORVASC  Take 1 tablet (10 mg total) by mouth once daily.     blood sugar diagnostic Strp  1 strip by " "Misc.(Non-Drug; Combo Route) route 2 (two) times daily with meals.     budesonide-formoterol 160-4.5 mcg 160-4.5 mcg/actuation Hfaa  Commonly known as:  SYMBICORT  Inhale 2 puffs into the lungs every 12 (twelve) hours. Controller     cetirizine 10 MG tablet  Commonly known as:  ZYRTEC  Take 1 tablet (10 mg total) by mouth once daily.     insulin glargine 100 unit/mL (3 mL) Inpn pen  Commonly known as:  LANTUS SOLOSTAR U-100 INSULIN  24 units every nights     LANCETS & BLOOD GLUCOSE STRIPS MISC     losartan-hydrochlorothiazide 50-12.5 mg 50-12.5 mg per tablet  Commonly known as:  HYZAAR  Take 1 tablet by mouth once daily.     metFORMIN 500 MG 24 hr tablet  Commonly known as:  GLUCOPHAGE-XR  TAKE 2 TABLETS (1,000 MG TOTAL) BY MOUTH 2 (TWO) TIMES DAILY WITH MEALS.     OMEGA-3 FISH OIL 1,000-5 mg-unit Cap  Generic drug:  omega-3 fatty acids-vitamin E     pen needle, diabetic 29 gauge x 1/2" Ndle  Inject insulin daily     pravastatin 40 MG tablet  Commonly known as:  PRAVACHOL  Take 1 tablet (40 mg total) by mouth once daily.     PriLOSEC OTC 20 MG tablet  Generic drug:  omeprazole     rivaroxaban 20 mg Tab  Commonly known as:  XARELTO  Take 1 tablet (20 mg total) by mouth daily with dinner or evening meal.     SITagliptin 100 MG Tab  Commonly known as:  JANUVIA  Take 1 tablet (100 mg total) by mouth once daily.     sotalol 80 MG tablet  Commonly known as:  BETAPACE  Take 1 tablet (80 mg total) by mouth 2 (two) times daily. Start Sotalol 3 days before cardioversion     VITAMIN D3 1,000 unit capsule  Generic drug:  cholecalciferol (vitamin D3)        * This list has 2 medication(s) that are the same as other medications prescribed for you. Read the directions carefully, and ask your doctor or other care provider to review them with you.               Where to Get Your Medications      These medications were sent to [facility] [place] - [address] [address] [place]  [address] [address] [place] [address]    " Phone:  [phone]   dexamethasone 4 MG Tab         Signing Physician:  Carlos Lafleur MD

## 2018-07-13 ENCOUNTER — LAB VISIT (OUTPATIENT)
Dept: LAB | Facility: HOSPITAL | Age: 73
End: 2018-07-13
Attending: NURSE PRACTITIONER
Payer: MEDICARE

## 2018-07-13 LAB
ESTIMATED AVG GLUCOSE: 203 MG/DL
HBA1C MFR BLD HPLC: 8.7 %

## 2018-07-13 PROCEDURE — 36415 COLL VENOUS BLD VENIPUNCTURE: CPT | Mod: PO

## 2018-07-13 PROCEDURE — 83036 HEMOGLOBIN GLYCOSYLATED A1C: CPT

## 2018-07-24 ENCOUNTER — OFFICE VISIT (OUTPATIENT)
Dept: ENDOCRINOLOGY | Facility: CLINIC | Age: 73
End: 2018-07-24
Payer: MEDICARE

## 2018-07-24 VITALS
SYSTOLIC BLOOD PRESSURE: 146 MMHG | HEIGHT: 73 IN | WEIGHT: 246.94 LBS | BODY MASS INDEX: 32.73 KG/M2 | HEART RATE: 57 BPM | DIASTOLIC BLOOD PRESSURE: 80 MMHG

## 2018-07-24 DIAGNOSIS — E66.09 CLASS 1 OBESITY DUE TO EXCESS CALORIES WITH SERIOUS COMORBIDITY AND BODY MASS INDEX (BMI) OF 32.0 TO 32.9 IN ADULT: ICD-10-CM

## 2018-07-24 DIAGNOSIS — N18.30 CHRONIC KIDNEY DISEASE (CKD), STAGE III (MODERATE): ICD-10-CM

## 2018-07-24 DIAGNOSIS — E11.40 TYPE 2 DIABETES, CONTROLLED, WITH NEUROPATHY: Primary | ICD-10-CM

## 2018-07-24 PROCEDURE — 99213 OFFICE O/P EST LOW 20 MIN: CPT | Mod: PBBFAC | Performed by: INTERNAL MEDICINE

## 2018-07-24 PROCEDURE — 99999 PR PBB SHADOW E&M-EST. PATIENT-LVL III: CPT | Mod: PBBFAC,,, | Performed by: INTERNAL MEDICINE

## 2018-07-24 PROCEDURE — 99214 OFFICE O/P EST MOD 30 MIN: CPT | Mod: S$PBB,,, | Performed by: INTERNAL MEDICINE

## 2018-07-24 RX ORDER — INSULIN GLARGINE 100 [IU]/ML
INJECTION, SOLUTION SUBCUTANEOUS
Qty: 1 BOX | Refills: 11 | Status: SHIPPED | OUTPATIENT
Start: 2018-07-24 | End: 2018-10-25

## 2018-07-24 NOTE — PROGRESS NOTES
Subjective:     Patient ID: Des Junior is a 73 y.o. male.    Chief Complaint: No chief complaint on file.    HPI:   Mr. Junior is a 73 y.o. male who is here for a follow-up visit for evaluation T2DM that is controlled but complicated by neuropathy. Diagnosed over 15 years ago.      Today reports, feeling better but his blood sugars have not been well controlled. In march had episode of atrial fibrillation complicated bronichitis and PE four months ago. He was admitted at [facility].     Reports polyuria and leakage and pain under his right breast. Pain occurs with palpation not worsened with breathing.     His most recent A1C is 8.7%. Reports no major changes to his weight, but when compared to his last visit it is about a 25 lbs increase.     Current regimen:  Lantus 24 units at night  Januvia 100 mg daily  Metformin  mg two pills twice a day.     Pt is monitoring blood glucose readings 1 times a day.    Brought his meter:  185, 146, 196, 282, 196, 196, 186, 200, 306, 193, 157, 190, 219    Diet:  Breakfast: Crackers with milk  Lunch: Spam, potatoes, lima beans, red beans and rice, baked chicken, fried shrimp, sandwiches, sausage  Although eats a variety of foods that are not healthy, he reports eating small portions.     Review of Systems   Steroids two weeks ago for rib pain  No recent antibiotics  Denies shortness of breath, chest pain or pressure  Denies swelling of his lower extremities  Appetite good but controlling his portions  Walking but limited due to knee pain, scheduled to have knee replacement.     Objective:     Physical Exam   Constitutional: He is oriented to person, place, and time. He appears well-developed and well-nourished.   Neurological: He is alert and oriented to person, place, and time.   Vibratory sense is absent left foot, intact right foot.   Microfilament is decreased bilaterally R>L  Skin is intact     Vitals:    07/24/18 0949   BP: (!) 146/80   BP Location:  "Right arm   Patient Position: Sitting   Pulse: (!) 57   Weight: 112 kg (246 lb 14.6 oz)   Height: 6' 1" (1.854 m)     Body mass index is 32.58 kg/m².  Results for CHAYITO BARBER (MRN [id number]) as of 7/24/2018 10:07   Ref. Range 7/13/2018 09:35   Hemoglobin A1C Latest Ref Range: 4.0 - 5.6 % 8.7 (H)   Estimated Avg Glucose Latest Ref Range: 68 - 131 mg/dL 203 (H)   Results for CHAYITO BARBER (MRN [id number]) as of 7/24/2018 10:07   Ref. Range 3/13/2018 10:48   BUN, Bld Latest Ref Range: 8 - 23 mg/dL 30 (H)   Creatinine Latest Ref Range: 0.5 - 1.4 mg/dL 1.3   eGFR if non African American Latest Ref Range: >60 mL/min/1.73 m^2 54.9 (A)   eGFR if African American Latest Ref Range: >60 mL/min/1.73 m^2 >60.0   Glucose Latest Ref Range: 70 - 110 mg/dL 121 (H)   Results for CHAYITO BARBER (MRN [id number]) as of 7/24/2018 10:07   Ref. Range 3/2/2018 05:18   Cholesterol Latest Ref Range: 120 - 199 mg/dL 94 (L)   HDL Latest Ref Range: 40 - 75 mg/dL 25 (L)   LDL Cholesterol Latest Ref Range: 63.0 - 159.0 mg/dL 57.2 (L)   Total Cholesterol/HDL Ratio Latest Ref Range: 2.0 - 5.0  3.8   Triglycerides Latest Ref Range: 30 - 150 mg/dL 59   Results for CHAYITO BARBER (MRN [id number]) as of 7/24/2018 10:07   Ref. Range 3/2/2018 05:18   TSH Latest Ref Range: 0.400 - 4.000 uIU/mL 1.701     Assessment/Plan:       1. Type 2 diabetes, controlled, with neuropathy  - discussed foods that can increase blood sugar including carbohydrates  - has gained significant weight, ? Diet and sedentary lifestyle  - increase lantus to 28 units at bedtime. Refilled  - please check blood sugars two times daily     2. Class 1 obesity due to excess calories with serious comorbidity and body mass index (BMI) of 32.0 to 32.9 in adult  - see above  - defers nutrition/education appointment  - consider GLP 1 agonist -- discussed defers for now.     3. Chronic kidney disease (CKD), stage III (moderate)  - at risk for hypoglycemia  - adjust " vibhauvia at GFR of < 50 cc/min -- not yet but monitor  - continue metformin at current dose if GFR < 40 cc/ml or changes would decrease dose to 500 mg bid.       F/u in 4 months with ms Hampton.   With A1C

## 2018-07-24 NOTE — PATIENT INSTRUCTIONS
Goal weight loss 5% body weight (10  lbs)  Weigh yourself weekly  Increase vegetables, lean proteins, limit carbohydrates (rice, bread, cereal, crackers).   Choose high fiber (more than 3 - 5 grams/serving) of carbohydrates.     Lantus to 28 units     Check blood sugars and in the evenings.

## 2018-08-06 ENCOUNTER — OFFICE VISIT (OUTPATIENT)
Dept: DERMATOLOGY | Facility: CLINIC | Age: 73
End: 2018-08-06
Payer: MEDICARE

## 2018-08-06 DIAGNOSIS — L57.0 AK (ACTINIC KERATOSIS): ICD-10-CM

## 2018-08-06 PROCEDURE — 17000 DESTRUCT PREMALG LESION: CPT | Mod: S$PBB,,, | Performed by: DERMATOLOGY

## 2018-08-06 PROCEDURE — 17000 DESTRUCT PREMALG LESION: CPT | Mod: PBBFAC | Performed by: DERMATOLOGY

## 2018-08-06 PROCEDURE — 99213 OFFICE O/P EST LOW 20 MIN: CPT | Mod: 25,S$PBB,, | Performed by: DERMATOLOGY

## 2018-08-06 PROCEDURE — 17003 DESTRUCT PREMALG LES 2-14: CPT | Mod: S$PBB,,, | Performed by: DERMATOLOGY

## 2018-08-06 PROCEDURE — 99212 OFFICE O/P EST SF 10 MIN: CPT | Mod: PBBFAC | Performed by: DERMATOLOGY

## 2018-08-06 PROCEDURE — 99999 PR PBB SHADOW E&M-EST. PATIENT-LVL II: CPT | Mod: PBBFAC,,, | Performed by: DERMATOLOGY

## 2018-08-06 PROCEDURE — 17003 DESTRUCT PREMALG LES 2-14: CPT | Mod: PBBFAC | Performed by: DERMATOLOGY

## 2018-08-06 RX ORDER — METOPROLOL SUCCINATE 25 MG/1
TABLET, EXTENDED RELEASE ORAL
COMMUNITY
Start: 2018-07-31 | End: 2019-03-12

## 2018-08-06 RX ORDER — FLUOROURACIL 50 MG/G
CREAM TOPICAL
Qty: 40 G | Refills: 1 | Status: SHIPPED | OUTPATIENT
Start: 2018-08-06 | End: 2019-06-12 | Stop reason: SDUPTHER

## 2018-08-06 NOTE — PROGRESS NOTES
Subjective:       Patient ID:  Des Junior is a 73 y.o. male who presents for   Chief Complaint   Patient presents with    Skin Check     Face check      History of Present Illness: The patient presents for follow up of skin check.    The patient was last seen on: 1/2018 for cryosurgery to actinic keratoses which have resolved.     Other skin complaints: none  No h/o nmsc - pt declines ubse          Review of Systems   Skin: Positive for wears hat (100%). Negative for daily sunscreen use, activity-related sunscreen use and recent sunburn.   Hematologic/Lymphatic: Bruises/bleeds easily (on xeralto).        Objective:    Physical Exam   Constitutional: He appears well-developed and well-nourished. No distress.   Neurological: He is alert and oriented to person, place, and time. He is not disoriented.   Psychiatric: He has a normal mood and affect.   Skin:   Areas Examined (abnormalities noted in diagram):   Scalp / Hair Palpated and Inspected  Head / Face Inspection Performed              Diagram Legend     Erythematous scaling macule/papule c/w actinic keratosis       Vascular papule c/w angioma      Pigmented verrucoid papule/plaque c/w seborrheic keratosis      Yellow umbilicated papule c/w sebaceous hyperplasia      Irregularly shaped tan macule c/w lentigo     1-2 mm smooth white papules consistent with Milia      Movable subcutaneous cyst with punctum c/w epidermal inclusion cyst      Subcutaneous movable cyst c/w pilar cyst      Firm pink to brown papule c/w dermatofibroma      Pedunculated fleshy papule(s) c/w skin tag(s)      Evenly pigmented macule c/w junctional nevus     Mildly variegated pigmented, slightly irregular-bordered macule c/w mildly atypical nevus      Flesh colored to evenly pigmented papule c/w intradermal nevus       Pink pearly papule/plaque c/w basal cell carcinoma      Erythematous hyperkeratotic cursted plaque c/w SCC      Surgical scar with no sign of skin cancer  recurrence      Open and closed comedones      Inflammatory papules and pustules      Verrucoid papule consistent consistent with wart     Erythematous eczematous patches and plaques     Dystrophic onycholytic nail with subungual debris c/w onychomycosis     Umbilicated papule    Erythematous-base heme-crusted tan verrucoid plaque consistent with inflamed seborrheic keratosis     Erythematous Silvery Scaling Plaque c/w Psoriasis     See annotation      Assessment / Plan:        AK (actinic keratosis)  -     fluorouracil (EFUDEX) 5 % cream; AAA right cheek and left lower cheek bid x 2 weeks  Dispense: 40 g; Refill: 1      AK (actinic keratosis)  Today's Plan:      Cryosurgery Procedure Note    Verbal consent from the patient is obtained including, but not limited to, risk of hypopigmentation/hyperpigmentation, scar, recurrence of lesion. The patient is aware of the precancerous quality and need for treatment of these lesions. Liquid nitrogen cryosurgery is applied to the 2 actinic keratoses, as detailed in the physical exam, to produce a freeze injury. The patient is aware that blisters may form and is instructed on wound care with gentle cleansing and use of vaseline ointment to keep moist until healed. The patient is supplied a handout on cryosurgery and is instructed to call if lesions do not completely resolve.    And efudex bid x 2 weeks right cheek and left lower cheek    F/u 3 months        Follow-up in about 3 months (around 11/6/2018).

## 2018-08-06 NOTE — PATIENT INSTRUCTIONS

## 2018-08-06 NOTE — ASSESSMENT & PLAN NOTE
Today's Plan:      Cryosurgery Procedure Note    Verbal consent from the patient is obtained including, but not limited to, risk of hypopigmentation/hyperpigmentation, scar, recurrence of lesion. The patient is aware of the precancerous quality and need for treatment of these lesions. Liquid nitrogen cryosurgery is applied to the 2 actinic keratoses, as detailed in the physical exam, to produce a freeze injury. The patient is aware that blisters may form and is instructed on wound care with gentle cleansing and use of vaseline ointment to keep moist until healed. The patient is supplied a handout on cryosurgery and is instructed to call if lesions do not completely resolve.    And efudex bid x 2 weeks right cheek and left lower cheek    F/u 3 months

## 2018-08-27 ENCOUNTER — OFFICE VISIT (OUTPATIENT)
Dept: OPTOMETRY | Facility: CLINIC | Age: 73
End: 2018-08-27
Payer: MEDICARE

## 2018-08-27 DIAGNOSIS — H52.4 PRESBYOPIA: ICD-10-CM

## 2018-08-27 DIAGNOSIS — E11.40 TYPE 2 DIABETES, CONTROLLED, WITH NEUROPATHY: Primary | ICD-10-CM

## 2018-08-27 DIAGNOSIS — H02.836 DERMATOCHALASIS OF BOTH EYELIDS: ICD-10-CM

## 2018-08-27 DIAGNOSIS — H25.13 NS (NUCLEAR SCLEROSIS), BILATERAL: ICD-10-CM

## 2018-08-27 DIAGNOSIS — H02.833 DERMATOCHALASIS OF BOTH EYELIDS: ICD-10-CM

## 2018-08-27 DIAGNOSIS — E11.9 TYPE 2 DIABETES MELLITUS WITHOUT OPHTHALMIC MANIFESTATIONS: ICD-10-CM

## 2018-08-27 DIAGNOSIS — I15.2 HYPERTENSION ASSOCIATED WITH DIABETES: ICD-10-CM

## 2018-08-27 DIAGNOSIS — E11.59 HYPERTENSION ASSOCIATED WITH DIABETES: ICD-10-CM

## 2018-08-27 PROCEDURE — 99212 OFFICE O/P EST SF 10 MIN: CPT | Mod: PBBFAC | Performed by: OPTOMETRIST

## 2018-08-27 PROCEDURE — 92015 DETERMINE REFRACTIVE STATE: CPT | Mod: ,,, | Performed by: OPTOMETRIST

## 2018-08-27 PROCEDURE — 99999 PR PBB SHADOW E&M-EST. PATIENT-LVL II: CPT | Mod: PBBFAC,,, | Performed by: OPTOMETRIST

## 2018-08-27 PROCEDURE — 92014 COMPRE OPH EXAM EST PT 1/>: CPT | Mod: S$PBB,,, | Performed by: OPTOMETRIST

## 2018-08-27 NOTE — PROGRESS NOTES
HPI     73yr old male present for Annual IDDM DFE. Patient states BSL constantly   high ranging at 180 and last Hemoglobin A1C       Date                     Value               Ref Range             Status                07/13/2018               8.7 (H)             4.0 - 5.6 %           Final            Pt notes vision OU stable no changes since last eye exam. Pt says on   occasion his eyes tear due to Allergies. No floaters and flashes. Pt says   OS was sore to touch last week, improved today.        Last edited by Alberto Bright on 8/27/2018 10:59 AM. (History)            Assessment /Plan     For exam results, see Encounter Report.      Type 2 diabetes, controlled, with neuropathy  DM type 2 without retinopathy  Essential hypertension                        No retinopathy, monitor yearly     NS (nuclear sclerosis), bilateral                        Mild, not visually significant     Presbyopia                        Rx specs    Dermatochalasis of both eyelids  Blepharitis, unspecified laterality                         Lid scrubs and artificial tears QHS     RTC 1 year, sooner PRN

## 2018-09-04 ENCOUNTER — TELEPHONE (OUTPATIENT)
Dept: PULMONOLOGY | Facility: CLINIC | Age: 73
End: 2018-09-04

## 2018-09-04 ENCOUNTER — HOSPITAL ENCOUNTER (OUTPATIENT)
Dept: RADIOLOGY | Facility: HOSPITAL | Age: 73
Discharge: HOME | End: 2018-09-04
Attending: INTERNAL MEDICINE
Payer: MEDICARE

## 2018-09-04 DIAGNOSIS — J47.9 BRONCHIECTASIS WITHOUT COMPLICATION: ICD-10-CM

## 2018-09-04 DIAGNOSIS — R91.1 LUNG NODULE, SOLITARY: ICD-10-CM

## 2018-09-04 PROCEDURE — 71250 CT THORAX DX C-: CPT | Mod: 26,,, | Performed by: RADIOLOGY

## 2018-09-04 PROCEDURE — 71250 CT THORAX DX C-: CPT | Mod: TC

## 2018-09-04 NOTE — TELEPHONE ENCOUNTER
Per Dr. Sandoval patient notified and gave the patient appointment for 9/25/2018 at 2:00pm, so Dr. Sandoval can review the CT with the patient.      ----- Message from Eladio Sandoval MD sent at 9/4/2018  2:24 PM CDT -----  Notify ct is stable and improved,  Need to discuss at f/u

## 2018-09-06 DIAGNOSIS — E78.5 HYPERLIPIDEMIA, UNSPECIFIED HYPERLIPIDEMIA TYPE: ICD-10-CM

## 2018-09-10 DIAGNOSIS — I10 ESSENTIAL HYPERTENSION: ICD-10-CM

## 2018-09-10 DIAGNOSIS — E11.40 TYPE 2 DIABETES, CONTROLLED, WITH NEUROPATHY: ICD-10-CM

## 2018-09-10 RX ORDER — LOSARTAN POTASSIUM AND HYDROCHLOROTHIAZIDE 12.5; 5 MG/1; MG/1
1 TABLET ORAL DAILY
Qty: 30 TABLET | Refills: 3 | Status: SHIPPED | OUTPATIENT
Start: 2018-09-10 | End: 2019-02-05 | Stop reason: SDUPTHER

## 2018-09-10 RX ORDER — AMLODIPINE BESYLATE 10 MG/1
TABLET ORAL
Qty: 30 TABLET | Refills: 3 | Status: SHIPPED | OUTPATIENT
Start: 2018-09-10 | End: 2019-02-05 | Stop reason: SDUPTHER

## 2018-09-10 RX ORDER — PRAVASTATIN SODIUM 40 MG/1
TABLET ORAL
Qty: 30 TABLET | Refills: 3 | Status: SHIPPED | OUTPATIENT
Start: 2018-09-10 | End: 2019-01-04 | Stop reason: SDUPTHER

## 2018-09-10 RX ORDER — METFORMIN HYDROCHLORIDE 500 MG/1
TABLET, EXTENDED RELEASE ORAL
Qty: 120 TABLET | Refills: 3 | Status: SHIPPED | OUTPATIENT
Start: 2018-09-10 | End: 2019-01-28 | Stop reason: SDUPTHER

## 2018-09-10 RX ORDER — SITAGLIPTIN 100 MG/1
TABLET, FILM COATED ORAL
Qty: 30 TABLET | Refills: 3 | Status: SHIPPED | OUTPATIENT
Start: 2018-09-10 | End: 2018-10-25

## 2018-09-10 NOTE — TELEPHONE ENCOUNTER
----- Message from Ana Tomlinson sent at 9/10/2018  9:40 AM CDT -----  Contact: Wife  .Rx Refill/Request     Is this a Refill or New Rx:  amlodipine (NORVASC) 10 MG tablet, SITagliptin (JANUVIA) 100 MG Tab, pravastatin (PRAVACHOL) 40 MG tablet, metformin (GLUCOPHAGE-XR) 500 MG 24 hr tablet, losartan-hydrochlorothiazide 50-12.5 mg (HYZAAR) 50-12.5 mg per tablet  Rx Name and Strength:    Preferred Pharmacy with phone number:  [facility] - [place], [place]  Communication Preference: [phone] Fax: [phone]  Additional Information:

## 2018-09-14 DIAGNOSIS — I48.19 PERSISTENT ATRIAL FIBRILLATION: Primary | ICD-10-CM

## 2018-09-17 ENCOUNTER — PATIENT MESSAGE (OUTPATIENT)
Dept: INTERNAL MEDICINE | Facility: CLINIC | Age: 73
End: 2018-09-17

## 2018-09-17 ENCOUNTER — CLINICAL SUPPORT (OUTPATIENT)
Dept: CARDIOLOGY | Facility: CLINIC | Age: 73
End: 2018-09-17
Attending: INTERNAL MEDICINE
Payer: MEDICARE

## 2018-09-17 VITALS — WEIGHT: 246.94 LBS | BODY MASS INDEX: 32.73 KG/M2 | HEIGHT: 73 IN

## 2018-09-17 DIAGNOSIS — I48.19 PERSISTENT ATRIAL FIBRILLATION: ICD-10-CM

## 2018-09-17 LAB
ASCENDING AORTA: 3.56 CM
AV MEAN GRADIENT: 2.47 MMHG
AV PEAK GRADIENT: 4.78 MMHG
AV VALVE AREA: 2.82 CM2
BSA FOR ECHO PROCEDURE: 2.4 M2
CV ECHO LV RWT: 0.33 CM
DOP CALC AO PEAK VEL: 1.09 M/S
DOP CALC AO VTI: 20.6 CM
DOP CALC LVOT AREA: 3.4 CM2
DOP CALC LVOT DIAMETER: 2.08 CM
DOP CALC LVOT STROKE VOLUME: 58.04 CM3
DOP CALCLVOT PEAK VEL VTI: 17.09 CM
E WAVE DECELERATION TIME: 278.88 MSEC
E/A RATIO: 0.76
E/E' RATIO: 5.88
ECHO LV POSTERIOR WALL: 0.88 CM (ref 0.6–1.1)
FRACTIONAL SHORTENING: 28 % (ref 28–44)
INTERVENTRICULAR SEPTUM: 0.78 CM (ref 0.6–1.1)
IVRT: 0.14 MSEC
LA MAJOR: 5.38 CM
LA MINOR: 5.54 CM
LA WIDTH: 2.95 CM
LEFT ATRIUM SIZE: 4.11 CM
LEFT ATRIUM VOLUME INDEX: 23.4 ML/M2
LEFT ATRIUM VOLUME: 56.26 CM3
LEFT INTERNAL DIMENSION IN SYSTOLE: 3.63 CM (ref 2.1–4)
LEFT VENTRICLE MASS INDEX: 59.5 G/M2
LEFT VENTRICULAR INTERNAL DIMENSION IN DIASTOLE: 5.01 CM (ref 3.5–6)
LEFT VENTRICULAR MASS: 142.86 G
LV LATERAL E/E' RATIO: 5.56
LV SEPTAL E/E' RATIO: 6.25
MV PEAK A VEL: 0.66 M/S
MV PEAK E VEL: 0.5 M/S
MV STENOSIS PRESSURE HALF TIME: 80.88 MS
MV VALVE AREA P 1/2 METHOD: 2.72 CM2
PISA TR MAX VEL: 2.08 M/S
PULM VEIN S/D RATIO: 1.18
PV PEAK D VEL: 0.22 M/S
PV PEAK S VEL: 0.26 M/S
RA MAJOR: 6.07 CM
RA PRESSURE: 3 MMHG
RA WIDTH: 3.59 CM
SINUS: 2.73 CM
STJ: 3.01 CM
TDI LATERAL: 0.09
TDI SEPTAL: 0.08
TDI: 0.09
TR MAX PG: 17.31 MMHG
TRICUSPID ANNULAR PLANE SYSTOLIC EXCURSION: 0.02 CM
TV REST PULMONARY ARTERY PRESSURE: 20.31 MMHG

## 2018-09-17 PROCEDURE — 99999 PR PBB SHADOW E&M-EST. PATIENT-LVL I: CPT | Mod: PBBFAC,,,

## 2018-09-17 PROCEDURE — 99211 OFF/OP EST MAY X REQ PHY/QHP: CPT | Mod: PBBFAC,PO,25

## 2018-09-17 PROCEDURE — 93306 TTE W/DOPPLER COMPLETE: CPT | Mod: PBBFAC,PO | Performed by: INTERNAL MEDICINE

## 2018-09-18 ENCOUNTER — TELEPHONE (OUTPATIENT)
Dept: INTERNAL MEDICINE | Facility: CLINIC | Age: 73
End: 2018-09-18

## 2018-09-18 NOTE — TELEPHONE ENCOUNTER
----- Message from Gabriela Finch sent at 9/17/2018  5:13 PM CDT -----  Contact: PT Portal Request  Appointment Request From: Des Junior    With Provider: Christopher Cortze MD [Alex giovanna - Internal Medicine]    Preferred Date Range: 9/18/2018 - 9/18/2018    Preferred Times: Any time    Reason for visit: Existing Patient    Comments:  Annual check up

## 2018-09-19 ENCOUNTER — OFFICE VISIT (OUTPATIENT)
Dept: CARDIOLOGY | Facility: CLINIC | Age: 73
End: 2018-09-19
Payer: MEDICARE

## 2018-09-19 VITALS
BODY MASS INDEX: 32.17 KG/M2 | OXYGEN SATURATION: 94 % | DIASTOLIC BLOOD PRESSURE: 82 MMHG | HEART RATE: 64 BPM | WEIGHT: 242.75 LBS | SYSTOLIC BLOOD PRESSURE: 140 MMHG | HEIGHT: 73 IN

## 2018-09-19 DIAGNOSIS — Z86.711 HISTORY OF PULMONARY EMBOLISM: ICD-10-CM

## 2018-09-19 DIAGNOSIS — I15.2 HYPERTENSION ASSOCIATED WITH DIABETES: ICD-10-CM

## 2018-09-19 DIAGNOSIS — Z79.899 ENCOUNTER FOR MONITORING SOTALOL THERAPY: ICD-10-CM

## 2018-09-19 DIAGNOSIS — Z79.01 CURRENT USE OF LONG TERM ANTICOAGULATION: ICD-10-CM

## 2018-09-19 DIAGNOSIS — Z51.81 ENCOUNTER FOR MONITORING SOTALOL THERAPY: ICD-10-CM

## 2018-09-19 DIAGNOSIS — I48.19 PERSISTENT ATRIAL FIBRILLATION: Primary | ICD-10-CM

## 2018-09-19 DIAGNOSIS — I10 HYPERTENSION, UNSPECIFIED TYPE: ICD-10-CM

## 2018-09-19 DIAGNOSIS — E11.59 HYPERTENSION ASSOCIATED WITH DIABETES: ICD-10-CM

## 2018-09-19 DIAGNOSIS — E11.40 TYPE 2 DIABETES, CONTROLLED, WITH NEUROPATHY: ICD-10-CM

## 2018-09-19 PROCEDURE — 99999 PR PBB SHADOW E&M-EST. PATIENT-LVL IV: CPT | Mod: PBBFAC,,, | Performed by: INTERNAL MEDICINE

## 2018-09-19 PROCEDURE — 93005 ELECTROCARDIOGRAM TRACING: CPT | Mod: PBBFAC,PO | Performed by: INTERNAL MEDICINE

## 2018-09-19 PROCEDURE — 99214 OFFICE O/P EST MOD 30 MIN: CPT | Mod: S$PBB,,, | Performed by: INTERNAL MEDICINE

## 2018-09-19 PROCEDURE — 99214 OFFICE O/P EST MOD 30 MIN: CPT | Mod: PBBFAC,PO | Performed by: INTERNAL MEDICINE

## 2018-09-19 PROCEDURE — 93010 ELECTROCARDIOGRAM REPORT: CPT | Mod: S$PBB,,, | Performed by: INTERNAL MEDICINE

## 2018-09-19 NOTE — PROGRESS NOTES
Subjective:     Cardiologist: Josue Barajas MD  PCP: Christopher Cortez MD    I had the pleasure of seeing Des Junior in follow up for his history of atrial fibrillation. He is a 73 year old male with a history of HTN, HLD, DM2, and unprovoked DVT/PE in 2/2018, whose history of AF dates back to 2/2018 when he was seen by his PCP for a several week history of worsening VELASCO. He was found to be in AF at 91 bpm. He was started on Xarelto around this time. In 4/2018, an electrical cardioversion was performed, which restored sinus rhythm. Post-cardioversion, he was noted to have profound 1st degree AV block (304 ms). At 1 week follow-up he was back in AF.     A Regadenoson nuclear stress test done in 3/2018 showed no evidence of ischemia or infarction, abnormal wall motion at rest showing moderate hypokinesis of the anterior and lateral walls of the left ventricle and severe hypokinesis of the inferior and septal walls of the left ventricle, and an EF of 39 %. An echo done in 3/2018 showed an EF of 40-45%, septal diameter 1.2 cm, normal LA size, grade 3 diastolic dysfunction, and no significant valvular disease.    I reviewed all ECGs in the EMR dating back to 1998 at his initial visit. ECGs from 2/2018 and 3/2018 showed AF with rates in the 80-110s bpm range. A post-cardioversion ECG done on 4/17/2018 showed sinus rhythm at 76 bpm with a MO of 304 ms and RBBB. All subsequent ECGs showed rate controlled AF.    When I initially saw Mr. Junior I was concerned his cardiomyopathy may be related to his AF. The plan was for him to start Sotalol, and undergo another DCCV. He chemically converted to sinus rhythm shortly after starting Sotalol, however. He noted an improvement in his palpitations. He was not sure whether his energy level has improved as he hardly ambulates due to chronic knee pains. The plan at that time was to hold the course.    Recent [facility] studies include an echo performed in 9/2018 which showed an EF of  50-55% and mild LAE.    My interpretation of today's ECG is sinus rhythm at 64 bpm with RBBB and a NH of 246 ms and a QTc of 474 ms.    Review of Systems   Constitution: Negative for decreased appetite, malaise/fatigue, weight gain and weight loss.   HENT: Negative for sore throat.    Eyes: Negative for blurred vision.   Cardiovascular: Negative for chest pain, dyspnea on exertion, irregular heartbeat, leg swelling, near-syncope, orthopnea, palpitations, paroxysmal nocturnal dyspnea and syncope.   Respiratory: Negative for shortness of breath.    Skin: Negative for rash.   Musculoskeletal: Positive for joint pain. Negative for arthritis.   Gastrointestinal: Negative for abdominal pain.   Neurological: Negative for focal weakness.   Psychiatric/Behavioral: Negative for altered mental status.        Objective:    Physical Exam   Constitutional: He is oriented to person, place, and time. He appears well-developed and well-nourished. No distress.   HENT:   Head: Normocephalic and atraumatic.   Mouth/Throat: Oropharynx is clear and moist.   Eyes: Pupils are equal, round, and reactive to light. No scleral icterus.   Neck: Neck supple. No thyromegaly present.   Cardiovascular: Normal rate, regular rhythm, normal heart sounds and normal pulses. Exam reveals no gallop and no friction rub.   No murmur heard.  Pulmonary/Chest: Effort normal and breath sounds normal. He has no rales.   Abdominal: Soft. Bowel sounds are normal. He exhibits no distension. There is no tenderness.   Musculoskeletal: He exhibits no edema.   Neurological: He is alert and oriented to person, place, and time.   Skin: Skin is warm and dry. No rash noted.   Psychiatric: He has a normal mood and affect. His behavior is normal.   Vitals reviewed.        Assessment:       1. Persistent atrial fibrillation    2. Hypertension, unspecified type    3. Encounter for monitoring sotalol therapy    4. Type 2 diabetes, controlled, with neuropathy    5. Current use  of long term anticoagulation    6. History of pulmonary embolism    7. Hypertension associated with diabetes         Plan:       In summary, Des Junior is a 73 year old male with recently diagnosed persistent AF. His [id number] Score is 3 (age, HTN, DM2), which corresponds to a yearly risk of stroke without anticoagulation estimated at 3.2%. He should continue Xarelto indefinitely. At this point he is maintaining sinus rhythm on Sotalol, and his EF has normalized in sinus rhythm. We briefly discussed the pros and cons of PVI, but for now Mr. Junior would like to hold the course and continue Sotalol until he has his knee replacement surgery, which is scheduled for later in the fall. The plan will be to follow-up with me in early 2019. At that time, risks and benefits of PVI will be discussed again, and the procedure scheduled.    There is no EP contraindication to Mr. Junior's upcoming knee surgeries.    Thank you for allowing me to participate in the care of this patient. Please do not hesitate to call me with any questions or concerns.

## 2018-09-25 ENCOUNTER — OFFICE VISIT (OUTPATIENT)
Dept: PULMONOLOGY | Facility: CLINIC | Age: 73
End: 2018-09-25
Payer: MEDICARE

## 2018-09-25 VITALS
SYSTOLIC BLOOD PRESSURE: 118 MMHG | WEIGHT: 244.06 LBS | HEIGHT: 73 IN | HEART RATE: 66 BPM | BODY MASS INDEX: 32.34 KG/M2 | DIASTOLIC BLOOD PRESSURE: 69 MMHG | OXYGEN SATURATION: 96 %

## 2018-09-25 DIAGNOSIS — J47.9 BRONCHIECTASIS WITHOUT COMPLICATION: ICD-10-CM

## 2018-09-25 DIAGNOSIS — Z77.090 ASBESTOS EXPOSURE: ICD-10-CM

## 2018-09-25 DIAGNOSIS — R91.1 LUNG NODULE: Primary | ICD-10-CM

## 2018-09-25 PROCEDURE — 99999 PR PBB SHADOW E&M-EST. PATIENT-LVL IV: CPT | Mod: PBBFAC,,, | Performed by: INTERNAL MEDICINE

## 2018-09-25 PROCEDURE — 99214 OFFICE O/P EST MOD 30 MIN: CPT | Mod: S$PBB,,, | Performed by: INTERNAL MEDICINE

## 2018-09-25 PROCEDURE — 99214 OFFICE O/P EST MOD 30 MIN: CPT | Mod: PBBFAC,PO | Performed by: INTERNAL MEDICINE

## 2018-09-25 RX ORDER — PREDNISONE 20 MG/1
TABLET ORAL
Qty: 12 TABLET | Refills: 0 | Status: SHIPPED | OUTPATIENT
Start: 2018-09-25 | End: 2018-12-04

## 2018-09-25 RX ORDER — AMOXICILLIN AND CLAVULANATE POTASSIUM 875; 125 MG/1; MG/1
1 TABLET, FILM COATED ORAL 2 TIMES DAILY
Qty: 20 TABLET | Refills: 2 | Status: SHIPPED | OUTPATIENT
Start: 2018-09-25 | End: 2019-02-25 | Stop reason: SDUPTHER

## 2018-09-25 NOTE — PATIENT INSTRUCTIONS
Cultures had no bad germs in lungs.      If cough flares up- symbicort 2 twice daily.  May use if cough or wheeze or mucous.    May use Augmentin antibiotic if cough or yellow mucous.  May use prednisone if cough is excess or wheezes or short breath.    Nodules no big worries seen but got a new one.

## 2018-09-25 NOTE — PROGRESS NOTES
9/25/2018    [facility] Follow Up    Chief Complaint   Patient presents with    Follow-up     CT SCAN       HPI:     Sept 25, 2018-- no cough nor mucous, breathing very good.        March 22, 2018pt seen with massive pe, also has chr lung dz seen on ct with massive clots.         From my ct review:  Radiographs reviewed: view by direct vision  Very large pulm emboli bilat main arteries, 5 mm rll solid fairly smooth nodule, very min mild post lung marking increase, no calcified plaques seen, lower lungs with thickened bronchi and perhaps early bronchiectasis      from  Consult hpi and rec:    03/02/2018                                                  Admit Date: 3/1/2018  [facility]  New Patient Consult          Chief Complaint   Patient presents with    Shortness of Breath       completed CT this morning outpatient, was advised by PCP to then present to ER for possible admission          History of Present Illness:  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  Pt never smoker.  No leg trauma nor leg swelling nor pain legs.  Pt had intermittent cough with purulent mucous production last yr- has had seasonal allergies with occ nocturnal wheezes.  No inhaler use nor sob til lately.  Pt does travel with 3-4 immobilization between stops.  Fh + only in nephew with clotts in his early 50's.   Pt developed small climbing to his elevated home last 3 weeks, no syncope, no chest pain nor palpitations.  Pt was evaluated by pcp and a fib and pe found.     Pt rm air sat 94% today.          Plan: pt is stable for home soon on xarelto but would recommend bed to chair activity for next week. dvt and pe are unprovoked.  Given large clots seen and delayed presentation- anticoagg 6 month is min but recommend life long.  hypercoagulable chahal will not change length of rx.  Would ask what nephew  eval showed if done.   Pt has nodule with 1 of 200 risk lung ca by Brandyn Model.   F/u ct with airway dz may be reasonable in a year?  Asbestos exposure - no impressive asbestosis seen.   Pt has symptomatic chr mucopurulent bronchitis.  Coverage for meds is limited on his insurance.  Would give course levaquin and cultures and afb x 3 for geraldo and neb rx prn 1/2 dose albuterol    Suggest f/u in 3 months or so (GERALDO can take 2 months to culture).    pe likely ppt a fib?        The chief compliant  problem is varies with instablilty at time   PFSH:  Past Medical History:   Diagnosis Date    Abdominal obesity 3/15/2018    Acute deep vein thrombosis (DVT) of popliteal vein of right lower extremity 3/2/2018    AK (actinic keratosis) 11/15/2012    S/p 1st PDT face (80 min incubation) - did great! S/p PDT face - 90 min  - 12/15 - did great S/p PDT face - 90 min - 10/17 - great response    Asbestos exposure 3/2/2018    Asymptomatic LV dysfunction 3/2/2018    Bronchitis, chronic, mucopurulent 3/2/2018    Cataract     Colon adenoma 8/20/2014    Current use of long term anticoagulation 4/23/2018    ED (erectile dysfunction) 11/28/2012    Encounter for monitoring sotalol therapy 6/13/2018    Hay fever     Hearing loss, sensorineural 6/25/2013    History of adenomatous polyp of colon 10/5/2015    History of pulmonary embolism 5/2/2018    Hyperlipidemia associated with type 2 diabetes mellitus, baseline  11/28/2012    Hypertension associated with diabetes 11/28/2012    Lung nodule, solitary- 5 mm rll 3/2/2018    On home oxygen therapy 3/15/2018    Osteoarthritis of right knee 11/28/2012    Persistent atrial fibrillation 3/1/2018    Pulmonary embolus- large involved central PA, unprovoked. 3/1/2018    Type 2 diabetes mellitus, controlled 7/16/2013    Type 2 diabetes, controlled, with neuropathy 1/20/2014    Type 2 diabetes, uncontrolled, with neuropathy, onset 2003 11/28/2012         Past Surgical  "History:   Procedure Laterality Date    Cardioversion N/A 4/10/2018    Performed by Josue Barajas MD at [facility] CATH LAB    Cardioversion/Defibrillation N/A 5/9/2018    Performed by Josue [facility]ajas MD at [facility] CATH [facility]    COLONOSCOPY N/A 10/5/2015    Procedure: COLONOSCOPY;  Surgeon: Pro Jang MD;  Location: [facility] (4TH FLR);  Service: Endoscopy;  Laterality: N/A;    COLONOSCOPY N/A 10/5/2015    Performed [facility] Pro [facility]ang MD at [facility] (4TH FLR)    COLONOSCOPY N/A 8/20/2014    Performed by Pro Jang MD at [facility] (4TH FLR)    thumb surgery       Social History     Tobacco Use    Smoking status: Never Smoker    Smokeless tobacco: Never Used   Substance Use Topics    Alcohol use: Yes     Comment: rare    Drug use: No     Family History   Problem Relation Age of Onset    Hypertension Father     Heart disease Father         chf    Cancer Brother         colon    Cataracts Mother     Glaucoma Mother     Pneumonia Mother     Cancer Sister         lymphoma    Cancer Brother         prostate ce    Melanoma Neg Hx     Amblyopia Neg Hx     Blindness Neg Hx     Macular degeneration Neg Hx     Retinal detachment Neg Hx     Strabismus Neg Hx     Diabetes Neg Hx      Review of patient's allergies indicates:   Allergen Reactions    Codeine Nausea And Vomiting     Other reaction(s): Nausea    Benicar  [olmesartan]      Other reaction(s): SPOTS IN FRONT OF EYES       Performance Status:The patient's activity level is functions out of house.      Review of Systems:  a review of eleven systems covering constitutional, Eye, HEENT, Psych, Respiratory, Cardiac, GI, , Musculoskeletal, Endocrine, Dermatologic was negative except for pertinent findings as listed ABOVE and below:  pertinent positive as above, rest is good       Exam:Comprehensive exam done. /69 (BP Location: Left arm, Patient Position: Sitting)   Pulse 66   Ht 6' 1" (1.854 m)   Wt 110.7 kg (244 lb 0.8 oz)   SpO2 96% " Comment: on room air  BMI 32.20 kg/m²   Exam included Vitals as listed, and patient's appearance and affect and alertness and mood, oral exam for yeast and hygiene and pharynx lesions and Mallapatti (M) score, neck with inspection for jvd and masses and thyroid abnormalities and lymph nodes (supraclavicular and infraclavicular nodes and axillary also examined and noted if abn), chest exam included symmetry and effort and fremitus and percussion and auscultation, cardiac exam included rhythm and gallops and murmur and rubs and jvd and edema, abdominal exam for mass and hepatosplenomegaly and tenderness and hernias and bowel sounds, Musculoskeletal exam with muscle tone and posture and mobility/gait and  strength, and skin for rashes and cyanosis and pallor and turgor, extremity for clubbing.  Findings were normal except for pertinent findings listed below:  M3, chest is symmetric, no distress, normal percussion, normal fremitus and good normal breath sounds  Rrr, no murmur , mild edema +1    Radiographs (ct chest and cxr) reviewed: view by direct vision  lg pe on ct earlier march-- ct chest sept 2018 new nodule noted but lungs look better otherwise.  CTA Chest Non-Coronary   Status: Final result   [facility] Results Release     [facility] Status: Active Results Release   PACS Images     Show images for CTA Chest Non-Coronary   Reviewed By Medina Cortez MD on 3/1/2018 16:40   External Result Report     External Result Report   Narrative     EXAMINATION:  CTA CHEST NON CORONARY    CLINICAL HISTORY:  Chest pain, acute, PE suspected, intermed prob, positive D-dimer;Chest pain, unspecified    TECHNIQUE:  Low dose axial images, sagittal and coronal reformations were obtained from the thoracic inlet to the lung bases.  Timing was optimized to evaluate the pulmonary arteries.    COMPARISON:  Chest radiographs 02/27/2018    FINDINGS:  The visualized thyroid gland is unremarkable.  No supraclavicular lymphadenopathy  is seen.  No mediastinal or hilar lymphadenopathy is seen.  No axillary lymphadenopathy is seen.  No acute esophageal abnormality is seen.    The thoracic aorta is normal in caliber.  There is mild atherosclerosis in the aortic arch.  Moderate coronary artery calcification is also noted.    Pulmonary arterial bolus timing is good.  The study is positive for pulmonary embolism.  There are filling defects within the central pulmonary arteries bilaterally with involvement of the distal aspect of the main left pulmonary artery as well as the descending right and left pulmonary artery branches.  Segmental branches involving the right middle lobe, right upper lobe, left upper lobe, lingula and bilateral lower lobes are also affected.    There is a small wedge-shaped opacity in the superior segment of the left lower lobe on axial image 108 consistent with a small pulmonary infarct.  There is flattening of the interventricular septum.  No focal alveolar consolidation is seen.  There is an approximately 5 mm pulmonary nodule within the anterior basal segment of the right lower lobe.  No pleural effusion is seen.  There is an approximately 6 mm nodule along the lateral segment of the right middle lobe.  5 mm pulmonary nodule noted in the superior segment of the left lower lobe.  Small ground-glass opacities in the right middle lobe laterally may represent additional small pulmonary infarcts.    No acute findings are noted in the visualized upper abdomen.  There is cholelithiasis.  Nonspecific perinephric stranding is seen bilaterally, likely chronic.    No acute osseous abnormality is seen.  There is multilevel thoracic spondylosis.  Calcific tendinitis involving the left rotator cuff is also noted.   Impression       Positive study demonstrating bilateral central and segmental pulmonary emboli, as detailed above.  Small pulmonary infarct involving the superior segment of the left lower lobe and possible small pulmonary  infarcts involving the lateral segment of the right middle lobe also noted.  There is also flattening of the interventricular septum which may indicate developing right heart strain.    Small pulmonary nodules in the bilateral lungs measuring up to 6 mm.  Single 1 year follow up CT of the thorax is recommended for surveillance.    Incidental note of cholelithiasis.    This report was flagged in Epic as abnormal on 3/1/2018 at 8:14 am.    COMMUNICATION  This critical result was discovered/received at 8:15 a.m. on 03/01/2018.  The critical information above was relayed directly by me by telephone to Dr. Christopher Cortez on 03/01/2018 at 8:30 a.m..      Electronically signed by: Tita Alves MD  Date: 03/01/2018  Time: 08:32    Encounter     View Encounter              Labs reviewed    PFT was not done       Plan:  Clinical impression is apparently straight forward and impression with management as below.    Des was seen today for follow-up.    Diagnoses and all orders for this visit:    Lung nodule  -     CT Chest Without Contrast; Future    Asbestos exposure  -     CT Chest Without Contrast; Future    Bronchiectasis without complication  -     amoxicillin-clavulanate 875-125mg (AUGMENTIN) 875-125 mg per tablet; Take 1 tablet by mouth 2 (two) times daily.  -     predniSONE (DELTASONE) 20 MG tablet; One daily for 3 days and repeat for flare of lung symptoms as intructed  -     CT Chest Without Contrast; Future        Follow-up in about 6 months (around 3/25/2019).    Discussed with patient above for education the following:      Patient Instructions   Cultures had no bad germs in lungs.      If cough flares up- symbicort 2 twice daily.  May use if cough or wheeze or mucous.    May use Augmentin antibiotic if cough or yellow mucous.  May use prednisone if cough is excess or wheezes or short breath.    Nodules no big worries seen but got a new one.

## 2018-10-09 ENCOUNTER — IMMUNIZATION (OUTPATIENT)
Dept: INTERNAL MEDICINE | Facility: CLINIC | Age: 73
End: 2018-10-09
Payer: MEDICARE

## 2018-10-09 ENCOUNTER — OFFICE VISIT (OUTPATIENT)
Dept: INTERNAL MEDICINE | Facility: CLINIC | Age: 73
End: 2018-10-09
Payer: MEDICARE

## 2018-10-09 VITALS
HEART RATE: 68 BPM | WEIGHT: 244.5 LBS | BODY MASS INDEX: 32.26 KG/M2 | DIASTOLIC BLOOD PRESSURE: 78 MMHG | SYSTOLIC BLOOD PRESSURE: 132 MMHG

## 2018-10-09 DIAGNOSIS — Z86.711 HISTORY OF PULMONARY EMBOLISM: ICD-10-CM

## 2018-10-09 DIAGNOSIS — Z79.01 CURRENT USE OF LONG TERM ANTICOAGULATION: Primary | ICD-10-CM

## 2018-10-09 PROCEDURE — 99213 OFFICE O/P EST LOW 20 MIN: CPT | Mod: PBBFAC | Performed by: INTERNAL MEDICINE

## 2018-10-09 PROCEDURE — 90662 IIV NO PRSV INCREASED AG IM: CPT | Mod: PBBFAC

## 2018-10-09 PROCEDURE — 99214 OFFICE O/P EST MOD 30 MIN: CPT | Mod: S$PBB,,, | Performed by: INTERNAL MEDICINE

## 2018-10-09 PROCEDURE — 99999 PR PBB SHADOW E&M-EST. PATIENT-LVL III: CPT | Mod: PBBFAC,,, | Performed by: INTERNAL MEDICINE

## 2018-10-09 NOTE — MEDICAL/APP STUDENT
Subjective:       Patient ID: Des Junior is a 73 y.o. male.    Chief Complaint: No chief complaint on file.    Pt is here for an annual check-up.  He has no major complaints today.    DM2 managed with metformin, januvia, and lantus.  Checks morning BG at home and gets 129-190s.  Has an appointment with his endocrinologist later this month to adjust lantus dose.  Has some tingling and numbness in his toes.    Pt had a PE in 3/2018.  On long-term xarelto.  No major bleeding since starting.    HLD managed with pravastatin.    HTN managed with losartan-HCTZ and amlodipine.  Measures his pressures at home and gets normal values.    Hx of heart arrhythmia managed with atenolol.    Complaining of some urinary incontinence.  States if he waits too long before using the rest room he has leakage.  Denies trouble stopping/starting and burning during urination.  Denies fecal incontinence.  Has started taking saw palmetto for his prostate health.    Has an appointment with Dr. Rivera next month to plan bilateral knee replacement.    Would like his flu vaccine and Tdap today.      Review of Systems   Constitutional: Negative for chills, fever and unexpected weight change.   HENT: Negative.    Eyes: Negative.    Respiratory: Negative.  Negative for cough, shortness of breath and wheezing.    Cardiovascular: Positive for leg swelling. Negative for chest pain and palpitations.   Gastrointestinal: Negative.  Negative for abdominal pain, constipation, diarrhea, nausea and vomiting.   Endocrine: Negative.    Genitourinary: Positive for frequency and urgency. Negative for difficulty urinating, dysuria and hematuria.   Musculoskeletal: Positive for arthralgias.   Skin: Negative.    Neurological: Positive for numbness. Negative for dizziness, weakness, light-headedness and headaches.        Numbness and tingling in toes bilat   Psychiatric/Behavioral: Negative.        Current Outpatient Medications:     amLODIPine (NORVASC)  "10 MG tablet, TAKE ONE TABLET BY MOUTH ONCE DAILY, Disp: 30 tablet, Rfl: 3    blood sugar diagnostic Strp, 1 strip by Misc.(Non-Drug; Combo Route) route 2 (two) times daily with meals., Disp: 100 strip, Rfl: 11    budesonide-formoterol 160-4.5 mcg (SYMBICORT) 160-4.5 mcg/actuation HFAA, Inhale 2 puffs into the lungs every 12 (twelve) hours. Controller, Disp: 1 Inhaler, Rfl: 11    cetirizine (ZYRTEC) 10 MG tablet, Take 1 tablet (10 mg total) by mouth once daily., Disp: , Rfl: 0    cholecalciferol, vitamin D3, (VITAMIN D3) 1,000 unit capsule, Take 1,000 Units by mouth once daily., Disp: , Rfl:     insulin glargine (LANTUS SOLOSTAR U-100 INSULIN) 100 unit/mL (3 mL) InPn pen, 28 units every nights, Disp: 1 Box, Rfl: 11    JANUVIA 100 mg Tab, TAKE ONE TABLET BY MOUTH ONCE DAILY, Disp: 30 tablet, Rfl: 3    LANCETS & BLOOD GLUCOSE STRIPS MISC, * * * Twice a day .  check glucose twice a day, Disp: , Rfl:     losartan-hydrochlorothiazide 50-12.5 mg (HYZAAR) 50-12.5 mg per tablet, TAKE 1 TABLET BY MOUTH ONCE DAILY., Disp: 30 tablet, Rfl: 3    metFORMIN (GLUCOPHAGE-XR) 500 MG 24 hr tablet, TAKE 2 TABLETS BY MOUTH 2 TIMES DAILY WITH MEALS, Disp: 120 tablet, Rfl: 3    metoprolol succinate (TOPROL-XL) 25 MG 24 hr tablet, , Disp: , Rfl:     omega-3 fatty acids-vitamin E (OMEGA-3 FISH OIL) 1,000-5 mg-unit Cap, Take by mouth. 1 Capsule Oral Every day, Disp: , Rfl:     omeprazole (PRILOSEC OTC) 20 MG tablet, Take by mouth. 1 Tablet, Delayed Release (E.C.) Oral Every day, Disp: , Rfl:     pen needle, diabetic 29 gauge x 1/2" Ndle, Inject insulin daily, Disp: 50 each, Rfl: 11    pravastatin (PRAVACHOL) 40 MG tablet, TAKE ONE TABLET BY MOUTH ONCE DAILY, Disp: 30 tablet, Rfl: 3    rivaroxaban (XARELTO) 20 mg Tab, Take 1 tablet (20 mg total) by mouth daily with dinner or evening meal., Disp: 30 tablet, Rfl: 11    sotalol (BETAPACE) 80 MG tablet, Take 1 tablet (80 mg total) by mouth 2 (two) times daily. Start Sotalol 3 days " before cardioversion, Disp: 60 tablet, Rfl: 11    albuterol (ACCUNEB) 1.25 mg/3 mL Nebu, Take 3 mLs (1.25 mg total) by nebulization every 4 (four) hours as needed (cough or wheeze). Rescue, Disp: 120 vial, Rfl: 11    albuterol 90 mcg/actuation inhaler, 2 puffs every 4 hours as needed for cough, wheeze, or shortness of breath, Disp: 1 Inhaler, Rfl: 11    amoxicillin-clavulanate 875-125mg (AUGMENTIN) 875-125 mg per tablet, Take 1 tablet by mouth 2 (two) times daily., Disp: 20 tablet, Rfl: 2    fluorouracil (EFUDEX) 5 % cream, AAA right cheek and left lower cheek bid x 2 weeks, Disp: 40 g, Rfl: 1    predniSONE (DELTASONE) 20 MG tablet, One daily for 3 days and repeat for flare of lung symptoms as intructed, Disp: 12 tablet, Rfl: 0  Past Medical History:   Diagnosis Date    Abdominal obesity 3/15/2018    Acute deep vein thrombosis (DVT) of popliteal vein of right lower extremity 3/2/2018    AK (actinic keratosis) 11/15/2012    S/p 1st PDT face (80 min incubation) - did great! S/p PDT face - 90 min  - 12/15 - did great S/p PDT face - 90 min - 10/17 - great response    Asbestos exposure 3/2/2018    Asymptomatic LV dysfunction 3/2/2018    Bronchitis, chronic, mucopurulent 3/2/2018    Cataract     Colon adenoma 8/20/2014    Current use of long term anticoagulation 4/23/2018    ED (erectile dysfunction) 11/28/2012    Encounter for monitoring sotalol therapy 6/13/2018    Hay fever     Hearing loss, sensorineural 6/25/2013    History of adenomatous polyp of colon 10/5/2015    History of pulmonary embolism 5/2/2018    Hyperlipidemia associated with type 2 diabetes mellitus, baseline  11/28/2012    Hypertension associated with diabetes 11/28/2012    Lung nodule, solitary- 5 mm rll 3/2/2018    On home oxygen therapy 3/15/2018    Osteoarthritis of right knee 11/28/2012    Persistent atrial fibrillation 3/1/2018    Pulmonary embolus- large involved central PA, unprovoked. 3/1/2018    Type 2  diabetes mellitus, controlled 7/16/2013    Type 2 diabetes, controlled, with neuropathy 1/20/2014    Type 2 diabetes, uncontrolled, with neuropathy, onset 2003 11/28/2012     Past Surgical History:   Procedure Laterality Date    Cardioversion N/A 4/10/2018    Performed by Josue [facility]ajas [facility] at [facility] CATH LAB    Cardioversion/Defibrillation N/A 5/9/2018    Performed by [facility]ajas [facility] at [facility] CATH LAB    COLONOSCOPY N/A 10/5/2015    Procedure: COLONOSCOPY;  Surgeon: Pro Jang MD;  [facility]  Service: Endoscopy;  Laterality: N/A;    COLONOSCOPY N/A 10/5/2015    Performed by [facility]ang [facility] at [facility] ([facility])    COLONOSCOPY N/A 8/20/2014    Performed by Pro [facility]ang [facility] at [facility] ([facility])    thumb surgery           Objective:       Vitals:    10/09/18 1338   BP: 132/78   Pulse: 68       Physical Exam   Constitutional: He is oriented to person, place, and time. He appears well-developed and well-nourished.   HENT:   Head: Normocephalic and atraumatic.   Eyes: Conjunctivae and EOM are normal. Pupils are equal, round, and reactive to light.   Neck: Neck supple. No thyromegaly present.   Cardiovascular: Normal rate, regular rhythm, normal heart sounds and intact distal pulses.   No murmur heard.  Pulses:       Dorsalis pedis pulses are 1+ on the right side, and 1+ on the left side.        Posterior tibial pulses are 1+ on the right side, and 1+ on the left side.   Pulmonary/Chest: Effort normal and breath sounds normal. He has no wheezes.   Abdominal: Soft. Bowel sounds are normal. He exhibits no distension and no mass. There is no tenderness. There is no guarding.   Musculoskeletal: He exhibits edema.        Right foot: There is no deformity.        Left foot: There is no deformity.   1+ pitting edema of lower extremities bilat up to knee   Feet:   Right Foot:   Protective Sensation: 7 sites tested. 6 sites sensed.   Skin Integrity: Positive for dry skin. Negative for  ulcer, blister or skin breakdown.   Left Foot:   Protective Sensation: 7 sites tested. 7 sites sensed.   Skin Integrity: Positive for dry skin. Negative for ulcer, blister or skin breakdown.   Lymphadenopathy:     He has no cervical adenopathy.   Neurological: He is alert and oriented to person, place, and time.   Skin: Skin is warm and dry.   Psychiatric: He has a normal mood and affect. His behavior is normal.       Assessment:     1. DM2    2. HTN    3. HLD    4. Urinary Incontinence    5. Hx of PE    6. Arrhythmia  Plan:       DM2  - Continue metformin 1000mg BID with food, Januvia 100mg dialy, and Lantus 28U nightly  - Seeing endocrinologist to adjust insulin dose to better manage morning BG    HTN  - Continue losartan-HCTZ 50-12.5mg daily  - Continue amlodipine 10mg daily    HLD  - Continue pravastatin 40mg daily    Hx of PE  - Contnue xarelto 20mg with dinner    Arrythmia  - Continue sotalol 80mg BID    Health Maintenance  - Flu vaccine given  - Tdap given  - Recommended getting shingles vaccine at local pharmacy    F/u in 6 months

## 2018-10-09 NOTE — PROGRESS NOTES
Subjective:       Patient ID: Des Junior is a 73 y.o. male.    Chief Complaint: No chief complaint on file.    Patient is here for followup for chronic conditions.    No new significant complaints.    DM2:  good med adherence  no changed Diet  Some over 150s, <200, 140 AM sugars  n/a Post-prandial sugars  stable Numbness in feet  no sores in feet  no Visual changes  no Polyuria/polydipsia.      Some urinary incont with urge symptoms at times. Takes saw palmetto for prostate which helps a bit. Urinary urge symptoms for long time. No blood in urine or bowels.        Review of Systems   Constitutional: Negative for activity change and unexpected weight change.   HENT: Negative for hearing loss, rhinorrhea and trouble swallowing.    Eyes: Negative for discharge and visual disturbance.   Respiratory: Negative for chest tightness and wheezing.    Cardiovascular: Negative for chest pain and palpitations.   Gastrointestinal: Negative for blood in stool, constipation, diarrhea and vomiting.   Endocrine: Positive for polyuria. Negative for polydipsia.   Genitourinary: Positive for urgency. Negative for difficulty urinating and hematuria.   Musculoskeletal: Positive for arthralgias. Negative for joint swelling and neck pain.   Neurological: Negative for weakness and headaches.   Psychiatric/Behavioral: Negative for confusion and dysphoric mood.       Objective:      Physical Exam   Constitutional: He is oriented to person, place, and time. He appears well-developed and well-nourished. No distress.   HENT:   Head: Normocephalic and atraumatic.   Mouth/Throat: No oropharyngeal exudate.   Eyes: No scleral icterus.   Neck: Normal range of motion. No thyromegaly present.   Cardiovascular: Normal rate, regular rhythm and normal heart sounds. Exam reveals no gallop and no friction rub.   No murmur heard.  Pulmonary/Chest: Effort normal and breath sounds normal. No respiratory distress. He has no wheezes. He has no rales.    Abdominal: Soft. Bowel sounds are normal. He exhibits no distension and no mass. There is no tenderness. There is no rebound and no guarding.   Musculoskeletal: Normal range of motion. He exhibits no edema.   Lymphadenopathy:     He has no cervical adenopathy.   Neurological: He is alert and oriented to person, place, and time.   Skin: No lesion noted. He is not diaphoretic.   Psychiatric: He has a normal mood and affect. Thought content normal.       Assessment:       No diagnosis found.    Plan:       Here for followup.    HTN controlled.    dm2 -- has f/u with endocrin, may need even higher basal and/or add prandial.    afib good rate control, on NOAC w/o any ADRs.    OA bilat knees -- will see Dr. Martines 1 month. There would be some surgical risk, especially for VTE/PE, but he would be on full anticoag postop.    PE -- has completed 6 mos anticoag, remain on for afib he had had.    There are no diagnoses linked to this encounter.    Health Maintenance       Date Due Completion Date    Hepatitis C Screening 1945 ---    TETANUS VACCINE 03/16/1964 ---    Zoster Vaccine 03/16/2006 ---    Hemoglobin A1c 01/13/2019 7/13/2018    Lipid Panel 03/02/2019 3/2/2018    Eye Exam 08/27/2019 8/27/2018    Override on 8/27/2018: Done    Low Dose Statin 10/09/2019 10/9/2018    Foot Exam 10/09/2019 10/9/2018    Colonoscopy 10/05/2025 10/5/2015      Next blood draw hep c  Patient Instructions   Ask your pharmacist for the Shingrix vaccine    Flu vax please, Tdap as well.          Follow-up in about 6 months (around 4/9/2019).    Future Appointments   Date Time Provider Department Center   10/18/2018 10:15 AM LAB, SL[facility] SAT [facility] LAB [place]   10/24/2018  1:30 PM Josue Barajas MD [facility] CARDIO [place] [facility]   10/25/2018 10:30 AM CHRIS De, FNP [facility] ENDODIA [address]   11/13/2018  2:30 PM Sixto Martines MD [facility] ORTHO [address]   11/16/2018  3:20 PM Louise Suh MD [facility] DERM [address]   2/25/2019  2:00 PM Eladio ZIMMER  MD [facility] PUL [place] [facility]

## 2018-10-18 ENCOUNTER — LAB VISIT (OUTPATIENT)
Dept: LAB | Facility: HOSPITAL | Age: 73
End: 2018-10-18
Attending: INTERNAL MEDICINE
Payer: MEDICARE

## 2018-10-18 DIAGNOSIS — E66.09 CLASS 1 OBESITY DUE TO EXCESS CALORIES WITH SERIOUS COMORBIDITY AND BODY MASS INDEX (BMI) OF 32.0 TO 32.9 IN ADULT: ICD-10-CM

## 2018-10-18 DIAGNOSIS — N18.30 CHRONIC KIDNEY DISEASE (CKD), STAGE III (MODERATE): ICD-10-CM

## 2018-10-18 DIAGNOSIS — E11.40 TYPE 2 DIABETES, CONTROLLED, WITH NEUROPATHY: ICD-10-CM

## 2018-10-18 LAB
ESTIMATED AVG GLUCOSE: 192 MG/DL
HBA1C MFR BLD HPLC: 8.3 %

## 2018-10-18 PROCEDURE — 36415 COLL VENOUS BLD VENIPUNCTURE: CPT | Mod: PO

## 2018-10-18 PROCEDURE — 83036 HEMOGLOBIN GLYCOSYLATED A1C: CPT

## 2018-10-24 ENCOUNTER — OFFICE VISIT (OUTPATIENT)
Dept: CARDIOLOGY | Facility: CLINIC | Age: 73
End: 2018-10-24
Payer: MEDICARE

## 2018-10-24 VITALS
HEIGHT: 73 IN | HEART RATE: 103 BPM | DIASTOLIC BLOOD PRESSURE: 76 MMHG | SYSTOLIC BLOOD PRESSURE: 116 MMHG | WEIGHT: 246.5 LBS | OXYGEN SATURATION: 96 % | BODY MASS INDEX: 32.67 KG/M2

## 2018-10-24 DIAGNOSIS — I15.2 HYPERTENSION ASSOCIATED WITH DIABETES: ICD-10-CM

## 2018-10-24 DIAGNOSIS — Z79.01 CURRENT USE OF LONG TERM ANTICOAGULATION: ICD-10-CM

## 2018-10-24 DIAGNOSIS — Z51.81 ENCOUNTER FOR MONITORING SOTALOL THERAPY: ICD-10-CM

## 2018-10-24 DIAGNOSIS — E66.09 CLASS 1 OBESITY DUE TO EXCESS CALORIES WITH SERIOUS COMORBIDITY AND BODY MASS INDEX (BMI) OF 32.0 TO 32.9 IN ADULT: ICD-10-CM

## 2018-10-24 DIAGNOSIS — Z86.711 HISTORY OF PULMONARY EMBOLISM: ICD-10-CM

## 2018-10-24 DIAGNOSIS — E11.59 HYPERTENSION ASSOCIATED WITH DIABETES: ICD-10-CM

## 2018-10-24 DIAGNOSIS — E11.69 HYPERLIPIDEMIA ASSOCIATED WITH TYPE 2 DIABETES MELLITUS: ICD-10-CM

## 2018-10-24 DIAGNOSIS — I48.0 PAF (PAROXYSMAL ATRIAL FIBRILLATION): ICD-10-CM

## 2018-10-24 DIAGNOSIS — Z01.810 PREOP CARDIOVASCULAR EXAM: Primary | ICD-10-CM

## 2018-10-24 DIAGNOSIS — E78.5 HYPERLIPIDEMIA ASSOCIATED WITH TYPE 2 DIABETES MELLITUS: ICD-10-CM

## 2018-10-24 DIAGNOSIS — Z79.899 ENCOUNTER FOR MONITORING SOTALOL THERAPY: ICD-10-CM

## 2018-10-24 DIAGNOSIS — I51.9 ASYMPTOMATIC LV DYSFUNCTION: ICD-10-CM

## 2018-10-24 DIAGNOSIS — E65 ABDOMINAL OBESITY: ICD-10-CM

## 2018-10-24 PROBLEM — I48.19 PERSISTENT ATRIAL FIBRILLATION: Status: RESOLVED | Noted: 2018-03-01 | Resolved: 2018-10-24

## 2018-10-24 PROBLEM — Z99.81 ON HOME OXYGEN THERAPY: Status: RESOLVED | Noted: 2018-03-15 | Resolved: 2018-10-24

## 2018-10-24 PROCEDURE — 99999 PR PBB SHADOW E&M-EST. PATIENT-LVL V: CPT | Mod: PBBFAC,,, | Performed by: INTERNAL MEDICINE

## 2018-10-24 PROCEDURE — 99215 OFFICE O/P EST HI 40 MIN: CPT | Mod: PBBFAC,PO | Performed by: INTERNAL MEDICINE

## 2018-10-24 PROCEDURE — 99215 OFFICE O/P EST HI 40 MIN: CPT | Mod: S$PBB,,, | Performed by: INTERNAL MEDICINE

## 2018-10-24 NOTE — PROGRESS NOTES
"Subjective:    Patient ID:  Des Junior is a 73 y.o. male who presents for evaluation of Follow-up (6mos)  For acute PE, DVT, PAF, post DCC with short lived NSR  PCP: Dr. Cortez, [facility] [facility]  Endocrine: Dr. Mark Mchugh, has also been seeing for primary care. Now with Dr. Stallings, [facility]  Pulmonary: Dr. Sandoval, cleared for surgery  EP: Dr. Alicea, appointment 3/20 for AF, now Dr. Palma  Orthopedic: Dr. Rivera, anticipating bilateral knee replacement in late November  Patient lives with his wife, Christian (nonsmoker), here with patient.  Patient is a never smoker, denies ETOH consumption.   Patient works part-time as master .     DC from [facility] on 3/2/2018      DCS - "72 year old male with PMH of DM type 2, hyperlipidemia, hypertension, and recent diagonosis of Pulmoanry embolic and atrial fibrillation. He reports shortness of breath for 3 weeks with ambulation ~ 25 feet and walking up stairs. He was evaluated by his PCP on 2/27 for same complaint, D-dimer and BNP ordered. He was noted to have elevated D-dimer therefore he was initiated on xarelto and underwent CTA chest this am. He took his first dose of Xarelto yesterday and am.  CTA chest demonstrated bilateral pulmoonay emboli with pulmonary infarcts prompting ED evaluation. He reports bilateral lower ext swelling which is unchanged. Denies recent road trips and flights. He was recently diagnosed with AFib and is scheduled for outpatient echo and Cardiology apt with Dr. Alicea at [facility] [place].  Denies dizziness, lightheadedness, fever, and chills.      * No surgery found *       Hospital Course:   Patient monitored on telemetry during hospitalizations and remained with atrial fib CVR. Pulmonary and cardiology consulted. Pulmonary recommended sputum culture, initiation of oral abx, and home bronchodilators. He was evaluated for home oxygen and qualified with oxygen sat 83% exertional. Echo obtained. See results below. US of lower ext demonstrated " "thrombus of right popliteal likely attributing to pulmonary emboli. Patient stable for DC from pulmonary and cardiology standpoint. Discussed importance of outpatient follow up. Will continue OAC as previously prescribed by PCP."    Seen by me - noted: WM with no clear risk factor for thrombophlebitis; although sits a lot while working, admitted with recent onset of SOB, found thrombosis of the leg, new onset AF and significant PE with new RBBB on ECG. No recent prolong traveling. Feeling better on NOAC with stable vital signs. Echo shows no evidence for RV strain nor pulmonary HTN. Telemetry demonstrated PAF on low BB with rate controlled. Will be reviewed by EP soon. OK for DC from CV standpoint. Plans as listed by Ms. Ari NP. Have mild LV dysfunction and high ASCVD event risk, will need future ischemic evaluation.    Workup - Echo 03/01/2018:  CONCLUSIONS     1 - Mildly to moderately depressed left ventricular systolic function (EF 40-45%).     2 - Indeterminate LV diastolic function.     3 - Intermediate central venous pressure.     4 - Difficult windows, Lumason contrast used for wall motion analysis.     5 - Deterioration of LV function from Echo in 7/2009.      Bilateral Lower Extremity Venous US 03/01/2018:  Nonocclusive thrombus in the right popliteal vein in this patient with known pulmonary thromboemboli.     Chest CTA non-coronary 02/28/2018:  Positive study demonstrating bilateral central and segmental pulmonary emboli, as detailed above.  Small pulmonary infarct involving the superior segment of the left lower lobe and possible small pulmonary infarcts involving the lateral segment of the right middle lobe also noted.  There is also flattening of the interventricular septum which may indicate developing right heart strain.    Small pulmonary nodules in the bilateral lungs measuring up to 6 mm.  Single 1 year follow up CT of the thorax is recommended for surveillance.    Incidental note of " "cholelithiasis.    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 ECG remains in AF, rate 94, frequent PVC, RVCD, possible previous MI. LDL in 3/2018 57.2, baseline 115.    Lexiscan 3/2018 - Nuclear Quantitative Functional Analysis:   LVEF: 39 % (normal is 47 - 59)  LVED Volume: 127 ml (normal is 91 - 155)  LVES Volume: 74 ml (normal is 40 - 78)    Impression: NORMAL MYOCARDIAL PERFUSION  1. The perfusion scan is free of evidence for myocardial ischemia or injury.   2. There is a mild intensity fixed defect in the apical and inferior walls of the left ventricle, secondary to soft tissue attenuation.   3. There is abnormal wall motion at rest showing moderate hypokinesis of the anterior and lateral walls of the left ventricle, and severe hypokinesis of the inferior and septal walls of the left ventricle.   4. There is resting LV dysfunction with a reduced ejection fraction of 39 %.  (normal is 47 - 59)  5. The ventricular volumes are normal at rest and stress.   6. The extracardiac distribution of radioactivity is normal.     In 3/2018, feeling a lot better, no SOB / VELASCO, no limitation except for OA of both knees, here for possible DCC. ECG in AF, rate 110, PVC, QTc 487 msec. No problem with medications and no bleeding.    In 4/2018, post DCC, "feels ready to go to work more". Limited again by the knees. ECG a week post DCC was in AF, same as today, with some v-couplets. Have intermediate LVEF 40%. Options discussed will refer to EP.    HPI comments: in 10/2018, here for pre-op review for bilateral knee replacement. 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 Labs reviewed LDL 57.2 in 3/2018  Review with Dr. Palma in 9/2018 - "73 year old male with recently diagnosed persistent AF. His [id number] Score is 3 (age, HTN, DM2), which corresponds to a yearly risk " "of stroke without anticoagulation estimated at 3.2%. He should continue Xarelto indefinitely. At this point he is maintaining sinus rhythm on Sotalol, and his EF has normalized in sinus rhythm. We briefly discussed the pros and cons of PVI, but for now Mr. Junior would like to hold the course and continue Sotalol until he has his knee replacement surgery, which is scheduled for later in the fall. The plan will be to follow-up with me in early 2019."    · Echo 9/2018 - The left ventricle cavity is normal.  · Left ventricle ejection fraction is low normal at 53%  · Normal LV diastolic function.  · RV systolic function is normal.  · Tricuspid valve shows mild regurgitation.  · Left atrium is mildly dilated.  · Right atrium is mildly dilated.  · Normal central venous pressure (3 mm Hg).  · The estimated PA systolic pressure is 20.31 mm Hg  · There is improved LV function from Echo in 3/2018     Difficult apical windows.    ROS    Constitution: no further malaise/fatigue, have stable weigh since 4/2018. Negative for chills, diaphoresis, weakness and weight gain.   HENT: Negative for congestion and sore throat.    Eyes:        No acute vision changes    Cardiovascular: Less dyspnea on exertion, no further leg swelling (chronic, at baseline) and no further paroxysmal nocturnal dyspnea. Negative for chest pain, irregular heartbeat, near-syncope, orthopnea, palpitations and syncope.   Respiratory: Positive for cough, no shortness of breath, snoring with daytime sleepiness, sputum production and wheezing (5 weeks ago, now resolved ). Negative for hemoptysis. Pasco score 13   Hematologic/Lymphatic: Does not bruise/bleed easily.   Skin: Negative for color change and rash.   Musculoskeletal: Negative for back pain, joint swelling and neck pain. Positive for joint pain, bilateral knee pains, moderate limitation of motion  Gastrointestinal: Negative for abdominal pain, constipation, heartburn, hematochezia, nausea and vomiting. " "Some bowel habit change, diarrhea in the morning likely due to medications.  Genitourinary: Negative for dysuria and hematuria.   Neurological: Negative for aphonia, dizziness, focal weakness, headaches, light-headedness, numbness, paresthesias and sensory change.   Psychiatric/Behavioral: Negative for altered mental status. The patient is not nervous/anxious.    Allergic/Immunologic: Negative for persistent infections.     Objective:    Physical Exam    Constitutional: He is oriented to person, place, and time. He appears well-developed and well-nourished. No distress.   HENT:   Head: Normocephalic and atraumatic.   Eyes: Conjunctivae and EOM are normal. Right eye exhibits no discharge. Left eye exhibits no discharge. No scleral icterus.   Neck: Normal range of motion. Normal carotid pulses and no JVD present. Carotid bruit is not present. Circumference 15.25"  Cardiovascular: Normal rate with regular rhythm present. Exam reveals no friction rub.  No murmur heard.  Pulses:       Radial pulses are 2+ on the right side, and 2+ on the left side.        Dorsalis pedis pulses are 1+ on the right side, and 1+ on the left side.   Pulmonary/Chest: Effort normal and breath sounds normal. He has no wheezes. He has no rales. He exhibits no tenderness.   Abdominal: Soft. Bowel sounds are normal. There is no tenderness. There is no guarding. Waist 43" up to 49.5", hip 47"  Musculoskeletal: Normal range of motion. No Pitting edema.   Neurological: He is alert and oriented to person, place, and time. Follows commands, moves all extremities.    Skin: Skin is warm and dry. He is not diaphoretic. No cyanosis. Nails show no clubbing.   Psychiatric: He has a normal mood and affect. His behavior is normal.   Vitals reviewed.    Assessment:       1. Preop cardiovascular exam    2. Hyperlipidemia associated with type 2 diabetes mellitus, baseline     3. Hypertension associated with diabetes    4. Class 1 obesity due to excess " calories with serious comorbidity and body mass index (BMI) of 32.0 to 32.9 in adult, today 32.5    5. Asymptomatic LV dysfunction    6. Abdominal obesity    7. Current use of long term anticoagulation    8. History of pulmonary embolism    9. Encounter for monitoring sotalol therapy    10. PAF (paroxysmal atrial fibrillation)         Plan:       Des was seen today for follow-up.    Diagnoses and all orders for this visit:    Preop cardiovascular exam    Hyperlipidemia associated with type 2 diabetes mellitus, baseline     Hypertension associated with diabetes    Class 1 obesity due to excess calories with serious comorbidity and body mass index (BMI) of 32.0 to 32.9 in adult, today 32.5    Asymptomatic LV dysfunction    Abdominal obesity    Current use of long term anticoagulation    History of pulmonary embolism    Encounter for monitoring sotalol therapy    PAF (paroxysmal atrial fibrillation)    - All medical issues reviewed, continue current Rx.  - Clear for Surgery and anesthesia with acceptable risk from the cardiac standpoint.   - Instruction for Mediterranean diet and heart healthy exercise given.  - Weigh twice weekly, try to lose 1-2 lbs per week  - Recommend at least annual cardiovascular evaluation in view of his significant risk factors. Patient's preference      Patient Active Problem List   Diagnosis    AK (actinic keratosis)    Hyperlipidemia associated with type 2 diabetes mellitus, baseline     Hypertension associated with diabetes    ED (erectile dysfunction)    Class 1 obesity due to excess calories with serious comorbidity and body mass index (BMI) of 32.0 to 32.9 in adult, today 32.5    Hearing loss, sensorineural    Type 2 diabetes, controlled, with neuropathy    History of adenomatous polyp of colon    Asbestos exposure    Bronchitis, chronic, mucopurulent    Asymptomatic LV dysfunction    Abdominal obesity    Current use of long term anticoagulation     History of pulmonary embolism    Encounter for monitoring sotalol therapy    Lung nodule    PAF (paroxysmal atrial fibrillation), CHADS-VAS score 3, onset 3/2018     Total face-to-face time with the patient was 25 minutes and greater than 50% was spent in counseling and coordination of care. The above assessment and plan have been discussed at length. Labs and procedure over the last 6 months reviewed. Problem List5 updated. Asked to bring in all active medications / pills bottles with next visit.

## 2018-10-24 NOTE — PROGRESS NOTES
"CHIEF COMPLAINT: Type 2 Diabetes     HPI: Mr. Des Junior is a 73 y.o. male who was diagnosed with Type 2 DM > 10-11 years.   Pt was last seen by Dr. Mchugh and then Dr. Stallings.  He is now being seen by me for the second time.  Pt was seen by Dr. Stallings in July.   Bilateral knee replacement pending.   Improved from 8.7% to 8.3%.  Lab Results   Component Value Date    HGBA1C 8.3 (H) 10/18/2018     He recently was admitted for pulmonary embolism, and was seen by cardiology for persistent afib.  He has an appt with cardiology today.   Has h/o blood clots, on anticoagulants.   Has f/u with pulmonary as well.    PREVIOUS DIABETES MEDICATIONS TRIED  lantus  januvia  metformin  invokana- yeast infection  CAM  CURRENT DIABETIC MEDS: lantus 28 units at night, januvia 100 mg daily, metformin 1000 mg bid     Pt is monitoring blood glucose readings 2 times a day.  Needs >100 strips per month related to fluctuations with blood glucose reading, a1c trends, and activity level.      -200     Last Podiatry Exam:  none    REVIEW OF SYSTEMS  General: no weakness, fatigue, + weight gain 3 lbs.   Eyes: no double or blurred vision, eye pain, or redness; Last Eye Exam=8/25/17  Cardiovascular: no chest pain, palpitations, + pedal edema, or murmurs.   Respiratory: no cough or dyspnea. +SOB- oxygen at home prn , sp02 96%-much improved  GI: no heartburn, nausea, or +diarrhea (early morning); good appetite.   Skin: no rashes, dryness, itching, or reactions at insulin injection sites.  Neuro: + mild numbness, tingling, tremors, or vertigo.   Endocrine: no polyuria, polydipsia, polyphagia, heat or cold intolerance.     Vital Signs  /79 (BP Location: Right arm, Patient Position: Sitting, BP Method: Medium (Manual))   Pulse 71   Ht 6' 1" (1.854 m)   Wt 111.9 kg (246 lb 11.2 oz)   BMI 32.55 kg/m²     Hemoglobin A1C   Date Value Ref Range Status   10/18/2018 8.3 (H) 4.0 - 5.6 % Final     Comment:     ADA Screening " Guidelines:  5.7-6.4%  Consistent with prediabetes  >or=6.5%  Consistent with diabetes  High levels of fetal hemoglobin interfere with the HbA1C  assay. Heterozygous hemoglobin variants (HbS, HgC, etc)do  not significantly interfere with this assay.   However, presence of multiple variants may affect accuracy.     07/13/2018 8.7 (H) 4.0 - 5.6 % Final     Comment:     ADA Screening Guidelines:  5.7-6.4%  Consistent with prediabetes  >or=6.5%  Consistent with diabetes  High levels of fetal hemoglobin interfere with the HbA1C  assay. Heterozygous hemoglobin variants (HbS, HgC, etc)do  not significantly interfere with this assay.   However, presence of multiple variants may affect accuracy.     03/13/2018 7.0 (H) 4.0 - 5.6 % Final     Comment:     According to ADA guidelines, hemoglobin A1c <7.0% represents  optimal control in non-pregnant diabetic patients. Different  metrics may apply to specific patient populations.   Standards of Medical Care in Diabetes-2016.  For the purpose of screening for the presence of diabetes:  <5.7%     Consistent with the absence of diabetes  5.7-6.4%  Consistent with increasing risk for diabetes   (prediabetes)  >or=6.5%  Consistent with diabetes  Currently, no consensus exists for use of hemoglobin A1c  for diagnosis of diabetes for children.  This Hemoglobin A1c assay has significant interference with fetal   hemoglobin   (HbF). The results are invalid for patients with abnormal amounts of   HbF,   including those with known Hereditary Persistence   of Fetal Hemoglobin. Heterozygous hemoglobin variants (HbAS, HbAC,   HbAD, HbAE, HbA2) do not significantly interfere with this assay;   however, presence of multiple variants in a sample may impact the %   interference.          Chemistry        Component Value Date/Time     03/13/2018 1048    K 4.2 03/13/2018 1048     03/13/2018 1048    CO2 24 03/13/2018 1048    BUN 30 (H) 03/13/2018 1048    CREATININE 1.3 03/13/2018 1048      (H) 03/13/2018 1048        Component Value Date/Time    CALCIUM 10.1 03/13/2018 1048    ALKPHOS 74 03/13/2018 1048    AST 14 03/13/2018 1048    ALT 12 03/13/2018 1048    BILITOT 0.7 03/13/2018 1048    ESTGFRAFRICA >60.0 03/13/2018 1048    EGFRNONAA 54.9 (A) 03/13/2018 1048           Lab Results   Component Value Date    TSH 1.701 03/02/2018      Lab Results   Component Value Date    CHOL 94 (L) 03/02/2018    CHOL 124 08/30/2017    CHOL 136 07/28/2015     Lab Results   Component Value Date    HDL 25 (L) 03/02/2018    HDL 39 (L) 08/30/2017    HDL 42 07/28/2015     Lab Results   Component Value Date    LDLCALC 57.2 (L) 03/02/2018    LDLCALC 72.2 08/30/2017    LDLCALC 81.8 07/28/2015     Lab Results   Component Value Date    TRIG 59 03/02/2018    TRIG 64 08/30/2017    TRIG 61 07/28/2015     Lab Results   Component Value Date    CHOLHDL 26.6 03/02/2018    CHOLHDL 31.5 08/30/2017    CHOLHDL 30.9 07/28/2015         PHYSICAL EXAMINATION  Constitutional: Appears well, no distress. Wears glasses  Neck: Supple, trachea midline.   Respiratory: No wheezes, even and unlabored.  Cardiovascular: RRR,+1 BLE/pedal edema.   Lymph: no lymphadenopathy   Skin: warm and dry; no injection site reactions, no acanthosis nigracans observed.  Neuro:patient alert and cooperative, normal affect; steady gait-uses walker.    Diabetes Foot Exam:   deferred      Assessment/Plan  1. Type 2 diabetes, controlled, with neuropathy  Hemoglobin A1c next time  F/u in 3 mos  a1c goal less than 7.5%/7%  Add glp1a trulicity 0.75 mg weekly  Stop januvia  Increase lantus 32 units at night  Continue metformin 1000 mg bid.      2. Class 1 obesity due to excess calories with serious comorbidity and body mass index (BMI) of 32.0 to 32.9 in adult, today 32.5  Body mass index is 32.55 kg/m². may increase insulin resistance.   Encourage exercise 3 times a week  30 min per session    3. Hyperlipidemia associated with type 2 diabetes mellitus, baseline    Lab Results   Component Value Date    LDLCALC 57.2 (L) 03/02/2018     At goal   On statin     4. Hypertension associated with diabetes  On arb  Continue med(s)      FOLLOW UP  Follow-up in about 3 months (around 1/25/2019).

## 2018-10-25 ENCOUNTER — OFFICE VISIT (OUTPATIENT)
Dept: ENDOCRINOLOGY | Facility: CLINIC | Age: 73
End: 2018-10-25
Payer: MEDICARE

## 2018-10-25 VITALS
HEART RATE: 71 BPM | BODY MASS INDEX: 32.7 KG/M2 | DIASTOLIC BLOOD PRESSURE: 79 MMHG | HEIGHT: 73 IN | SYSTOLIC BLOOD PRESSURE: 123 MMHG | WEIGHT: 246.69 LBS

## 2018-10-25 DIAGNOSIS — E11.40 TYPE 2 DIABETES, CONTROLLED, WITH NEUROPATHY: Primary | ICD-10-CM

## 2018-10-25 DIAGNOSIS — E66.09 CLASS 1 OBESITY DUE TO EXCESS CALORIES WITH SERIOUS COMORBIDITY AND BODY MASS INDEX (BMI) OF 32.0 TO 32.9 IN ADULT: ICD-10-CM

## 2018-10-25 DIAGNOSIS — E11.69 HYPERLIPIDEMIA ASSOCIATED WITH TYPE 2 DIABETES MELLITUS: ICD-10-CM

## 2018-10-25 DIAGNOSIS — I15.2 HYPERTENSION ASSOCIATED WITH DIABETES: ICD-10-CM

## 2018-10-25 DIAGNOSIS — E78.5 HYPERLIPIDEMIA ASSOCIATED WITH TYPE 2 DIABETES MELLITUS: ICD-10-CM

## 2018-10-25 DIAGNOSIS — E11.59 HYPERTENSION ASSOCIATED WITH DIABETES: ICD-10-CM

## 2018-10-25 PROCEDURE — 99214 OFFICE O/P EST MOD 30 MIN: CPT | Mod: PBBFAC | Performed by: NURSE PRACTITIONER

## 2018-10-25 PROCEDURE — 99999 PR PBB SHADOW E&M-EST. PATIENT-LVL IV: CPT | Mod: PBBFAC,,, | Performed by: NURSE PRACTITIONER

## 2018-10-25 PROCEDURE — 99214 OFFICE O/P EST MOD 30 MIN: CPT | Mod: S$PBB,,, | Performed by: NURSE PRACTITIONER

## 2018-10-25 RX ORDER — INSULIN GLARGINE 100 [IU]/ML
INJECTION, SOLUTION SUBCUTANEOUS
Qty: 1 BOX | Refills: 11
Start: 2018-10-25 | End: 2019-04-04

## 2018-10-25 NOTE — PATIENT INSTRUCTIONS
Snacks can be an important part of a balanced, healthy meal plan. They allow you to eat more frequently, feeling full and satisfied throughout the day. Also, they allow you to spread carbohydrates evenly, which may stabilize blood sugars.  Plus, snacks are enjoyable!     The amount of carbohydrate needed at snacks varies. Generally, about 15-30 grams of carbohydrate per snack is recommended.  Below you will find some tasty treats.       0-5 gm carb   Crystal Light   Vitamin Water Zero   Herbal tea, unsweetened   2 tsp peanut butter on celery   1./2 cup sugar-free jell-o   1 sugar-free popsicle   ¼ cup blueberries   8oz Blue Bridget unsweetened almond milk   5 baby carrots & celery sticks, cucumbers, bell peppers dipped in ¼ cup salsa, 2Tbsp light ranch dressing or 2Tbsp plain Greek yogurt   10 Goldfish crackers   ½ oz low-fat cheese or string cheese   1 closed handful of nuts, unsalted   1 Tbsp of sunflower seeds, unsalted   1 cup Smart Pop popcorn   1 whole grain brown rice cake        15 gm carb   1 small piece of fruit or ½ banana or 1/2 cup lite canned fruit   3 lynette cracker squares   3 cups Smart Pop popcorn, top spray butter, Conroy lite salt or cinnamon and Truvia   5 Vanilla Wafers   ½ cup low fat, no added sugar ice cream or frozen yogurt (Blue bell, Blue Bunny, Weight Watchers, Skinny Cow)   ½ turkey, ham, or chicken sandwich   ½ c fruit with ½ c Cottage cheese   4-6 unsalted wheat crackers with 1 oz low fat cheese or 1 tbsp peanut butter    30-45 goldfish crackers (depending on flavor)    7-8 Tenriism mini brown rice cakes (caramel, apple cinnamon, chocolate)    12 Tenriism mini brown rice cakes (cheddar, bbq, ranch)    1/3 cup hummus dip with raw veg   1/2 whole wheat mauricio, 1Tbsp hummus   Mini Pizza (1/2 whole wheat English muffin, low-fat  cheese, tomato sauce)   100 calorie snack pack (Oreo, Chips Ahoy, Ritz Mix, Baked Cheetos)   4-6 oz. light or Greek Style yogurt  (Ana, Luisana, Gloria, [facility])   ½ cup sugar-free pudding     6 in. wheat tortilla or mauricio oven toasted chips (topped with spray butter flavoring, cinnamon, Truvia OR spray butter, garlic powder, chili powder)    18 BBQ Popchips (available at [employer], Whole Foods, Fresh Market)                   Diabetes Support Group Meetings         Date: Topic:   February 8 Health Promotion/Cooking Demo   March 8 Taking Care of Your Kidneys   April 12 Taking Care of Your Feet   May 10 Ease Your Mind with Diabetes   Imelda 14 Summer Treats/Cooking Demo   July 12 Super Market Sweep   August 9 Taking Care of Your Eyes   Sept 13 Technology/ADA updates   October 11 Recipes & Treats/Cooking Demo   November 8 Heart Health/Pump it up!   December 13 Year-End Close Out        Meetings are held in the [facility] (A) of the [facility] located at [address] [place] [address]. Please call [phone] for additional information.    Free service, offered every 2nd Thursday of every month! Family members and/or friends are welcome as well!  Support group is for patients with type 1 or type 2 diabetes.    From 3:30p to 4:30p        Dulaglutide injection  What is this medicine?  DULAGLUTIDE (DOO la GLOO tide) is used to improve blood sugar control in adults with type 2 diabetes. This medicine may be used with other oral diabetes medicines.  How should I use this medicine?  This medicine is for injection under the skin of your upper leg (thigh), stomach area, or upper arm. It is usually given once every week (every 7 days). You will be taught how to prepare and give this medicine. Use exactly as directed. Take your medicine at regular intervals. Do not take it more often than directed.  If you use this medicine with insulin, you should inject this medicine and the insulin separately. Do not mix them together. Do not give the injections right next to each other. Change (rotate) injection  sites with each injection.  It is important that you put your used needles and syringes in a special sharps container. Do not put them in a trash can. If you do not have a sharps container, call your pharmacist or healthcare provider to get one.  A special MedGuide will be given to you by the pharmacist with each prescription and refill. Be sure to read this information carefully each time.  Talk to your pediatrician regarding the use of this medicine in children. Special care may be needed.  What side effects may I notice from receiving this medicine?  Side effects that you should report to your doctor or health care professional as soon as possible:  · allergic reactions like skin rash, itching or hives, swelling of the face, lips, or tongue  · breathing problems  · signs and symptoms of low blood sugar such as feeling anxious, confusion, dizziness, increased hunger, unusually weak or tired, sweating, shakiness, cold, irritable, headache, blurred vision, fast heartbeat, loss of consciousness  · unusual stomach upset or pain  · vomiting  Side effects that usually do not require medical attention (Report these to your doctor or health care professional if they continue or are bothersome.):diarrhea  · heartburn  · loss of appetite  · nausea  · pain, redness, or irritation at site where injected  What may interact with this medicine?  Do not take this medicine with any of the following medications:  · gatifloxacin  Many medications may cause changes in blood sugar, these include:  · alcohol containing beverages  · aspirin and aspirin-like drugs  · chloramphenicol  · chromium  · diuretics  · female hormones, such as estrogens or progestins, birth control pills  · heart medicines  · isoniazid  · male hormones or anabolic steroids  · medications for weight loss  · medicines for allergies, asthma, cold, or cough  · medicines for mental problems  · medicines called MAO inhibitors - Nardil, Parnate, Marplan,  Eldepryl  · niacin  · NSAIDS, such as ibuprofen  · pentamidine  · phenytoin  · probenecid  · quinolone antibiotics such as ciprofloxacin, levofloxacin, ofloxacin  · some herbal dietary supplements  · steroid medicines such as prednisone or cortisone  · thyroid hormonesSome medications can hide the warning symptoms of low blood sugar (hypoglycemia). You may need to monitor your blood sugar more closely if you are taking one of these medications. These include:  · beta-blockers, often used for high blood pressure or heart problems (examples include atenolol, metoprolol, propranolol)  · clonidine  · guanethidine  · reserpine  What if I miss a dose?  If you miss a dose, take it as soon as you can within 3 days after the missed dose. Then take your next dose at your regular weekly time. If it has been longer than 3 days after the missed dose, do not take the missed dose. Take the next dose at your regular time. Do not take double or extra doses. If you have questions about a missed dose, contact your health care provider for advice.  Where should I keep my medicine?  Keep out of the reach of children.  Store this medicine in a refrigerator between 2 and 8 degrees C (36 and 46 degrees F). Do not freeze or use if the medicine has been frozen. Protect from light and excessive heat. Each single-dose pen or prefilled syringe can be kept at room temperature, not to exceed 30 degrees C (86 degrees F) for a total of 14 days, if needed. Store in the carton until use. Throw away any unused medicine after the expiration date.  What should I tell my health care provider before I take this medicine?  They need to know if you have any of these conditions:  · endocrine tumors (MEN 2) or if someone in your family had these tumors  · history of pancreatitis  · kidney disease  · liver disease  · stomach problems  · thyroid cancer or if someone in your family had thyroid cancer  · an unusual or allergic reaction to dulaglutide, other  medicines, foods, dyes, or preservatives  · pregnant or trying to get pregnant  · breast-feeding  What should I watch for while using this medicine?  Visit your doctor or health care professional for regular checks on your progress.  A test called the HbA1C (A1C) will be monitored. This is a simple blood test. It measures your blood sugar control over the last 2 to 3 months. You will receive this test every 3 to 6 months.  Learn how to check your blood sugar. Learn the symptoms of low and high blood sugar and how to manage them.  Always carry a quick-source of sugar with you in case you have symptoms of low blood sugar. Examples include hard sugar candy or glucose tablets. Make sure others know that you can choke if you eat or drink when you develop serious symptoms of low blood sugar, such as seizures or unconsciousness. They must get medical help at once.  Tell your doctor or health care professional if you have high blood sugar. You might need to change the dose of your medicine. If you are sick or exercising more than usual, you might need to change the dose of your medicine.  Do not skip meals. Ask your doctor or health care professional if you should avoid alcohol. Many nonprescription cough and cold products contain sugar or alcohol. These can affect blood sugar.  Wear a medical ID bracelet or chain, and carry a card that describes your disease and details of your medicine and dosage times.  NOTE:This sheet is a summary. It may not cover all possible information. If you have questions about this medicine, talk to your doctor, pharmacist, or health care provider. Copyright© 2017 [employer]

## 2018-10-30 ENCOUNTER — OFFICE VISIT (OUTPATIENT)
Dept: SPORTS MEDICINE | Facility: CLINIC | Age: 73
End: 2018-10-30
Payer: MEDICARE

## 2018-10-30 ENCOUNTER — HOSPITAL ENCOUNTER (OUTPATIENT)
Dept: RADIOLOGY | Facility: HOSPITAL | Age: 73
Discharge: HOME | End: 2018-10-30
Attending: ORTHOPAEDIC SURGERY
Payer: MEDICARE

## 2018-10-30 VITALS
WEIGHT: 246.69 LBS | HEART RATE: 60 BPM | DIASTOLIC BLOOD PRESSURE: 85 MMHG | SYSTOLIC BLOOD PRESSURE: 159 MMHG | BODY MASS INDEX: 32.7 KG/M2 | HEIGHT: 73 IN

## 2018-10-30 DIAGNOSIS — M25.562 PAIN IN BOTH KNEES, UNSPECIFIED CHRONICITY: ICD-10-CM

## 2018-10-30 DIAGNOSIS — R73.9 BLOOD GLUCOSE ELEVATED: ICD-10-CM

## 2018-10-30 DIAGNOSIS — M17.0 BILATERAL PRIMARY OSTEOARTHRITIS OF KNEE: Primary | ICD-10-CM

## 2018-10-30 DIAGNOSIS — M25.561 PAIN IN BOTH KNEES, UNSPECIFIED CHRONICITY: ICD-10-CM

## 2018-10-30 PROCEDURE — 99213 OFFICE O/P EST LOW 20 MIN: CPT | Mod: PBBFAC,25,PO | Performed by: ORTHOPAEDIC SURGERY

## 2018-10-30 PROCEDURE — 77073 BONE LENGTH STUDIES: CPT | Mod: TC,FY,PO

## 2018-10-30 PROCEDURE — 99204 OFFICE O/P NEW MOD 45 MIN: CPT | Mod: S$PBB,,, | Performed by: ORTHOPAEDIC SURGERY

## 2018-10-30 PROCEDURE — 99999 PR PBB SHADOW E&M-EST. PATIENT-LVL III: CPT | Mod: PBBFAC,,, | Performed by: ORTHOPAEDIC SURGERY

## 2018-10-30 PROCEDURE — 73564 X-RAY EXAM KNEE 4 OR MORE: CPT | Mod: TC,50,FY,PO

## 2018-10-30 PROCEDURE — 73564 X-RAY EXAM KNEE 4 OR MORE: CPT | Mod: 26,50,, | Performed by: RADIOLOGY

## 2018-10-30 PROCEDURE — 77073 BONE LENGTH STUDIES: CPT | Mod: 26,,, | Performed by: RADIOLOGY

## 2018-10-30 NOTE — PROGRESS NOTES
CC: Right>left knee pain    73 y.o. Male who presents as a new patient to me. From [place], [place] and accompanied by his wife today. He works as a Master . Complaint is right>left knee pain for 10+ years, diffuse and arthritic in nature. Previously diagnosed with osteoarthritis and treated with multiple injections over the years. Occasional swelling episodes after prolonged time on his feet. No prominent mechanical symptoms. Denies instability. Worse with prolonged standing and walking, kneeling, stairs. Better with rest. Treatment thus far has included multiple steroid and visco injections, rest, activity modifications, oral medications. Here today to discuss diagnosis and treatment options.     Referred by my partner Dr. Sixto Martines for consideration for possible bilateral TKA.      PMHx notable for paroxysmal Afib, LV dysfunction  Negative for tobacco.   Positive for diabetes. Last A1C; 8.3 in 9/2018.     Pain Score:   5    REVIEW OF SYSTEMS:   Constitution: Negative. Negative for chills, fever and night sweats.    Hematologic/Lymphatic: Negative for bleeding problem. Does not bruise/bleed easily.   Skin: Negative for dry skin, itching and rash.   Musculoskeletal: Negative for falls. Positive for bilateral knee pain and  muscle weakness.     All other systems were reviewed and found to be noncontributory    PAST MEDICAL HISTORY:   Past Medical History:   Diagnosis Date    Abdominal obesity 3/15/2018    Acute deep vein thrombosis (DVT) of popliteal vein of right lower extremity 3/2/2018    AK (actinic keratosis) 11/15/2012    S/p 1st PDT face (80 min incubation) - did great! S/p PDT face - 90 min  - 12/15 - did great S/p PDT face - 90 min - 10/17 - great response    Asbestos exposure 3/2/2018    Asymptomatic LV dysfunction 3/2/2018    Bronchitis, chronic, mucopurulent 3/2/2018    Cataract     Colon adenoma 8/20/2014    Current use of long term anticoagulation 4/23/2018    ED (erectile dysfunction)  11/28/2012    Encounter for monitoring sotalol therapy 6/13/2018    Hay fever     Hearing loss, sensorineural 6/25/2013    History of adenomatous polyp of colon 10/5/2015    History of pulmonary embolism 5/2/2018    Hyperlipidemia associated with type 2 diabetes mellitus, baseline  11/28/2012    Hypertension associated with diabetes 11/28/2012    Lung nodule, solitary- 5 mm rll 3/2/2018    On home oxygen therapy 3/15/2018    Osteoarthritis of right knee 11/28/2012    Persistent atrial fibrillation 3/1/2018    Pulmonary embolus- large involved central PA, unprovoked. 3/1/2018    Type 2 diabetes mellitus, controlled 7/16/2013    Type 2 diabetes, controlled, with neuropathy 1/20/2014    Type 2 diabetes, uncontrolled, with neuropathy, onset 2003 11/28/2012       PAST SURGICAL HISTORY:   Past Surgical History:   Procedure Laterality Date    Cardioversion N/A 4/10/2018    [facility]    Cardioversion/Defibrillation N/A 5/9/2018    [facility] C[facility]    COLONOSCOPY N/A 10/5/2015    Procedure: COLONOSCOPY;  Surgeon: Pro Jang MD;  Location: [facility] ([address] [place] [address]);  Service: Endoscopy;  Laterality: N/A;    COLONOSCOPY N/A 10/5/2015    Performed by Pro [facility]ang [facility] at [facility] ([facility])    COLONOSCOPY N/A 8/20/2014    [facility]    thumb surgery         FAMILY HISTORY:   Family History   Problem Relation Age of Onset    Hypertension Father     Heart disease Father         chf    Cancer Brother         colon    Cataracts Mother     Glaucoma Mother     Pneumonia Mother     Cancer Sister         lymphoma    Cancer Brother         prostate ce    Melanoma Neg Hx     Amblyopia Neg Hx     Blindness Neg Hx     Macular degeneration Neg Hx     Retinal detachment Neg Hx     Strabismus Neg Hx     Diabetes Neg Hx        SOCIAL HISTORY:   Social History     Socioeconomic History     Marital status:      Spouse name: Not on file    Number of children: Not on file    Years of education: Not on file    Highest education level: Not on file   Social Needs    Financial resource strain: Not on file    Food insecurity - worry: Not on file    Food insecurity - inability: Not on file    Transportation needs - medical: Not on file    Transportation needs - non-medical: Not on file   Occupational History    Occupation: Self emplyed   Tobacco Use    Smoking status: Never Smoker    Smokeless tobacco: Never Used   Substance and Sexual Activity    Alcohol use: Yes     Comment: rare    Drug use: No    Sexual activity: Not on file   Other Topics Concern    Not on file   Social History Narrative    Master . 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       MEDICATIONS:     Current Outpatient Medications:     albuterol (ACCUNEB) 1.25 mg/3 mL Nebu, Take 3 mLs (1.25 mg total) by nebulization every 4 (four) hours as needed (cough or wheeze). Rescue, Disp: 120 vial, Rfl: 11    albuterol 90 mcg/actuation inhaler, 2 puffs every 4 hours as needed for cough, wheeze, or shortness of breath, Disp: 1 Inhaler, Rfl: 11    amLODIPine (NORVASC) 10 MG tablet, TAKE ONE TABLET BY MOUTH ONCE DAILY, Disp: 30 tablet, Rfl: 3    amoxicillin-clavulanate 875-125mg (AUGMENTIN) 875-125 mg per tablet, Take 1 tablet by mouth 2 (two) times daily., Disp: 20 tablet, Rfl: 2    blood sugar diagnostic Strp, 1 strip by Misc.(Non-Drug; Combo Route) route 2 (two) times daily with meals., Disp: 100 strip, Rfl: 11    budesonide-formoterol 160-4.5 mcg (SYMBICORT) 160-4.5 mcg/actuation HFAA, Inhale 2 puffs into the lungs every 12 (twelve) hours. Controller, Disp: 1 Inhaler, Rfl: 11    cetirizine (ZYRTEC) 10 MG tablet, Take 1 tablet (10 mg total) by mouth once daily., Disp: , Rfl: 0    cholecalciferol, vitamin D3, (VITAMIN D3) 1,000 unit capsule, Take 1,000 Units by mouth once daily., Disp: , Rfl:      "diphth,pertus,acell,,tetanus (BOOSTRIX) 2.5-8-5 Lf-mcg-Lf/0.5mL Syrg injection, Inject into the muscle., Disp: 0.5 mL, Rfl: 0    dulaglutide (TRULICITY) 0.75 mg/0.5 mL PnIj, Inject 0.75 mg weekly., Disp: 4 Syringe, Rfl: 6    fluorouracil (EFUDEX) 5 % cream, AAA right cheek and left lower cheek bid x 2 weeks, Disp: 40 g, Rfl: 1    influenza (FLUZONE HIGH-DOSE) 180 mcg/0.5 mL vaccine, Inject into the muscle., Disp: 0.5 mL, Rfl: 0    influenza (FLUZONE HIGH-DOSE) 180 mcg/0.5 mL vaccine, Inject into the muscle., Disp: 0.5 mL, Rfl: 0    insulin glargine (LANTUS SOLOSTAR U-100 INSULIN) 100 unit/mL (3 mL) InPn pen, Inject 32 units at night, Disp: 1 Box, Rfl: 11    LANCETS & BLOOD GLUCOSE STRIPS MISC, * * * Twice a day .  check glucose twice a day, Disp: , Rfl:     losartan-hydrochlorothiazide 50-12.5 mg (HYZAAR) 50-12.5 mg per tablet, TAKE 1 TABLET BY MOUTH ONCE DAILY., Disp: 30 tablet, Rfl: 3    metFORMIN (GLUCOPHAGE-XR) 500 MG 24 hr tablet, TAKE 2 TABLETS BY MOUTH 2 TIMES DAILY WITH MEALS, Disp: 120 tablet, Rfl: 3    metoprolol succinate (TOPROL-XL) 25 MG 24 hr tablet, , Disp: , Rfl:     omega-3 fatty acids-vitamin E (OMEGA-3 FISH OIL) 1,000-5 mg-unit Cap, Take by mouth. 1 Capsule Oral Every day, Disp: , Rfl:     omeprazole (PRILOSEC OTC) 20 MG tablet, Take by mouth. 1 Tablet, Delayed Release (E.C.) Oral Every day, Disp: , Rfl:     pen needle, diabetic 29 gauge x 1/2" Ndle, Inject insulin daily, Disp: 50 each, Rfl: 11    pravastatin (PRAVACHOL) 40 MG tablet, TAKE ONE TABLET BY MOUTH ONCE DAILY, Disp: 30 tablet, Rfl: 3    predniSONE (DELTASONE) 20 MG tablet, One daily for 3 days and repeat for flare of lung symptoms as intructed, Disp: 12 tablet, Rfl: 0    rivaroxaban (XARELTO) 20 mg Tab, Take 1 tablet (20 mg total) by mouth daily with dinner or evening meal., Disp: 30 tablet, Rfl: 11    sotalol (BETAPACE) 80 MG tablet, Take 1 tablet (80 mg total) by mouth 2 (two) times daily. Start Sotalol 3 days before " "cardioversion, Disp: 60 tablet, Rfl: 11    ALLERGIES:   Review of patient's allergies indicates:   Allergen Reactions    Codeine Nausea And Vomiting     Other reaction(s): Nausea    Benicar  [olmesartan]      Other reaction(s): SPOTS IN FRONT OF EYES        PHYSICAL EXAMINATION:  BP (!) 159/85   Pulse 60   Ht 6' 1" (1.854 m)   Wt 111.9 kg (246 lb 11.2 oz)   BMI 32.55 kg/m²   General: Well-developed well-nourished 73 y.o. malein no acute distress   Cardiovascular: Regular rhythm by palpation of distal pulse, normal color and temperature, no concerning varicosities on symptomatic side   Lungs: No labored breathing or wheezing appreciated   Neuro: Alert and oriented ×3   Psychiatric: well oriented to person, place and time, demonstrates normal mood and affect   Skin: No rashes, lesions or ulcers, normal temperature, turgor, and texture on involved extremity    Ortho/SPM Exam  Examination of both knees demonstrates painful arc of motion with crepitus.  5° short of full extension to 115° of flexion. Stable to varus and valgus stress at 0 and 30°.  Prominent medial greater than lateral joint line tenderness.  Pain medially with varus load.  2+ dorsalis pedis pulse bilaterally. No significant peripheral vascular disease. Standing varus alignment.    IMAGING:  X-rays including standing, weight bearing AP and flexion bilateral knees, RIGHT knee lateral and sunrise views ordered and images reviewed by me show:    Tricompartmental degenerative arthritis with intra-articular varus deformity, advanced    ASSESSMENT:      ICD-10-CM ICD-9-CM   1. Bilateral primary osteoarthritis of knee M17.0 715.16   2. Pain in both knees, unspecified chronicity M25.561 719.46    M25.562    3. Blood glucose elevated R73.9 790.29     PLAN:     -Findings and treatment options were discussed with the patient, both operative and non operative.  The patient has failed prior conservative treatment and wishes to proceed with surgery. We did " discuss the risks and potential complications of stage versus bilateral concurrent surgery. Given the patient's diabetes and heart history, I would recommend a staged approach for proceeding with 1 side at a time.   -Plan for a Right Total Knee arthroplasty. The details of such were discussed to include the expected postop rehab and recovery course.  Optimally, I have recommended delaying surgery until his serum glucose level is better controlled.  Elevated risk for infection, poor wound healing, and prolonged hospital stay if proceeding in the setting of sub optimal control. Goal A1c of 7 or less.  He states that his serum glucose levels are ready under better control since lab drawn earlier in month.   -Planned DOS for mid December.  -Needs clearance - PCP, Cardiology and Dental    -RTC for preoperative appointment, re-check A1C at that time. The patient understands that we may need to delay his surgery further if not adequately controlled DM.  -All questions answered    Informed Consent:    The details of the surgical procedure were explained, including the location of probable incisions and a description of possible hardware and/or grafts to be used. We also discussed the potential benefit of Amniox tissue biologic augmentation with associated literature support, theoretical risks and benefits. Alternatives to both operative and non-operative options with associated risks and benefits were discussed. The patient understands the likely convalescence after surgery and, in particular, the expected postop rehab and recovery course. The outlined risks and potential complications of the proposed procedure include but are not limited to: infection, poor wound healing, scarring, deformity, stiffness, swelling, continued or recurrent pain, instability, hardware or prosthetic failure if implanted, symptomatic hardware requiring removal, weakness, neurovascular injury, numbness, chronic regional pain disorder, tissue  nonhealing/irreparability/retear, subsequent contralateral limb injury or pathology, chondral injury, arthritis, fracture, blood clot formation, inability to return to previous level of activity, anesthetic or regional block complication up to death, need for additional procedure as indicated intraoperatively, and potential need for further surgery.    The patient was also informed and understands that the risks of surgery are greater for patients with a current condition or history of heart disease, obesity, clotting disorders, recurrent infections, steroid use, current or past smoking, and factors such as sedentary lifestyle and noncompliance with medications, therapy or follow-up. The degree of the increased risk is hard to estimate with any degree of precision. If applicable, smoking cessation was discussed.     All questions were answered. The patient has verbalized understanding of these issues and wishes to proceed with the surgery as discussed.          Procedures

## 2018-10-31 DIAGNOSIS — M17.11 OSTEOARTHRITIS OF RIGHT KNEE, UNSPECIFIED OSTEOARTHRITIS TYPE: Primary | ICD-10-CM

## 2018-11-26 ENCOUNTER — OFFICE VISIT (OUTPATIENT)
Dept: DERMATOLOGY | Facility: CLINIC | Age: 73
End: 2018-11-26
Payer: MEDICARE

## 2018-11-26 DIAGNOSIS — L57.0 AK (ACTINIC KERATOSIS): Primary | ICD-10-CM

## 2018-11-26 PROCEDURE — 17003 DESTRUCT PREMALG LES 2-14: CPT | Mod: S$PBB,,, | Performed by: DERMATOLOGY

## 2018-11-26 PROCEDURE — 99212 OFFICE O/P EST SF 10 MIN: CPT | Mod: 25,S$PBB,, | Performed by: DERMATOLOGY

## 2018-11-26 PROCEDURE — 99999 PR PBB SHADOW E&M-EST. PATIENT-LVL II: CPT | Mod: PBBFAC,,, | Performed by: DERMATOLOGY

## 2018-11-26 PROCEDURE — 99212 OFFICE O/P EST SF 10 MIN: CPT | Mod: PBBFAC | Performed by: DERMATOLOGY

## 2018-11-26 PROCEDURE — 17000 DESTRUCT PREMALG LESION: CPT | Mod: PBBFAC | Performed by: DERMATOLOGY

## 2018-11-26 PROCEDURE — 17000 DESTRUCT PREMALG LESION: CPT | Mod: S$PBB,,, | Performed by: DERMATOLOGY

## 2018-11-26 PROCEDURE — 17003 DESTRUCT PREMALG LES 2-14: CPT | Mod: PBBFAC | Performed by: DERMATOLOGY

## 2018-11-26 NOTE — PATIENT INSTRUCTIONS

## 2018-11-26 NOTE — PROGRESS NOTES
"  Subjective:       Patient ID:  Des Junior is a 73 y.o. male who presents for   Chief Complaint   Patient presents with    Skin Check     post Efudex incomplete only 1 week     History of Present Illness: The patient presents for follow up of Efudex tx to right cheek and left lower cheek.    The patient was last seen on: 8/6/2018 for cryosurgery to actinic keratoses which have resolved, and he was prescribed Efudex to apply bid x 2 wks to his right cheek and left lower cheek. Pt reports discontinuing Efudex after 1 wk of bid tx because he thought "it didn't do anything."    He has been applying Desonide cream q other day after shaving x 2 months.            Review of Systems   Skin: Positive for wears hat (100%). Negative for daily sunscreen use, activity-related sunscreen use and recent sunburn.   Hematologic/Lymphatic: Bruises/bleeds easily (on xeralto).        Objective:    Physical Exam   Constitutional: He appears well-developed and well-nourished. No distress.   Neurological: He is alert and oriented to person, place, and time. He is not disoriented.   Psychiatric: He has a normal mood and affect.   Skin:   Areas Examined (abnormalities noted in diagram):   Scalp / Hair Palpated and Inspected  Head / Face Inspection Performed  RUE Inspected                   Diagram Legend     Erythematous scaling macule/papule c/w actinic keratosis       Vascular papule c/w angioma      Pigmented verrucoid papule/plaque c/w seborrheic keratosis      Yellow umbilicated papule c/w sebaceous hyperplasia      Irregularly shaped tan macule c/w lentigo     1-2 mm smooth white papules consistent with Milia      Movable subcutaneous cyst with punctum c/w epidermal inclusion cyst      Subcutaneous movable cyst c/w pilar cyst      Firm pink to brown papule c/w dermatofibroma      Pedunculated fleshy papule(s) c/w skin tag(s)      Evenly pigmented macule c/w junctional nevus     Mildly variegated pigmented, slightly " irregular-bordered macule c/w mildly atypical nevus      Flesh colored to evenly pigmented papule c/w intradermal nevus       Pink pearly papule/plaque c/w basal cell carcinoma      Erythematous hyperkeratotic cursted plaque c/w SCC      Surgical scar with no sign of skin cancer recurrence      Open and closed comedones      Inflammatory papules and pustules      Verrucoid papule consistent consistent with wart     Erythematous eczematous patches and plaques     Dystrophic onycholytic nail with subungual debris c/w onychomycosis     Umbilicated papule    Erythematous-base heme-crusted tan verrucoid plaque consistent with inflamed seborrheic keratosis     Erythematous Silvery Scaling Plaque c/w Psoriasis     See annotation      Assessment / Plan:        AK (actinic keratosis)      AK (actinic keratosis)  S/p Efudex x 1 wk to cheeks - 8/6/18 (pt stopped tx after 1 wk)    Today's Plan:  Efudex x 2 wks to cheeks    And     Cryosurgery Procedure Note    Verbal consent from the patient is obtained including, but not limited to, risk of hypopigmentation/hyperpigmentation, scar, recurrence of lesion. The patient is aware of the precancerous quality and need for treatment of these lesions. Liquid nitrogen cryosurgery is applied to the 2 actinic keratoses, as detailed in the physical exam, to produce a freeze injury. The patient is aware that blisters may form and is instructed on wound care with gentle cleansing and use of vaseline ointment to keep moist until healed. The patient is supplied a handout on cryosurgery and is instructed to call if lesions do not completely resolve.        Follow-up in about 3 months (around 2/26/2019).

## 2018-11-26 NOTE — ASSESSMENT & PLAN NOTE
S/p Efudex x 1 wk to cheeks - 8/6/18 (pt stopped tx after 1 wk)    Today's Plan:  Efudex x 2 wks to cheeks    And     Cryosurgery Procedure Note    Verbal consent from the patient is obtained including, but not limited to, risk of hypopigmentation/hyperpigmentation, scar, recurrence of lesion. The patient is aware of the precancerous quality and need for treatment of these lesions. Liquid nitrogen cryosurgery is applied to the 2 actinic keratoses, as detailed in the physical exam, to produce a freeze injury. The patient is aware that blisters may form and is instructed on wound care with gentle cleansing and use of vaseline ointment to keep moist until healed. The patient is supplied a handout on cryosurgery and is instructed to call if lesions do not completely resolve.

## 2018-12-03 ENCOUNTER — ANESTHESIA EVENT (OUTPATIENT)
Dept: SURGERY | Facility: HOSPITAL | Age: 73
End: 2018-12-03

## 2018-12-03 ENCOUNTER — TELEPHONE (OUTPATIENT)
Dept: CARDIOLOGY | Facility: CLINIC | Age: 73
End: 2018-12-03

## 2018-12-03 DIAGNOSIS — M79.606 PAIN OF LOWER EXTREMITY, UNSPECIFIED LATERALITY: Primary | ICD-10-CM

## 2018-12-03 NOTE — ANESTHESIA PREPROCEDURE EVALUATION
Anesthesia Assessment: Preoperative EQUATION    Planned Procedure: Procedure(s) (LRB):  ARTHROPLASTY, KNEE, TOTAL/REPLACEMENT KNEE,MEDIAL AND LATERAL COMPARTMENT (TOTAL KNEE). (Right)  Requested Anesthesia Type:General  Surgeon: CHANTEL Bustamante MD  Service: Orthopedics  Known or anticipated Date of Surgery:12/12/2018    Plan:    Testing:  Hematology profile, A1C and PT/INR   Pre-anesthesia  visit       Visit focus: possible regional anesthesia and/or nerve block      Consultation:Cardiology and Pulmonary     Mariangel Maier RN 12/03/2018 12/03/2018  Des Junior is a 73 y.o., male.    Pre-op Assessment      I have reviewed the Medications.   Steroids Taken In Past Year: Prednisone    Review of Systems  Anesthesia Hx:  No problems with previous Anesthesia    Social:  Non-Smoker, No Alcohol Use    Hematology/Oncology:         -- Anemia: Oncology Comments: Skin cancer    EENT/Dental:EENT/Dental Normal   Cardiovascular:   Hypertension hyperlipidemia Works as a . On feet 8 hrs a day.  Can climb a flight of stairs slowly.  Denies chest pain or sob Valvular Heart Disease: Tricuspid Regurgitation (TR), mild   Deep Venous Thrombosis (DVT), right lower extremity  Disorder of Cardiac Rhythm, Atrial Fibrillation (new onset 2/2018), Paroxysmal Atrial Fibrillation    Pulmonary:   Bronchiectasis (last used nebulizer and inhalers around March of 2018. Was on ABX and prednisone.  was on home O2 March for 3 weeks) Inhaler use is maintenance inhaler PRN and inhaled steroid use in the past, not currently.  Possible Obstructive Sleep Apnea , (STOP/BANG) Symptoms A - Age > 50, N - Neck circumference > 40 cms, G - Gender (Male > Female) and P - Pressure being treated for high BP  Pulmonary Embolism (2/2018)    Renal/:  Renal/ Normal     Hepatic/GI:   PUD, (15 years ago) GERD Denies Liver Disease.     Musculoskeletal:  Musculoskeletal General/Symptoms: Functional capacity is ambulatory with walker.  Joint Disease:  Arthritis, Osteoarthritis    Neurological:   Denies CVA. Denies Seizures.  Pain , onset is chronic , location of knee R>L , quality of burning , severity is a 7 , precipitating factors are standing and walking  , alleviating factors are rest. Osteoarthritis    Endocrine:   Diabetes, poorly controlled, type 2    Dermatological:  Skin Normal    Psych:  Psychiatric Normal           Physical Exam  General:  Well nourished, Obesity    Airway/Jaw/Neck:  Airway Findings: Mouth Opening: Normal Tongue: Large  General Airway Assessment: Adult  Jaw/Neck Findings:  Neck ROM: Decreased Lateral Motion, to the right, to the left      Dental:  Dental Findings: (missing teeth upper and lower back) In tact   Chest/Lungs:  Chest/Lungs Findings: Clear to auscultation, Normal Respiratory Rate     Heart/Vascular:  Heart Findings: Rate: Tachycardia  Rhythm: Regular Rhythm  Sounds: Normal        Mental Status:  Mental Status Findings:  Cooperative, Alert and Oriented         Labs reviewed    Discharge plans: wife Ellen [phone]    Cardiology evaluation, Dr. Barajas 10/24- Clear for Surgery and anesthesia with acceptable risk from the cardiac standpoint. If the patient is on NOAC (Eliquis, Xarelto, Pradaxa) can hold for 1-2 days per surgeon discretion. Certain recommend resumption of these medications ASAP post procedure.   XARELTO-last dose 12/9    Pulmonary evaluation 12/5, Dr. Jaime Maier, RN 12/04/2018

## 2018-12-03 NOTE — TELEPHONE ENCOUNTER
----- Message from Mariangel Maier RN sent at 12/2/2018  5:24 PM CST -----  Patient is scheduled for a R-TKR 12/12 with Dr. Bustamante under spinal anesthesia.  Anesthesia is requesting if patient has any contraindications holding Xarelto 4 days before surgery? Please advise.      Mariangel Maier RN  [place] [phone]

## 2018-12-04 ENCOUNTER — HOSPITAL ENCOUNTER (OUTPATIENT)
Dept: PREADMISSION TESTING | Facility: HOSPITAL | Age: 73
Discharge: HOME | End: 2018-12-04
Attending: ANESTHESIOLOGY
Payer: MEDICARE

## 2018-12-04 ENCOUNTER — TELEPHONE (OUTPATIENT)
Dept: PULMONOLOGY | Facility: CLINIC | Age: 73
End: 2018-12-04

## 2018-12-04 VITALS
OXYGEN SATURATION: 97 % | HEART RATE: 106 BPM | WEIGHT: 248.19 LBS | TEMPERATURE: 98 F | DIASTOLIC BLOOD PRESSURE: 88 MMHG | BODY MASS INDEX: 32.89 KG/M2 | HEIGHT: 73 IN | RESPIRATION RATE: 18 BRPM | SYSTOLIC BLOOD PRESSURE: 137 MMHG

## 2018-12-04 DIAGNOSIS — I15.2 HYPERTENSION ASSOCIATED WITH DIABETES: Primary | ICD-10-CM

## 2018-12-04 DIAGNOSIS — E11.59 HYPERTENSION ASSOCIATED WITH DIABETES: Primary | ICD-10-CM

## 2018-12-04 RX ORDER — DEXTROMETHORPHAN HYDROBROMIDE, GUAIFENESIN 5; 100 MG/5ML; MG/5ML
650 LIQUID ORAL EVERY 8 HOURS PRN
COMMUNITY
End: 2019-10-15

## 2018-12-04 RX ORDER — GUAIFENESIN/PHENYLPROPANOLAMIN
500 EXPECTORANT ORAL DAILY
COMMUNITY
End: 2020-11-05

## 2018-12-04 NOTE — TELEPHONE ENCOUNTER
Gave patient appointment for 12/5/2018 at 8:20am for pre op clear ence per Dr. Sandoval    .  ----- Message from Mariangel Maier RN sent at 12/2/2018  5:33 PM CST -----  Patient is scheduled for a R-TKR 12/12 with Dr. Bustamante under spinal anesthesia.  Anesthesia is requesting any recommendations or contraindication with patient proceeding with surgery? Please advise.    Mariangel Maier RN  [place] [phone]

## 2018-12-04 NOTE — DISCHARGE INSTRUCTIONS
Your surgery has been scheduled for:__________________________________________    You should report to:  ____[facility], located on the [place] side of the first floor of the           [facility] ([phone])  ____[facility], located on the [facility] side of the            Second floor of the [facility] ([phone])  ____3rd Floor [facility] located on the [facility] side of the [facility] [phone]  Please Note   - Tell your doctor if you take Aspirin, products containing Aspirin, herbal medications  or blood thinners, such as Coumadin, Ticlid, or Plavix.  (Consult your provider regarding holding or stopping before surgery).  - Arrange for someone to drive you home following surgery.  You will not be allowed to leave the surgical facility alone or drive yourself home following sedation and anesthesia.  Before Surgery  - Stop taking all herbal medications 14days prior to surgery  - No Motrin/Advil (Ibuprofen) 7 days before surgery  - No Aleve (Naproxen) 7 days before surgery  - No Goody's/BC powder 7 days before surgery  - Stop Taking Asprin, products containing Asprin _____days before surgery  - Stop taking blood thinners_______days before surgery  - Refrain from drinking alcoholic beverages for 24hours before and after surgery  - Stop or limit smoking _________days before surgery  - You may take Tylenol for pain  Night before Surgery  - Take a shower or bath (shower is recommended).  Bathe with Hibiclens soap or an antibacterial soap from the neck down.  If not supplied by your surgeon, hibiclens soap will need to be purchased over the counter in pharmacy.  Rinse soap off thoroughly.  - Shampoo your hair with your regular shampoo                           Food and Beverage Instructions  1. Stop ALL solid food, gum, candy (including vitamins) 8 hours before surgery/procedure time.  2. The patient should be  ENCOURAGED to drink carbohydrate-rich clear liquids (sports drinks, clear juices) until 2 hours prior to surgery/procedure time.  3. CLEAR liquids include only water, black coffee NO creamer, clear oral rehydration drinks, clear sports drinks or clear fruit juices (no orange juice, no pulpy juices, no apple cider). Advise patients if they can read newsprint through the liquid, it qualifies as clear liquid.   4. IF IN DOUBT, drink water instead.   5. NOTHING  TO DRINK 2 hours before to arrival for surgery/procedure time. If you are told to take medication on the morning of surgery, it may be taken with a sip of water.     FOLLOW YOUR SURGEON'S INSTRUCTIONS REGARDING FOOD AND BEVERAGES IF DIFFERENT THAN ABOVE INSTRUCTIONS.    The Day of Surgery  - Take another bath or shower with hibiclens or any antibacterial soap, to reduce the chance of infection.  - Take heart and blood pressure medications with a small sip of water, as advised by the perioperative team.  - Do not take fluid pills  - You may brush your teeth and rinse your mouth, but do not swall any additional water.   - Do not apply perfumes, powder, body lotions or deodorant on the day of surgery.  - Nail polish should be removed.  - Do not wear makeup or moisturizer  - Wear comfortable clothes, such as a button front shirt and loose fitting pants.  - Leave all jewelry, including body piercings, and valuables at home.    - Bring any devices you will neeed after surgery such as crutches or canes.  - If you have sleep apnea, please bring your CPAP machine  In the event that your physical condition changes including the onset of a cold or respiratory illness, or if you have to delay or cancel your surgery, please notify your surgeon.    Anesthesia: Regional Anesthesia    Youre scheduled for surgery. During surgery, youll receive medicine called anesthesia to keep you comfortable and pain-free. Your surgeon has decided that youll receive regional anesthesia.  This sheet tells you what to expect with this type of anesthesia.  What is regional anesthesia?  Regional anesthesia numbs one region of your body. The anesthesia may be given around nerves or into veins in your arms, neck, or legs (nerve block or Tappan block). Or it may be sent into the spinal fluid (spinal anesthesia) or into the space just outside the spinal fluid (epidural anesthesia). You may also be given sedatives to help you relax.  Nerve block or Tappan block  A small area of the body, such as an arm or leg, can be numbed using a nerve block or Lamar block.  · Nerve block. During a nerve block, your skin is numbed. A needle is then inserted near nerves that serve the area to be numbed. Anesthetic is sent through the needle.  · IV regional or Tappan block. For this type of block, an IV line is put into a vein. The blood flow to the area to be numbed is blocked for a short time. Anesthetic is sent through the IV.  Spinal anesthesia  Spinal anesthesia numbs your body from about the waist down.  · Anesthetic is injected into the spinal fluid. This is a substance that surrounds the spinal cord in your spinal column. The anesthetic blocks pain traveling from the body to the brain.  · To receive the anesthetic, your skin is numbed at the injection site on your back.  · A needle is then inserted into the spinal space. Anesthetic is sent into the spinal fluid through the needle.  Epidural anesthesia  Epidural anesthesia is most commonly used during childbirth and may also be used after surgical procedures of the chest, belly, and legs.  · Anesthetic is injected into the epidural space. This is just outside the dural sac which contains the spinal fluid.  · To receive the anesthetic, your skin is numbed at the injection site on your back.  · A needle is then inserted into the epidural space. Anesthetic is sent into the epidural space through the needle.  · A small flexible catheter may be attached to the needle and left in  place. This allows for continuous injections or infusions of anesthetic.  Anesthesia tools and medicines that might be near you during your procedure  · Local anesthetic. This medicine is given through a needle numbs one region of your body.  · Electrocardiography leads (electrodes). These are used to record your heart rate and rhythm.  · Blood pressure cuff. A cuff is placed on your arm to keep track of your blood pressure.  · Pulse oximeter. This small clip is placed on the end of the finger. It measures your blood oxygen level.  · Sedatives. These medicines may be given through an IV. They help to relax you and keep you comfortable. You may stay awake or sleep lightly.  · Oxygen. You may be given oxygen through a facemask.  Risks and possible complications  Regional anesthesia carries some risks. These include:  · Nausea and vomiting  · Headache  · Backache  · Decreased blood pressure  · Allergic reaction to the anesthetic  · Ongoing numbness (rare)  · Irregular heartbeat (rare)  · Cardiac arrest (rare)   Date Last Reviewed: 12/1/2016  © 6885-4615 [employer]. [address] [place] [address]. All rights reserved. This information is not intended as a substitute for professional medical care. Always follow your healthcare professional's instructions.

## 2018-12-04 NOTE — TELEPHONE ENCOUNTER
Gave patient appointment for 12/5/2018 at 2:20 am, per Dr. Sandoval.      ----- Message from Eladio Sandoval MD sent at 12/4/2018 10:10 AM CST -----  Bring pt by asap for clearance for tkr 12/12.

## 2018-12-05 ENCOUNTER — TELEPHONE (OUTPATIENT)
Dept: SPORTS MEDICINE | Facility: CLINIC | Age: 73
End: 2018-12-05

## 2018-12-05 ENCOUNTER — OFFICE VISIT (OUTPATIENT)
Dept: PULMONOLOGY | Facility: CLINIC | Age: 73
End: 2018-12-05
Payer: MEDICARE

## 2018-12-05 VITALS
DIASTOLIC BLOOD PRESSURE: 83 MMHG | WEIGHT: 245.13 LBS | HEIGHT: 73 IN | HEART RATE: 68 BPM | OXYGEN SATURATION: 98 % | SYSTOLIC BLOOD PRESSURE: 132 MMHG | BODY MASS INDEX: 32.49 KG/M2

## 2018-12-05 DIAGNOSIS — R91.1 LUNG NODULE: ICD-10-CM

## 2018-12-05 DIAGNOSIS — Z86.711 HISTORY OF PULMONARY EMBOLISM: ICD-10-CM

## 2018-12-05 DIAGNOSIS — J41.1 BRONCHITIS, CHRONIC, MUCOPURULENT: ICD-10-CM

## 2018-12-05 DIAGNOSIS — Z77.090 ASBESTOS EXPOSURE: Primary | ICD-10-CM

## 2018-12-05 DIAGNOSIS — Z79.01 CURRENT USE OF LONG TERM ANTICOAGULATION: ICD-10-CM

## 2018-12-05 PROCEDURE — 99214 OFFICE O/P EST MOD 30 MIN: CPT | Mod: S$PBB,,, | Performed by: INTERNAL MEDICINE

## 2018-12-05 PROCEDURE — 99215 OFFICE O/P EST HI 40 MIN: CPT | Mod: PBBFAC,PO | Performed by: INTERNAL MEDICINE

## 2018-12-05 PROCEDURE — 99999 PR PBB SHADOW E&M-EST. PATIENT-LVL V: CPT | Mod: PBBFAC,,, | Performed by: INTERNAL MEDICINE

## 2018-12-05 NOTE — PATIENT INSTRUCTIONS
With new nodule seen (old ones better)- ct chest March or so would be good- films viewed    Use inhaler/antibiotic/prednisone if bronchitis flares    Blood clot risk with surgery but should not be a problem if blood thinned asap post surgery.    Regular follow up in March or so

## 2018-12-05 NOTE — TELEPHONE ENCOUNTER
Spoke w/ patient A1C 8.0. Will need to get closer to 7 prior to proceeding with surgery. We will recheck in 8 weeks. Pt will call to schedule repeat A1C after the holidays. He will get in touch with his PCP to discuss medication & diet. All questions answered.

## 2018-12-05 NOTE — LETTER
[facility]  [address]  [place] [address]  Phone: [phone]  Fax: [phone]       12/5/2018    To whom it may concern:    Re:  Des Junior, 1945      Pt is in stable and optimal shape for total knee replacement.  Pt is at risk for dvt/pe post op, and should be anticoagulated asap postop.      Thank You        Eladio Sandoval MD  Pulmonology

## 2018-12-12 ENCOUNTER — ANESTHESIA (OUTPATIENT)
Dept: SURGERY | Facility: HOSPITAL | Age: 73
End: 2018-12-12

## 2019-01-04 DIAGNOSIS — E78.5 HYPERLIPIDEMIA, UNSPECIFIED HYPERLIPIDEMIA TYPE: ICD-10-CM

## 2019-01-09 RX ORDER — PRAVASTATIN SODIUM 40 MG/1
TABLET ORAL
Qty: 30 TABLET | Refills: 6 | Status: SHIPPED | OUTPATIENT
Start: 2019-01-09 | End: 2019-06-12 | Stop reason: SDUPTHER

## 2019-01-30 RX ORDER — METFORMIN HYDROCHLORIDE 500 MG/1
TABLET, EXTENDED RELEASE ORAL
Qty: 120 TABLET | Refills: 6 | Status: SHIPPED | OUTPATIENT
Start: 2019-01-30 | End: 2019-08-31 | Stop reason: SDUPTHER

## 2019-02-05 DIAGNOSIS — I10 ESSENTIAL HYPERTENSION: ICD-10-CM

## 2019-02-05 RX ORDER — AMLODIPINE BESYLATE 10 MG/1
TABLET ORAL
Qty: 30 TABLET | Refills: 0 | Status: SHIPPED | OUTPATIENT
Start: 2019-02-05 | End: 2019-03-12 | Stop reason: SDUPTHER

## 2019-02-05 RX ORDER — LOSARTAN POTASSIUM AND HYDROCHLOROTHIAZIDE 12.5; 5 MG/1; MG/1
TABLET ORAL
Qty: 30 TABLET | Refills: 0 | Status: SHIPPED | OUTPATIENT
Start: 2019-02-05 | End: 2019-03-12 | Stop reason: SDUPTHER

## 2019-02-25 ENCOUNTER — OFFICE VISIT (OUTPATIENT)
Dept: PULMONOLOGY | Facility: CLINIC | Age: 74
End: 2019-02-25
Payer: MEDICARE

## 2019-02-25 VITALS
HEART RATE: 67 BPM | OXYGEN SATURATION: 97 % | WEIGHT: 239.63 LBS | HEIGHT: 73 IN | DIASTOLIC BLOOD PRESSURE: 74 MMHG | BODY MASS INDEX: 31.76 KG/M2 | SYSTOLIC BLOOD PRESSURE: 134 MMHG

## 2019-02-25 DIAGNOSIS — J47.9 BRONCHIECTASIS WITHOUT COMPLICATION: ICD-10-CM

## 2019-02-25 DIAGNOSIS — R91.1 LUNG NODULE, SOLITARY: ICD-10-CM

## 2019-02-25 DIAGNOSIS — R91.1 LUNG NODULE: ICD-10-CM

## 2019-02-25 DIAGNOSIS — R05.9 COUGH: ICD-10-CM

## 2019-02-25 DIAGNOSIS — I70.0 CALCIFICATION OF AORTA: ICD-10-CM

## 2019-02-25 DIAGNOSIS — J41.1 BRONCHITIS, CHRONIC, MUCOPURULENT: Primary | ICD-10-CM

## 2019-02-25 PROCEDURE — 99214 PR OFFICE/OUTPT VISIT, EST, LEVL IV, 30-39 MIN: ICD-10-PCS | Mod: S$PBB,,, | Performed by: INTERNAL MEDICINE

## 2019-02-25 PROCEDURE — 99214 OFFICE O/P EST MOD 30 MIN: CPT | Mod: S$PBB,,, | Performed by: INTERNAL MEDICINE

## 2019-02-25 PROCEDURE — 99214 OFFICE O/P EST MOD 30 MIN: CPT | Mod: PBBFAC,PO | Performed by: INTERNAL MEDICINE

## 2019-02-25 PROCEDURE — 99999 PR PBB SHADOW E&M-EST. PATIENT-LVL IV: CPT | Mod: PBBFAC,,, | Performed by: INTERNAL MEDICINE

## 2019-02-25 PROCEDURE — 99999 PR PBB SHADOW E&M-EST. PATIENT-LVL IV: ICD-10-PCS | Mod: PBBFAC,,, | Performed by: INTERNAL MEDICINE

## 2019-02-25 RX ORDER — ALBUTEROL SULFATE 90 UG/1
AEROSOL, METERED RESPIRATORY (INHALATION)
Qty: 1 INHALER | Refills: 11 | Status: SHIPPED | OUTPATIENT
Start: 2019-02-25 | End: 2019-08-29 | Stop reason: SDUPTHER

## 2019-02-25 RX ORDER — BUDESONIDE AND FORMOTEROL FUMARATE DIHYDRATE 160; 4.5 UG/1; UG/1
2 AEROSOL RESPIRATORY (INHALATION) EVERY 12 HOURS
Qty: 1 INHALER | Refills: 11 | Status: SHIPPED | OUTPATIENT
Start: 2019-02-25 | End: 2019-08-29 | Stop reason: SDUPTHER

## 2019-02-25 RX ORDER — ALBUTEROL SULFATE 1.25 MG/3ML
1.25 SOLUTION RESPIRATORY (INHALATION) EVERY 4 HOURS PRN
Qty: 120 VIAL | Refills: 11 | Status: SHIPPED | OUTPATIENT
Start: 2019-02-25 | End: 2019-08-29 | Stop reason: SDUPTHER

## 2019-02-25 RX ORDER — AMOXICILLIN AND CLAVULANATE POTASSIUM 875; 125 MG/1; MG/1
1 TABLET, FILM COATED ORAL 2 TIMES DAILY
Qty: 20 TABLET | Refills: 2 | Status: SHIPPED | OUTPATIENT
Start: 2019-02-25 | End: 2019-04-04

## 2019-02-25 NOTE — PATIENT INSTRUCTIONS
No positive cultures, still nodules- but from march to September nodules look better.      symbicort prevents bronchitis.    Use augmentin if bronchitis    Use albuterol inhaler as needed.      Check ct chest in 3 months

## 2019-02-25 NOTE — PROGRESS NOTES
2/25/2019    [facility] Follow Up    Chief Complaint   Patient presents with    Follow-up     6 month    Sputum Production     light green now after 1 week of a cold       HPI:   2/25/2019 did not get knee surg as sugar too high, had bronchitis last wk with green mucous 2 wks ago.        12/5/18- no cough, breathing well- tkr next week,   ]    Instructions-  With new nodule seen (old ones better)- ct chest March or so would be good- films viewed    Use inhaler/antibiotic/prednisone if bronchitis flares    Blood clot risk with surgery but should not be a problem if blood thinned asap post surgery.    Regular follow up in March or so          Sept 25, 2018-- no cough nor mucous, breathing very good.    Patient Instructions   Cultures had no bad germs in lungs.       If cough flares up- symbicort 2 twice daily.  May use if cough or wheeze or mucous.     May use Augmentin antibiotic if cough or yellow mucous.  May use prednisone if cough is excess or wheezes or short breath.     Nodules no big worries seen but got a new one.      March 22, 2018pt seen with massive pe, also has chr lung dz seen on ct with massive clots.         From my ct review:  Radiographs reviewed: view by direct vision  Very large pulm emboli bilat main arteries, 5 mm rll solid fairly smooth nodule, very min mild post lung marking increase, no calcified plaques seen, lower lungs with thickened bronchi and perhaps early bronchiectasis      from  Consult hpi and rec:    03/02/2018                                                  Admit Date: 3/1/2018  [facility]  New Patient Consult          Chief Complaint   Patient presents with    Shortness of Breath       completed CT this morning outpatient, was advised by PCP to then present to ER for possible admission          History of Present Illness:  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  Pt never smoker.  No leg trauma nor leg swelling nor pain legs.  Pt had intermittent cough with purulent mucous production last yr- has had seasonal allergies with occ nocturnal wheezes.  No inhaler use nor sob til lately.  Pt does travel with 3-4 immobilization between stops.  Fh + only in nephew with clotts in his early 50's.   Pt developed small climbing to his elevated home last 3 weeks, no syncope, no chest pain nor palpitations.  Pt was evaluated by pcp and a fib and pe found.     Pt rm air sat 94% today.          Plan: pt is stable for home soon on xarelto but would recommend bed to chair activity for next week. dvt and pe are unprovoked.  Given large clots seen and delayed presentation- anticoagg 6 month is min but recommend life long.  hypercoagulable chahal will not change length of rx.  Would ask what nephew chris showed if done.   Pt has nodule with 1 of 200 risk lung ca by Brandyn Model.   F/u ct with airway dz may be reasonable in a year?  Asbestos exposure - no impressive asbestosis seen.   Pt has symptomatic chr mucopurulent bronchitis.  Coverage for meds is limited on his insurance.  Would give course levaquin and cultures and afb x 3 for ashley and neb rx prn 1/2 dose albuterol    Suggest f/u in 3 months or so (ASHLEY can take 2 months to culture).    pe likely ppt a fib?        The chief compliant  problem is varies with instablilty at time   PFSH:  Past Medical History:   Diagnosis Date    Abdominal obesity 3/15/2018    Acute deep vein thrombosis (DVT) of popliteal vein of right lower extremity 3/2/2018    AK (actinic keratosis) 11/15/2012    S/p 1st PDT face (80 min incubation) - did great! S/p PDT face - 90 min  - 12/15 - did great S/p PDT face - 90 min - 10/17 - great response    Asbestos exposure 3/2/2018    Asymptomatic LV dysfunction 3/2/2018    Bronchitis, chronic, mucopurulent 3/2/2018    Cataract     Colon adenoma 8/20/2014    Current use of long term  anticoagulation 4/23/2018    ED (erectile dysfunction) 11/28/2012    Encounter for monitoring sotalol therapy 6/13/2018    Hay fever     Hearing loss, sensorineural 6/25/2013    History of adenomatous polyp of colon 10/5/2015    History of pulmonary embolism 5/2/2018    Hyperlipidemia associated with type 2 diabetes mellitus, baseline  11/28/2012    Hypertension associated with diabetes 11/28/2012    Lung nodule, solitary- 5 mm rll 3/2/2018    On home oxygen therapy 3/15/2018    Osteoarthritis of right knee 11/28/2012    Persistent atrial fibrillation 3/1/2018    Pulmonary embolus- large involved central PA, unprovoked. 3/1/2018    Type 2 diabetes mellitus, controlled 7/16/2013    Type 2 diabetes, controlled, with neuropathy 1/20/2014    Type 2 diabetes, uncontrolled, with neuropathy, onset 2003 11/28/2012         Past Surgical History:   Procedure Laterality Date    Cardioversion N/A 4/10/2018    [facility]    Cardioversion/Defibrillation N/A 5/9/2018    [facility] CATH LAB    COLONOSCOPY N/A 10/5/2015    Performed [facility] ENDO (4TH FLR)    COLONOSCOPY N/A 8/20/2014    Performed by Pro [facility]agn [facility] at [facility] ENDO (4TH FLR)    thumb surgery       Social History     Tobacco Use    Smoking status: Never Smoker    Smokeless tobacco: Never Used   Substance Use Topics    Alcohol use: Yes     Comment: rare    Drug use: No     Family History   Problem Relation Age of Onset    Hypertension Father     Heart disease Father         chf    Cancer Brother         colon    Cataracts Mother     Glaucoma Mother     Pneumonia Mother     Cancer Sister         lymphoma    Cancer Brother         prostate ce    Melanoma Neg Hx     Amblyopia Neg Hx     Blindness Neg Hx     Macular degeneration Neg Hx     Retinal detachment Neg Hx     Strabismus Neg Hx     Diabetes Neg Hx      Review of patient's  "allergies indicates:   Allergen Reactions    Codeine Nausea And Vomiting     Other reaction(s): Nausea    Benicar  [olmesartan]      Other reaction(s): SPOTS IN FRONT OF EYES       Performance Status:The patient's activity level is functions out of house.      Review of Systems:  a review of eleven systems covering constitutional, Eye, HEENT, Psych, Respiratory, Cardiac, GI, , Musculoskeletal, Endocrine, Dermatologic was negative except for pertinent findings as listed ABOVE and below:  pertinent positive as above, rest is good       Exam:Comprehensive exam done. /74 (BP Location: Right arm, Patient Position: Sitting)   Pulse 67   Ht 6' 1" (1.854 m)   Wt 108.7 kg (239 lb 10.2 oz)   SpO2 97% Comment: on room air  BMI 31.62 kg/m²   Exam included Vitals as listed, and patient's appearance and affect and alertness and mood, oral exam for yeast and hygiene and pharynx lesions and Mallapatti (M) score, neck with inspection for jvd and masses and thyroid abnormalities and lymph nodes (supraclavicular and infraclavicular nodes and axillary also examined and noted if abn), chest exam included symmetry and effort and fremitus and percussion and auscultation, cardiac exam included rhythm and gallops and murmur and rubs and jvd and edema, abdominal exam for mass and hepatosplenomegaly and tenderness and hernias and bowel sounds, Musculoskeletal exam with muscle tone and posture and mobility/gait and  strength, and skin for rashes and cyanosis and pallor and turgor, extremity for clubbing.  Findings were normal except for pertinent findings listed below:  M3, chest is symmetric, no distress, normal percussion, normal fremitus and good normal breath sounds  Rrr, no murmur , mild edema +1    Radiographs (ct chest and cxr) reviewed: view by direct vision  lg pe on ct earlier march-- ct chest sept 2018 new nodule noted but lungs look better otherwise- old nodules better.  CTA Chest Non-Coronary   Status: Final " result   MyChart Results Release     [employer] Status: Active Results Release   PACS Images     Show images for CTA Chest Non-Coronary   Reviewed By Medina Cortez MD on 3/1/2018 16:40   External Result Report     External Result Report   Narrative     EXAMINATION:  CTA CHEST NON CORONARY    CLINICAL HISTORY:  Chest pain, acute, PE suspected, intermed prob, positive D-dimer;Chest pain, unspecified    TECHNIQUE:  Low dose axial images, sagittal and coronal reformations were obtained from the thoracic inlet to the lung bases.  Timing was optimized to evaluate the pulmonary arteries.    COMPARISON:  Chest radiographs 02/27/2018    FINDINGS:  The visualized thyroid gland is unremarkable.  No supraclavicular lymphadenopathy is seen.  No mediastinal or hilar lymphadenopathy is seen.  No axillary lymphadenopathy is seen.  No acute esophageal abnormality is seen.    The thoracic aorta is normal in caliber.  There is mild atherosclerosis in the aortic arch.  Moderate coronary artery calcification is also noted.    Pulmonary arterial bolus timing is good.  The study is positive for pulmonary embolism.  There are filling defects within the central pulmonary arteries bilaterally with involvement of the distal aspect of the main left pulmonary artery as well as the descending right and left pulmonary artery branches.  Segmental branches involving the right middle lobe, right upper lobe, left upper lobe, lingula and bilateral lower lobes are also affected.    There is a small wedge-shaped opacity in the superior segment of the left lower lobe on axial image 108 consistent with a small pulmonary infarct.  There is flattening of the interventricular septum.  No focal alveolar consolidation is seen.  There is an approximately 5 mm pulmonary nodule within the anterior basal segment of the right lower lobe.  No pleural effusion is seen.  There is an approximately 6 mm nodule along the lateral segment of the right middle lobe.   5 mm pulmonary nodule noted in the superior segment of the left lower lobe.  Small ground-glass opacities in the right middle lobe laterally may represent additional small pulmonary infarcts.    No acute findings are noted in the visualized upper abdomen.  There is cholelithiasis.  Nonspecific perinephric stranding is seen bilaterally, likely chronic.    No acute osseous abnormality is seen.  There is multilevel thoracic spondylosis.  Calcific tendinitis involving the left rotator cuff is also noted.   Impression       Positive study demonstrating bilateral central and segmental pulmonary emboli, as detailed above.  Small pulmonary infarct involving the superior segment of the left lower lobe and possible small pulmonary infarcts involving the lateral segment of the right middle lobe also noted.  There is also flattening of the interventricular septum which may indicate developing right heart strain.    Small pulmonary nodules in the bilateral lungs measuring up to 6 mm.  Single 1 year follow up CT of the thorax is recommended for surveillance.    Incidental note of cholelithiasis.    This report was flagged in Epic as abnormal on 3/1/2018 at 8:14 am.    COMMUNICATION  This critical result was discovered/received at 8:15 a.m. on 03/01/2018.  The critical information above was relayed directly by me by telephone to Dr. Christopher Cortez on 03/01/2018 at 8:30 a.m..      Electronically signed by: Tita Alves MD  Date: 03/01/2018  Time: 08:32    Encounter     View Encounter              Labs reviewed    PFT was not done       Plan:  Clinical impression is apparently straight forward and impression with management as below.    Des was seen today for follow-up and sputum production.    Diagnoses and all orders for this visit:    Bronchitis, chronic, mucopurulent  -     albuterol (ACCUNEB) 1.25 mg/3 mL Nebu; Take 3 mLs (1.25 mg total) by nebulization every 4 (four) hours as needed (cough or wheeze). Rescue  -      budesonide-formoterol 160-4.5 mcg (SYMBICORT) 160-4.5 mcg/actuation HFAA; Inhale 2 puffs into the lungs every 12 (twelve) hours. Controller  -     albuterol (PROVENTIL/VENTOLIN HFA) 90 mcg/actuation inhaler; 2 puffs every 4 hours as needed for cough, wheeze, or shortness of breath    Lung nodule  -     CT Chest Without Contrast; Future    Cough  -     albuterol (ACCUNEB) 1.25 mg/3 mL Nebu; Take 3 mLs (1.25 mg total) by nebulization every 4 (four) hours as needed (cough or wheeze). Rescue  -     budesonide-formoterol 160-4.5 mcg (SYMBICORT) 160-4.5 mcg/actuation HFAA; Inhale 2 puffs into the lungs every 12 (twelve) hours. Controller  -     albuterol (PROVENTIL/VENTOLIN HFA) 90 mcg/actuation inhaler; 2 puffs every 4 hours as needed for cough, wheeze, or shortness of breath    Bronchiectasis without complication  -     albuterol (ACCUNEB) 1.25 mg/3 mL Nebu; Take 3 mLs (1.25 mg total) by nebulization every 4 (four) hours as needed (cough or wheeze). Rescue  -     amoxicillin-clavulanate 875-125mg (AUGMENTIN) 875-125 mg per tablet; Take 1 tablet by mouth 2 (two) times daily.  -     budesonide-formoterol 160-4.5 mcg (SYMBICORT) 160-4.5 mcg/actuation HFAA; Inhale 2 puffs into the lungs every 12 (twelve) hours. Controller  -     albuterol (PROVENTIL/VENTOLIN HFA) 90 mcg/actuation inhaler; 2 puffs every 4 hours as needed for cough, wheeze, or shortness of breath    Lung nodule, solitary- 5 mm rll  -     budesonide-formoterol 160-4.5 mcg (SYMBICORT) 160-4.5 mcg/actuation HFAA; Inhale 2 puffs into the lungs every 12 (twelve) hours. Controller  -     albuterol (PROVENTIL/VENTOLIN HFA) 90 mcg/actuation inhaler; 2 puffs every 4 hours as needed for cough, wheeze, or shortness of breath    Calcification of aorta  Comments:  defer bp and cholesterol rx to pcp.        Follow-up in about 6 months (around 8/25/2019), or if symptoms worsen or fail to improve.    Discussed with patient above for education the following:      Patient  Instructions   No positive cultures, still nodules- but from march to September nodules look better.      symbicort prevents bronchitis.    Use augmentin if bronchitis    Use albuterol inhaler as needed.      Check ct chest in 3 months

## 2019-03-01 DIAGNOSIS — I48.19 PERSISTENT ATRIAL FIBRILLATION: ICD-10-CM

## 2019-03-01 RX ORDER — RIVAROXABAN 20 MG/1
TABLET, FILM COATED ORAL
Qty: 30 TABLET | Refills: 11 | Status: SHIPPED | OUTPATIENT
Start: 2019-03-01 | End: 2020-03-13 | Stop reason: SDUPTHER

## 2019-03-04 ENCOUNTER — TELEPHONE (OUTPATIENT)
Dept: PULMONOLOGY | Facility: CLINIC | Age: 74
End: 2019-03-04

## 2019-03-04 DIAGNOSIS — E11.9 TYPE 2 DIABETES MELLITUS WITHOUT COMPLICATION: ICD-10-CM

## 2019-03-04 NOTE — TELEPHONE ENCOUNTER
Pt stated he wants his oxygen picked up states his O2 has been upper 90s stated his current o2 is 97. pt stated he is not using o2 and would like the oxygen picked up. Per MD sign off order to Discontinue oxygen was sent to [facility].      ----- Message from Kaylee Poole sent at 3/4/2019  3:54 PM CST -----  Contact: Wolf Abad  Type: Needs Medical Advice    Who Called:  Wolf Abad  Best Call Back Number: Wolf at   Additional Information: Calling to speak with the Nurse. The patient called [facility] stating he has an order to  an Oxygen tank and Wolf is calling to verify that. Please advise. Call to pod. No answer.

## 2019-03-12 DIAGNOSIS — I10 ESSENTIAL HYPERTENSION: ICD-10-CM

## 2019-03-12 DIAGNOSIS — I48.19 PERSISTENT ATRIAL FIBRILLATION: ICD-10-CM

## 2019-03-12 RX ORDER — METOPROLOL SUCCINATE 25 MG/1
TABLET, EXTENDED RELEASE ORAL
Qty: 30 TABLET | Refills: 11 | Status: SHIPPED | OUTPATIENT
Start: 2019-03-12 | End: 2020-03-13 | Stop reason: SDUPTHER

## 2019-03-12 RX ORDER — AMLODIPINE BESYLATE 10 MG/1
TABLET ORAL
Qty: 30 TABLET | Refills: 0 | Status: SHIPPED | OUTPATIENT
Start: 2019-03-12 | End: 2019-04-09 | Stop reason: SDUPTHER

## 2019-03-12 RX ORDER — LOSARTAN POTASSIUM AND HYDROCHLOROTHIAZIDE 12.5; 5 MG/1; MG/1
TABLET ORAL
Qty: 30 TABLET | Refills: 0 | Status: SHIPPED | OUTPATIENT
Start: 2019-03-12 | End: 2019-04-08 | Stop reason: SDUPTHER

## 2019-04-04 ENCOUNTER — OFFICE VISIT (OUTPATIENT)
Dept: INTERNAL MEDICINE | Facility: CLINIC | Age: 74
End: 2019-04-04
Payer: MEDICARE

## 2019-04-04 ENCOUNTER — LAB VISIT (OUTPATIENT)
Dept: LAB | Facility: HOSPITAL | Age: 74
End: 2019-04-04
Attending: INTERNAL MEDICINE
Payer: MEDICARE

## 2019-04-04 VITALS
DIASTOLIC BLOOD PRESSURE: 80 MMHG | HEIGHT: 73 IN | OXYGEN SATURATION: 93 % | WEIGHT: 246.5 LBS | SYSTOLIC BLOOD PRESSURE: 130 MMHG | HEART RATE: 53 BPM | BODY MASS INDEX: 32.67 KG/M2

## 2019-04-04 DIAGNOSIS — I48.0 PAF (PAROXYSMAL ATRIAL FIBRILLATION): ICD-10-CM

## 2019-04-04 DIAGNOSIS — Z11.59 NEED FOR HEPATITIS C SCREENING TEST: ICD-10-CM

## 2019-04-04 LAB
ALBUMIN SERPL BCP-MCNC: 3.7 G/DL (ref 3.5–5.2)
ALP SERPL-CCNC: 76 U/L (ref 55–135)
ALT SERPL W/O P-5'-P-CCNC: 15 U/L (ref 10–44)
ANION GAP SERPL CALC-SCNC: 8 MMOL/L (ref 8–16)
AST SERPL-CCNC: 16 U/L (ref 10–40)
BILIRUB SERPL-MCNC: 1 MG/DL (ref 0.1–1)
BUN SERPL-MCNC: 21 MG/DL (ref 8–23)
CALCIUM SERPL-MCNC: 9.7 MG/DL (ref 8.7–10.5)
CHLORIDE SERPL-SCNC: 104 MMOL/L (ref 95–110)
CHOLEST SERPL-MCNC: 118 MG/DL (ref 120–199)
CHOLEST/HDLC SERPL: 3.4 {RATIO} (ref 2–5)
CO2 SERPL-SCNC: 29 MMOL/L (ref 23–29)
CREAT SERPL-MCNC: 1.1 MG/DL (ref 0.5–1.4)
EST. GFR  (AFRICAN AMERICAN): >60 ML/MIN/1.73 M^2
EST. GFR  (NON AFRICAN AMERICAN): >60 ML/MIN/1.73 M^2
ESTIMATED AVG GLUCOSE: 169 MG/DL (ref 68–131)
GLUCOSE SERPL-MCNC: 108 MG/DL (ref 70–110)
HBA1C MFR BLD HPLC: 7.5 % (ref 4–5.6)
HCV AB SERPL QL IA: NEGATIVE
HDLC SERPL-MCNC: 35 MG/DL (ref 40–75)
HDLC SERPL: 29.7 % (ref 20–50)
LDLC SERPL CALC-MCNC: 66.8 MG/DL (ref 63–159)
NONHDLC SERPL-MCNC: 83 MG/DL
POTASSIUM SERPL-SCNC: 4.4 MMOL/L (ref 3.5–5.1)
PROT SERPL-MCNC: 7.1 G/DL (ref 6–8.4)
SODIUM SERPL-SCNC: 141 MMOL/L (ref 136–145)
TRIGL SERPL-MCNC: 81 MG/DL (ref 30–150)

## 2019-04-04 PROCEDURE — 80061 LIPID PANEL: CPT

## 2019-04-04 PROCEDURE — 99999 PR PBB SHADOW E&M-EST. PATIENT-LVL IV: CPT | Mod: PBBFAC,,, | Performed by: INTERNAL MEDICINE

## 2019-04-04 PROCEDURE — 99214 OFFICE O/P EST MOD 30 MIN: CPT | Mod: PBBFAC | Performed by: INTERNAL MEDICINE

## 2019-04-04 PROCEDURE — 36415 COLL VENOUS BLD VENIPUNCTURE: CPT

## 2019-04-04 PROCEDURE — 99999 PR PBB SHADOW E&M-EST. PATIENT-LVL IV: ICD-10-PCS | Mod: PBBFAC,,, | Performed by: INTERNAL MEDICINE

## 2019-04-04 PROCEDURE — 83036 HEMOGLOBIN GLYCOSYLATED A1C: CPT

## 2019-04-04 PROCEDURE — 99214 OFFICE O/P EST MOD 30 MIN: CPT | Mod: S$PBB,,, | Performed by: INTERNAL MEDICINE

## 2019-04-04 PROCEDURE — 99214 PR OFFICE/OUTPT VISIT, EST, LEVL IV, 30-39 MIN: ICD-10-PCS | Mod: S$PBB,,, | Performed by: INTERNAL MEDICINE

## 2019-04-04 PROCEDURE — 80053 COMPREHEN METABOLIC PANEL: CPT

## 2019-04-04 PROCEDURE — 86803 HEPATITIS C AB TEST: CPT

## 2019-04-04 RX ORDER — INSULIN GLARGINE 100 [IU]/ML
INJECTION, SOLUTION SUBCUTANEOUS
Qty: 1 BOX | Refills: 11
Start: 2019-04-04 | End: 2019-08-12 | Stop reason: SDUPTHER

## 2019-04-04 NOTE — PROGRESS NOTES
Subjective:       Patient ID: Des Junior is a 74 y.o. male.    Chief Complaint: Follow-up    Patient is here for followup for chronic conditions.    Due for dm2 check.    12/19 knee surgery was canceled due to sanjay a1c. Has appt in [place] for JUly with new endocrin.    Dry cough still, see lung doctor, dxed with bronchiectasis. Has course abx in last 2 mos.    DM2:  good med adherence  no changed Diet  14 d avg, 114< 140 AM sugars  occ later in day and sugars are sanjay in 200s. Post-prandial sugars  Stable mild Numbness in feet  no sores in feet  no Visual changes  no Polyuria/polydipsia.        Review of Systems   Constitutional: Negative for activity change and unexpected weight change.   HENT: Negative for hearing loss, rhinorrhea and trouble swallowing.    Eyes: Negative for discharge and visual disturbance.   Respiratory: Negative for chest tightness and wheezing.    Cardiovascular: Negative for chest pain and palpitations.   Gastrointestinal: Negative for blood in stool, constipation, diarrhea and vomiting.   Endocrine: Negative for polydipsia and polyuria.   Genitourinary: Positive for urgency. Negative for difficulty urinating and hematuria.   Musculoskeletal: Positive for arthralgias. Negative for joint swelling and neck pain.   Neurological: Negative for weakness and headaches.   Psychiatric/Behavioral: Negative for confusion and dysphoric mood.       Objective:      Physical Exam   Constitutional: He is oriented to person, place, and time. He appears well-developed and well-nourished. No distress.   HENT:   Head: Normocephalic and atraumatic.   Mouth/Throat: No oropharyngeal exudate.   Eyes: No scleral icterus.   Neck: Normal range of motion. No thyromegaly present.   Cardiovascular: Normal rate, regular rhythm and normal heart sounds. Exam reveals no gallop and no friction rub.   No murmur heard.  Pulmonary/Chest: Effort normal and breath sounds normal. No respiratory distress. He has no  wheezes. He has no rales.   Abdominal: Soft. Bowel sounds are normal. He exhibits no distension and no mass. There is no tenderness. There is no rebound and no guarding.   Musculoskeletal: Normal range of motion. He exhibits no edema.   Painful R knee rom   Lymphadenopathy:     He has no cervical adenopathy.   Neurological: He is alert and oriented to person, place, and time.   Skin: No lesion noted. He is not diaphoretic.   Psychiatric: He has a normal mood and affect. Thought content normal.       Assessment:       1. Type 2 diabetes, uncontrolled, with neuropathy, onset 2003    2. Need for hepatitis C screening test        Plan:       Des was seen today for follow-up.    Diagnoses and all orders for this visit:    Type 2 diabetes, uncontrolled, with neuropathy, onset 2003  -     Comprehensive metabolic panel; Future  -     Lipid panel; Future  -     Hemoglobin A1c; Future  If >7.9 will start 8 u per meal prandial insulin and d/c victoza    Need for hepatitis C screening test  -     Hepatitis C antibody; Future    Other orders  -     insulin (LANTUS SOLOSTAR U-100 INSULIN) glargine 100 units/mL (3mL) SubQ pen; Inject 32 units at night    PAF -- sees heart rhythm doctor, good rate control today    Considering R TKA --  Patient Instructions   Consider seeing Dr. Hua Gore, Dr. Peng, Dr. [facility]        Health Maintenance       Date Due Completion Date    Hepatitis C Screening 1945 ---    Zoster Vaccine 03/16/2006 ---    Lipid Panel 03/02/2019 3/2/2018    Hemoglobin A1c 03/04/2019 12/4/2018    Eye Exam 08/27/2019 8/27/2018    Override on 8/27/2018: Done    Foot Exam 10/09/2019 10/9/2018    High Dose Statin 04/04/2020 4/4/2019    Colonoscopy 10/05/2025 10/5/2015    TETANUS VACCINE 10/09/2028 10/9/2018          Follow up in about 3 months (around 7/4/2019).    Future Appointments   Date Time Provider Department Center   5/23/2019  2:15 PM [facility] CT1 LIMIT 400 LBS [facility]AN [facility]   7/5/2019   1:20 PM Christopher Cortez MD [facility] [address] PCW   7/12/2019  9:00 AM Nino Salgado MD [facility] [place]   8/29/2019  1:40 PM Eladio Sandoval MD [facility] PUL [place] [facility]

## 2019-04-08 DIAGNOSIS — I10 ESSENTIAL HYPERTENSION: ICD-10-CM

## 2019-04-09 RX ORDER — AMLODIPINE BESYLATE 10 MG/1
10 TABLET ORAL DAILY
Qty: 30 TABLET | Refills: 6 | Status: SHIPPED | OUTPATIENT
Start: 2019-04-09 | End: 2019-07-12 | Stop reason: SDUPTHER

## 2019-04-10 RX ORDER — LOSARTAN POTASSIUM AND HYDROCHLOROTHIAZIDE 12.5; 5 MG/1; MG/1
TABLET ORAL
Qty: 30 TABLET | Refills: 6 | Status: SHIPPED | OUTPATIENT
Start: 2019-04-10 | End: 2019-10-02 | Stop reason: SDUPTHER

## 2019-04-10 RX ORDER — AMLODIPINE BESYLATE 10 MG/1
TABLET ORAL
Qty: 30 TABLET | Refills: 6 | Status: SHIPPED | OUTPATIENT
Start: 2019-04-10 | End: 2019-10-02 | Stop reason: SDUPTHER

## 2019-04-14 ENCOUNTER — PATIENT MESSAGE (OUTPATIENT)
Dept: INTERNAL MEDICINE | Facility: CLINIC | Age: 74
End: 2019-04-14

## 2019-05-23 ENCOUNTER — HOSPITAL ENCOUNTER (OUTPATIENT)
Dept: RADIOLOGY | Facility: HOSPITAL | Age: 74
Discharge: HOME | End: 2019-05-23
Attending: INTERNAL MEDICINE
Payer: MEDICARE

## 2019-05-23 DIAGNOSIS — R91.1 LUNG NODULE: ICD-10-CM

## 2019-05-23 PROCEDURE — 71250 CT CHEST WITHOUT CONTRAST: ICD-10-PCS | Mod: 26,,, | Performed by: RADIOLOGY

## 2019-05-23 PROCEDURE — 71250 CT THORAX DX C-: CPT | Mod: 26,,, | Performed by: RADIOLOGY

## 2019-05-23 PROCEDURE — 71250 CT THORAX DX C-: CPT | Mod: TC

## 2019-06-12 ENCOUNTER — OFFICE VISIT (OUTPATIENT)
Dept: DERMATOLOGY | Facility: CLINIC | Age: 74
End: 2019-06-12
Payer: MEDICARE

## 2019-06-12 VITALS — HEIGHT: 73 IN | WEIGHT: 246 LBS | BODY MASS INDEX: 32.6 KG/M2

## 2019-06-12 DIAGNOSIS — E78.5 HYPERLIPIDEMIA, UNSPECIFIED HYPERLIPIDEMIA TYPE: ICD-10-CM

## 2019-06-12 DIAGNOSIS — L57.0 AK (ACTINIC KERATOSIS): ICD-10-CM

## 2019-06-12 DIAGNOSIS — Z12.83 SKIN CANCER SCREENING: Primary | ICD-10-CM

## 2019-06-12 PROCEDURE — 99999 PR PBB SHADOW E&M-EST. PATIENT-LVL III: ICD-10-PCS | Mod: PBBFAC,,, | Performed by: DERMATOLOGY

## 2019-06-12 PROCEDURE — 99213 PR OFFICE/OUTPT VISIT, EST, LEVL III, 20-29 MIN: ICD-10-PCS | Mod: S$PBB,,, | Performed by: DERMATOLOGY

## 2019-06-12 PROCEDURE — 99999 PR PBB SHADOW E&M-EST. PATIENT-LVL III: CPT | Mod: PBBFAC,,, | Performed by: DERMATOLOGY

## 2019-06-12 PROCEDURE — 99213 OFFICE O/P EST LOW 20 MIN: CPT | Mod: S$PBB,,, | Performed by: DERMATOLOGY

## 2019-06-12 PROCEDURE — 99213 OFFICE O/P EST LOW 20 MIN: CPT | Mod: PBBFAC,PO | Performed by: DERMATOLOGY

## 2019-06-12 RX ORDER — PRAVASTATIN SODIUM 40 MG/1
TABLET ORAL
Qty: 90 TABLET | Refills: 1 | Status: SHIPPED | OUTPATIENT
Start: 2019-06-12 | End: 2019-08-07 | Stop reason: SDUPTHER

## 2019-06-12 RX ORDER — FLUOROURACIL 50 MG/G
CREAM TOPICAL
Qty: 40 G | Refills: 1 | Status: SHIPPED | OUTPATIENT
Start: 2019-06-12 | End: 2021-02-03

## 2019-06-12 NOTE — PROGRESS NOTES
Subjective:       Patient ID:  Des Junior is a 74 y.o. male who presents for   Chief Complaint   Patient presents with    Spot     back     Lesion     face     HPI    New patient with hx of multiple AK's  Last o/v with Dr Ruiz on 11/26/2018    Here today for a UBSE.  Spots on the back and lesion on the face, spot on the back about 10 years, raised. Was using efudex for face from Dr Ruiz. Ran out the medication.    Review of Systems   Constitutional: Negative for fever, chills and fatigue.   HENT: Negative for congestion and sore throat.    Respiratory: Negative for cough and shortness of breath.    Musculoskeletal: Positive for arthralgias. Negative for joint swelling.   Skin: Positive for dry skin and wears hat. Negative for daily sunscreen use and activity-related sunscreen use.   Hematologic/Lymphatic: Bruises/bleeds easily.        Objective:    Physical Exam   Constitutional: He appears well-developed and well-nourished. No distress.   Eyes: No conjunctival no injection.   Neurological: He is alert and oriented to person, place, and time. He is not disoriented.   Psychiatric: He has a normal mood and affect.   Skin:   Areas Examined (abnormalities noted in diagram):   Scalp / Hair Palpated and Inspected  Head / Face Inspection Performed  Neck Inspection Performed  Chest / Axilla Inspection Performed  Abdomen Inspection Performed  Back Inspection Performed  RUE Inspected  LUE Inspection Performed  Nails and Digits Inspection Performed                   Diagram Legend     Erythematous scaling macule/papule c/w actinic keratosis       Vascular papule c/w angioma      Pigmented verrucoid papule/plaque c/w seborrheic keratosis      Yellow umbilicated papule c/w sebaceous hyperplasia      Irregularly shaped tan macule c/w lentigo     1-2 mm smooth white papules consistent with Milia      Movable subcutaneous cyst with punctum c/w epidermal inclusion cyst      Subcutaneous movable cyst c/w  pilar cyst      Firm pink to brown papule c/w dermatofibroma      Pedunculated fleshy papule(s) c/w skin tag(s)      Evenly pigmented macule c/w junctional nevus     Mildly variegated pigmented, slightly irregular-bordered macule c/w mildly atypical nevus      Flesh colored to evenly pigmented papule c/w intradermal nevus       Pink pearly papule/plaque c/w basal cell carcinoma      Erythematous hyperkeratotic cursted plaque c/w SCC      Surgical scar with no sign of skin cancer recurrence      Open and closed comedones      Inflammatory papules and pustules      Verrucoid papule consistent consistent with wart     Erythematous eczematous patches and plaques     Dystrophic onycholytic nail with subungual debris c/w onychomycosis     Umbilicated papule    Erythematous-base heme-crusted tan verrucoid plaque consistent with inflamed seborrheic keratosis     Erythematous Silvery Scaling Plaque c/w Psoriasis     See annotation      Assessment / Plan:        Skin cancer screening  No other seriously suspicious lesions noted for body areas examined today.  Patient to inform of us if they notice any dark or changing or suspicious spots in areas not examined today.  Follow up for routine monitoring recommended to patient.    Mass  Discussed with patient the likelihood that this lesion is an epidermal cyst caused by an involuted lining of skin with dead skin trapped inside.  Cysts do not have a malignant potential but can become infected and turn into abscesses with possible cellulitis.  Discussed the option of warm compresses if infected with oral antibiotics.  Discussed the option of surgical excision with scar, possible recurrence, hematoma, and infection.  Patient to consider these options.    AK (actinic keratosis)  -     fluorouracil (EFUDEX) 5 % cream; Use qd-bid to red or rough precancer spots until irritation occurs and then stop.  Resume as needed.  Dispense: 40 g; Refill: 1  Discussed all of the following:  Actinic  keratosis is a skin growth caused by sun damage. These growths are not cancer, but they may develop into skin cancer (precancerous). Many people get these growths, especially as they age. This is because of cumulative sun exposure over years. Multiple growths are called actinic keratoses.    Patient instructed in importance in daily sun protection. Sun avoidance and topical protection discussed.     Patient encouraged to wear hat for all outdoor exposure.     Also discussed sun protective clothing.  Has had 5fu before and pdt.  Recommend sun block with plain zinc oxide or sun block products with some zinc or titanium as their base with an spf of at least 30 to be applied every 2-3 hours of outdoor exposure.    Try slow and go 5fu over prolonged course.  Pt amenable.         Follow up in about 2 months (around 8/12/2019), or if symptoms worsen or fail to improve, for Procedure.

## 2019-07-01 ENCOUNTER — PATIENT OUTREACH (OUTPATIENT)
Dept: ADMINISTRATIVE | Facility: HOSPITAL | Age: 74
End: 2019-07-01

## 2019-07-12 ENCOUNTER — OFFICE VISIT (OUTPATIENT)
Dept: ENDOCRINOLOGY | Facility: CLINIC | Age: 74
End: 2019-07-12
Payer: MEDICARE

## 2019-07-12 ENCOUNTER — LAB VISIT (OUTPATIENT)
Dept: LAB | Facility: HOSPITAL | Age: 74
End: 2019-07-12
Attending: INTERNAL MEDICINE
Payer: MEDICARE

## 2019-07-12 VITALS
HEIGHT: 73 IN | SYSTOLIC BLOOD PRESSURE: 142 MMHG | HEART RATE: 72 BPM | TEMPERATURE: 98 F | WEIGHT: 245.69 LBS | BODY MASS INDEX: 32.56 KG/M2 | DIASTOLIC BLOOD PRESSURE: 88 MMHG

## 2019-07-12 DIAGNOSIS — N40.0 BENIGN PROSTATIC HYPERPLASIA, UNSPECIFIED WHETHER LOWER URINARY TRACT SYMPTOMS PRESENT: ICD-10-CM

## 2019-07-12 DIAGNOSIS — N52.01 ERECTILE DYSFUNCTION DUE TO ARTERIAL INSUFFICIENCY: ICD-10-CM

## 2019-07-12 DIAGNOSIS — E78.00 HYPERCHOLESTEROLEMIA: ICD-10-CM

## 2019-07-12 DIAGNOSIS — E11.69 HYPERLIPIDEMIA ASSOCIATED WITH TYPE 2 DIABETES MELLITUS: ICD-10-CM

## 2019-07-12 DIAGNOSIS — I15.2 HYPERTENSION ASSOCIATED WITH DIABETES: ICD-10-CM

## 2019-07-12 DIAGNOSIS — E78.5 HYPERLIPIDEMIA ASSOCIATED WITH TYPE 2 DIABETES MELLITUS: ICD-10-CM

## 2019-07-12 DIAGNOSIS — E66.09 CLASS 1 OBESITY DUE TO EXCESS CALORIES WITH SERIOUS COMORBIDITY AND BODY MASS INDEX (BMI) OF 32.0 TO 32.9 IN ADULT: ICD-10-CM

## 2019-07-12 DIAGNOSIS — E11.65 TYPE 2 DIABETES MELLITUS WITH HYPERGLYCEMIA, WITHOUT LONG-TERM CURRENT USE OF INSULIN: Primary | ICD-10-CM

## 2019-07-12 DIAGNOSIS — E11.59 HYPERTENSION ASSOCIATED WITH DIABETES: ICD-10-CM

## 2019-07-12 DIAGNOSIS — K21.9 GASTROESOPHAGEAL REFLUX DISEASE WITHOUT ESOPHAGITIS: ICD-10-CM

## 2019-07-12 DIAGNOSIS — Z77.090 ASBESTOS EXPOSURE: ICD-10-CM

## 2019-07-12 DIAGNOSIS — Z79.01 CURRENT USE OF LONG TERM ANTICOAGULATION: ICD-10-CM

## 2019-07-12 DIAGNOSIS — I48.0 PAF (PAROXYSMAL ATRIAL FIBRILLATION): ICD-10-CM

## 2019-07-12 DIAGNOSIS — H90.3 SENSORINEURAL HEARING LOSS (SNHL) OF BOTH EARS: ICD-10-CM

## 2019-07-12 DIAGNOSIS — E11.65 TYPE 2 DIABETES MELLITUS WITH HYPERGLYCEMIA, WITHOUT LONG-TERM CURRENT USE OF INSULIN: ICD-10-CM

## 2019-07-12 DIAGNOSIS — E55.9 HYPOVITAMINOSIS D: ICD-10-CM

## 2019-07-12 DIAGNOSIS — Z86.711 HISTORY OF PULMONARY EMBOLISM: ICD-10-CM

## 2019-07-12 LAB
25(OH)D3+25(OH)D2 SERPL-MCNC: 34 NG/ML (ref 30–96)
AMYLASE SERPL-CCNC: 34 U/L (ref 20–110)
CA-I BLDV-SCNC: 1.24 MMOL/L (ref 1.06–1.42)
ESTIMATED AVG GLUCOSE: 206 MG/DL (ref 68–131)
HBA1C MFR BLD HPLC: 8.8 % (ref 4–5.6)
LIPASE SERPL-CCNC: <3 U/L (ref 4–60)
PROSTATE SPECIFIC ANTIGEN, TOTAL: 0.6 NG/ML (ref 0–4)
PSA FREE MFR SERPL: 43.33 %
PSA FREE SERPL-MCNC: 0.26 NG/ML (ref 0.01–1.5)
PTH-INTACT SERPL-MCNC: 98 PG/ML (ref 9–77)
T4 FREE SERPL-MCNC: 1.24 NG/DL (ref 0.71–1.51)
TSH SERPL DL<=0.005 MIU/L-ACNC: 1.38 UIU/ML (ref 0.4–4)
URATE SERPL-MCNC: 8.4 MG/DL (ref 3.4–7)

## 2019-07-12 PROCEDURE — 99214 PR OFFICE/OUTPT VISIT, EST, LEVL IV, 30-39 MIN: ICD-10-PCS | Mod: S$PBB,,, | Performed by: INTERNAL MEDICINE

## 2019-07-12 PROCEDURE — 82150 ASSAY OF AMYLASE: CPT

## 2019-07-12 PROCEDURE — 82306 VITAMIN D 25 HYDROXY: CPT

## 2019-07-12 PROCEDURE — 82330 ASSAY OF CALCIUM: CPT

## 2019-07-12 PROCEDURE — 84443 ASSAY THYROID STIM HORMONE: CPT

## 2019-07-12 PROCEDURE — 36415 COLL VENOUS BLD VENIPUNCTURE: CPT | Mod: PO

## 2019-07-12 PROCEDURE — 99999 PR PBB SHADOW E&M-EST. PATIENT-LVL V: CPT | Mod: PBBFAC,,, | Performed by: INTERNAL MEDICINE

## 2019-07-12 PROCEDURE — 99214 OFFICE O/P EST MOD 30 MIN: CPT | Mod: S$PBB,,, | Performed by: INTERNAL MEDICINE

## 2019-07-12 PROCEDURE — 84439 ASSAY OF FREE THYROXINE: CPT

## 2019-07-12 PROCEDURE — 83970 ASSAY OF PARATHORMONE: CPT

## 2019-07-12 PROCEDURE — 99215 OFFICE O/P EST HI 40 MIN: CPT | Mod: PBBFAC,PO | Performed by: INTERNAL MEDICINE

## 2019-07-12 PROCEDURE — 99999 PR PBB SHADOW E&M-EST. PATIENT-LVL V: ICD-10-PCS | Mod: PBBFAC,,, | Performed by: INTERNAL MEDICINE

## 2019-07-12 PROCEDURE — 84378 SUGARS SINGLE QUANT: CPT

## 2019-07-12 PROCEDURE — 83036 HEMOGLOBIN GLYCOSYLATED A1C: CPT

## 2019-07-12 PROCEDURE — 84154 ASSAY OF PSA FREE: CPT

## 2019-07-12 PROCEDURE — 83690 ASSAY OF LIPASE: CPT

## 2019-07-12 PROCEDURE — 82985 ASSAY OF GLYCATED PROTEIN: CPT

## 2019-07-12 PROCEDURE — 84550 ASSAY OF BLOOD/URIC ACID: CPT

## 2019-07-12 RX ORDER — REPAGLINIDE 1 MG/1
1 TABLET ORAL
Qty: 270 TABLET | Refills: 3 | Status: SHIPPED | OUTPATIENT
Start: 2019-07-12 | End: 2019-10-15

## 2019-07-12 NOTE — PROGRESS NOTES
Subjective:      Patient ID: Des Junior is a 74 y.o. male.    Chief Complaint:  Diabetes    Patient is a 74 yr old gentleman seen for initial care visit today on account diabetes care.     History of Present Illness    Patient is a 74 yr old gentleman with presumed type 2 diabetes seen for initial care visit today. His most recent HBA1c from 04/19 was 7.5 which is essentially at goal given his age and comorbidities.  His present antidiabetic regimen is metformin XR 1 g BID and Trulicity 0.75mg wkly (has been of it for ~ 3 weeks because he was having excessive diarrhea and had some thyroid discomfort). Patient is also on lantus QHS 32 units once daily.   His background comorbidities are as follows;    1. Type 2 diabetes mellitus with hyperglycemia, without long-term current use of insulin     2. Sensorineural hearing loss (SNHL) of both ears     3. Hypertension associated with diabetes     4. Hyperlipidemia associated with type 2 diabetes mellitus, baseline      5. Hypercholesterolemia     6. PAF (paroxysmal atrial fibrillation), CHADS-VAS score 3, onset 3/2018     7. History of pulmonary embolism     8. Current use of long term anticoagulation     9. Class 1 obesity due to excess calories with serious comorbidity and body mass index (BMI) of 32.0 to 32.9 in adult, today 32.5     10. Asbestos exposure     11. Gastroesophageal reflux disease without esophagitis     12. Erectile dysfunction due to arterial insufficiency     13. Hypovitaminosis D       Patient was seen previously by Dr Mchugh and  Then Dr Stallings.   Opthal screening; 08/18  Pneumovax; 03/18  Flu vaccination; 10/18  Patients baseline Marion scores is 8.   Patient does his SMBGs daily and it has been in the 100-180.  Patient is a retired boat maker.  Patient does not smoke.  Patient does not take ETOH either.        Review of Systems   Constitutional: Negative for chills and fever.   HENT: Negative for congestion, facial swelling and  "sinus pressure.    Eyes: Negative for visual disturbance.   Respiratory: Negative for chest tightness and shortness of breath.    Cardiovascular: Negative for chest pain and palpitations.   Gastrointestinal: Negative for abdominal distention, diarrhea, nausea and vomiting.   Endocrine: Negative for polyuria.   Genitourinary: Negative for dysuria and flank pain.   Musculoskeletal: Positive for gait problem (major gait deformity with marked limp; walks with aid of walkign stick). Negative for back pain.   Skin: Negative for color change, pallor and rash.   Neurological: Positive for numbness (in both feet; chronic and stable). Negative for weakness.   Hematological: Does not bruise/bleed easily.   Psychiatric/Behavioral: Negative for confusion and sleep disturbance.       Objective: BP (!) 142/88 (BP Location: Left arm, Patient Position: Sitting, BP Method: Large (Manual))   Pulse 72   Temp 97.7 °F (36.5 °C) (Oral)   Ht 6' 1" (1.854 m)   Wt 111.4 kg (245 lb 11.2 oz)   BMI 32.42 kg/m²  Body surface area is 2.4 meters squared.         Physical Exam   Constitutional: He is oriented to person, place, and time. He appears well-developed and well-nourished.   HENT:   Head: Normocephalic and atraumatic.   Mouth/Throat: No oropharyngeal exudate.   Eyes: Pupils are equal, round, and reactive to light. EOM are normal. No scleral icterus.   Neck: Normal range of motion. Neck supple. No tracheal deviation present. No thyromegaly present.   Cardiovascular: Normal rate, regular rhythm and normal heart sounds.   No murmur heard.  Pulmonary/Chest: Effort normal and breath sounds normal. No stridor. No respiratory distress. He has no wheezes. He has no rales.   Abdominal: Soft. He exhibits no distension. There is no tenderness.   Obese anterior abdominal wall.   Musculoskeletal: He exhibits edema (bilateral pedal edema 1+ 2/3rd up shins bilaterally; chronic). He exhibits no deformity.   Has marked limp and walks with aid of " walking stick.   Neurological: He is alert and oriented to person, place, and time. No cranial nerve deficit.   Reduced vibration sensation in both legs and feet in stocking distribution.   Skin: Skin is warm and dry. No rash noted. No erythema. No pallor.   Psychiatric: He has a normal mood and affect. His behavior is normal. Judgment and thought content normal.   Vitals reviewed.      Lab Review:     Results for CHAYITO BARBER (MRN [id number]) as of 7/12/2019 09:34   Ref. Range 12/4/2018 16:16 4/4/2019 10:48   WBC Latest Ref Range: 3.90 - 12.70 K/uL 10.27    RBC Latest Ref Range: 4.60 - 6.20 M/uL 4.68    Hemoglobin Latest Ref Range: 14.0 - 18.0 g/dL 13.9 (L)    Hematocrit Latest Ref Range: 40.0 - 54.0 % 42.5    MCV Latest Ref Range: 82 - 98 fL 91    MCH Latest Ref Range: 27.0 - 31.0 pg 29.7    MCHC Latest Ref Range: 32.0 - 36.0 g/dL 32.7    RDW Latest Ref Range: 11.5 - 14.5 % 12.6    Platelets Latest Ref Range: 150 - 350 K/uL 267    MPV Latest Ref Range: 9.2 - 12.9 fL 12.1    Protime Latest Ref Range: 9.0 - 12.5 sec 12.2    Coumadin Monitoring INR Latest Ref Range: 0.8 - 1.2  1.2    Sodium Latest Ref Range: 136 - 145 mmol/L 140 141   Potassium Latest Ref Range: 3.5 - 5.1 mmol/L 4.3 4.4   Chloride Latest Ref Range: 95 - 110 mmol/L 103 104   CO2 Latest Ref Range: 23 - 29 mmol/L 28 29   Anion Gap Latest Ref Range: 8 - 16 mmol/L 9 8   BUN, Bld Latest Ref Range: 8 - 23 mg/dL 17 21   Creatinine Latest Ref Range: 0.5 - 1.4 mg/dL 1.1 1.1   eGFR if non African American Latest Ref Range: >60 mL/min/1.73 m^2 >60.0 >60   eGFR if  Latest Ref Range: >60 mL/min/1.73 m^2 >60.0 >60   Glucose Latest Ref Range: 70 - 110 mg/dL 154 (H) 108   Calcium Latest Ref Range: 8.7 - 10.5 mg/dL 9.9 9.7   Alkaline Phosphatase Latest Ref Range: 55 - 135 U/L  76   PROTEIN TOTAL Latest Ref Range: 6.0 - 8.4 g/dL  7.1   Albumin Latest Ref Range: 3.5 - 5.2 g/dL  3.7   BILIRUBIN TOTAL Latest Ref Range: 0.1 - 1.0 mg/dL  1.0   AST  Latest Ref Range: 10 - 40 U/L  16   ALT Latest Ref Range: 10 - 44 U/L  15   Triglycerides Latest Ref Range: 30 - 150 mg/dL  81   Cholesterol Latest Ref Range: 120 - 199 mg/dL  118 (L)   HDL Latest Ref Range: 40 - 75 mg/dL  35 (L)   Hdl/Cholesterol Ratio Latest Ref Range: 20.0 - 50.0 %  29.7   LDL Cholesterol External Latest Ref Range: 63.0 - 159.0 mg/dL  66.8   Non-HDL Cholesterol Latest Units: mg/dL  83   Total Cholesterol/HDL Ratio Latest Ref Range: 2.0 - 5.0   3.4   Hemoglobin A1C External Latest Ref Range: 4.0 - 5.6 % 8.0 (H) 7.5 (H)   Estimated Avg Glucose Latest Ref Range: 68 - 131 mg/dL 183 (H) 169 (H)   Hepatitis C Ab Unknown  Negative       Assessment:     1. Type 2 diabetes mellitus with hyperglycemia, without long-term current use of insulin  Fructosamine    GlycoMark (TM)    Hemoglobin A1c    Calcitonin    Amylase    Lipase   2. Sensorineural hearing loss (SNHL) of both ears     3. Hypertension associated with diabetes  Uric acid   4. Hyperlipidemia associated with type 2 diabetes mellitus, baseline   TSH    T4, free   5. Hypercholesterolemia  TSH    T4, free   6. PAF (paroxysmal atrial fibrillation), CHADS-VAS score 3, onset 3/2018     7. History of pulmonary embolism     8. Current use of long term anticoagulation     9. Class 1 obesity due to excess calories with serious comorbidity and body mass index (BMI) of 32.0 to 32.9 in adult, today 32.5     10. Asbestos exposure     11. Gastroesophageal reflux disease without esophagitis     12. Erectile dysfunction due to arterial insufficiency     13. Hypovitaminosis D  Vitamin D    PTH, intact    Calcium, ionized   14. Benign prostatic hyperplasia, unspecified whether lower urinary tract symptoms present  PSA, total and free      Regarding type 2 diabetes; fairly good glycemic control but patient has stopped trulicity because of associated side effects. He is trying to get his glycemic control optimized to enable him have knee replacement.  To add on  prandin 1mg TID as needed as his insurance does not cover victoza or ozempic and he would not prefer Soliqua as a treatment option for now. To check HBA1c today.  Regarding  Hypertension; BP suboptimally controlled today. Some of this may be related to his ongoing active painful knee arthralgias. To continue serial tracking of ambulatory BP and will defer to PCP for adjustment of his antihypertensive medications.  Regarding hyyperlipidemia; to continue statin therapy as before; most recent lipid leevls are at desired goals.  Regarding Chronic A fibb; ongoing ffup byt cardiology and coagulation services.  Regarding GERD; symptomatically stable on PPI therapy. To continue same as before.  Regarding hypovitaminosis D; to recheck 25 OH Vit D levels and replete as needed.      Plan:       FFup in ~ 4mths

## 2019-07-16 LAB — CALCIT SERPL-MCNC: 7.9 PG/ML

## 2019-07-17 DIAGNOSIS — M25.561 RIGHT KNEE PAIN, UNSPECIFIED CHRONICITY: Primary | ICD-10-CM

## 2019-07-18 LAB — FRUCTOSAMINE SERPL-SCNC: 453 UMOL /L

## 2019-07-19 LAB — GLYCOMARK (TM): 6.4 UG/ML

## 2019-07-25 ENCOUNTER — HOSPITAL ENCOUNTER (OUTPATIENT)
Dept: RADIOLOGY | Facility: HOSPITAL | Age: 74
Discharge: HOME | End: 2019-07-25
Attending: ORTHOPAEDIC SURGERY
Payer: MEDICARE

## 2019-07-25 ENCOUNTER — OFFICE VISIT (OUTPATIENT)
Dept: ORTHOPEDICS | Facility: CLINIC | Age: 74
End: 2019-07-25
Payer: MEDICARE

## 2019-07-25 VITALS — BODY MASS INDEX: 33.34 KG/M2 | WEIGHT: 246.13 LBS | HEIGHT: 72 IN

## 2019-07-25 DIAGNOSIS — M17.0 ARTHRITIS OF BOTH KNEES: Primary | ICD-10-CM

## 2019-07-25 DIAGNOSIS — M25.561 RIGHT KNEE PAIN, UNSPECIFIED CHRONICITY: ICD-10-CM

## 2019-07-25 PROCEDURE — 99214 OFFICE O/P EST MOD 30 MIN: CPT | Mod: S$PBB,,, | Performed by: ORTHOPAEDIC SURGERY

## 2019-07-25 PROCEDURE — 73564 X-RAY EXAM KNEE 4 OR MORE: CPT | Mod: 26,RT,, | Performed by: RADIOLOGY

## 2019-07-25 PROCEDURE — 99999 PR PBB SHADOW E&M-EST. PATIENT-LVL IV: ICD-10-PCS | Mod: PBBFAC,,, | Performed by: ORTHOPAEDIC SURGERY

## 2019-07-25 PROCEDURE — 99214 OFFICE O/P EST MOD 30 MIN: CPT | Mod: PBBFAC,25 | Performed by: ORTHOPAEDIC SURGERY

## 2019-07-25 PROCEDURE — 99214 PR OFFICE/OUTPT VISIT, EST, LEVL IV, 30-39 MIN: ICD-10-PCS | Mod: S$PBB,,, | Performed by: ORTHOPAEDIC SURGERY

## 2019-07-25 PROCEDURE — 73562 X-RAY EXAM OF KNEE 3: CPT | Mod: 59,TC,LT

## 2019-07-25 PROCEDURE — 99999 PR PBB SHADOW E&M-EST. PATIENT-LVL IV: CPT | Mod: PBBFAC,,, | Performed by: ORTHOPAEDIC SURGERY

## 2019-07-25 PROCEDURE — 73564 XR KNEE ORTHO RIGHT WITH FLEXION: ICD-10-PCS | Mod: 26,RT,, | Performed by: RADIOLOGY

## 2019-07-25 NOTE — PROGRESS NOTES
Subjective:     HPI:   Des Junior is a 74 y.o. male who presents for evaluation of bilateral knee arthritis right is mostly symptomatic    He is seen multiple orthopedic providers here.  He initially saw Dr. [facility] in October 2017 and did bilateral steroid knee injections. He then saw Dr. Martines in May of 2018 and got Euflexxa injections. He saw Dr. Bustamante October 2018 and they planned right total knee replacement for some time in December of 2018 unfortunately his blood sugar was poorly controlled and the surgery was delayed    He complains of moderate to severe global predominantly medial-sided right greater than left knee pain.  Activity related and relieved with rest.  No groin anterior thigh radicular pain or paresthesias.    He takes Tylenol once a day with good relief he has tried some type of topical Chinese oral in the past.  He has had both steroid and Euflexxa injections in the knee he has not done any physical therapy he has tried knee braces.  He uses a Rollator and a cane for support he would have difficulty walking more than a block.  His predominant limitation is walking and activities of daily living.    He lives with his wife at home in [place], they have an elevated house with an elevator.  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  Denies any previous history of blood clots and denies any family history of that.  Diabetes.  Most recent hemoglobin A1c was 8.8 on July 12th.  It was 7.5 in April of 2019.  He just saw a new endocrinologist  Pulmonary disease.  Has been on home oxygen in the past but says he is off it now       ROS:  The updated medical history is in the chart and has been reviewed. A review of systems is updated and there is no reported vision changes,  ear/nose/mouth/throat complaints,  chest pain, shortness of breath, abdominal pain, urological complaints, fevers or chills, psychiatric complaints. Musculoskeletal and neurologcial symptoms are as documented. All other systems are negative.      Objective:   Exam:  There were no vitals filed for this visit.  Body mass index is 33.85 kg/m².    Physical examination included assessment of the patient's general appearance with particular attention to development, nutrition, body habitus, attention to grooming, and any evidence of distress.  Constitutional: The patient is a well-developed, well-nourished patient in no acute distress.   Cardiovascular: Vascular examination included warmth and capillary refill as well inspection for edema and assessment of pedal pulses. Pulses are palpable and regular.  Musculoskeletal: Gait was assessed as to whether it was steady, non-antalgic, and/or required the use of an assist device. The patient was also asked to walk independently and get onto the examination table.  Skin: The skin was examined for any obvious rashes or lesions in the extremity.  Neurologic: Sensation is intact to light touch in the extremity. The patient has good coordination without hyperreflexia and is alert and oriented to person, place and time and has normal mood and affect.     All of the above were examined and found to be within normal limits except for the following pertinent clinical findings:    Antalgic gait with a limp favoring the right knee.  Right knee 3-115 left knee 5-115, 10° varus bilateral which almost corrects to neutral.  Knees are stable anterior-posterior varus and valgus stresses significant flexion contracture but no extensor lag tender palpation medial lateral patellofemoral joint lines with moderate effusions      Imaging:  Indication:  Right knee pain  Exam Ordered: Radiographs of the right knee include a standing anteroposterior view, a standing posterioanterior view, a lateral view  in full flexion, and a sunrise view  Details of Examination: Todays exam shows evidence of joint space narrowing, osteophyte formation, and subchondral sclerosis, all consistent with degenerative arthritis of the knee.  No other significant findings are noted.  Impression:  Degenerative Arthritis, Right Knee    Indication:  Left knee pain  Exam Ordered: Radiographs of the left knee include a standing anteroposterior view, a standing posterioanterior view, a lateral view in full flexion, and a sunrise view  Details of Examination: Todays exam shows evidence of joint space narrowing, osteophyte formation, and subchondral sclerosis, all consistent with degenerative arthritis of the knee.  No other significant findings are noted.  Impression:  Degenerative Arthritis, Left Knee        Assessment:     Arthritis of both knees [M17.0]  Severe varus, right side most symptomatic      Atrial fibrillation on Xarelto (not on aspirin)  History of DVT right lower extremity and bilateral PTE diagnosed in March of 2018 around the time of his atrial fibrillation diagnosis.  Denies any previous history of blood clots and denies any family history of that.  Diabetes.  Most recent hemoglobin A1c was 8.8 on July 12th.  It was 7.5 in April of 2019.  He just saw a new endocrinologist  Pulmonary disease.  Has been on home oxygen in the past but says he is off it now     Plan:       The above findings were discussed with patient length. We discussed the risks of conservative versus surgical management knee arthritis. Conservative management consisting of anti-inflammatory medications, glucosamine/chondroitin sulfate, weight loss, physical therapy, activity modification, as well as injections (lubricant versus corticosteroid) was discussed at length.   This patient has significant symptoms in their knee that are affecting their quality of life and daily activities.  They have tried non-operative treatment including analgesics, an exercise  program, and activity modification, but the symptoms have persisted. I believe they make a good candidate for knee arthroplasty.     The risks and benefits of knee arthroplasty have been discussed with the patient which include, but are not limited to infections (including severe sequelae), potential component failure, fracture, DVT, pulmonary embolus, nerve palsy, poor range of motion, the possibility of bone grafting, persistent pain, wound healing complications, transfusions, medical complications and death.     We discussed that I would recommend a right total knee replacement when he is medically optimized.  He needs to work with his endocrinologist to get his hemoglobin A1c less than 8 which is our surgical cut off.  We discussed that ideally would be less than 7.5 as there is data that shows hemoglobin A1c over 7.7 can double risk of infection and wound healing issues.  He would need cardiac clearance as well he would need to be able to stop the Xarelto for a period of time prior to surgery we would plan on a dose of aspirin postop day 0 and then resume Xarelto 24 hours after surgery. Seems he would be a candidate for outpatient physical therapy    Offered him a referral to the [facility] for evaluation for geniculate nerve block/ablation he refuses that.  I offered him Voltaren gel which she also refuses.  He could increase his Tylenol we discussed appropriate Tylenol dosing.  He could ice the knees.     I do not recommend intra-articular injections in the knee as this will necessitate a 3 month weight in order to do a surgery in addition I do not want to do steroids in his knees given his blood sugar issues    We will give him a home exercise knee conditioning program for some home therapy.    We discussed risks and benefits of surgery today. Once his hemoglobin A1c is for improved he can call us back to to be put on the surgical schedule            No orders of the defined types were placed in this  encounter.      Past Medical History:   Diagnosis Date    Abdominal obesity 3/15/2018    Acute deep vein thrombosis (DVT) of popliteal vein of right lower extremity 3/2/2018    AK (actinic keratosis) 11/15/2012    S/p 1st PDT face (80 min incubation) - did great! S/p PDT face - 90 min  - 12/15 - did great S/p PDT face - 90 min - 10/17 - great response    Asbestos exposure 3/2/2018    Asymptomatic LV dysfunction 3/2/2018    Bronchitis, chronic, mucopurulent 3/2/2018    Cataract     Colon adenoma 8/20/2014    Current use of long term anticoagulation 4/23/2018    ED (erectile dysfunction) 11/28/2012    Encounter for monitoring sotalol therapy 6/13/2018    Hay fever     Hearing loss, sensorineural 6/25/2013    History of adenomatous polyp of colon 10/5/2015    History of pulmonary embolism 5/2/2018    Hyperlipidemia associated with type 2 diabetes mellitus, baseline  11/28/2012    Hypertension associated with diabetes 11/28/2012    Lung nodule, solitary- 5 mm rll 3/2/2018    On home oxygen therapy 3/15/2018    Osteoarthritis of right knee 11/28/2012    Persistent atrial fibrillation 3/1/2018    Pulmonary embolus- large involved central PA, unprovoked. 3/1/2018    Type 2 diabetes mellitus, controlled 7/16/2013    Type 2 diabetes, controlled, with neuropathy 1/20/2014    Type 2 diabetes, uncontrolled, with neuropathy, onset 2003 11/28/2012       Past Surgical History:   Procedure Laterality Date    Cardioversion N/A 4/10/2018    [facility] at [facility] CATH LAB    Cardioversion/Defibrillation N/A 5/9/2018    [facility] at [facility] CATH LAB    COLONOSCOPY N/A 10/5/2015    [facility] ENDO (4TH F[facility])    COLONOSCOPY N/A 8/20/2014    Performed [facility] at [facility] ENDO (4TH FLR)    thumb surgery         Family History   Problem Relation Age of Onset    Hypertension Father     Heart disease Father         chf    Cancer  Brother         colon    Cataracts Mother     Glaucoma Mother     Pneumonia Mother     Cancer Sister         lymphoma    Cancer Brother         prostate ce    Melanoma Neg Hx     Amblyopia Neg Hx     Blindness Neg Hx     Macular degeneration Neg Hx     Retinal detachment Neg Hx     Strabismus Neg Hx     Diabetes Neg Hx        Social History     Socioeconomic History    Marital status:      Spouse name: Not on file    Number of children: Not on file    Years of education: Not on file    Highest education level: Not on file   Occupational History    Occupation: Self emplyed   Social Needs    Financial resource strain: Not on file    Food insecurity:     Worry: Not on file     Inability: Not on file    Transportation needs:     Medical: Not on file     Non-medical: Not on file   Tobacco Use    Smoking status: Never Smoker    Smokeless tobacco: Never Used   Substance and Sexual Activity    Alcohol use: Yes     Comment: rare    Drug use: No    Sexual activity: Not on file   Lifestyle    Physical activity:     Days per week: Not on file     Minutes per session: Not on file    Stress: Not on file   Relationships    Social connections:     Talks on phone: Not on file     Gets together: Not on file     Attends Synagogue service: Not on file     Active member of club or organization: Not on file     Attends meetings of clubs or organizations: Not on file     Relationship status: Not on file   Other Topics Concern    Not on file   Social History Narrative    Master . 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

## 2019-08-05 ENCOUNTER — OFFICE VISIT (OUTPATIENT)
Dept: OPTOMETRY | Facility: CLINIC | Age: 74
End: 2019-08-05
Payer: MEDICARE

## 2019-08-05 DIAGNOSIS — I15.2 HYPERTENSION ASSOCIATED WITH DIABETES: ICD-10-CM

## 2019-08-05 DIAGNOSIS — E11.9 TYPE 2 DIABETES MELLITUS WITHOUT OPHTHALMIC MANIFESTATIONS: ICD-10-CM

## 2019-08-05 DIAGNOSIS — H02.831 DERMATOCHALASIS OF BOTH UPPER EYELIDS: ICD-10-CM

## 2019-08-05 DIAGNOSIS — H04.123 DRY EYE SYNDROME, BILATERAL: ICD-10-CM

## 2019-08-05 DIAGNOSIS — H02.834 DERMATOCHALASIS OF BOTH UPPER EYELIDS: ICD-10-CM

## 2019-08-05 DIAGNOSIS — E11.59 HYPERTENSION ASSOCIATED WITH DIABETES: ICD-10-CM

## 2019-08-05 DIAGNOSIS — E11.40 TYPE 2 DIABETES, CONTROLLED, WITH NEUROPATHY: Primary | ICD-10-CM

## 2019-08-05 DIAGNOSIS — H25.13 NS (NUCLEAR SCLEROSIS), BILATERAL: ICD-10-CM

## 2019-08-05 PROCEDURE — 92014 PR EYE EXAM, EST PATIENT,COMPREHESV: ICD-10-PCS | Mod: S$PBB,,, | Performed by: OPTOMETRIST

## 2019-08-05 PROCEDURE — 92014 COMPRE OPH EXAM EST PT 1/>: CPT | Mod: S$PBB,,, | Performed by: OPTOMETRIST

## 2019-08-05 PROCEDURE — 99212 OFFICE O/P EST SF 10 MIN: CPT | Mod: PBBFAC | Performed by: OPTOMETRIST

## 2019-08-05 PROCEDURE — 99999 PR PBB SHADOW E&M-EST. PATIENT-LVL II: CPT | Mod: PBBFAC,,, | Performed by: OPTOMETRIST

## 2019-08-05 PROCEDURE — 99999 PR PBB SHADOW E&M-EST. PATIENT-LVL II: ICD-10-PCS | Mod: PBBFAC,,, | Performed by: OPTOMETRIST

## 2019-08-06 NOTE — PROGRESS NOTES
HPI     Last eye exam was 8/27/18 with Dr Dow.    Pt here for annual eye exam.  Not having any problems  Patient denies diplopia, headaches, flashes/floaters, pain, and   itching/burning/tearing.          Last edited by Mahnaz Carroll on 8/5/2019 10:50 AM. (History)            Assessment /Plan     For exam results, see Encounter Report.            Type 2 diabetes, controlled, with neuropathy  DM type 2 without retinopathy  Essential hypertension                        No retinopathy, monitor yearly     NS (nuclear sclerosis), bilateral                        Mild, not visually significant     Presbyopia                        Rx specs     Dermatochalasis of both eyelids                         Lid scrubs and artificial tears QHS     RTC 1 year, sooner PRN

## 2019-08-07 DIAGNOSIS — E78.5 HYPERLIPIDEMIA, UNSPECIFIED HYPERLIPIDEMIA TYPE: ICD-10-CM

## 2019-08-07 RX ORDER — PRAVASTATIN SODIUM 40 MG/1
TABLET ORAL
Qty: 30 TABLET | Refills: 11 | Status: SHIPPED | OUTPATIENT
Start: 2019-08-07 | End: 2020-07-13 | Stop reason: SDUPTHER

## 2019-08-08 ENCOUNTER — OFFICE VISIT (OUTPATIENT)
Dept: DERMATOLOGY | Facility: CLINIC | Age: 74
End: 2019-08-08
Payer: MEDICARE

## 2019-08-08 VITALS — BODY MASS INDEX: 33.32 KG/M2 | WEIGHT: 246 LBS | HEIGHT: 72 IN

## 2019-08-08 DIAGNOSIS — L57.0 AK (ACTINIC KERATOSIS): Primary | ICD-10-CM

## 2019-08-08 PROCEDURE — 99999 PR PBB SHADOW E&M-EST. PATIENT-LVL III: CPT | Mod: PBBFAC,,, | Performed by: DERMATOLOGY

## 2019-08-08 PROCEDURE — 99213 PR OFFICE/OUTPT VISIT, EST, LEVL III, 20-29 MIN: ICD-10-PCS | Mod: S$PBB,,, | Performed by: DERMATOLOGY

## 2019-08-08 PROCEDURE — 99213 OFFICE O/P EST LOW 20 MIN: CPT | Mod: S$PBB,,, | Performed by: DERMATOLOGY

## 2019-08-08 PROCEDURE — 99213 OFFICE O/P EST LOW 20 MIN: CPT | Mod: PBBFAC,PO | Performed by: DERMATOLOGY

## 2019-08-08 PROCEDURE — 99999 PR PBB SHADOW E&M-EST. PATIENT-LVL III: ICD-10-PCS | Mod: PBBFAC,,, | Performed by: DERMATOLOGY

## 2019-08-08 NOTE — PROGRESS NOTES
Subjective:       Patient ID:  Des Junior is a 74 y.o. male who presents for   Chief Complaint   Patient presents with    Actinic Keratosis     HPI  Last o/v 6/12/2019  Skin cancer screening  No other seriously suspicious lesions noted for body areas examined today.  Patient to inform of us if they notice any dark or changing or suspicious spots in areas not examined today.  Follow up for routine monitoring recommended to patient.     Mass  Discussed with patient the likelihood that this lesion is an epidermal cyst caused by an involuted lining of skin with dead skin trapped inside.  Cysts do not have a malignant potential but can become infected and turn into abscesses with possible cellulitis.  Discussed the option of warm compresses if infected with oral antibiotics.  Discussed the option of surgical excision with scar, possible recurrence, hematoma, and infection.  Patient to consider these options.     AK (actinic keratosis)  -     fluorouracil (EFUDEX) 5 % cream; Use qd-bid to red or rough precancer spots until irritation occurs and then stop.  Resume as needed.  Dispense: 40 g; Refill: 1  Discussed all of the following:  Actinic keratosis is a skin growth caused by sun damage. These growths are not cancer, but they may develop into skin cancer (precancerous). Many people get these growths, especially as they age. This is because of cumulative sun exposure over years. Multiple growths are called actinic keratoses.     Patient instructed in importance in daily sun protection. Sun avoidance and topical protection discussed.      Patient encouraged to wear hat for all outdoor exposure.      Also discussed sun protective clothing.  Has had 5fu before and pdt.  Recommend sun block with plain zinc oxide or sun block products with some zinc or titanium as their base with an spf of at least 30 to be applied every 2-3 hours of outdoor exposure.     Try slow and go 5fu over prolonged course.  Pt  amenable.        Follow up in about 2 months (around 8/12/2019), or if symptoms worsen or fail to improve, for Procedure.    Patient presents today for AK follow up on the face, patient was using efudex for 2 weeks then stopped for a minute.    Review of Systems   Constitutional: Negative for fever, chills and fatigue.   HENT: Negative for congestion and sore throat.    Respiratory: Negative for cough and shortness of breath.    Musculoskeletal: Positive for arthralgias. Negative for joint swelling.   Skin: Positive for dry skin and wears hat. Negative for daily sunscreen use and activity-related sunscreen use.   Hematologic/Lymphatic: Bruises/bleeds easily.        Objective:    Physical Exam   Constitutional: He appears well-developed and well-nourished. No distress.   Eyes: No conjunctival no injection.   Neurological: He is alert and oriented to person, place, and time. He is not disoriented.   Psychiatric: He has a normal mood and affect.   Skin:   Areas Examined (abnormalities noted in diagram):   Scalp / Hair Palpated and Inspected  Head / Face Inspection Performed  Neck Inspection Performed  RUE Inspected  LUE Inspection Performed  Nails and Digits Inspection Performed                   Diagram Legend     Erythematous scaling macule/papule c/w actinic keratosis       Vascular papule c/w angioma      Pigmented verrucoid papule/plaque c/w seborrheic keratosis      Yellow umbilicated papule c/w sebaceous hyperplasia      Irregularly shaped tan macule c/w lentigo     1-2 mm smooth white papules consistent with Milia      Movable subcutaneous cyst with punctum c/w epidermal inclusion cyst      Subcutaneous movable cyst c/w pilar cyst      Firm pink to brown papule c/w dermatofibroma      Pedunculated fleshy papule(s) c/w skin tag(s)      Evenly pigmented macule c/w junctional nevus     Mildly variegated pigmented, slightly irregular-bordered macule c/w mildly atypical nevus      Flesh colored to evenly pigmented  papule c/w intradermal nevus       Pink pearly papule/plaque c/w basal cell carcinoma      Erythematous hyperkeratotic cursted plaque c/w SCC      Surgical scar with no sign of skin cancer recurrence      Open and closed comedones      Inflammatory papules and pustules      Verrucoid papule consistent consistent with wart     Erythematous eczematous patches and plaques     Dystrophic onycholytic nail with subungual debris c/w onychomycosis     Umbilicated papule    Erythematous-base heme-crusted tan verrucoid plaque consistent with inflamed seborrheic keratosis     Erythematous Silvery Scaling Plaque c/w Psoriasis     See annotation      Assessment / Plan:        AK (actinic keratosis)    Doing better.    Cont 5fu x at least 2 more cycles to face, ears, scalp.    Then start on arms and dorsal hands.    Proper application of medications and or care for affected area(s) and condition(s) reviewed.  Discussed with patient the etiology and pathogenesis of the disease or skin lesion(s) and possible treatments and aggravators.    Reviewed with patient different treatment options and associated risks.    Patient instructed in importance in daily sun protection. Sun avoidance and topical protection discussed.     Patient encouraged to wear hat for all outdoor exposure.     Also discussed sun protective clothing.           No follow-ups on file.

## 2019-08-12 RX ORDER — INSULIN GLARGINE 100 [IU]/ML
INJECTION, SOLUTION SUBCUTANEOUS
Qty: 15 ML | Refills: 0 | Status: SHIPPED | OUTPATIENT
Start: 2019-08-12 | End: 2019-09-26 | Stop reason: SDUPTHER

## 2019-08-29 ENCOUNTER — TELEPHONE (OUTPATIENT)
Dept: PULMONOLOGY | Facility: CLINIC | Age: 74
End: 2019-08-29

## 2019-08-29 ENCOUNTER — OFFICE VISIT (OUTPATIENT)
Dept: PULMONOLOGY | Facility: CLINIC | Age: 74
End: 2019-08-29
Payer: MEDICARE

## 2019-08-29 VITALS
DIASTOLIC BLOOD PRESSURE: 82 MMHG | SYSTOLIC BLOOD PRESSURE: 113 MMHG | OXYGEN SATURATION: 96 % | HEIGHT: 73 IN | HEART RATE: 104 BPM | BODY MASS INDEX: 33.02 KG/M2 | WEIGHT: 249.13 LBS

## 2019-08-29 DIAGNOSIS — R91.1 LUNG NODULE, SOLITARY: ICD-10-CM

## 2019-08-29 DIAGNOSIS — J41.1 BRONCHITIS, CHRONIC, MUCOPURULENT: ICD-10-CM

## 2019-08-29 DIAGNOSIS — R05.9 COUGH: ICD-10-CM

## 2019-08-29 DIAGNOSIS — R09.89 CHRONIC SINUS COMPLAINTS: Primary | ICD-10-CM

## 2019-08-29 DIAGNOSIS — I49.9 IRREGULAR HEARTBEAT: ICD-10-CM

## 2019-08-29 DIAGNOSIS — J47.9 BRONCHIECTASIS WITHOUT COMPLICATION: ICD-10-CM

## 2019-08-29 PROCEDURE — 99214 OFFICE O/P EST MOD 30 MIN: CPT | Mod: S$PBB,,, | Performed by: INTERNAL MEDICINE

## 2019-08-29 PROCEDURE — 99214 PR OFFICE/OUTPT VISIT, EST, LEVL IV, 30-39 MIN: ICD-10-PCS | Mod: S$PBB,,, | Performed by: INTERNAL MEDICINE

## 2019-08-29 PROCEDURE — 99215 OFFICE O/P EST HI 40 MIN: CPT | Mod: PBBFAC,PO | Performed by: INTERNAL MEDICINE

## 2019-08-29 PROCEDURE — 99999 PR PBB SHADOW E&M-EST. PATIENT-LVL V: ICD-10-PCS | Mod: PBBFAC,,, | Performed by: INTERNAL MEDICINE

## 2019-08-29 PROCEDURE — 99999 PR PBB SHADOW E&M-EST. PATIENT-LVL V: CPT | Mod: PBBFAC,,, | Performed by: INTERNAL MEDICINE

## 2019-08-29 RX ORDER — ALBUTEROL SULFATE 90 UG/1
AEROSOL, METERED RESPIRATORY (INHALATION)
Qty: 1 INHALER | Refills: 11 | Status: SHIPPED | OUTPATIENT
Start: 2019-08-29 | End: 2019-10-15

## 2019-08-29 RX ORDER — BUDESONIDE AND FORMOTEROL FUMARATE DIHYDRATE 160; 4.5 UG/1; UG/1
2 AEROSOL RESPIRATORY (INHALATION) EVERY 12 HOURS
Qty: 1 INHALER | Refills: 11 | Status: SHIPPED | OUTPATIENT
Start: 2019-08-29 | End: 2019-10-15

## 2019-08-29 RX ORDER — SOTALOL HYDROCHLORIDE 80 MG/1
80 TABLET ORAL 2 TIMES DAILY
Qty: 60 TABLET | Refills: 11 | Status: SHIPPED | OUTPATIENT
Start: 2019-08-29 | End: 2020-11-11

## 2019-08-29 RX ORDER — ALBUTEROL SULFATE 1.25 MG/3ML
1.25 SOLUTION RESPIRATORY (INHALATION) EVERY 4 HOURS PRN
Qty: 120 VIAL | Refills: 11 | Status: SHIPPED | OUTPATIENT
Start: 2019-08-29 | End: 2020-08-28

## 2019-08-29 RX ORDER — AMOXICILLIN AND CLAVULANATE POTASSIUM 875; 125 MG/1; MG/1
1 TABLET, FILM COATED ORAL 2 TIMES DAILY
Qty: 20 TABLET | Refills: 2 | Status: SHIPPED | OUTPATIENT
Start: 2019-08-29 | End: 2019-10-15

## 2019-08-29 NOTE — PROGRESS NOTES
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  Had summer cold, no abx ,no prednisone, uses symbicort.  sinsues drainage ppt problem. Worked asbestos past and lung nodules seen. Had pe and still xarelto- lg pe.  No fh lung ca, asbestos exposure.      2/25/2019 did not get knee surg as sugar too high, had bronchitis last wk with green mucous 2 wks ago.  Patient Instructions   No positive cultures, still nodules- but from march to September nodules look better.    symbicort prevents bronchitis.  Use augmentin if bronchitis  Use albuterol inhaler as needed.  Check ct chest in 3 months    12/5/18- no cough, breathing well- tkr next week,   ]  Instructions-  With new nodule seen (old ones better)- ct chest March or so would be good- films viewed  Use inhaler/antibiotic/prednisone if bronchitis flares  Blood clot risk with surgery but should not be a problem if blood thinned asap post surgery.  Regular follow up in March or so    Sept 25, 2018-- no cough nor mucous, breathing very good.    Patient Instructions   Cultures had no bad germs in lungs.     If cough flares up- symbicort 2 twice daily.  May use if cough or wheeze or mucous.   May use Augmentin antibiotic if cough or yellow mucous.  May use prednisone if cough is excess or wheezes or short breath.   Nodules no big worries seen but got a new one.      March 22, 2018pt seen with massive pe, also has chr lung dz seen on ct with massive clots.     From my ct review:  Radiographs reviewed: view by direct vision  Very large pulm emboli bilat main arteries, 5 mm rll solid fairly smooth nodule, very min mild post lung marking increase, no calcified plaques seen, lower lungs with thickened bronchi and perhaps early bronchiectasis      from  Consult hpi and rec:    03/02/2018                                                   Admit Date: 3/1/2018  Des Junior  New Patient Consult          Chief Complaint   Patient presents with    Shortness of Breath       completed CT this morning outpatient, was advised by PCP to then present to ER for possible admission          History of Present Illness:  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  Pt never smoker.  No leg trauma nor leg swelling nor pain legs.  Pt had intermittent cough with purulent mucous production last yr- has had seasonal allergies with occ nocturnal wheezes.  No inhaler use nor sob til lately.  Pt does travel with 3-4 immobilization between stops.  Fh + only in nephew with clotts in his early 50's.   Pt developed small climbing to his elevated home last 3 weeks, no syncope, no chest pain nor palpitations.  Pt was evaluated by pcp and a fib and pe found.     Pt rm air sat 94% today.          Plan: pt is stable for home soon on xarelto but would recommend bed to chair activity for next week. dvt and pe are unprovoked.  Given large clots seen and delayed presentation- anticoagg 6 month is min but recommend life long.  hypercoagulable chahal will not change length of rx.  Would ask what nephew eval showed if done.   Pt has nodule with 1 of 200 risk lung ca by Brandyn Model.   F/u ct with airway dz may be reasonable in a year?  Asbestos exposure - no impressive asbestosis seen.   Pt has symptomatic chr mucopurulent bronchitis.  Coverage for meds is limited on his insurance.  Would give course levaquin and cultures and afb x 3 for ashley and neb rx prn 1/2 dose albuterol    Suggest f/u in 3 months or so (ASHLEY can take 2 months to culture).    pe likely ppt a fib?        The chief compliant  problem is varies with instablilty at time   PFSH:  Past Medical History:   Diagnosis Date    Abdominal obesity 3/15/2018    Acute deep vein thrombosis  (DVT) of popliteal vein of right lower extremity 3/2/2018    AK (actinic keratosis) 11/15/2012    S/p 1st PDT face (80 min incubation) - did great! S/p PDT face - 90 min  - 12/15 - did great S/p PDT face - 90 min - 10/17 - great response    Asbestos exposure 3/2/2018    Asymptomatic LV dysfunction 3/2/2018    Bronchitis, chronic, mucopurulent 3/2/2018    Cataract     Colon adenoma 8/20/2014    Current use of long term anticoagulation 4/23/2018    ED (erectile dysfunction) 11/28/2012    Encounter for monitoring sotalol therapy 6/13/2018    Hay fever     Hearing loss, sensorineural 6/25/2013    History of adenomatous polyp of colon 10/5/2015    History of pulmonary embolism 5/2/2018    Hyperlipidemia associated with type 2 diabetes mellitus, baseline  11/28/2012    Hypertension associated with diabetes 11/28/2012    Lung nodule, solitary- 5 mm rll 3/2/2018    On home oxygen therapy 3/15/2018    Osteoarthritis of right knee 11/28/2012    Persistent atrial fibrillation 3/1/2018    Pulmonary embolus- large involved central PA, unprovoked. 3/1/2018    Type 2 diabetes mellitus, controlled 7/16/2013    Type 2 diabetes, controlled, with neuropathy 1/20/2014    Type 2 diabetes, uncontrolled, with neuropathy, onset 2003 11/28/2012         Past Surgical History:   Procedure Laterality Date    Cardioversion N/A 4/10/2018    [facility]    Cardioversion/Defibrillation N/A 5/9/2018    [facility] CATH [facility]    COLONOSCOPY N/A 10/5/2015    [facility] ENDO (4TH [facility]    COLONOSCOPY N/A 8/20/2014    [facility] at [facility] ENDO (4TH F[facility])    thumb surgery       Social History     Tobacco Use    Smoking status: Never Smoker    Smokeless tobacco: Never Used   Substance Use Topics    Alcohol use: Yes     Comment: rare    Drug use: No     Family History   Problem Relation Age of Onset     "Hypertension Father     Heart disease Father         chf    Cancer Brother         colon    Cataracts Mother     Glaucoma Mother     Pneumonia Mother     Cancer Sister         lymphoma    Cancer Brother         prostate ce    Melanoma Neg Hx     Amblyopia Neg Hx     Blindness Neg Hx     Macular degeneration Neg Hx     Retinal detachment Neg Hx     Strabismus Neg Hx     Diabetes Neg Hx      Review of patient's allergies indicates:   Allergen Reactions    Codeine Nausea And Vomiting     Other reaction(s): Nausea    Benicar  [olmesartan]      Other reaction(s): SPOTS IN FRONT OF EYES       Performance Status:The patient's activity level is functions out of house.      Review of Systems:  a review of eleven systems covering constitutional, Eye, HEENT, Psych, Respiratory, Cardiac, GI, , Musculoskeletal, Endocrine, Dermatologic was negative except for pertinent findings as listed ABOVE and below:  pertinent positive as above, rest is good       Exam:Comprehensive exam done. /82 (BP Location: Left arm, Patient Position: Sitting)   Pulse 104   Ht 6' 1" (1.854 m)   Wt 113 kg (249 lb 1.9 oz)   SpO2 96% Comment: on room air  BMI 32.87 kg/m²   Exam included Vitals as listed, and patient's appearance and affect and alertness and mood, oral exam for yeast and hygiene and pharynx lesions and Mallapatti (M) score, neck with inspection for jvd and masses and thyroid abnormalities and lymph nodes (supraclavicular and infraclavicular nodes and axillary also examined and noted if abn), chest exam included symmetry and effort and fremitus and percussion and auscultation, cardiac exam included rhythm and gallops and murmur and rubs and jvd and edema, abdominal exam for mass and hepatosplenomegaly and tenderness and hernias and bowel sounds, Musculoskeletal exam with muscle tone and posture and mobility/gait and  strength, and skin for rashes and cyanosis and pallor and turgor, extremity for clubbing.  " Findings were normal except for pertinent findings listed below:  M3, chest is symmetric, no distress, normal percussion, normal fremitus and good normal breath sounds  Rrr, no murmur , mild edema +1    Radiographs (ct chest and cxr) reviewed: view by direct vision  lg pe on ct earlier march-- ct chest sept 2018 new nodule noted but lungs look better otherwise- old nodules better.  CTA Chest Non-Coronary   Status: Final result   MyChart Results Release     MyChart Status: Active Results Release   PACS Images     Show images for CTA Chest Non-Coronary   Reviewed By Medina Cortze MD on 3/1/2018 16:40   External Result Report     External Result Report   Narrative     EXAMINATION:  CTA CHEST NON CORONARY    CLINICAL HISTORY:  Chest pain, acute, PE suspected, intermed prob, positive D-dimer;Chest pain, unspecified    TECHNIQUE:  Low dose axial images, sagittal and coronal reformations were obtained from the thoracic inlet to the lung bases.  Timing was optimized to evaluate the pulmonary arteries.    COMPARISON:  Chest radiographs 02/27/2018    FINDINGS:  The visualized thyroid gland is unremarkable.  No supraclavicular lymphadenopathy is seen.  No mediastinal or hilar lymphadenopathy is seen.  No axillary lymphadenopathy is seen.  No acute esophageal abnormality is seen.    The thoracic aorta is normal in caliber.  There is mild atherosclerosis in the aortic arch.  Moderate coronary artery calcification is also noted.    Pulmonary arterial bolus timing is good.  The study is positive for pulmonary embolism.  There are filling defects within the central pulmonary arteries bilaterally with involvement of the distal aspect of the main left pulmonary artery as well as the descending right and left pulmonary artery branches.  Segmental branches involving the right middle lobe, right upper lobe, left upper lobe, lingula and bilateral lower lobes are also affected.    There is a small wedge-shaped opacity in the  superior segment of the left lower lobe on axial image 108 consistent with a small pulmonary infarct.  There is flattening of the interventricular septum.  No focal alveolar consolidation is seen.  There is an approximately 5 mm pulmonary nodule within the anterior basal segment of the right lower lobe.  No pleural effusion is seen.  There is an approximately 6 mm nodule along the lateral segment of the right middle lobe.  5 mm pulmonary nodule noted in the superior segment of the left lower lobe.  Small ground-glass opacities in the right middle lobe laterally may represent additional small pulmonary infarcts.    No acute findings are noted in the visualized upper abdomen.  There is cholelithiasis.  Nonspecific perinephric stranding is seen bilaterally, likely chronic.    No acute osseous abnormality is seen.  There is multilevel thoracic spondylosis.  Calcific tendinitis involving the left rotator cuff is also noted.   Impression       Positive study demonstrating bilateral central and segmental pulmonary emboli, as detailed above.  Small pulmonary infarct involving the superior segment of the left lower lobe and possible small pulmonary infarcts involving the lateral segment of the right middle lobe also noted.  There is also flattening of the interventricular septum which may indicate developing right heart strain.    Small pulmonary nodules in the bilateral lungs measuring up to 6 mm.  Single 1 year follow up CT of the thorax is recommended for surveillance.    Incidental note of cholelithiasis.    This report was flagged in Epic as abnormal on 3/1/2018 at 8:14 am.    COMMUNICATION  This critical result was discovered/received at 8:15 a.m. on 03/01/2018.  The critical information above was relayed directly by me by telephone to Dr. Christopher Cortez on 03/01/2018 at 8:30 a.m..      Electronically signed by: Tita Alves MD  Date: 03/01/2018  Time: 08:32    Encounter     View Encounter              Labs reviewed     PFT was not done       Plan:  Clinical impression is apparently straight forward and impression with management as below.    Des was seen today for follow-up.    Diagnoses and all orders for this visit:    Chronic sinus complaints  -     amoxicillin-clavulanate 875-125mg (AUGMENTIN) 875-125 mg per tablet; Take 1 tablet by mouth 2 (two) times daily.    Cough  -     budesonide-formoterol 160-4.5 mcg (SYMBICORT) 160-4.5 mcg/actuation HFAA; Inhale 2 puffs into the lungs every 12 (twelve) hours. Controller  -     albuterol (ACCUNEB) 1.25 mg/3 mL Nebu; Take 3 mLs (1.25 mg total) by nebulization every 4 (four) hours as needed (cough or wheeze). Rescue  -     albuterol (PROVENTIL/VENTOLIN HFA) 90 mcg/actuation inhaler; 2 puffs every 4 hours as needed for cough, wheeze, or shortness of breath    Bronchitis, chronic, mucopurulent  -     budesonide-formoterol 160-4.5 mcg (SYMBICORT) 160-4.5 mcg/actuation HFAA; Inhale 2 puffs into the lungs every 12 (twelve) hours. Controller  -     albuterol (ACCUNEB) 1.25 mg/3 mL Nebu; Take 3 mLs (1.25 mg total) by nebulization every 4 (four) hours as needed (cough or wheeze). Rescue  -     albuterol (PROVENTIL/VENTOLIN HFA) 90 mcg/actuation inhaler; 2 puffs every 4 hours as needed for cough, wheeze, or shortness of breath  -     amoxicillin-clavulanate 875-125mg (AUGMENTIN) 875-125 mg per tablet; Take 1 tablet by mouth 2 (two) times daily.  -     fluticasone-umeclidin-vilanter (TRELEGY ELLIPTA) 100-62.5-25 mcg DsDv; Inhale 1 puff into the lungs once daily.    Lung nodule, solitary- 5 mm rll  -     CT Chest Without Contrast; Future    Bronchiectasis without complication  -     budesonide-formoterol 160-4.5 mcg (SYMBICORT) 160-4.5 mcg/actuation HFAA; Inhale 2 puffs into the lungs every 12 (twelve) hours. Controller  -     albuterol (ACCUNEB) 1.25 mg/3 mL Nebu; Take 3 mLs (1.25 mg total) by nebulization every 4 (four) hours as needed (cough or wheeze). Rescue  -     albuterol  (PROVENTIL/VENTOLIN HFA) 90 mcg/actuation inhaler; 2 puffs every 4 hours as needed for cough, wheeze, or shortness of breath  -     fluticasone-umeclidin-vilanter (TRELEGY ELLIPTA) 100-62.5-25 mcg DsDv; Inhale 1 puff into the lungs once daily.    Irregular heartbeat  -     Ambulatory consult to Cardiology  -     sotalol (BETAPACE) 80 MG tablet; Take 1 tablet (80 mg total) by mouth 2 (two) times daily. Start Sotalol 3 days before cardioversion        Follow up in about 1 year (around 8/29/2020), or if symptoms worsen or fail to improve.    Discussed with patient above for education the following:      Patient Instructions   For sinus infection- augmentin- may work for bronchitis if needed.    Try trelegy oce daily in place of symbicort as trial- use best medication.      Could follow up ct chest but nodules seen thus far are not cancer by scans since blood clot.      Renewed  stadol and refer to cardiology

## 2019-08-29 NOTE — PATIENT INSTRUCTIONS
For sinus infection- augmentin- may work for bronchitis if needed.    Try trelegy oce daily in place of symbicort as trial- use best medication.      Could follow up ct chest but nodules seen thus far are not cancer by scans since blood clot.      Renewed  stadol and refer to cardiology

## 2019-09-01 RX ORDER — METFORMIN HYDROCHLORIDE 500 MG/1
TABLET, EXTENDED RELEASE ORAL
Qty: 120 TABLET | Refills: 0 | Status: SHIPPED | OUTPATIENT
Start: 2019-09-01 | End: 2019-10-02 | Stop reason: SDUPTHER

## 2019-09-19 ENCOUNTER — TELEPHONE (OUTPATIENT)
Dept: INTERNAL MEDICINE | Facility: CLINIC | Age: 74
End: 2019-09-19

## 2019-09-19 ENCOUNTER — PATIENT MESSAGE (OUTPATIENT)
Dept: PULMONOLOGY | Facility: CLINIC | Age: 74
End: 2019-09-19

## 2019-09-19 NOTE — TELEPHONE ENCOUNTER
Hi, please call him -- I received a fax from his [employer] that he is taking both metopolol from me and sotalol from Dr Eladio Sandoval.    I recommend that he ask Dr. Sandoval whether he should remain on metoprolol from me as well.    Also he is due to see me back, please offer him an appt.    Let me know if patient has any questions.  Thank you, Christopher Cortez

## 2019-09-26 RX ORDER — INSULIN GLARGINE 100 [IU]/ML
INJECTION, SOLUTION SUBCUTANEOUS
Qty: 15 ML | Refills: 0 | Status: SHIPPED | OUTPATIENT
Start: 2019-09-26 | End: 2019-10-02 | Stop reason: SDUPTHER

## 2019-10-01 RX ORDER — METFORMIN HYDROCHLORIDE 500 MG/1
TABLET, EXTENDED RELEASE ORAL
Qty: 120 TABLET | Refills: 0 | OUTPATIENT
Start: 2019-10-01

## 2019-10-02 ENCOUNTER — LAB VISIT (OUTPATIENT)
Dept: LAB | Facility: HOSPITAL | Age: 74
End: 2019-10-02
Attending: INTERNAL MEDICINE
Payer: MEDICARE

## 2019-10-02 ENCOUNTER — OFFICE VISIT (OUTPATIENT)
Dept: INTERNAL MEDICINE | Facility: CLINIC | Age: 74
End: 2019-10-02
Payer: MEDICARE

## 2019-10-02 VITALS
HEIGHT: 73 IN | SYSTOLIC BLOOD PRESSURE: 124 MMHG | BODY MASS INDEX: 33.19 KG/M2 | WEIGHT: 250.44 LBS | HEART RATE: 108 BPM | DIASTOLIC BLOOD PRESSURE: 82 MMHG | OXYGEN SATURATION: 98 %

## 2019-10-02 DIAGNOSIS — I10 ESSENTIAL HYPERTENSION: ICD-10-CM

## 2019-10-02 DIAGNOSIS — I48.0 PAF (PAROXYSMAL ATRIAL FIBRILLATION): ICD-10-CM

## 2019-10-02 PROCEDURE — 99999 PR PBB SHADOW E&M-EST. PATIENT-LVL IV: CPT | Mod: PBBFAC,,, | Performed by: INTERNAL MEDICINE

## 2019-10-02 PROCEDURE — 99214 PR OFFICE/OUTPT VISIT, EST, LEVL IV, 30-39 MIN: ICD-10-PCS | Mod: S$PBB,,, | Performed by: INTERNAL MEDICINE

## 2019-10-02 PROCEDURE — 36415 COLL VENOUS BLD VENIPUNCTURE: CPT

## 2019-10-02 PROCEDURE — 99214 OFFICE O/P EST MOD 30 MIN: CPT | Mod: PBBFAC | Performed by: INTERNAL MEDICINE

## 2019-10-02 PROCEDURE — 99999 PR PBB SHADOW E&M-EST. PATIENT-LVL IV: ICD-10-PCS | Mod: PBBFAC,,, | Performed by: INTERNAL MEDICINE

## 2019-10-02 PROCEDURE — 90662 IIV NO PRSV INCREASED AG IM: CPT | Mod: PBBFAC

## 2019-10-02 PROCEDURE — 83036 HEMOGLOBIN GLYCOSYLATED A1C: CPT

## 2019-10-02 PROCEDURE — 99214 OFFICE O/P EST MOD 30 MIN: CPT | Mod: S$PBB,,, | Performed by: INTERNAL MEDICINE

## 2019-10-02 RX ORDER — INSULIN GLARGINE 100 [IU]/ML
38 INJECTION, SOLUTION SUBCUTANEOUS NIGHTLY
Qty: 45 ML | Refills: 11 | Status: SHIPPED | OUTPATIENT
Start: 2019-10-02 | End: 2020-05-06 | Stop reason: SDUPTHER

## 2019-10-02 RX ORDER — METFORMIN HYDROCHLORIDE 500 MG/1
1000 TABLET, EXTENDED RELEASE ORAL 2 TIMES DAILY WITH MEALS
Qty: 360 TABLET | Refills: 11 | Status: SHIPPED | OUTPATIENT
Start: 2019-10-02 | End: 2021-01-15 | Stop reason: SDUPTHER

## 2019-10-02 RX ORDER — AMLODIPINE BESYLATE 10 MG/1
10 TABLET ORAL DAILY
Qty: 90 TABLET | Refills: 11 | Status: SHIPPED | OUTPATIENT
Start: 2019-10-02 | End: 2019-10-15

## 2019-10-02 RX ORDER — LOSARTAN POTASSIUM AND HYDROCHLOROTHIAZIDE 12.5; 5 MG/1; MG/1
1 TABLET ORAL DAILY
Qty: 90 TABLET | Refills: 11 | Status: SHIPPED | OUTPATIENT
Start: 2019-10-02 | End: 2019-11-03 | Stop reason: SDUPTHER

## 2019-10-02 NOTE — PROGRESS NOTES
Subjective:       Patient ID: Des Junior is a 74 y.o. male.    Chief Complaint: Follow-up (wants to discuss metoprolol succinate (TOPROL-XL) 25 MG 24 hr tablet)    Patient is here for followup for chronic conditions.    Would like flu vax    occ urinary incont in urge pattern.    Review of Systems   Constitutional: Negative for activity change and unexpected weight change.   HENT: Negative for hearing loss, rhinorrhea and trouble swallowing.    Eyes: Negative for discharge and visual disturbance.   Respiratory: Negative for chest tightness and wheezing.    Cardiovascular: Negative for chest pain and palpitations.   Gastrointestinal: Negative for blood in stool, constipation, diarrhea and vomiting.   Endocrine: Positive for polyuria. Negative for polydipsia.   Genitourinary: Positive for urgency. Negative for difficulty urinating and hematuria.   Musculoskeletal: Negative for arthralgias, joint swelling and neck pain.   Neurological: Negative for weakness and headaches.   Psychiatric/Behavioral: Negative for confusion and dysphoric mood.       Objective:      Physical Exam   Constitutional: He is oriented to person, place, and time. He appears well-developed and well-nourished. No distress.   HENT:   Head: Normocephalic and atraumatic.   Mouth/Throat: No oropharyngeal exudate.   Eyes: No scleral icterus.   Neck: Normal range of motion. No thyromegaly present.   Cardiovascular: Normal rate, regular rhythm and normal heart sounds. Exam reveals no gallop and no friction rub.   No murmur heard.  Pulmonary/Chest: Effort normal and breath sounds normal. No respiratory distress. He has no wheezes. He has no rales.   Abdominal: Soft. Bowel sounds are normal. He exhibits no distension and no mass. There is no tenderness. There is no rebound and no guarding.   Musculoskeletal: Normal range of motion. He exhibits no edema.   Lymphadenopathy:     He has no cervical adenopathy.   Neurological: He is alert and oriented  to person, place, and time.   Skin: No lesion noted. He is not diaphoretic.   Psychiatric: He has a normal mood and affect. Thought content normal.       Assessment:       1. Type 2 diabetes, uncontrolled, with neuropathy, onset 2003    2. PAF (paroxysmal atrial fibrillation), CHADS-VAS score 3, onset 3/2018    3. Essential hypertension        Plan:       Des was seen today for follow-up.    Diagnoses and all orders for this visit:    Type 2 diabetes, uncontrolled, with neuropathy, onset 2003  -     Hemoglobin A1c; Future  -     metFORMIN (GLUCOPHAGE-XR) 500 MG 24 hr tablet; Take 2 tablets (1,000 mg total) by mouth 2 (two) times daily with meals.  -     insulin (LANTUS SOLOSTAR U-100 INSULIN) glargine 100 units/mL (3mL) SubQ pen; Inject 38 Units into the skin every evening. INJECT 28 UNITS INTO THE SKIN EVERY NIGHT    PAF (paroxysmal atrial fibrillation), CHADS-VAS score 3, onset 3/2018  -     Ambulatory Referral to Electrophysiology  I am not sure abt being on both sotatlol and toprol. He does not have good rate control today.    Essential hypertension  -     losartan-hydrochlorothiazide 50-12.5 mg (HYZAAR) 50-12.5 mg per tablet; Take 1 tablet by mouth once daily.  -     amLODIPine (NORVASC) 10 MG tablet; Take 1 tablet (10 mg total) by mouth once daily.  controlled    Other orders  -     Influenza - High Dose (65+) (PF) (IM)    Urinary urge symptoms --   Patient Instructions   Try the bladder training,         Health Maintenance       Date Due Completion Date    Shingles Vaccine (1 of 2) 03/16/1996 ---    Foot Exam 10/09/2019 10/9/2018    Hemoglobin A1c 01/02/2020 10/2/2019    Lipid Panel 04/04/2020 4/4/2019    Eye Exam 08/05/2020 8/5/2019    High Dose Statin 10/02/2020 10/2/2019    Colonoscopy 10/05/2025 10/5/2015    TETANUS VACCINE 10/09/2028 10/9/2018          Follow up in about 6 months (around 4/2/2020) for Flu vax please.    Future Appointments   Date Time Provider Department Center   10/15/2019  9:00 AM  YASIR Bowens PA-C [facility] [place]   11/7/2019 10:30 AM Wilfredo Corral MD [facility] [place]   3/16/2020 11:40 AM Christopher Cortez MD [facility]

## 2019-10-03 ENCOUNTER — PATIENT MESSAGE (OUTPATIENT)
Dept: INTERNAL MEDICINE | Facility: CLINIC | Age: 74
End: 2019-10-03

## 2019-10-03 LAB
ESTIMATED AVG GLUCOSE: 169 MG/DL (ref 68–131)
HBA1C MFR BLD HPLC: 7.5 % (ref 4–5.6)

## 2019-10-15 ENCOUNTER — PATIENT MESSAGE (OUTPATIENT)
Dept: ENDOCRINOLOGY | Facility: CLINIC | Age: 74
End: 2019-10-15

## 2019-10-15 ENCOUNTER — OFFICE VISIT (OUTPATIENT)
Dept: ENDOCRINOLOGY | Facility: CLINIC | Age: 74
End: 2019-10-15
Payer: MEDICARE

## 2019-10-15 ENCOUNTER — LAB VISIT (OUTPATIENT)
Dept: LAB | Facility: HOSPITAL | Age: 74
End: 2019-10-15
Attending: PHYSICIAN ASSISTANT
Payer: MEDICARE

## 2019-10-15 VITALS
DIASTOLIC BLOOD PRESSURE: 84 MMHG | HEART RATE: 95 BPM | TEMPERATURE: 98 F | BODY MASS INDEX: 33.11 KG/M2 | WEIGHT: 249.81 LBS | HEIGHT: 73 IN | SYSTOLIC BLOOD PRESSURE: 142 MMHG

## 2019-10-15 DIAGNOSIS — E11.65 TYPE 2 DIABETES MELLITUS WITH HYPERGLYCEMIA, WITHOUT LONG-TERM CURRENT USE OF INSULIN: Primary | ICD-10-CM

## 2019-10-15 DIAGNOSIS — E55.9 HYPOVITAMINOSIS D: ICD-10-CM

## 2019-10-15 DIAGNOSIS — E66.9 OBESITY (BMI 30-39.9): ICD-10-CM

## 2019-10-15 DIAGNOSIS — E11.65 TYPE 2 DIABETES MELLITUS WITH HYPERGLYCEMIA, WITHOUT LONG-TERM CURRENT USE OF INSULIN: ICD-10-CM

## 2019-10-15 DIAGNOSIS — E78.5 HYPERLIPIDEMIA ASSOCIATED WITH TYPE 2 DIABETES MELLITUS: ICD-10-CM

## 2019-10-15 DIAGNOSIS — I48.0 PAF (PAROXYSMAL ATRIAL FIBRILLATION): ICD-10-CM

## 2019-10-15 DIAGNOSIS — I15.2 HYPERTENSION ASSOCIATED WITH DIABETES: ICD-10-CM

## 2019-10-15 DIAGNOSIS — E11.59 HYPERTENSION ASSOCIATED WITH DIABETES: ICD-10-CM

## 2019-10-15 DIAGNOSIS — E11.69 HYPERLIPIDEMIA ASSOCIATED WITH TYPE 2 DIABETES MELLITUS: ICD-10-CM

## 2019-10-15 LAB
ALBUMIN SERPL BCP-MCNC: 3.7 G/DL (ref 3.5–5.2)
ANION GAP SERPL CALC-SCNC: 9 MMOL/L (ref 8–16)
BUN SERPL-MCNC: 18 MG/DL (ref 8–23)
CALCIUM SERPL-MCNC: 9.7 MG/DL (ref 8.7–10.5)
CHLORIDE SERPL-SCNC: 103 MMOL/L (ref 95–110)
CO2 SERPL-SCNC: 27 MMOL/L (ref 23–29)
CREAT SERPL-MCNC: 1.1 MG/DL (ref 0.5–1.4)
EST. GFR  (AFRICAN AMERICAN): >60 ML/MIN/1.73 M^2
EST. GFR  (NON AFRICAN AMERICAN): >60 ML/MIN/1.73 M^2
GLUCOSE SERPL-MCNC: 75 MG/DL (ref 70–110)
PHOSPHATE SERPL-MCNC: 3.6 MG/DL (ref 2.7–4.5)
POTASSIUM SERPL-SCNC: 4.4 MMOL/L (ref 3.5–5.1)
SODIUM SERPL-SCNC: 139 MMOL/L (ref 136–145)
URATE SERPL-MCNC: 7.7 MG/DL (ref 3.4–7)

## 2019-10-15 PROCEDURE — 99999 PR PBB SHADOW E&M-EST. PATIENT-LVL V: ICD-10-PCS | Mod: PBBFAC,,, | Performed by: PHYSICIAN ASSISTANT

## 2019-10-15 PROCEDURE — 84550 ASSAY OF BLOOD/URIC ACID: CPT

## 2019-10-15 PROCEDURE — 36415 COLL VENOUS BLD VENIPUNCTURE: CPT | Mod: PO

## 2019-10-15 PROCEDURE — 99214 PR OFFICE/OUTPT VISIT, EST, LEVL IV, 30-39 MIN: ICD-10-PCS | Mod: S$PBB,,, | Performed by: PHYSICIAN ASSISTANT

## 2019-10-15 PROCEDURE — 99214 OFFICE O/P EST MOD 30 MIN: CPT | Mod: S$PBB,,, | Performed by: PHYSICIAN ASSISTANT

## 2019-10-15 PROCEDURE — 80069 RENAL FUNCTION PANEL: CPT

## 2019-10-15 PROCEDURE — 99999 PR PBB SHADOW E&M-EST. PATIENT-LVL V: CPT | Mod: PBBFAC,,, | Performed by: PHYSICIAN ASSISTANT

## 2019-10-15 PROCEDURE — 99215 OFFICE O/P EST HI 40 MIN: CPT | Mod: PBBFAC,PO | Performed by: PHYSICIAN ASSISTANT

## 2019-10-15 RX ORDER — REPAGLINIDE 2 MG/1
2 TABLET ORAL
Qty: 270 TABLET | Refills: 3 | Status: SHIPPED | OUTPATIENT
Start: 2019-10-15 | End: 2020-11-02 | Stop reason: SDUPTHER

## 2019-10-15 NOTE — PROGRESS NOTES
"CC: This 74 y.o. male presents for management of Diabetes    and chronic conditions pending review including HTN, HLP    HPI: was diagnosed with T2DM several years ago.  States his a1c needs to be <7.0% for knee replacement.  Has never been hospitalized r/t DM.  Family hx of DM: daughter  Fhx of thyroid disease:  Denies missing doses of DM medication.   hypoglycemia at home  monitoring BG at home:  Fastin-110.     Diet:   BF-breakfast burrito  LH-sandwich, liver and onions  DN-cereal,   Snacks on cookies, chips.  Mostly water, occ soda.     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     CURRENT DM MEDS: Metformin 1000 mg BID, prandin 1 mg qd, Lantus 38 units qhs    Standards of Care:  Eye exam: Dr. Dow   Podiatry exam:   DE: years ago    PMHx, PSHx: reviewed in epic.  Social Hx: no E/T use.    ROS:   Gen: Appetite good, + weight gain, denies fatigue and weakness.  Skin: Skin is intact and heals well, no rashes, no hair changes  Eyes: Denies visual disturbances  Resp: + VELASCO, no cough  Cardiac: No palpitations, chest pain, no edema   GI: No nausea or vomiting, diarrhea, constipation, or abdominal pain.  /GYN: No nocturia, burning or pain.   MS/Neuro: + knee pain, Denies numbness/ tingling in BLE; Gait steady, speech clear  Psych: Denies drug/ETOH abuse, no hx of depression.  Other systems: negative.    BP (!) 142/84 (BP Location: Right arm, Patient Position: Sitting, BP Method: Medium (Manual))   Pulse 95   Temp 97.5 °F (36.4 °C) (Oral)   Ht 6' 1" (1.854 m)   Wt 113.3 kg (249 lb 12.5 oz)   BMI 32.95 kg/m²      PE:  GENERAL: elderly male, well developed, well nourished.  PSYCH: AAOx3, appropriate mood and affect, pleasant expression, conversant, appears relaxed, well groomed.   EYES: Conjunctiva, corneas clear  NECK: Supple, trachea midline,no thyromegaly or nodules  CHEST: Resp even and unlabored, CTA bilateral.  CARDIAC: RRR, S1, S2 heard, no murmurs  ABDOMEN: Soft, non-tender, non-distended. Obese " abdomen.  VASCULAR: DP pulses +2/4 bilaterally, +2 edema in the left leg, +1 edema in the right leg.  NEURO: ambulates with a cane  SKIN: Skin warm and dry no acanthosis nigracans.  Foot Exam: no sores noted. Maceration on the fifth toe of the right foot.    Protective Sensation (w/ 10 gram monofilament):  Right: Intact  Left: Intact    Visual Inspection:  Normal -  Bilateral and Nails Intact - without Evidence of Foot Deformity- Bilateral    Pedal Pulses:   Right: Present  Left: Present    Posterior tibialis:   Right:Present  Left: Present     Vibratory Sensation  Right:Positive  Left:Positive     Personally reviewed labs below:    Lab Visit on 10/02/2019   Component Date Value Ref Range Status    Hemoglobin A1C 10/02/2019 7.5* 4.0 - 5.6 % Final    Comment: ADA Screening Guidelines:  5.7-6.4%  Consistent with prediabetes  >or=6.5%  Consistent with diabetes  High levels of fetal hemoglobin interfere with the HbA1C  assay. Heterozygous hemoglobin variants (HbS, HgC, etc)do  not significantly interfere with this assay.   However, presence of multiple variants may affect accuracy.      Estimated Avg Glucose 10/02/2019 169* 68 - 131 mg/dL Final         ASSESSMENT and PLAN:    1. Type 2 diabetes mellitus with hyperglycemia, without long-term current use of insulin  Renal function panel    Uric acid    repaglinide (PRANDIN) 2 MG tablet   2. Hypertension associated with diabetes     3. Hyperlipidemia associated with type 2 diabetes mellitus, baseline      4. Hypovitaminosis D     5. Obesity (BMI 30-39.9)     6. PAF (paroxysmal atrial fibrillation), CHADS-VAS score 3, onset 3/2018       T2DM with hyperglycemia-A1c today. Increase prandin to 2 mg with meals. Continue Prandin and Lantus.Discussed DM, progression of disease, long term complications, tx options.   Discussed A1c and BG goals.   Reviewed  hypoglycemia, s/s and appropriate tx.   Instructed to monitor BG and bring meter/ log to every clinic visit.   -  takes ASA, ARB, statin    HTN - controlled, continue meds as previously prescribed and monitor.     HLP - LDL , on statin therapy, LFTs WNL  Hypovitaminosis d-stable-monitor  Obesity-Body mass index is 32.95 kg/m². Increase exercise to 30 minutes daily.  [id number] hypoglycemia     Follow-up: in 3 months with lab prior

## 2019-10-22 RX ORDER — INSULIN DEGLUDEC 100 U/ML
INJECTION, SOLUTION SUBCUTANEOUS
Qty: 1 SYRINGE | Refills: 0 | COMMUNITY
Start: 2019-10-22 | End: 2020-05-06

## 2019-11-03 DIAGNOSIS — I10 ESSENTIAL HYPERTENSION: ICD-10-CM

## 2019-11-04 RX ORDER — LOSARTAN POTASSIUM AND HYDROCHLOROTHIAZIDE 12.5; 5 MG/1; MG/1
TABLET ORAL
Qty: 30 TABLET | Refills: 0 | Status: SHIPPED | OUTPATIENT
Start: 2019-11-04 | End: 2019-11-25

## 2019-11-07 ENCOUNTER — OFFICE VISIT (OUTPATIENT)
Dept: DERMATOLOGY | Facility: CLINIC | Age: 74
End: 2019-11-07
Payer: MEDICARE

## 2019-11-07 VITALS — HEIGHT: 73 IN | BODY MASS INDEX: 33 KG/M2 | WEIGHT: 249 LBS

## 2019-11-07 DIAGNOSIS — Z12.83 SKIN CANCER SCREENING: ICD-10-CM

## 2019-11-07 DIAGNOSIS — L57.0 AK (ACTINIC KERATOSIS): Primary | ICD-10-CM

## 2019-11-07 PROCEDURE — 99999 PR PBB SHADOW E&M-EST. PATIENT-LVL III: CPT | Mod: PBBFAC,,, | Performed by: DERMATOLOGY

## 2019-11-07 PROCEDURE — 99999 PR PBB SHADOW E&M-EST. PATIENT-LVL III: ICD-10-PCS | Mod: PBBFAC,,, | Performed by: DERMATOLOGY

## 2019-11-07 PROCEDURE — 99213 PR OFFICE/OUTPT VISIT, EST, LEVL III, 20-29 MIN: ICD-10-PCS | Mod: S$PBB,,, | Performed by: DERMATOLOGY

## 2019-11-07 PROCEDURE — 99213 OFFICE O/P EST LOW 20 MIN: CPT | Mod: PBBFAC,PO | Performed by: DERMATOLOGY

## 2019-11-07 PROCEDURE — 99213 OFFICE O/P EST LOW 20 MIN: CPT | Mod: S$PBB,,, | Performed by: DERMATOLOGY

## 2019-11-07 NOTE — PROGRESS NOTES
Subjective:       Patient ID:  Des Junior is a 74 y.o. male who presents for   Chief Complaint   Patient presents with    Actinic Keratosis     HPI    Last o/v 8/8/2019  AK (actinic keratosis)     Doing better.     Cont 5fu x at least 2 more cycles to face, ears, scalp.     Then start on arms and dorsal hands.     Proper application of medications and or care for affected area(s) and condition(s) reviewed.  Discussed with patient the etiology and pathogenesis of the disease or skin lesion(s) and possible treatments and aggravators.    Reviewed with patient different treatment options and associated risks.     Patient instructed in importance in daily sun protection. Sun avoidance and topical protection discussed.      Patient encouraged to wear hat for all outdoor exposure.      Also discussed sun protective clothing.      Patient presents today for AK follow up the face, ears and scalp. Patient is using efudex cream with improvement.     Review of Systems   Constitutional: Negative for fever, chills and fatigue.   HENT: Negative for congestion and sore throat.    Respiratory: Negative for cough and shortness of breath.    Musculoskeletal: Positive for arthralgias. Negative for joint swelling.   Skin: Positive for dry skin and wears hat. Negative for daily sunscreen use and activity-related sunscreen use.   Hematologic/Lymphatic: Bruises/bleeds easily.        Objective:    Physical Exam   Constitutional: He appears well-developed and well-nourished. No distress.   Eyes: No conjunctival no injection.   Neurological: He is alert and oriented to person, place, and time. He is not disoriented.   Psychiatric: He has a normal mood and affect.   Skin:   Areas Examined (abnormalities noted in diagram):   Scalp / Hair Palpated and Inspected  Head / Face Inspection Performed  Neck Inspection Performed  RUE Inspected  LUE Inspection Performed  Nails and Digits Inspection Performed                   Diagram  Legend     Erythematous scaling macule/papule c/w actinic keratosis       Vascular papule c/w angioma      Pigmented verrucoid papule/plaque c/w seborrheic keratosis      Yellow umbilicated papule c/w sebaceous hyperplasia      Irregularly shaped tan macule c/w lentigo     1-2 mm smooth white papules consistent with Milia      Movable subcutaneous cyst with punctum c/w epidermal inclusion cyst      Subcutaneous movable cyst c/w pilar cyst      Firm pink to brown papule c/w dermatofibroma      Pedunculated fleshy papule(s) c/w skin tag(s)      Evenly pigmented macule c/w junctional nevus     Mildly variegated pigmented, slightly irregular-bordered macule c/w mildly atypical nevus      Flesh colored to evenly pigmented papule c/w intradermal nevus       Pink pearly papule/plaque c/w basal cell carcinoma      Erythematous hyperkeratotic cursted plaque c/w SCC      Surgical scar with no sign of skin cancer recurrence      Open and closed comedones      Inflammatory papules and pustules      Verrucoid papule consistent consistent with wart     Erythematous eczematous patches and plaques     Dystrophic onycholytic nail with subungual debris c/w onychomycosis     Umbilicated papule    Erythematous-base heme-crusted tan verrucoid plaque consistent with inflamed seborrheic keratosis     Erythematous Silvery Scaling Plaque c/w Psoriasis     See annotation      Assessment / Plan:        AK (actinic keratosis)  Discussed all of the following:  Actinic keratosis is a skin growth caused by sun damage. These growths are not cancer, but they may develop into skin cancer (precancerous). Many people get these growths, especially as they age. This is because of cumulative sun exposure over years. Multiple growths are called actinic keratoses.    Improving.  Condition is stable.  We will continue present management.  Patient to watch for recurrence or flares or worsening and to call the clinic for a follow up appointment for  such.  Cont 5fu cream regularly to dry spots.  Proper application of medications and or care for affected area(s) and condition(s) reviewed.  Reviewed with patient different treatment options and associated risks.    Skin cancer screening  No other seriously suspicious lesions noted for body areas examined today.  Patient to inform of us if they notice any dark or changing or suspicious spots in areas not examined today.  Follow up for routine monitoring recommended to patient.    Instructed patient to watch out for dark spots, bleeding spots, crusty spots, sores that break out repeatedly in the same spot.  These characteristics are risk factors for skin cancer, and patient is to notify us if they experience any of these symptoms.  Recommended a full body skin check for moles and skin cancer screening to be done later.    Mass  Pt reports drained on its own.  Patient to watch for recurrence or flares or worsening and to call the clinic for a follow up appointment for such.  Discussed with patient the likelihood that this lesion is an epidermal cyst caused by an involuted lining of skin with dead skin trapped inside.  Cysts do not have a malignant potential but can become infected and turn into abscesses with possible cellulitis.  Discussed the option of warm compresses if infected with oral antibiotics.  Discussed the option of surgical excision with scar, possible recurrence, hematoma, and infection.  Patient to consider these options.             Follow up in about 4 months (around 3/7/2020).

## 2019-11-25 ENCOUNTER — PATIENT MESSAGE (OUTPATIENT)
Dept: INTERNAL MEDICINE | Facility: CLINIC | Age: 74
End: 2019-11-25

## 2019-11-25 DIAGNOSIS — I10 ESSENTIAL HYPERTENSION: Primary | ICD-10-CM

## 2019-11-25 RX ORDER — LOSARTAN POTASSIUM 50 MG/1
50 TABLET ORAL DAILY
Qty: 90 TABLET | Refills: 11 | Status: SHIPPED | OUTPATIENT
Start: 2019-11-25 | End: 2021-02-09 | Stop reason: SDUPTHER

## 2019-11-25 RX ORDER — HYDROCHLOROTHIAZIDE 12.5 MG/1
12.5 TABLET ORAL DAILY
Qty: 90 TABLET | Refills: 11 | Status: SHIPPED | OUTPATIENT
Start: 2019-11-25 | End: 2019-12-06

## 2019-12-06 ENCOUNTER — PATIENT MESSAGE (OUTPATIENT)
Dept: INTERNAL MEDICINE | Facility: CLINIC | Age: 74
End: 2019-12-06

## 2019-12-06 VITALS — DIASTOLIC BLOOD PRESSURE: 66 MMHG | SYSTOLIC BLOOD PRESSURE: 119 MMHG

## 2019-12-06 DIAGNOSIS — I10 ESSENTIAL HYPERTENSION: ICD-10-CM

## 2019-12-06 RX ORDER — HYDROCHLOROTHIAZIDE 25 MG/1
25 TABLET ORAL DAILY
Qty: 90 TABLET | Refills: 11 | Status: SHIPPED | OUTPATIENT
Start: 2019-12-06 | End: 2021-01-06 | Stop reason: SDUPTHER

## 2019-12-17 ENCOUNTER — OFFICE VISIT (OUTPATIENT)
Dept: INTERNAL MEDICINE | Facility: CLINIC | Age: 74
End: 2019-12-17
Payer: MEDICARE

## 2019-12-17 VITALS
WEIGHT: 250 LBS | OXYGEN SATURATION: 96 % | HEIGHT: 73 IN | SYSTOLIC BLOOD PRESSURE: 138 MMHG | HEART RATE: 61 BPM | BODY MASS INDEX: 33.13 KG/M2 | DIASTOLIC BLOOD PRESSURE: 72 MMHG

## 2019-12-17 DIAGNOSIS — I73.00 RAYNAUD'S PHENOMENON WITHOUT GANGRENE: Primary | ICD-10-CM

## 2019-12-17 PROCEDURE — 99999 PR PBB SHADOW E&M-EST. PATIENT-LVL III: CPT | Mod: PBBFAC,,, | Performed by: INTERNAL MEDICINE

## 2019-12-17 PROCEDURE — 99214 OFFICE O/P EST MOD 30 MIN: CPT | Mod: S$PBB,,, | Performed by: INTERNAL MEDICINE

## 2019-12-17 PROCEDURE — 1159F PR MEDICATION LIST DOCUMENTED IN MEDICAL RECORD: ICD-10-PCS | Mod: ,,, | Performed by: INTERNAL MEDICINE

## 2019-12-17 PROCEDURE — 1126F AMNT PAIN NOTED NONE PRSNT: CPT | Mod: ,,, | Performed by: INTERNAL MEDICINE

## 2019-12-17 PROCEDURE — 1126F PR PAIN SEVERITY QUANTIFIED, NO PAIN PRESENT: ICD-10-PCS | Mod: ,,, | Performed by: INTERNAL MEDICINE

## 2019-12-17 PROCEDURE — 99214 PR OFFICE/OUTPT VISIT, EST, LEVL IV, 30-39 MIN: ICD-10-PCS | Mod: S$PBB,,, | Performed by: INTERNAL MEDICINE

## 2019-12-17 PROCEDURE — 99213 OFFICE O/P EST LOW 20 MIN: CPT | Mod: PBBFAC | Performed by: INTERNAL MEDICINE

## 2019-12-17 PROCEDURE — 99999 PR PBB SHADOW E&M-EST. PATIENT-LVL III: ICD-10-PCS | Mod: PBBFAC,,, | Performed by: INTERNAL MEDICINE

## 2019-12-17 PROCEDURE — 1159F MED LIST DOCD IN RCRD: CPT | Mod: ,,, | Performed by: INTERNAL MEDICINE

## 2019-12-17 NOTE — PROGRESS NOTES
Subjective:       Patient ID: Des Junior is a 74 y.o. male.    Chief Complaint: Numbness (fingers on right hand)    Patient is here for followup for chronic conditions.    R hand 2nd and 3rd fingers numb feeling. 1 week of symptoms and new. Further describes symptoms as turning white/purple and numb when hand is exposed to cold water/temps. He has had these type of symptoms for many yrs. Normal use of R hand, no weakness, no numbness when hands not cold.    Review of Systems   Constitutional: Negative for activity change and unexpected weight change.   HENT: Negative for hearing loss, rhinorrhea and trouble swallowing.    Eyes: Negative for discharge and visual disturbance.   Respiratory: Negative for chest tightness and wheezing.    Cardiovascular: Negative for chest pain and palpitations.   Gastrointestinal: Negative for blood in stool, constipation, diarrhea and vomiting.   Endocrine: Negative for polydipsia and polyuria.   Genitourinary: Negative for difficulty urinating, hematuria and urgency.   Musculoskeletal: Negative for arthralgias, joint swelling and neck pain.   Neurological: Positive for numbness (fingers R hand). Negative for weakness and headaches.   Psychiatric/Behavioral: Negative for confusion and dysphoric mood.       Objective:      Physical Exam   Constitutional: He is oriented to person, place, and time. He appears well-developed and well-nourished. No distress.   Cardiovascular: Normal rate, regular rhythm and normal heart sounds.   Musculoskeletal: He exhibits no edema, tenderness or deformity.   2nd and 3rd digits R hand appears normal, normal strength and gross sensation.  Neg Tinels and Phalens, R wrist.  Normal R hand radial pulse   Neurological: He is alert and oriented to person, place, and time.   Skin: He is not diaphoretic.       Assessment:       No diagnosis found.    Plan:       Here for symptoms or Raynaud phenomena.  Pt given handouts.  Discussed keeping the hand  warm.  Discussed CTS is on ddx but color change and affect from cold more c/w Raynaud.    There are no diagnoses linked to this encounter.    Health Maintenance       Date Due Completion Date    Shingles Vaccine (1 of 2) 03/16/1996 ---    Hemoglobin A1c 01/02/2020 10/2/2019    Lipid Panel 04/04/2020 4/4/2019    Eye Exam 08/05/2020 8/5/2019    Foot Exam 10/15/2020 10/15/2019    High Dose Statin 12/17/2020 12/17/2019    Colonoscopy 10/05/2025 10/5/2015    TETANUS VACCINE 10/09/2028 10/9/2018          No follow-ups on file.    Future Appointments   Date Time Provider Department Center   2/11/2020 10:00 AM LAB, [facility] [place]   2/18/2020  9:30 AM YASIR Bowens PA-C [facility] [place]   3/5/2020  2:00 PM Wilfredo Corral MD [facility] [place]   3/16/2020 11:40 AM Christopher Cortez MD [facility]

## 2020-02-11 ENCOUNTER — LAB VISIT (OUTPATIENT)
Dept: LAB | Facility: HOSPITAL | Age: 75
End: 2020-02-11
Attending: PHYSICIAN ASSISTANT
Payer: MEDICARE

## 2020-02-11 DIAGNOSIS — E11.65 TYPE 2 DIABETES MELLITUS WITH HYPERGLYCEMIA, WITHOUT LONG-TERM CURRENT USE OF INSULIN: ICD-10-CM

## 2020-02-11 LAB
ALBUMIN SERPL BCP-MCNC: 3.6 G/DL (ref 3.5–5.2)
ANION GAP SERPL CALC-SCNC: 11 MMOL/L (ref 8–16)
BUN SERPL-MCNC: 20 MG/DL (ref 8–23)
CALCIUM SERPL-MCNC: 9.9 MG/DL (ref 8.7–10.5)
CHLORIDE SERPL-SCNC: 103 MMOL/L (ref 95–110)
CO2 SERPL-SCNC: 27 MMOL/L (ref 23–29)
CREAT SERPL-MCNC: 1.3 MG/DL (ref 0.5–1.4)
EST. GFR  (AFRICAN AMERICAN): >60 ML/MIN/1.73 M^2
EST. GFR  (NON AFRICAN AMERICAN): 54.1 ML/MIN/1.73 M^2
ESTIMATED AVG GLUCOSE: 166 MG/DL (ref 68–131)
GLUCOSE SERPL-MCNC: 144 MG/DL (ref 70–110)
HBA1C MFR BLD HPLC: 7.4 % (ref 4–5.6)
PHOSPHATE SERPL-MCNC: 3.2 MG/DL (ref 2.7–4.5)
POTASSIUM SERPL-SCNC: 4.6 MMOL/L (ref 3.5–5.1)
SODIUM SERPL-SCNC: 141 MMOL/L (ref 136–145)
URATE SERPL-MCNC: 8.1 MG/DL (ref 3.4–7)

## 2020-02-11 PROCEDURE — 80069 RENAL FUNCTION PANEL: CPT

## 2020-02-11 PROCEDURE — 36415 COLL VENOUS BLD VENIPUNCTURE: CPT | Mod: PO

## 2020-02-11 PROCEDURE — 84550 ASSAY OF BLOOD/URIC ACID: CPT

## 2020-02-11 PROCEDURE — 83036 HEMOGLOBIN GLYCOSYLATED A1C: CPT

## 2020-02-18 ENCOUNTER — PATIENT MESSAGE (OUTPATIENT)
Dept: ENDOCRINOLOGY | Facility: CLINIC | Age: 75
End: 2020-02-18

## 2020-02-18 ENCOUNTER — OFFICE VISIT (OUTPATIENT)
Dept: ENDOCRINOLOGY | Facility: CLINIC | Age: 75
End: 2020-02-18
Payer: MEDICARE

## 2020-02-18 VITALS
TEMPERATURE: 98 F | SYSTOLIC BLOOD PRESSURE: 136 MMHG | HEIGHT: 73 IN | DIASTOLIC BLOOD PRESSURE: 90 MMHG | WEIGHT: 254.88 LBS | BODY MASS INDEX: 33.78 KG/M2 | HEART RATE: 58 BPM

## 2020-02-18 DIAGNOSIS — E78.5 HYPERLIPIDEMIA ASSOCIATED WITH TYPE 2 DIABETES MELLITUS: ICD-10-CM

## 2020-02-18 DIAGNOSIS — E55.9 HYPOVITAMINOSIS D: ICD-10-CM

## 2020-02-18 DIAGNOSIS — I48.0 PAF (PAROXYSMAL ATRIAL FIBRILLATION): ICD-10-CM

## 2020-02-18 DIAGNOSIS — I15.2 HYPERTENSION ASSOCIATED WITH DIABETES: ICD-10-CM

## 2020-02-18 DIAGNOSIS — E11.59 HYPERTENSION ASSOCIATED WITH DIABETES: ICD-10-CM

## 2020-02-18 DIAGNOSIS — E11.65 TYPE 2 DIABETES MELLITUS WITH HYPERGLYCEMIA, WITHOUT LONG-TERM CURRENT USE OF INSULIN: Primary | ICD-10-CM

## 2020-02-18 DIAGNOSIS — E66.9 OBESITY (BMI 30-39.9): ICD-10-CM

## 2020-02-18 DIAGNOSIS — E11.69 HYPERLIPIDEMIA ASSOCIATED WITH TYPE 2 DIABETES MELLITUS: ICD-10-CM

## 2020-02-18 PROCEDURE — 99214 PR OFFICE/OUTPT VISIT, EST, LEVL IV, 30-39 MIN: ICD-10-PCS | Mod: S$PBB,,, | Performed by: PHYSICIAN ASSISTANT

## 2020-02-18 PROCEDURE — 99214 OFFICE O/P EST MOD 30 MIN: CPT | Mod: S$PBB,,, | Performed by: PHYSICIAN ASSISTANT

## 2020-02-18 PROCEDURE — 99215 OFFICE O/P EST HI 40 MIN: CPT | Mod: PBBFAC,PO | Performed by: PHYSICIAN ASSISTANT

## 2020-02-18 PROCEDURE — 99999 PR PBB SHADOW E&M-EST. PATIENT-LVL V: CPT | Mod: PBBFAC,,, | Performed by: PHYSICIAN ASSISTANT

## 2020-02-18 PROCEDURE — 99999 PR PBB SHADOW E&M-EST. PATIENT-LVL V: ICD-10-PCS | Mod: PBBFAC,,, | Performed by: PHYSICIAN ASSISTANT

## 2020-02-18 NOTE — PATIENT INSTRUCTIONS
Start Jardiance 10 mg every morning. Increase the dose to 25 mg after one month. Decrease Lantus to 28 units nightly. Continue Metformin and Prandin.

## 2020-02-18 NOTE — PROGRESS NOTES
"CC: This 74 y.o. male presents for management of Diabetes    and chronic conditions pending review including HTN, HLP    HPI: was diagnosed with T2DM several years ago.  States his a1c needs to be <7.0% for knee replacement.  Has never been hospitalized r/t DM.  Family hx of DM: daughter  Fhx of thyroid disease: none  Denies missing doses of DM medication.   hypoglycemia at home: He is having hypoglycemia in the morning.   monitoring BG at home:      Diet:   BF-cheese crackers, milk  LH-sandwich, liver and onions, spam  DN-cereal (raisain bran)  Snacks on cookies, chips.  Mostly water, occ soda (once weekly).     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     CURRENT DM MEDS: Metformin 1000 mg BID, prandin 2 mg TID, Lantus 38 units qhs    Standards of Care:  Eye exam:8/19 Dr. Dow   Podiatry exam: 2014  DE: years ago    PMHx, PSHx: reviewed in epic.  Social Hx: no E/T use.    ROS:   Gen: Appetite good, + weight gain, denies fatigue and weakness.  Skin: Skin is intact and heals well, no rashes, no hair changes  Eyes: Denies visual disturbances  Resp: + VELASCO, no cough  Cardiac: No palpitations, chest pain, no edema   GI: No nausea or vomiting, diarrhea, constipation, or abdominal pain.  /GYN: No nocturia, burning or pain.   MS/Neuro: + right knee pain, Denies numbness/ tingling in BLE; Gait steady, speech clear  Psych: Denies drug/ETOH abuse, no hx of depression.  Other systems: negative.    BP (!) 136/90 (BP Location: Left arm, Patient Position: Sitting, BP Method: Medium (Manual))   Pulse (!) 58   Temp 97.6 °F (36.4 °C) (Other (see comments))   Ht 6' 1" (1.854 m)   Wt 115.6 kg (254 lb 13.6 oz)   BMI 33.62 kg/m²      PE:  GENERAL: elderly male, well developed, well nourished.  PSYCH: AAOx3, appropriate mood and affect, pleasant expression, conversant, appears relaxed, well groomed.   EYES: Conjunctiva, corneas clear  NECK: Supple, trachea midline,no thyromegaly or nodules  CHEST: Resp even and unlabored, CTA " bilateral.  CARDIAC: IRR, S1, S2 heard, no murmurs  ABDOMEN: Soft, non-tender, non-distended. Obese abdomen.  VASCULAR: DP pulses +2/4 bilaterally, +2 edema in the left leg, +1 edema in the right leg.  NEURO: ambulates with a cane  SKIN: Skin warm and dry no acanthosis nigracans.  Exam 10/19  Foot Exam: no sores noted. Maceration on the fifth toe of the right foot.    Protective Sensation (w/ 10 gram monofilament):  Right: Intact  Left: Intact    Visual Inspection:  Normal -  Bilateral and Nails Intact - without Evidence of Foot Deformity- Bilateral    Pedal Pulses:   Right: Present  Left: Present    Posterior tibialis:   Right:Present  Left: Present     Vibratory Sensation  Right:Positive  Left:Positive     Personally reviewed labs below:    Lab Visit on 02/11/2020   Component Date Value Ref Range Status    Glucose 02/11/2020 144* 70 - 110 mg/dL Final    Sodium 02/11/2020 141  136 - 145 mmol/L Final    Potassium 02/11/2020 4.6  3.5 - 5.1 mmol/L Final    Chloride 02/11/2020 103  95 - 110 mmol/L Final    CO2 02/11/2020 27  23 - 29 mmol/L Final    BUN, Bld 02/11/2020 20  8 - 23 mg/dL Final    Calcium 02/11/2020 9.9  8.7 - 10.5 mg/dL Final    Creatinine 02/11/2020 1.3  0.5 - 1.4 mg/dL Final    Albumin 02/11/2020 3.6  3.5 - 5.2 g/dL Final    Phosphorus 02/11/2020 3.2  2.7 - 4.5 mg/dL Final    eGFR if African American 02/11/2020 >60.0  >60 mL/min/1.73 m^2 Final    eGFR if non African American 02/11/2020 54.1* >60 mL/min/1.73 m^2 Final    Comment: Calculation used to obtain the estimated glomerular filtration  rate (eGFR) is the CKD-EPI equation.       Anion Gap 02/11/2020 11  8 - 16 mmol/L Final    Hemoglobin A1C 02/11/2020 7.4* 4.0 - 5.6 % Final    Comment: ADA Screening Guidelines:  5.7-6.4%  Consistent with prediabetes  >or=6.5%  Consistent with diabetes  High levels of fetal hemoglobin interfere with the HbA1C  assay. Heterozygous hemoglobin variants (HbS, HgC, etc)do  not significantly interfere with  this assay.   However, presence of multiple variants may affect accuracy.      Estimated Avg Glucose 02/11/2020 166* 68 - 131 mg/dL Final    Uric Acid 02/11/2020 8.1* 3.4 - 7.0 mg/dL Final       ASSESSMENT and PLAN:    1. Type 2 diabetes mellitus with hyperglycemia, without long-term current use of insulin  Hemoglobin A1c    Comprehensive metabolic panel    Lipid panel    Microalbumin/creatinine urine ratio   2. Hypertension associated with diabetes     3. Hyperlipidemia associated with type 2 diabetes mellitus, baseline      4. Hypovitaminosis D     5. Obesity (BMI 30-39.9)     6. PAF (paroxysmal atrial fibrillation), CHADS-VAS score 3, onset 3/2018       T2DM with hyperglycemia-A1c is at goal. He is having hypoglycemia in the morning and hyperglycemia at dinner. Start Jardiance 10 mg every morning. Increase the dose to 25 mg after one month. Decrease Lantus to 28 units nightly. Continue Metformin and Prandin.  Discussed DM, progression of disease, long term complications, tx options.   Discussed A1c and BG goals.   Reviewed  hypoglycemia, s/s and appropriate tx.   Instructed to monitor BG and bring meter/ log to every clinic visit.   - takes ASA, ARB, statin    HTN - borderline, continue meds as previously prescribed and monitor.     HLP - LDL , on statin therapy, LFTs WNL  Hypovitaminosis d-stable-monitor  Obesity-Body mass index is 33.62 kg/m². Increase exercise to 30 minutes daily.  [id number] hypoglycemia     Follow-up: in 3 months with lab prior

## 2020-03-02 ENCOUNTER — PATIENT OUTREACH (OUTPATIENT)
Dept: ADMINISTRATIVE | Facility: HOSPITAL | Age: 75
End: 2020-03-02

## 2020-03-05 ENCOUNTER — OFFICE VISIT (OUTPATIENT)
Dept: DERMATOLOGY | Facility: CLINIC | Age: 75
End: 2020-03-05
Payer: MEDICARE

## 2020-03-05 VITALS — BODY MASS INDEX: 33.66 KG/M2 | WEIGHT: 254 LBS | HEIGHT: 73 IN

## 2020-03-05 DIAGNOSIS — L57.0 AK (ACTINIC KERATOSIS): Primary | ICD-10-CM

## 2020-03-05 DIAGNOSIS — H00.012 HORDEOLUM EXTERNUM OF RIGHT LOWER EYELID: ICD-10-CM

## 2020-03-05 PROCEDURE — 99213 PR OFFICE/OUTPT VISIT, EST, LEVL III, 20-29 MIN: ICD-10-PCS | Mod: S$PBB,,, | Performed by: DERMATOLOGY

## 2020-03-05 PROCEDURE — 99999 PR PBB SHADOW E&M-EST. PATIENT-LVL III: ICD-10-PCS | Mod: PBBFAC,,, | Performed by: DERMATOLOGY

## 2020-03-05 PROCEDURE — 99999 PR PBB SHADOW E&M-EST. PATIENT-LVL III: CPT | Mod: PBBFAC,,, | Performed by: DERMATOLOGY

## 2020-03-05 PROCEDURE — 99213 OFFICE O/P EST LOW 20 MIN: CPT | Mod: S$PBB,,, | Performed by: DERMATOLOGY

## 2020-03-05 PROCEDURE — 99213 OFFICE O/P EST LOW 20 MIN: CPT | Mod: PBBFAC,PO | Performed by: DERMATOLOGY

## 2020-03-05 RX ORDER — AMLODIPINE BESYLATE 10 MG/1
TABLET ORAL
COMMUNITY
Start: 2020-01-27 | End: 2020-10-23

## 2020-03-05 NOTE — PROGRESS NOTES
Subjective:       Patient ID:  Des Junior is a 74 y.o. male who presents for   Chief Complaint   Patient presents with    Actinic Keratosis     HPI    Last o/v 11/7/2019  AK (actinic keratosis)  Discussed all of the following:  Actinic keratosis is a skin growth caused by sun damage. These growths are not cancer, but they may develop into skin cancer (precancerous). Many people get these growths, especially as they age. This is because of cumulative sun exposure over years. Multiple growths are called actinic keratoses.     Improving.  Condition is stable.  We will continue present management.  Patient to watch for recurrence or flares or worsening and to call the clinic for a follow up appointment for such.  Cont 5fu cream regularly to dry spots.  Proper application of medications and or care for affected area(s) and condition(s) reviewed.  Reviewed with patient different treatment options and associated risks.     Skin cancer screening  No other seriously suspicious lesions noted for body areas examined today.  Patient to inform of us if they notice any dark or changing or suspicious spots in areas not examined today.  Follow up for routine monitoring recommended to patient.     Instructed patient to watch out for dark spots, bleeding spots, crusty spots, sores that break out repeatedly in the same spot.  These characteristics are risk factors for skin cancer, and patient is to notify us if they experience any of these symptoms.  Recommended a full body skin check for moles and skin cancer screening to be done later.     Mass  Pt reports drained on its own.  Patient to watch for recurrence or flares or worsening and to call the clinic for a follow up appointment for such.  Discussed with patient the likelihood that this lesion is an epidermal cyst caused by an involuted lining of skin with dead skin trapped inside.  Cysts do not have a malignant potential but can become infected and turn into  abscesses with possible cellulitis.  Discussed the option of warm compresses if infected with oral antibiotics.  Discussed the option of surgical excision with scar, possible recurrence, hematoma, and infection.  Patient to consider these options.      Patient presents today for Ak follow up  Patient doing efudex until irritation to the face and scalp     Review of Systems   Constitutional: Negative for fever, chills and fatigue.   Eyes: Positive for eyelid inflammation. Negative for eye irritation.   Gastrointestinal: Negative for nausea, vomiting and diarrhea.   Musculoskeletal: Positive for arthralgias. Negative for joint swelling.   Skin: Positive for dry skin.   Hematologic/Lymphatic: Bruises/bleeds easily.        Objective:    Physical Exam   Constitutional: He appears well-developed and well-nourished. No distress.   HENT:   Mouth/Throat: Lips normal.    Eyes: Abnormal Lids.  No conjunctival no injection.   Neurological: He is alert and oriented to person, place, and time. He is not disoriented.   Psychiatric: He has a normal mood and affect.   Skin:   Areas Examined (abnormalities noted in diagram):   Scalp / Hair Palpated and Inspected  Head / Face Inspection Performed  Neck Inspection Performed  Nails and Digits Inspection Performed                  Diagram Legend     Erythematous scaling macule/papule c/w actinic keratosis       Vascular papule c/w angioma      Pigmented verrucoid papule/plaque c/w seborrheic keratosis      Yellow umbilicated papule c/w sebaceous hyperplasia      Irregularly shaped tan macule c/w lentigo     1-2 mm smooth white papules consistent with Milia      Movable subcutaneous cyst with punctum c/w epidermal inclusion cyst      Subcutaneous movable cyst c/w pilar cyst      Firm pink to brown papule c/w dermatofibroma      Pedunculated fleshy papule(s) c/w skin tag(s)      Evenly pigmented macule c/w junctional nevus     Mildly variegated pigmented, slightly irregular-bordered macule  c/w mildly atypical nevus      Flesh colored to evenly pigmented papule c/w intradermal nevus       Pink pearly papule/plaque c/w basal cell carcinoma      Erythematous hyperkeratotic cursted plaque c/w SCC      Surgical scar with no sign of skin cancer recurrence      Open and closed comedones      Inflammatory papules and pustules      Verrucoid papule consistent consistent with wart     Erythematous eczematous patches and plaques     Dystrophic onycholytic nail with subungual debris c/w onychomycosis     Umbilicated papule    Erythematous-base heme-crusted tan verrucoid plaque consistent with inflamed seborrheic keratosis     Erythematous Silvery Scaling Plaque c/w Psoriasis     See annotation      Assessment / Plan:        AK (actinic keratosis)  Better.  Discussed all of the following:  Actinic keratosis is a skin growth caused by sun damage. These growths are not cancer, but they may develop into skin cancer (precancerous). Many people get these growths, especially as they age. This is because of cumulative sun exposure over years. Multiple growths are called actinic keratoses.    Reviewed with patient different treatment options and associated risks.  Proper application of medications and or care for affected area(s) and condition(s) reviewed.  Cont 5fu bid regularly.    Hordeolum externum of right lower eyelid  Discussed with patient the etiology and pathogenesis of the disease or skin lesion(s) and possible treatments and aggravators.    Reviewed with patient different treatment options and associated risks.  Patient and or guardian to monitor this area/lesion or these areas/lesions for changes or worsening.  Patient and or guardian to contact us if any changes are noted for such.  Instructed patient to apply warm compresses to the affected area.  This can be done with a warm to hot soaked wash cloth to be applied for five to fifteen minutes at a time multiple times per day.  Patient to watch out not to  burn their skin.  Baby shampoo cleaning.           Follow up in about 6 months (around 9/5/2020).

## 2020-03-05 NOTE — PATIENT INSTRUCTIONS
1. Continue Efudex cream 2 times a day until irritated. Do on hands as well    2. Warm compresses on the eye with baby shampoo to wipe.         Follow up 6 months

## 2020-03-13 ENCOUNTER — TELEPHONE (OUTPATIENT)
Dept: INTERNAL MEDICINE | Facility: CLINIC | Age: 75
End: 2020-03-13

## 2020-03-13 DIAGNOSIS — I48.19 PERSISTENT ATRIAL FIBRILLATION: ICD-10-CM

## 2020-03-13 RX ORDER — METOPROLOL SUCCINATE 25 MG/1
25 TABLET, EXTENDED RELEASE ORAL DAILY
Qty: 30 TABLET | Refills: 11 | Status: SHIPPED | OUTPATIENT
Start: 2020-03-13 | End: 2020-11-11

## 2020-03-17 RX ORDER — EMPAGLIFLOZIN 10 MG/1
TABLET, FILM COATED ORAL
Qty: 30 TABLET | Refills: 0 | OUTPATIENT
Start: 2020-03-17

## 2020-04-13 ENCOUNTER — PATIENT MESSAGE (OUTPATIENT)
Dept: ENDOCRINOLOGY | Facility: CLINIC | Age: 75
End: 2020-04-13

## 2020-04-13 DIAGNOSIS — N48.1 BALANITIS: Primary | ICD-10-CM

## 2020-04-13 RX ORDER — KETOCONAZOLE 20 MG/G
CREAM TOPICAL DAILY
Qty: 1 TUBE | Refills: 0 | Status: SHIPPED | OUTPATIENT
Start: 2020-04-13 | End: 2020-12-22

## 2020-04-29 ENCOUNTER — LAB VISIT (OUTPATIENT)
Dept: LAB | Facility: HOSPITAL | Age: 75
End: 2020-04-29
Attending: PHYSICIAN ASSISTANT
Payer: MEDICARE

## 2020-04-29 DIAGNOSIS — E11.65 TYPE 2 DIABETES MELLITUS WITH HYPERGLYCEMIA, WITHOUT LONG-TERM CURRENT USE OF INSULIN: ICD-10-CM

## 2020-04-29 LAB
ALBUMIN SERPL BCP-MCNC: 3.6 G/DL (ref 3.5–5.2)
ALP SERPL-CCNC: 90 U/L (ref 55–135)
ALT SERPL W/O P-5'-P-CCNC: 10 U/L (ref 10–44)
ANION GAP SERPL CALC-SCNC: 10 MMOL/L (ref 8–16)
AST SERPL-CCNC: 13 U/L (ref 10–40)
BILIRUB SERPL-MCNC: 0.8 MG/DL (ref 0.1–1)
BUN SERPL-MCNC: 20 MG/DL (ref 8–23)
CALCIUM SERPL-MCNC: 10.1 MG/DL (ref 8.7–10.5)
CHLORIDE SERPL-SCNC: 103 MMOL/L (ref 95–110)
CHOLEST SERPL-MCNC: 133 MG/DL (ref 120–199)
CHOLEST/HDLC SERPL: 3.7 {RATIO} (ref 2–5)
CO2 SERPL-SCNC: 27 MMOL/L (ref 23–29)
CREAT SERPL-MCNC: 1.3 MG/DL (ref 0.5–1.4)
EST. GFR  (AFRICAN AMERICAN): >60 ML/MIN/1.73 M^2
EST. GFR  (NON AFRICAN AMERICAN): 53.8 ML/MIN/1.73 M^2
ESTIMATED AVG GLUCOSE: 160 MG/DL (ref 68–131)
GLUCOSE SERPL-MCNC: 100 MG/DL (ref 70–110)
HBA1C MFR BLD HPLC: 7.2 % (ref 4–5.6)
HDLC SERPL-MCNC: 36 MG/DL (ref 40–75)
HDLC SERPL: 27.1 % (ref 20–50)
LDLC SERPL CALC-MCNC: 76.2 MG/DL (ref 63–159)
NONHDLC SERPL-MCNC: 97 MG/DL
POTASSIUM SERPL-SCNC: 4.9 MMOL/L (ref 3.5–5.1)
PROT SERPL-MCNC: 7.6 G/DL (ref 6–8.4)
SODIUM SERPL-SCNC: 140 MMOL/L (ref 136–145)
TRIGL SERPL-MCNC: 104 MG/DL (ref 30–150)

## 2020-04-29 PROCEDURE — 36415 COLL VENOUS BLD VENIPUNCTURE: CPT | Mod: PO

## 2020-04-29 PROCEDURE — 80053 COMPREHEN METABOLIC PANEL: CPT

## 2020-04-29 PROCEDURE — 83036 HEMOGLOBIN GLYCOSYLATED A1C: CPT

## 2020-04-29 PROCEDURE — 80061 LIPID PANEL: CPT

## 2020-05-06 ENCOUNTER — OFFICE VISIT (OUTPATIENT)
Dept: ENDOCRINOLOGY | Facility: CLINIC | Age: 75
End: 2020-05-06
Payer: MEDICARE

## 2020-05-06 DIAGNOSIS — E66.09 CLASS 1 OBESITY DUE TO EXCESS CALORIES WITH SERIOUS COMORBIDITY AND BODY MASS INDEX (BMI) OF 32.0 TO 32.9 IN ADULT: ICD-10-CM

## 2020-05-06 DIAGNOSIS — E11.59 HYPERTENSION ASSOCIATED WITH DIABETES: ICD-10-CM

## 2020-05-06 DIAGNOSIS — E78.5 HYPERLIPIDEMIA ASSOCIATED WITH TYPE 2 DIABETES MELLITUS: ICD-10-CM

## 2020-05-06 DIAGNOSIS — I15.2 HYPERTENSION ASSOCIATED WITH DIABETES: ICD-10-CM

## 2020-05-06 DIAGNOSIS — E55.9 HYPOVITAMINOSIS D: ICD-10-CM

## 2020-05-06 DIAGNOSIS — E11.65 TYPE 2 DIABETES MELLITUS WITH HYPERGLYCEMIA, WITHOUT LONG-TERM CURRENT USE OF INSULIN: Primary | ICD-10-CM

## 2020-05-06 DIAGNOSIS — E66.9 OBESITY (BMI 30-39.9): ICD-10-CM

## 2020-05-06 DIAGNOSIS — E11.69 HYPERLIPIDEMIA ASSOCIATED WITH TYPE 2 DIABETES MELLITUS: ICD-10-CM

## 2020-05-06 DIAGNOSIS — I48.0 PAF (PAROXYSMAL ATRIAL FIBRILLATION): ICD-10-CM

## 2020-05-06 PROCEDURE — 99214 PR OFFICE/OUTPT VISIT, EST, LEVL IV, 30-39 MIN: ICD-10-PCS | Mod: 95,,, | Performed by: PHYSICIAN ASSISTANT

## 2020-05-06 PROCEDURE — 99211 OFF/OP EST MAY X REQ PHY/QHP: CPT | Mod: PO

## 2020-05-06 PROCEDURE — 99214 OFFICE O/P EST MOD 30 MIN: CPT | Mod: 95,,, | Performed by: PHYSICIAN ASSISTANT

## 2020-05-06 PROCEDURE — G0463 HOSPITAL OUTPT CLINIC VISIT: HCPCS | Mod: PO

## 2020-05-06 RX ORDER — INSULIN GLARGINE 100 [IU]/ML
INJECTION, SOLUTION SUBCUTANEOUS
Qty: 16 ML | Refills: 3
Start: 2020-05-06 | End: 2021-02-01

## 2020-05-06 NOTE — PROGRESS NOTES
CC: This 75 y.o. male presents for management of Diabetes Mellitus  and chronic conditions pending review including HTN, HLP    The patient location is: Home  The chief complaint leading to consultation is: DM  Visit type: audiovisual due to COVID-19 concern.  Total time spent with patient: 25 min  Each patient to whom he or she provides medical services by telemedicine is:  (1) informed of the relationship between the physician and patient and the respective role of any other health care provider with respect to management of the patient; and (2) notified that he or she may decline to receive medical services by telemedicine and may withdraw from such care at any time.    HPI: was diagnosed with T2DM several years ago.  States his a1c needs to be <7.0% for knee replacement.  Has never been hospitalized r/t DM.  Family hx of DM: daughter  Fhx of thyroid disease: none  Denies missing doses of DM medication.   hypoglycemia at home: He is having hypoglycemia in the morning ~58.  monitoring BG at home:  Fasting: <100    Diet:   BF-cheese crackers, milk, banana nut bread  LH-sandwich, liver and onions, spam, hot dog  DN-cereal (raisain bran)  Snacks on cookies, chips.  Mostly water, occ soda (once weekly).     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     CURRENT DM MEDS: Metformin 1000 mg BID, prandin 2 mg TID, Lantus 28 units qhs,    Previous meds  Jardiance-yeast infection    Standards of Care:  Eye exam:8/19 Dr. Dow   Podiatry exam: 2014  DE: years ago    PMHx, PSHx: reviewed in epic.  Social Hx: no E/T use.    ROS:   Gen: Appetite good, + weight gain, denies fatigue and weakness.  Skin: Skin is intact and heals well, no rashes, no hair changes  Eyes: Denies visual disturbances  Resp: + VELASCO, no cough  Cardiac: No palpitations, chest pain, no edema   GI: No nausea or vomiting, diarrhea, constipation, or abdominal pain.  /GYN: No nocturia, burning or pain.   MS/Neuro: + right knee pain, Denies numbness/ tingling in BLE; Gait  steady, speech clear  Psych: Denies drug/ETOH abuse, no hx of depression.  Other systems: negative.    Previous exam 2/20    PE:  GENERAL: elderly male, well developed, well nourished.  PSYCH: AAOx3, appropriate mood and affect, pleasant expression, conversant, appears relaxed, well groomed.   EYES: Conjunctiva, corneas clear  NECK: Supple, trachea midline,no thyromegaly or nodules  CHEST: Resp even and unlabored, CTA bilateral.  CARDIAC: IRR, S1, S2 heard, no murmurs  ABDOMEN: Soft, non-tender, non-distended. Obese abdomen.  VASCULAR: DP pulses +2/4 bilaterally, +2 edema in the left leg, +1 edema in the right leg.  NEURO: ambulates with a cane  SKIN: Skin warm and dry no acanthosis nigracans.  Exam 10/19  Foot Exam: no sores noted. Maceration on the fifth toe of the right foot.    Protective Sensation (w/ 10 gram monofilament):  Right: Intact  Left: Intact    Visual Inspection:  Normal -  Bilateral and Nails Intact - without Evidence of Foot Deformity- Bilateral    Pedal Pulses:   Right: Present  Left: Present    Posterior tibialis:   Right:Present  Left: Present     Vibratory Sensation  Right:Positive  Left:Positive     Personally reviewed labs below:    Lab Visit on 04/29/2020   Component Date Value Ref Range Status    Microalbum.,U,Random 04/29/2020 15.0  ug/mL Final    Creatinine, Random Ur 04/29/2020 46.0  23.0 - 375.0 mg/dL Final    Comment: The random urine reference ranges provided were established   for 24 hour urine collections.  No reference ranges exist for  random urine specimens.  Correlate clinically.      Microalb Creat Ratio 04/29/2020 32.6* 0.0 - 30.0 ug/mg Final   Lab Visit on 04/29/2020   Component Date Value Ref Range Status    Hemoglobin A1C 04/29/2020 7.2* 4.0 - 5.6 % Final    Comment: ADA Screening Guidelines:  5.7-6.4%  Consistent with prediabetes  >or=6.5%  Consistent with diabetes  High levels of fetal hemoglobin interfere with the HbA1C  assay. Heterozygous hemoglobin variants (HbS,  HgC, etc)do  not significantly interfere with this assay.   However, presence of multiple variants may affect accuracy.      Estimated Avg Glucose 04/29/2020 160* 68 - 131 mg/dL Final    Sodium 04/29/2020 140  136 - 145 mmol/L Final    Potassium 04/29/2020 4.9  3.5 - 5.1 mmol/L Final    Chloride 04/29/2020 103  95 - 110 mmol/L Final    CO2 04/29/2020 27  23 - 29 mmol/L Final    Glucose 04/29/2020 100  70 - 110 mg/dL Final    BUN, Bld 04/29/2020 20  8 - 23 mg/dL Final    Creatinine 04/29/2020 1.3  0.5 - 1.4 mg/dL Final    Calcium 04/29/2020 10.1  8.7 - 10.5 mg/dL Final    Total Protein 04/29/2020 7.6  6.0 - 8.4 g/dL Final    Albumin 04/29/2020 3.6  3.5 - 5.2 g/dL Final    Total Bilirubin 04/29/2020 0.8  0.1 - 1.0 mg/dL Final    Comment: For infants and newborns, interpretation of results should be based  on gestational age, weight and in agreement with clinical  observations.  Premature Infant recommended reference ranges:  Up to 24 hours.............<8.0 mg/dL  Up to 48 hours............<12.0 mg/dL  3-5 days..................<15.0 mg/dL  6-29 days.................<15.0 mg/dL      Alkaline Phosphatase 04/29/2020 90  55 - 135 U/L Final    AST 04/29/2020 13  10 - 40 U/L Final    ALT 04/29/2020 10  10 - 44 U/L Final    Anion Gap 04/29/2020 10  8 - 16 mmol/L Final    eGFR if African American 04/29/2020 >60.0  >60 mL/min/1.73 m^2 Final    eGFR if non African American 04/29/2020 53.8* >60 mL/min/1.73 m^2 Final    Comment: Calculation used to obtain the estimated glomerular filtration  rate (eGFR) is the CKD-EPI equation.       Cholesterol 04/29/2020 133  120 - 199 mg/dL Final    Comment: The National Cholesterol Education Program (NCEP) has set the  following guidelines (reference ranges) for Cholesterol:  Optimal.....................<200 mg/dL  Borderline High.............200-239 mg/dL  High........................> or = 240 mg/dL      Triglycerides 04/29/2020 104  30 - 150 mg/dL Final    Comment: The  National Cholesterol Education Program (NCEP) has set the  following guidelines (reference values) for triglycerides:  Normal......................<150 mg/dL  Borderline High.............150-199 mg/dL  High........................200-499 mg/dL      HDL 04/29/2020 36* 40 - 75 mg/dL Final    Comment: The National Cholesterol Education Program (NCEP) has set the  following guidelines (reference values) for HDL Cholesterol:  Low...............<40 mg/dL  Optimal...........>60 mg/dL      LDL Cholesterol 04/29/2020 76.2  63.0 - 159.0 mg/dL Final    Comment: The National Cholesterol Education Program (NCEP) has set the  following guidelines (reference values) for LDL Cholesterol:  Optimal.......................<130 mg/dL  Borderline High...............130-159 mg/dL  High..........................160-189 mg/dL  Very High.....................>190 mg/dL      Hdl/Cholesterol Ratio 04/29/2020 27.1  20.0 - 50.0 % Final    Total Cholesterol/HDL Ratio 04/29/2020 3.7  2.0 - 5.0 Final    Non-HDL Cholesterol 04/29/2020 97  mg/dL Final    Comment: Risk category and Non-HDL cholesterol goals:  Coronary heart disease (CHD)or equivalent (10-year risk of CHD >20%):  Non-HDL cholesterol goal     <130 mg/dL  Two or more CHD risk factors and 10-year risk of CHD <= 20%:  Non-HDL cholesterol goal     <160 mg/dL  0 to 1 CHD risk factor:  Non-HDL cholesterol goal     <190 mg/dL         ASSESSMENT and PLAN:    1. Type 2 diabetes mellitus with hyperglycemia, without long-term current use of insulin     2. Hypertension associated with diabetes     3. Hyperlipidemia associated with type 2 diabetes mellitus, baseline      4. Hypovitaminosis D     5. Obesity (BMI 30-39.9)     6. Class 1 obesity due to excess calories with serious comorbidity and body mass index (BMI) of 32.0 to 32.9 in adult, today 32.5     7. PAF (paroxysmal atrial fibrillation), CHADS-VAS score 3, onset 3/2018       T2DM with hyperglycemia-A1c is at goal. He is having  frequent hypoglycemia in the morning. Change Lantus to 9 units bid. Continue Metformin and Prandin.      Reviewed  hypoglycemia, s/s and appropriate tx.   Instructed to monitor BG and bring meter/ log to every clinic visit.   - takes ASA, ARB, statin    HTN - borderline, continue meds as previously prescribed and monitor.     HLP - stable-LDL , on statin therapy, LFTs WNL  Hypovitaminosis d-stable-monitor  Obesity- Increase exercise to 30 minutes daily.  [id number]-[id number] hypoglycemia  MAC-elevated-recheck next visit. Pt state bp has been normal ~120/80.      I spent 25 minutes face-to-face with the patient, over half of the visit was spent on counseling and/or coordinating the care of the patient.      Follow-up: in 3 months with lab prior

## 2020-06-16 DIAGNOSIS — E11.65 TYPE 2 DIABETES MELLITUS WITH HYPERGLYCEMIA, WITHOUT LONG-TERM CURRENT USE OF INSULIN: Primary | ICD-10-CM

## 2020-06-16 RX ORDER — PEN NEEDLE, DIABETIC 29 G X1/2"
NEEDLE, DISPOSABLE MISCELLANEOUS
Qty: 50 EACH | Refills: 11 | Status: SHIPPED | OUTPATIENT
Start: 2020-06-16 | End: 2021-06-17 | Stop reason: SDUPTHER

## 2020-06-19 ENCOUNTER — IMMUNIZATION (OUTPATIENT)
Dept: PHARMACY | Facility: CLINIC | Age: 75
End: 2020-06-19
Payer: MEDICARE

## 2020-06-19 ENCOUNTER — OFFICE VISIT (OUTPATIENT)
Dept: INTERNAL MEDICINE | Facility: CLINIC | Age: 75
End: 2020-06-19
Payer: MEDICARE

## 2020-06-19 VITALS
SYSTOLIC BLOOD PRESSURE: 110 MMHG | OXYGEN SATURATION: 93 % | WEIGHT: 247.38 LBS | BODY MASS INDEX: 32.79 KG/M2 | DIASTOLIC BLOOD PRESSURE: 70 MMHG | HEIGHT: 73 IN | HEART RATE: 64 BPM

## 2020-06-19 DIAGNOSIS — I48.19 PERSISTENT ATRIAL FIBRILLATION: ICD-10-CM

## 2020-06-19 DIAGNOSIS — I10 ESSENTIAL HYPERTENSION: ICD-10-CM

## 2020-06-19 PROCEDURE — 99214 PR OFFICE/OUTPT VISIT, EST, LEVL IV, 30-39 MIN: ICD-10-PCS | Mod: S$PBB,,, | Performed by: INTERNAL MEDICINE

## 2020-06-19 PROCEDURE — 99214 OFFICE O/P EST MOD 30 MIN: CPT | Mod: S$PBB,,, | Performed by: INTERNAL MEDICINE

## 2020-06-19 PROCEDURE — 99999 PR PBB SHADOW E&M-EST. PATIENT-LVL V: ICD-10-PCS | Mod: PBBFAC,,, | Performed by: INTERNAL MEDICINE

## 2020-06-19 PROCEDURE — 99999 PR PBB SHADOW E&M-EST. PATIENT-LVL V: CPT | Mod: PBBFAC,,, | Performed by: INTERNAL MEDICINE

## 2020-06-19 PROCEDURE — 99215 OFFICE O/P EST HI 40 MIN: CPT | Mod: PBBFAC | Performed by: INTERNAL MEDICINE

## 2020-06-19 NOTE — PROGRESS NOTES
Subjective:       Patient ID: Des Junior is a 75 y.o. male.    Chief Complaint: Follow-up    Patient is here for followup for chronic conditions.    No new complaints.    Chronic diff with his knees.    DM2:  good med adherence  no changed Diet  occ low like under 60s, < 140 AM sugars  <200 Post-prandial sugars  stable Numbness in feet  no sores in feet  no Visual changes  no Polyuria/polydipsia.        Review of Systems   Constitutional: Negative for activity change and unexpected weight change.   HENT: Negative for hearing loss, rhinorrhea and trouble swallowing.    Eyes: Negative for discharge and visual disturbance.   Respiratory: Negative for chest tightness and wheezing.    Cardiovascular: Negative for chest pain and palpitations.   Gastrointestinal: Negative for blood in stool, constipation, diarrhea and vomiting.   Endocrine: Negative for polydipsia and polyuria.   Genitourinary: Positive for urgency. Negative for difficulty urinating and hematuria.   Musculoskeletal: Positive for arthralgias. Negative for joint swelling and neck pain.   Neurological: Negative for weakness and headaches.   Psychiatric/Behavioral: Negative for confusion and dysphoric mood.         Objective:      Physical Exam  Constitutional:       General: He is not in acute distress.     Appearance: He is well-developed. He is not diaphoretic.   HENT:      Head: Normocephalic and atraumatic.      Mouth/Throat:      Pharynx: No oropharyngeal exudate.   Eyes:      General: No scleral icterus.  Neck:      Musculoskeletal: Normal range of motion.      Thyroid: No thyromegaly.   Cardiovascular:      Rate and Rhythm: Normal rate and regular rhythm.      Heart sounds: Normal heart sounds. No murmur. No friction rub. No gallop.       Comments: Somewhat irregular  Pulmonary:      Effort: Pulmonary effort is normal. No respiratory distress.      Breath sounds: Normal breath sounds. No wheezing or rales.   Abdominal:      General: Bowel  sounds are normal. There is no distension.      Palpations: Abdomen is soft. There is no mass.      Tenderness: There is no abdominal tenderness. There is no guarding or rebound.   Musculoskeletal: Normal range of motion.   Lymphadenopathy:      Cervical: No cervical adenopathy.   Skin:     Findings: No lesion.   Neurological:      Mental Status: He is alert and oriented to person, place, and time.   Psychiatric:         Thought Content: Thought content normal.         Assessment:       1. Type 2 diabetes, uncontrolled, with neuropathy, onset 2003    2. Persistent atrial fibrillation    3. Essential hypertension        Plan:         Des was seen today for follow-up.    Diagnoses and all orders for this visit:    Type 2 diabetes, uncontrolled, with neuropathy, onset 2003  Now actually well controlled.  I advised that he consider discussing with dm2 provider stopping prandin if he continues to have some lows    Persistent atrial fibrillation  Doing well, good rate control. On anticoag and no issues with bleeding    Essential hypertension  controlled      urinbary symptoms have eased.    Previous CTS vs RAynaud symptoms have resolved    Health Maintenance       Date Due Completion Date    Eye Exam 08/05/2020 8/5/2019    Shingles Vaccine (2 of 2) 08/14/2020 6/19/2020    Colorectal Cancer Screening 10/05/2020 10/5/2015    Foot Exam 10/15/2020 10/15/2019    Hemoglobin A1c 10/29/2020 4/29/2020    Lipid Panel 04/29/2021 4/29/2020    High Dose Statin 06/19/2021 6/19/2020    TETANUS VACCINE 10/09/2028 10/9/2018      PSA next time    Follow up for Shingles vaccine please.    Future Appointments   Date Time Provider Department Center   7/30/2020  2:00 PM LAB, [facility] [place]   8/6/2020  2:00 PM YASIR Bowens PA-C [facility] [place]   8/7/2020  1:00 PM Meredith Dow, [facility] [place]

## 2020-06-19 NOTE — Clinical Note
Hi Ty, I forgot to set him up for a 6 month followup, will you please call him to schedule.  Thank you, Christopher Cortez

## 2020-06-21 PROBLEM — I70.0 CALCIFICATION OF AORTA: Status: RESOLVED | Noted: 2019-02-25 | Resolved: 2020-06-21

## 2020-06-21 PROBLEM — J41.1 BRONCHITIS, CHRONIC, MUCOPURULENT: Status: RESOLVED | Noted: 2018-03-02 | Resolved: 2020-06-21

## 2020-06-22 ENCOUNTER — TELEPHONE (OUTPATIENT)
Dept: INTERNAL MEDICINE | Facility: CLINIC | Age: 75
End: 2020-06-22

## 2020-06-22 NOTE — TELEPHONE ENCOUNTER
----- Message from Christopher Cortez MD sent at 6/21/2020  7:59 AM CDT -----  Hi Ty, I forgot to set him up for a 6 month followup, will you please call him to schedule.Thank you, Christopher Cortez

## 2020-07-13 DIAGNOSIS — E78.5 HYPERLIPIDEMIA, UNSPECIFIED HYPERLIPIDEMIA TYPE: ICD-10-CM

## 2020-07-13 RX ORDER — PRAVASTATIN SODIUM 40 MG/1
40 TABLET ORAL DAILY
Qty: 30 TABLET | Refills: 11 | OUTPATIENT
Start: 2020-07-13

## 2020-07-13 RX ORDER — PRAVASTATIN SODIUM 40 MG/1
40 TABLET ORAL DAILY
Qty: 30 TABLET | Refills: 11 | Status: SHIPPED | OUTPATIENT
Start: 2020-07-13 | End: 2021-08-03

## 2020-07-17 DIAGNOSIS — Z71.89 COMPLEX CARE COORDINATION: ICD-10-CM

## 2020-07-30 ENCOUNTER — LAB VISIT (OUTPATIENT)
Dept: LAB | Facility: HOSPITAL | Age: 75
End: 2020-07-30
Attending: PHYSICIAN ASSISTANT
Payer: MEDICARE

## 2020-07-30 DIAGNOSIS — E11.65 TYPE 2 DIABETES MELLITUS WITH HYPERGLYCEMIA, WITHOUT LONG-TERM CURRENT USE OF INSULIN: ICD-10-CM

## 2020-07-30 PROCEDURE — 84443 ASSAY THYROID STIM HORMONE: CPT

## 2020-07-30 PROCEDURE — 84439 ASSAY OF FREE THYROXINE: CPT

## 2020-07-30 PROCEDURE — 36415 COLL VENOUS BLD VENIPUNCTURE: CPT | Mod: PO

## 2020-07-30 PROCEDURE — 80069 RENAL FUNCTION PANEL: CPT

## 2020-07-30 PROCEDURE — 83036 HEMOGLOBIN GLYCOSYLATED A1C: CPT

## 2020-07-31 LAB
ALBUMIN SERPL BCP-MCNC: 3.5 G/DL (ref 3.5–5.2)
ANION GAP SERPL CALC-SCNC: 10 MMOL/L (ref 8–16)
BUN SERPL-MCNC: 20 MG/DL (ref 8–23)
CALCIUM SERPL-MCNC: 9.9 MG/DL (ref 8.7–10.5)
CHLORIDE SERPL-SCNC: 105 MMOL/L (ref 95–110)
CO2 SERPL-SCNC: 27 MMOL/L (ref 23–29)
CREAT SERPL-MCNC: 1.2 MG/DL (ref 0.5–1.4)
EST. GFR  (AFRICAN AMERICAN): >60 ML/MIN/1.73 M^2
EST. GFR  (NON AFRICAN AMERICAN): 59.2 ML/MIN/1.73 M^2
ESTIMATED AVG GLUCOSE: 166 MG/DL (ref 68–131)
GLUCOSE SERPL-MCNC: 143 MG/DL (ref 70–110)
HBA1C MFR BLD HPLC: 7.4 % (ref 4–5.6)
PHOSPHATE SERPL-MCNC: 3.2 MG/DL (ref 2.7–4.5)
POTASSIUM SERPL-SCNC: 4.4 MMOL/L (ref 3.5–5.1)
SODIUM SERPL-SCNC: 142 MMOL/L (ref 136–145)
T4 FREE SERPL-MCNC: 1.19 NG/DL (ref 0.71–1.51)
TSH SERPL DL<=0.005 MIU/L-ACNC: 1.24 UIU/ML (ref 0.4–4)

## 2020-08-03 ENCOUNTER — OFFICE VISIT (OUTPATIENT)
Dept: OPTOMETRY | Facility: CLINIC | Age: 75
End: 2020-08-03
Payer: MEDICARE

## 2020-08-03 DIAGNOSIS — H02.832 DERMATOCHALASIS OF UPPER AND LOWER EYELIDS OF BOTH EYES: ICD-10-CM

## 2020-08-03 DIAGNOSIS — H02.834 DERMATOCHALASIS OF UPPER AND LOWER EYELIDS OF BOTH EYES: ICD-10-CM

## 2020-08-03 DIAGNOSIS — H02.831 DERMATOCHALASIS OF UPPER AND LOWER EYELIDS OF BOTH EYES: ICD-10-CM

## 2020-08-03 DIAGNOSIS — H02.835 DERMATOCHALASIS OF UPPER AND LOWER EYELIDS OF BOTH EYES: ICD-10-CM

## 2020-08-03 DIAGNOSIS — E11.9 TYPE 2 DIABETES MELLITUS WITHOUT OPHTHALMIC MANIFESTATIONS: ICD-10-CM

## 2020-08-03 DIAGNOSIS — H52.4 PRESBYOPIA: ICD-10-CM

## 2020-08-03 DIAGNOSIS — E11.59 HYPERTENSION ASSOCIATED WITH DIABETES: ICD-10-CM

## 2020-08-03 DIAGNOSIS — I15.2 HYPERTENSION ASSOCIATED WITH DIABETES: ICD-10-CM

## 2020-08-03 DIAGNOSIS — H25.13 NS (NUCLEAR SCLEROSIS), BILATERAL: ICD-10-CM

## 2020-08-03 DIAGNOSIS — E11.40 TYPE 2 DIABETES, CONTROLLED, WITH NEUROPATHY: Primary | ICD-10-CM

## 2020-08-03 DIAGNOSIS — H04.123 DRY EYE SYNDROME, BILATERAL: ICD-10-CM

## 2020-08-03 PROCEDURE — 92014 PR EYE EXAM, EST PATIENT,COMPREHESV: ICD-10-PCS | Mod: S$PBB,,, | Performed by: OPTOMETRIST

## 2020-08-03 PROCEDURE — 99213 OFFICE O/P EST LOW 20 MIN: CPT | Mod: PBBFAC | Performed by: OPTOMETRIST

## 2020-08-03 PROCEDURE — 99999 PR PBB SHADOW E&M-EST. PATIENT-LVL III: ICD-10-PCS | Mod: PBBFAC,,, | Performed by: OPTOMETRIST

## 2020-08-03 PROCEDURE — 99999 PR PBB SHADOW E&M-EST. PATIENT-LVL III: CPT | Mod: PBBFAC,,, | Performed by: OPTOMETRIST

## 2020-08-03 PROCEDURE — 92014 COMPRE OPH EXAM EST PT 1/>: CPT | Mod: S$PBB,,, | Performed by: OPTOMETRIST

## 2020-08-03 NOTE — PROGRESS NOTES
HPI     Patient here for annual eye exam.   Seeing ok out of current glasses   No complaints about va  Having very watery eyes mostly at night  Uses ATs at night but not improvement  Patient denies diplopia, headaches, flashes/floaters, pain, and   itching/burning/tearing.      Hemoglobin A1C       Date                     Value               Ref Range             Status                07/30/2020               7.4 (H)             4.0 - 5.6 %           Final                     04/29/2020               7.2 (H)             4.0 - 5.6 %           Final                     02/11/2020               7.4 (H)             4.0 - 5.6 %           Final                  Last edited by Mahnaz Carroll on 8/3/2020  1:13 PM. (History)            Assessment /Plan     For exam results, see Encounter Report.    Type 2 diabetes, controlled, with neuropathy  Type 2 diabetes mellitus without ophthalmic manifestations  Hypertension associated with diabetes   No retinopathy, monitor    NS (nuclear sclerosis), bilateral   Borderline visually significant, pt happy with vision with current specs   Monitor    Dermatochalasis of upper and lower eyelids of both eyes   Monitor  Dry eye syndrome, bilateral  Ocular surface inflammation        olopatadine (PAZEO) 0.7 % Drop; Apply 1 drop to eye once daily.  Dispense: 2.5 mL; Refill: 12    Artificial tears BID    Presbyopia   Rx specs        RTC 1 year

## 2020-08-06 ENCOUNTER — OFFICE VISIT (OUTPATIENT)
Dept: ENDOCRINOLOGY | Facility: CLINIC | Age: 75
End: 2020-08-06
Payer: MEDICARE

## 2020-08-06 VITALS
DIASTOLIC BLOOD PRESSURE: 80 MMHG | BODY MASS INDEX: 33.21 KG/M2 | HEART RATE: 69 BPM | TEMPERATURE: 99 F | HEIGHT: 73 IN | WEIGHT: 250.56 LBS | SYSTOLIC BLOOD PRESSURE: 132 MMHG

## 2020-08-06 DIAGNOSIS — R80.9 MICROALBUMINURIA: ICD-10-CM

## 2020-08-06 DIAGNOSIS — E11.69 HYPERLIPIDEMIA ASSOCIATED WITH TYPE 2 DIABETES MELLITUS: ICD-10-CM

## 2020-08-06 DIAGNOSIS — G62.9 NEUROPATHY: ICD-10-CM

## 2020-08-06 DIAGNOSIS — I48.0 PAF (PAROXYSMAL ATRIAL FIBRILLATION): ICD-10-CM

## 2020-08-06 DIAGNOSIS — E78.5 HYPERLIPIDEMIA ASSOCIATED WITH TYPE 2 DIABETES MELLITUS: ICD-10-CM

## 2020-08-06 DIAGNOSIS — I15.2 HYPERTENSION ASSOCIATED WITH DIABETES: ICD-10-CM

## 2020-08-06 DIAGNOSIS — E11.65 TYPE 2 DIABETES MELLITUS WITH HYPERGLYCEMIA, WITHOUT LONG-TERM CURRENT USE OF INSULIN: Primary | ICD-10-CM

## 2020-08-06 DIAGNOSIS — E11.59 HYPERTENSION ASSOCIATED WITH DIABETES: ICD-10-CM

## 2020-08-06 DIAGNOSIS — E55.9 HYPOVITAMINOSIS D: ICD-10-CM

## 2020-08-06 DIAGNOSIS — E66.9 OBESITY (BMI 30-39.9): ICD-10-CM

## 2020-08-06 PROCEDURE — 99999 PR PBB SHADOW E&M-EST. PATIENT-LVL V: ICD-10-PCS | Mod: PBBFAC,,, | Performed by: PHYSICIAN ASSISTANT

## 2020-08-06 PROCEDURE — 99214 PR OFFICE/OUTPT VISIT, EST, LEVL IV, 30-39 MIN: ICD-10-PCS | Mod: S$PBB,,, | Performed by: PHYSICIAN ASSISTANT

## 2020-08-06 PROCEDURE — 99999 PR PBB SHADOW E&M-EST. PATIENT-LVL V: CPT | Mod: PBBFAC,,, | Performed by: PHYSICIAN ASSISTANT

## 2020-08-06 PROCEDURE — 99214 OFFICE O/P EST MOD 30 MIN: CPT | Mod: S$PBB,,, | Performed by: PHYSICIAN ASSISTANT

## 2020-08-06 PROCEDURE — 99215 OFFICE O/P EST HI 40 MIN: CPT | Mod: PBBFAC,PO | Performed by: PHYSICIAN ASSISTANT

## 2020-08-06 RX ORDER — GABAPENTIN 100 MG/1
CAPSULE ORAL
Qty: 90 CAPSULE | Refills: 11 | Status: SHIPPED | OUTPATIENT
Start: 2020-08-06 | End: 2021-09-02

## 2020-08-06 NOTE — PATIENT INSTRUCTIONS
Decrease Lantus to 15 units nightly. Increase Prandin to 2 tablets with meals. Start Gabapentin for Neuropathy. Take one capsule at bedtime. Increase the dose up to 3 capsule if you do not have relief.

## 2020-08-06 NOTE — PROGRESS NOTES
"CC: This 75 y.o. male presents for management of Diabetes Mellitus  and chronic conditions pending review including HTN, HLP    HPI: was diagnosed with T2DM several years ago.  States his a1c needs to be <7.0% for knee replacement.   Has never been hospitalized r/t DM.  Family hx of DM: daughter  Fhx of thyroid disease: none  Denies missing doses of DM medication.   hypoglycemia at home: He is having hypoglycemia in the morning ~80.   monitoring BG at home:  Fasting: <100    Diet: He  BF-cheese crackers, milk, banana nut bread  LH-sandwich, liver and onions, grilled fish, broccali  DN-cereal (raisain bran and corn flakes)  Snacks on cookies, chips.  Mostly water. Rarely soda.     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     CURRENT DM MEDS: Metformin 1000 mg BID, prandin 2 mg TID, Lantus 22 units qhs,    Previous meds  Jardiance-yeast infection    Standards of Care:  Eye exam:8/20 Dr. Dow   Podiatry exam: 2014  DE: years ago    PMHx, PSHx: reviewed in epic.  Social Hx: no E/T use.    ROS:   Gen: Appetite good, + weight gain, denies fatigue and weakness.  Skin: Skin is intact and heals well, no rashes, no hair changes  Eyes: Denies visual disturbances  Resp: + VELASCO, no cough  Cardiac: No palpitations, chest pain, no edema   GI: No nausea or vomiting, diarrhea, constipation, or abdominal pain.  /GYN: No nocturia, burning or pain.   MS/Neuro: + right knee pain, Denies numbness/ tingling in BLE; Gait steady, speech clear  Psych: Denies drug/ETOH abuse, no hx of depression.  Other systems: negative.    /80 (BP Location: Left arm, Patient Position: Sitting, BP Method: Medium (Manual))   Pulse 69   Temp 99.1 °F (37.3 °C) (Oral)   Ht 6' 1" (1.854 m)   Wt 113.6 kg (250 lb 8.8 oz)   BMI 33.06 kg/m²      PE:  GENERAL: elderly male, well developed, well nourished.  PSYCH: AAOx3, appropriate mood and affect, pleasant expression, conversant, appears relaxed, well groomed.   EYES: Conjunctiva, corneas clear  NECK: Supple, trachea " midline,no thyromegaly or nodules  CHEST: Resp even and unlabored, CTA bilateral.  CARDIAC: IRR, S1, S2 heard, no murmurs  ABDOMEN: Soft, non-tender, non-distended. Obese abdomen.  VASCULAR: DP pulses +2/4 bilaterally, +2 edema in the left leg, +1 edema in the right leg.  NEURO: ambulates with a cane  SKIN: Skin warm and dry no acanthosis nigracans.  Exam 10/19  Foot Exam: no sores noted. Maceration on the fifth toe of the right foot.    Protective Sensation (w/ 10 gram monofilament):  Right: Intact  Left: Intact    Visual Inspection:  Normal -  Bilateral and Nails Intact - without Evidence of Foot Deformity- Bilateral    Pedal Pulses:   Right: Present  Left: Present    Posterior tibialis:   Right:Present  Left: Present     Vibratory Sensation  Right:Positive  Left:Positive     Personally reviewed labs below:    Lab Visit on 07/30/2020   Component Date Value Ref Range Status    Hemoglobin A1C 07/30/2020 7.4* 4.0 - 5.6 % Final    Comment: ADA Screening Guidelines:  5.7-6.4%  Consistent with prediabetes  >or=6.5%  Consistent with diabetes  High levels of fetal hemoglobin interfere with the HbA1C  assay. Heterozygous hemoglobin variants (HbS, HgC, etc)do  not significantly interfere with this assay.   However, presence of multiple variants may affect accuracy.      Estimated Avg Glucose 07/30/2020 166* 68 - 131 mg/dL Final    Free T4 07/30/2020 1.19  0.71 - 1.51 ng/dL Final    TSH 07/30/2020 1.237  0.400 - 4.000 uIU/mL Final    Glucose 07/30/2020 143* 70 - 110 mg/dL Final    Sodium 07/30/2020 142  136 - 145 mmol/L Final    Potassium 07/30/2020 4.4  3.5 - 5.1 mmol/L Final    Chloride 07/30/2020 105  95 - 110 mmol/L Final    CO2 07/30/2020 27  23 - 29 mmol/L Final    BUN, Bld 07/30/2020 20  8 - 23 mg/dL Final    Calcium 07/30/2020 9.9  8.7 - 10.5 mg/dL Final    Creatinine 07/30/2020 1.2  0.5 - 1.4 mg/dL Final    Albumin 07/30/2020 3.5  3.5 - 5.2 g/dL Final    Phosphorus 07/30/2020 3.2  2.7 - 4.5 mg/dL Final     eGFR if African American 07/30/2020 >60.0  >60 mL/min/1.73 m^2 Final    eGFR if non African American 07/30/2020 59.2* >60 mL/min/1.73 m^2 Final    Comment: Calculation used to obtain the estimated glomerular filtration  rate (eGFR) is the CKD-EPI equation.       Anion Gap 07/30/2020 10  8 - 16 mmol/L Final       ASSESSMENT and PLAN:    1. Type 2 diabetes mellitus with hyperglycemia, without long-term current use of insulin  Hemoglobin A1C    Renal function panel    Microalbumin/creatinine urine ratio    gabapentin (NEURONTIN) 100 MG capsule   2. Neuropathy     3. Hypertension associated with diabetes     4. Hyperlipidemia associated with type 2 diabetes mellitus, baseline      5. Hypovitaminosis D     6. Obesity (BMI 30-39.9)     7. PAF (paroxysmal atrial fibrillation), CHADS-VAS score 3, onset 3/2018     8. Microalbuminuria       T2DM with hyperglycemia-A1c is at goal.Decrease Lantus to 15 units nightly. Increase Prandin to 2 tablets with meals.  Continue Metformin and Prandin.      Instructed to monitor BG and bring meter/ log to every clinic visit.   - takes ASA, ARB, statin  Neuropathy-Start Gabapentin for Neuropathy. Take one capsule at bedtime. Increase the dose up to 3 capsule if you do not have relief.  HTN - stable continue meds as previously prescribed and monitor.     HLP - stable-LDL , on statin therapy, LFTs WNL  Hypovitaminosis d-stable-monitor  Obesity-Body mass index is 33.06 kg/m².  Increase exercise to 30 minutes daily.  LRgb-rcvzyx-irssy hypoglycemia  MAC-elevated-recheck next visit. Pt state bp has been normal ~120/80.      Follow-up: in 3 months with lab prior

## 2020-08-10 ENCOUNTER — HOSPITAL ENCOUNTER (OUTPATIENT)
Dept: RADIOLOGY | Facility: HOSPITAL | Age: 75
Discharge: HOME | End: 2020-08-10
Attending: INTERNAL MEDICINE
Payer: MEDICARE

## 2020-08-10 DIAGNOSIS — R91.1 LUNG NODULE, SOLITARY: ICD-10-CM

## 2020-08-10 PROCEDURE — 71250 CT CHEST WITHOUT CONTRAST: ICD-10-PCS | Mod: 26,,, | Performed by: RADIOLOGY

## 2020-08-10 PROCEDURE — 71250 CT THORAX DX C-: CPT | Mod: TC

## 2020-08-10 PROCEDURE — 71250 CT THORAX DX C-: CPT | Mod: 26,,, | Performed by: RADIOLOGY

## 2020-08-11 ENCOUNTER — TELEPHONE (OUTPATIENT)
Dept: PULMONOLOGY | Facility: CLINIC | Age: 75
End: 2020-08-11

## 2020-08-11 NOTE — TELEPHONE ENCOUNTER
Pt notified----- Message from Eladio Sandoval MD sent at 8/10/2020  6:28 PM CDT -----  Notify ct stable still last 2 yrs, no change In plans.

## 2020-08-14 ENCOUNTER — IMMUNIZATION (OUTPATIENT)
Dept: PHARMACY | Facility: CLINIC | Age: 75
End: 2020-08-14
Payer: MEDICARE

## 2020-08-24 ENCOUNTER — OFFICE VISIT (OUTPATIENT)
Dept: PULMONOLOGY | Facility: CLINIC | Age: 75
End: 2020-08-24
Payer: MEDICARE

## 2020-08-24 VITALS
BODY MASS INDEX: 33.09 KG/M2 | DIASTOLIC BLOOD PRESSURE: 84 MMHG | WEIGHT: 250.75 LBS | HEART RATE: 58 BPM | SYSTOLIC BLOOD PRESSURE: 184 MMHG | OXYGEN SATURATION: 98 %

## 2020-08-24 DIAGNOSIS — R93.429 ABNORMAL CT SCAN, KIDNEY: ICD-10-CM

## 2020-08-24 DIAGNOSIS — J41.1 BRONCHITIS, CHRONIC, MUCOPURULENT: ICD-10-CM

## 2020-08-24 DIAGNOSIS — Z77.090 ASBESTOS EXPOSURE: ICD-10-CM

## 2020-08-24 DIAGNOSIS — J47.9 BRONCHIECTASIS WITHOUT COMPLICATION: Primary | ICD-10-CM

## 2020-08-24 DIAGNOSIS — R91.1 LUNG NODULE: ICD-10-CM

## 2020-08-24 PROCEDURE — 99999 PR PBB SHADOW E&M-EST. PATIENT-LVL V: ICD-10-PCS | Mod: PBBFAC,,, | Performed by: INTERNAL MEDICINE

## 2020-08-24 PROCEDURE — 99214 PR OFFICE/OUTPT VISIT, EST, LEVL IV, 30-39 MIN: ICD-10-PCS | Mod: S$PBB,,, | Performed by: INTERNAL MEDICINE

## 2020-08-24 PROCEDURE — 99215 OFFICE O/P EST HI 40 MIN: CPT | Mod: PBBFAC,PO | Performed by: INTERNAL MEDICINE

## 2020-08-24 PROCEDURE — 99214 OFFICE O/P EST MOD 30 MIN: CPT | Mod: S$PBB,,, | Performed by: INTERNAL MEDICINE

## 2020-08-24 PROCEDURE — 99999 PR PBB SHADOW E&M-EST. PATIENT-LVL V: CPT | Mod: PBBFAC,,, | Performed by: INTERNAL MEDICINE

## 2020-08-24 RX ORDER — AMOXICILLIN AND CLAVULANATE POTASSIUM 875; 125 MG/1; MG/1
1 TABLET, FILM COATED ORAL 2 TIMES DAILY
Qty: 20 TABLET | Refills: 5 | Status: SHIPPED | OUTPATIENT
Start: 2020-08-24 | End: 2020-11-11

## 2020-08-24 RX ORDER — PREDNISONE 20 MG/1
TABLET ORAL
Qty: 12 TABLET | Refills: 0 | Status: SHIPPED | OUTPATIENT
Start: 2020-08-24 | End: 2020-11-11

## 2020-08-24 RX ORDER — ALBUTEROL SULFATE 90 UG/1
AEROSOL, METERED RESPIRATORY (INHALATION)
Qty: 18 G | Refills: 11 | Status: SHIPPED | OUTPATIENT
Start: 2020-08-24 | End: 2021-12-13

## 2020-08-24 NOTE — PROGRESS NOTES
8/24/2020    [facility] Follow Up    Chief Complaint   Patient presents with    Follow-up       HPI:   8/24/2020 - did well with covid epidemic, diabetes with hgba1c 7.4,  For right tkr soon.  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  Had summer cold, no abx ,no prednisone, uses symbicort.  sinsues drainage ppt problem. Worked asbestos past and lung nodules seen. Had pe and still xarelto- lg pe.  No fh lung ca, asbestos exposure.    Patient Instructions   For sinus infection- augmentin- may work for bronchitis if needed.    Try trelegy oce daily in place of symbicort as trial- use best medication.      Could follow up ct chest but nodules seen thus far are not cancer by scans since blood clot.      Renewed  stadol and refer to cardiology  2/25/2019 did not get knee surg as sugar too high, had bronchitis last wk with green mucous 2 wks ago.  Patient Instructions   No positive cultures, still nodules- but from march to September nodules look better.    symbicort prevents bronchitis.  Use augmentin if bronchitis  Use albuterol inhaler as needed.  Check ct chest in 3 months    12/5/18- no cough, breathing well- tkr next week,   ]  Instructions-  With new nodule seen (old ones better)- ct chest March or so would be good- films viewed  Use inhaler/antibiotic/prednisone if bronchitis flares  Blood clot risk with surgery but should not be a problem if blood thinned asap post surgery.  Regular follow up in March or so    Sept 25, 2018-- no cough nor mucous, breathing very good.    Patient Instructions   Cultures had no bad germs in lungs.     If cough flares up- symbicort 2 twice daily.  May use if cough or wheeze or mucous.   May use Augmentin antibiotic if cough or yellow mucous.  May use prednisone if cough is excess or wheezes or short breath.   Nodules no  big worries seen but got a new one.      March 22, 2018pt seen with massive pe, also has chr lung dz seen on ct with massive clots.     From my ct review:  Radiographs reviewed: view by direct vision  Very large pulm emboli bilat main arteries, 5 mm rll solid fairly smooth nodule, very min mild post lung marking increase, no calcified plaques seen, lower lungs with thickened bronchi and perhaps early bronchiectasis      from  Consult hpi and rec:    03/02/2018                                                  Admit Date: 3/1/2018  [facility]  New Patient Consult          Chief Complaint   Patient presents with    Shortness of Breath       completed CT this morning outpatient, was advised by PCP to then present to ER for possible admission          History of Present Illness:  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  Pt never smoker.  No leg trauma nor leg swelling nor pain legs.  Pt had intermittent cough with purulent mucous production last yr- has had seasonal allergies with occ nocturnal wheezes.  No inhaler use nor sob til lately.  Pt does travel with 3-4 immobilization between stops.  Fh + only in nephew with clotts in his early 50's.   Pt developed small climbing to his elevated home last 3 weeks, no syncope, no chest pain nor palpitations.  Pt was evaluated by pcp and a fib and pe found.     Pt rm air sat 94% today.          Plan: pt is stable for home soon on xarelto but would recommend bed to chair activity for next week. dvt and pe are unprovoked.  Given large clots seen and delayed presentation- anticoagg 6 month is min but recommend life long.  hypercoagulable chahal will not change length of rx.  Would ask what nephew eval showed if done.   Pt has nodule with 1 of 200 risk lung ca by Brandyn Model.   F/u ct with airway dz may be reasonable in a year?  Asbestos exposure - no impressive  asbestosis seen.   Pt has symptomatic chr mucopurulent bronchitis.  Coverage for meds is limited on his insurance.  Would give course levaquin and cultures and afb x 3 for geraldo and neb rx prn 1/2 dose albuterol    Suggest f/u in 3 months or so (GERALDO can take 2 months to culture).    pe likely ppt a fib?        The chief compliant  problem is varies with instablilty at time   PFSH:  Past Medical History:   Diagnosis Date    Abdominal obesity 3/15/2018    Acute deep vein thrombosis (DVT) of popliteal vein of right lower extremity 3/2/2018    AK (actinic keratosis) 11/15/2012    S/p 1st PDT face (80 min incubation) - did great! S/p PDT face - 90 min  - 12/15 - did great S/p PDT face - 90 min - 10/17 - great response    Asbestos exposure 3/2/2018    Asymptomatic LV dysfunction 3/2/2018    Bronchitis, chronic, mucopurulent 3/2/2018    Cataract     Colon adenoma 8/20/2014    Current use of long term anticoagulation 4/23/2018    ED (erectile dysfunction) 11/28/2012    Encounter for monitoring sotalol therapy 6/13/2018    Hay fever     Hearing loss, sensorineural 6/25/2013    History of adenomatous polyp of colon 10/5/2015    History of pulmonary embolism 5/2/2018    Hyperlipidemia associated with type 2 diabetes mellitus, baseline  11/28/2012    Hypertension associated with diabetes 11/28/2012    Lung nodule, solitary- 5 mm rll 3/2/2018    On home oxygen therapy 3/15/2018    Osteoarthritis of right knee 11/28/2012    Persistent atrial fibrillation 3/1/2018    Pulmonary embolus- large involved central PA, unprovoked. 3/1/2018    Type 2 diabetes mellitus, controlled 7/16/2013    Type 2 diabetes, controlled, with neuropathy 1/20/2014    Type 2 diabetes, uncontrolled, with neuropathy, onset 2003 11/28/2012         Past Surgical History:   Procedure Laterality Date    COLONOSCOPY N/A 10/5/2015    Procedure: COLONOSCOPY;  Surgeon: Pro Jang MD;  Location: [facility] ([address] [place] [address]);  Service:  Endoscopy;  Laterality: N/A;    thumb surgery       Social History     Tobacco Use    Smoking status: Never Smoker    Smokeless tobacco: Never Used   Substance Use Topics    Alcohol use: Yes     Frequency: Never     Drinks per session: Patient refused     Binge frequency: Never     Comment: rare    Drug use: No     Family History   Problem Relation Age of Onset    Hypertension Father     Heart disease Father         chf    Cancer Brother         colon    Cataracts Mother     Glaucoma Mother     Pneumonia Mother     Cancer Sister         lymphoma    Cancer Brother         prostate ce    Melanoma Neg Hx     Amblyopia Neg Hx     Blindness Neg Hx     Macular degeneration Neg Hx     Retinal detachment Neg Hx     Strabismus Neg Hx     Diabetes Neg Hx      Review of patient's allergies indicates:   Allergen Reactions    Codeine Nausea And Vomiting     Other reaction(s): Nausea    Benicar  [olmesartan]      Other reaction(s): SPOTS IN FRONT OF EYES       Performance Status:The patient's activity level is functions out of house.      Review of Systems:  a review of eleven systems covering constitutional, Eye, HEENT, Psych, Respiratory, Cardiac, GI, , Musculoskeletal, Endocrine, Dermatologic was negative except for pertinent findings as listed ABOVE and below:  pertinent positive as above, rest is good       Exam:Comprehensive exam done. BP (!) 184/84 (BP Location: Right arm, Patient Position: Sitting)   Pulse (!) 58   Wt 113.7 kg (250 lb 12.4 oz)   SpO2 98% Comment: on room air at rest  BMI 33.09 kg/m²   Exam included Vitals as listed, and patient's appearance and affect and alertness and mood, oral exam for yeast and hygiene and pharynx lesions and Mallapatti (M) score, neck with inspection for jvd and masses and thyroid abnormalities and lymph nodes (supraclavicular and infraclavicular nodes and axillary also examined and noted if abn), chest exam included symmetry and effort and fremitus and  percussion and auscultation, cardiac exam included rhythm and gallops and murmur and rubs and jvd and edema, abdominal exam for mass and hepatosplenomegaly and tenderness and hernias and bowel sounds, Musculoskeletal exam with muscle tone and posture and mobility/gait and  strength, and skin for rashes and cyanosis and pallor and turgor, extremity for clubbing.  Findings were normal except for pertinent findings listed below:  M3, chest is symmetric, no distress, normal percussion, normal fremitus and good normal breath sounds  Rrr, no murmur , mild edema +1    Radiographs (ct chest and cxr) reviewed: view by direct vision  lg pe on ct earlier march-- ct chest sept 2018 new nodule noted but lungs look better otherwise- old nodules better.  CTA Chest Non-Coronary   Status: Final result   MyChart Results Release     MyChart Status: Active Results Release   PACS Images     Show images for CTA Chest Non-Coronary   Reviewed By Medina Cortez MD on 3/1/2018 16:40   External Result Report     External Result Report   Narrative     EXAMINATION:  CTA CHEST NON CORONARY    CLINICAL HISTORY:  Chest pain, acute, PE suspected, intermed prob, positive D-dimer;Chest pain, unspecified    TECHNIQUE:  Low dose axial images, sagittal and coronal reformations were obtained from the thoracic inlet to the lung bases.  Timing was optimized to evaluate the pulmonary arteries.    COMPARISON:  Chest radiographs 02/27/2018    FINDINGS:  The visualized thyroid gland is unremarkable.  No supraclavicular lymphadenopathy is seen.  No mediastinal or hilar lymphadenopathy is seen.  No axillary lymphadenopathy is seen.  No acute esophageal abnormality is seen.    The thoracic aorta is normal in caliber.  There is mild atherosclerosis in the aortic arch.  Moderate coronary artery calcification is also noted.    Pulmonary arterial bolus timing is good.  The study is positive for pulmonary embolism.  There are filling defects within the  central pulmonary arteries bilaterally with involvement of the distal aspect of the main left pulmonary artery as well as the descending right and left pulmonary artery branches.  Segmental branches involving the right middle lobe, right upper lobe, left upper lobe, lingula and bilateral lower lobes are also affected.    There is a small wedge-shaped opacity in the superior segment of the left lower lobe on axial image 108 consistent with a small pulmonary infarct.  There is flattening of the interventricular septum.  No focal alveolar consolidation is seen.  There is an approximately 5 mm pulmonary nodule within the anterior basal segment of the right lower lobe.  No pleural effusion is seen.  There is an approximately 6 mm nodule along the lateral segment of the right middle lobe.  5 mm pulmonary nodule noted in the superior segment of the left lower lobe.  Small ground-glass opacities in the right middle lobe laterally may represent additional small pulmonary infarcts.    No acute findings are noted in the visualized upper abdomen.  There is cholelithiasis.  Nonspecific perinephric stranding is seen bilaterally, likely chronic.    No acute osseous abnormality is seen.  There is multilevel thoracic spondylosis.  Calcific tendinitis involving the left rotator cuff is also noted.   Impression       Positive study demonstrating bilateral central and segmental pulmonary emboli, as detailed above.  Small pulmonary infarct involving the superior segment of the left lower lobe and possible small pulmonary infarcts involving the lateral segment of the right middle lobe also noted.  There is also flattening of the interventricular septum which may indicate developing right heart strain.    Small pulmonary nodules in the bilateral lungs measuring up to 6 mm.  Single 1 year follow up CT of the thorax is recommended for surveillance.    Incidental note of cholelithiasis.    This report was flagged in Epic as abnormal on  3/1/2018 at 8:14 am.    COMMUNICATION  This critical result was discovered/received at 8:15 a.m. on 03/01/2018.  The critical information above was relayed directly by me by telephone to Dr. Christopher Cortez on 03/01/2018 at 8:30 a.m..      Electronically signed by: Tita Alves MD  Date: 03/01/2018  Time: 08:32    Encounter     View Encounter              Labs reviewed    PFT was not done       Plan:  Clinical impression is apparently straight forward and impression with management as below.    Des was seen today for follow-up.    Diagnoses and all orders for this visit:    Bronchiectasis without complication  -     predniSONE (DELTASONE) 20 MG tablet; One daily for 3 days and repeat for flare of lung symptoms as intructed  -     fluticasone-umeclidin-vilanter (TRELEGY ELLIPTA) 100-62.5-25 mcg DsDv; Inhale 1 puff into the lungs once daily.  -     albuterol (PROVENTIL/VENTOLIN HFA) 90 mcg/actuation inhaler; 2 puffs every 4 hours as needed for cough, wheeze, or shortness of breath  -     amoxicillin-clavulanate 875-125mg (AUGMENTIN) 875-125 mg per tablet; Take 1 tablet by mouth 2 (two) times daily.    Lung nodule  -     CT Chest Without Contrast; Future    Asbestos exposure  -     CT Chest Without Contrast; Future    Bronchitis, chronic, mucopurulent  -     predniSONE (DELTASONE) 20 MG tablet; One daily for 3 days and repeat for flare of lung symptoms as intructed  -     fluticasone-umeclidin-vilanter (TRELEGY ELLIPTA) 100-62.5-25 mcg DsDv; Inhale 1 puff into the lungs once daily.  -     albuterol (PROVENTIL/VENTOLIN HFA) 90 mcg/actuation inhaler; 2 puffs every 4 hours as needed for cough, wheeze, or shortness of breath    Abnormal CT scan, kidney  -     US Kidney; Future        Follow up in about 2 years (around 8/24/2022).    Discussed with patient above for education the following:      Patient Instructions   Your lungs are in fairly good shape, you have minimal scarring and minimal chronic bronchial problems -  would expect no issues with knee operation.       Use trelegy daily, may use albuterol as needed-   May use prednisone if wheezes.  May use augmentin antibiotic if yellow mucous.     Lung nodules and scarring are stable - would check ct November next year and f/u.  Sooner if concerned.    Ultrasound kidney recommended for abn findings right kidney-- will get back on phone.

## 2020-08-24 NOTE — PATIENT INSTRUCTIONS
Your lungs are in fairly good shape, you have minimal scarring and minimal chronic bronchial problems - would expect no issues with knee operation.       Use trelegy daily, may use albuterol as needed-   May use prednisone if wheezes.  May use augmentin antibiotic if yellow mucous.     Lung nodules and scarring are stable - would check ct November next year and f/u.  Sooner if concerned.    Ultrasound kidney recommended for abn findings right kidney-- will get back on phone.

## 2020-08-28 ENCOUNTER — HOSPITAL ENCOUNTER (OUTPATIENT)
Dept: RADIOLOGY | Facility: HOSPITAL | Age: 75
Discharge: HOME | End: 2020-08-28
Attending: INTERNAL MEDICINE
Payer: MEDICARE

## 2020-08-28 DIAGNOSIS — R93.429 ABNORMAL CT SCAN, KIDNEY: ICD-10-CM

## 2020-08-28 PROCEDURE — 76770 US EXAM ABDO BACK WALL COMP: CPT | Mod: TC

## 2020-08-28 PROCEDURE — 76770 US RETROPERITONEAL COMPLETE: ICD-10-PCS | Mod: 26,,, | Performed by: RADIOLOGY

## 2020-08-28 PROCEDURE — 76770 US EXAM ABDO BACK WALL COMP: CPT | Mod: 26,,, | Performed by: RADIOLOGY

## 2020-09-27 DIAGNOSIS — M17.0 ARTHRITIS OF BOTH KNEES: Primary | ICD-10-CM

## 2020-09-29 ENCOUNTER — PATIENT MESSAGE (OUTPATIENT)
Dept: OTHER | Facility: OTHER | Age: 75
End: 2020-09-29

## 2020-10-23 RX ORDER — AMLODIPINE BESYLATE 10 MG/1
10 TABLET ORAL DAILY
Qty: 90 TABLET | Refills: 11 | Status: SHIPPED | OUTPATIENT
Start: 2020-10-23 | End: 2020-11-11

## 2020-10-30 ENCOUNTER — OFFICE VISIT (OUTPATIENT)
Dept: ORTHOPEDICS | Facility: CLINIC | Age: 75
End: 2020-10-30
Payer: MEDICARE

## 2020-10-30 ENCOUNTER — HOSPITAL ENCOUNTER (OUTPATIENT)
Dept: RADIOLOGY | Facility: HOSPITAL | Age: 75
Discharge: HOME | End: 2020-10-30
Attending: ORTHOPAEDIC SURGERY
Payer: MEDICARE

## 2020-10-30 ENCOUNTER — PATIENT OUTREACH (OUTPATIENT)
Dept: ADMINISTRATIVE | Facility: OTHER | Age: 75
End: 2020-10-30

## 2020-10-30 VITALS — WEIGHT: 250.69 LBS | BODY MASS INDEX: 33.22 KG/M2 | HEIGHT: 73 IN

## 2020-10-30 DIAGNOSIS — Z01.818 PRE-OP TESTING: ICD-10-CM

## 2020-10-30 DIAGNOSIS — M17.0 ARTHRITIS OF BOTH KNEES: Primary | ICD-10-CM

## 2020-10-30 DIAGNOSIS — M17.0 ARTHRITIS OF BOTH KNEES: ICD-10-CM

## 2020-10-30 PROCEDURE — 73564 X-RAY EXAM KNEE 4 OR MORE: CPT | Mod: 26,50,, | Performed by: RADIOLOGY

## 2020-10-30 PROCEDURE — 99214 OFFICE O/P EST MOD 30 MIN: CPT | Mod: PBBFAC,25 | Performed by: ORTHOPAEDIC SURGERY

## 2020-10-30 PROCEDURE — 73564 XR KNEE ORTHO BILAT WITH FLEXION: ICD-10-PCS | Mod: 26,50,, | Performed by: RADIOLOGY

## 2020-10-30 PROCEDURE — 99213 PR OFFICE/OUTPT VISIT, EST, LEVL III, 20-29 MIN: ICD-10-PCS | Mod: S$PBB,,, | Performed by: ORTHOPAEDIC SURGERY

## 2020-10-30 PROCEDURE — 99999 PR PBB SHADOW E&M-EST. PATIENT-LVL IV: CPT | Mod: PBBFAC,,, | Performed by: ORTHOPAEDIC SURGERY

## 2020-10-30 PROCEDURE — 99213 OFFICE O/P EST LOW 20 MIN: CPT | Mod: S$PBB,,, | Performed by: ORTHOPAEDIC SURGERY

## 2020-10-30 PROCEDURE — 99999 PR PBB SHADOW E&M-EST. PATIENT-LVL IV: ICD-10-PCS | Mod: PBBFAC,,, | Performed by: ORTHOPAEDIC SURGERY

## 2020-10-30 PROCEDURE — 73564 X-RAY EXAM KNEE 4 OR MORE: CPT | Mod: TC,50

## 2020-10-30 NOTE — PROGRESS NOTES
Chart reviewed.   Immunizations: updated  Orders placed: n/a  Upcoming appts to satisfy [facility]IT[facility] topics: Hemoglobin A1c 10/30  Media reviewed for outside colonoscopy.

## 2020-10-30 NOTE — PROGRESS NOTES
Subjective:     HPI:   Des Junior is a 75 y.o. male who presents for repeat surgery discussion of his right knee replacement.    We saw him in July 2019 and discuss surgery when he got his diabetes better controlled.  He has made significant efforts and his hemoglobin A1c was 7.4 on July 30th down from 8.8 when we saw him a year ago.    To review:   He is seen multiple orthopedic providers here.  He initially saw Dr. [facility] in October 2017 and did bilateral steroid knee injections. He then saw Dr. Martines in May of 2018 and got Euflexxa injections. He saw Dr. Bustamante October 2018 and they planned right total knee replacement for some time in December of 2018 unfortunately his blood sugar was poorly controlled and the surgery was delayed     He complains of moderate to severe global predominantly medial-sided right greater than left knee pain.  Activity related and relieved with rest.  No groin anterior thigh radicular pain or paresthesias.     He takes Tylenol once a day with good relief he has tried some type of topical Chinese oral in the past.  He has had both steroid and Euflexxa injections in the knee he has not done any physical therapy he has tried knee braces.  He uses a Rollator and a cane for support he would have difficulty walking more than a block.  His predominant limitation is walking and activities of daily living.     He lives with his wife at home in [place], they have an elevated house with an elevator.  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  Exam:  Antalgic gait with a limp favoring the right knee.  Right knee 5-115 left knee 5-115, 10° varus bilateral which almost corrects to neutral.  Knees are stable anterior-posterior varus and valgus stresses significant flexion contracture but no extensor lag tender palpation medial lateral patellofemoral joint lines with moderate  effusions      Imaging:  Indication:  Right knee pain  Exam Ordered: Radiographs of the right knee include a standing anteroposterior view, a standing posterioanterior view, a lateral view in full flexion, and a sunrise view  Details of Examination: Todays exam shows evidence of joint space narrowing, osteophyte formation, and subchondral sclerosis, all consistent with degenerative arthritis of the knee.  No other significant findings are noted.  Impression:  Degenerative Arthritis, Right Knee    Indication:  Left knee pain  Exam Ordered: Radiographs of the left knee include a standing anteroposterior view, a standing posterioanterior view, a lateral view in full flexion, and a sunrise view  Details of Examination: Todays exam shows evidence of joint space narrowing, osteophyte formation, and subchondral sclerosis, all consistent with degenerative arthritis of the knee.  No other significant findings are noted.  Impression:  Degenerative Arthritis, Left Knee    Bilateral knee grade 4 varus arthritis      Assessment:       ICD-10-CM ICD-9-CM   1. Arthritis of both knees  M17.0 716.96      Severe varus, right side most symptomatic     Atrial fibrillation on Xarelto (not on aspirin)  History of DVT right lower extremity and bilateral PTE diagnosed in March of 2018 around the time of his atrial fibrillation diagnosis.  Denies any previous history of blood clots and denies any family history of that.  Diabetes.  Most recent hemoglobin A1c 7.4 down from 8.8  Pulmonary disease.  Has been on home oxygen in the past but says he is off it now     Plan:       This patient has significant symptoms in their knee that are affecting their quality of life and daily activities.  They have tried non-operative treatment including analgesics, an exercise program, and activity modification, but the symptoms have persisted. I believe they make a good candidate for knee arthroplasty.     The risks and benefits of knee arthroplasty have  been discussed with the patient which include, but are not limited to infections (including severe sequelae), potential component failure, fracture, DVT, pulmonary embolus, nerve palsy, poor range of motion, the possibility of bone grafting, persistent pain, wound healing complications, transfusions, medical complications and death.     Pre-operative planning will include the following:  A pre-surgical evaluation by will be arranged.  Pre-op orders will be placed.  We will make arrangements with the operating room for proper time and staffing.  We will make Social Service arrangements for the patient.    Implants:   Company: [employer]  System: [employer]    DVT prophylaxis: ASA 81mg x1 12hrs post op, resume xarelto 24hrs post op  Dispo: outpatient PT if there is a location available within a reasonable distance of his house, otherwise  PT    Admission status: Outpatient/23hr OBS                 Location: [place]                                   Recently cleared by pulmonary  Needs new cardiac clearance, last seen and cleared for surgery 2018    R TKA 12/7    No orders of the defined types were placed in this encounter.            Past Medical History:   Diagnosis Date    Abdominal obesity 3/15/2018    Acute deep vein thrombosis (DVT) of popliteal vein of right lower extremity 3/2/2018    AK (actinic keratosis) 11/15/2012    S/p 1st PDT face (80 min incubation) - did great! S/p PDT face - 90 min  - 12/15 - did great S/p PDT face - 90 min - 10/17 - great response    Asbestos exposure 3/2/2018    Asymptomatic LV dysfunction 3/2/2018    Bronchitis, chronic, mucopurulent 3/2/2018    Cataract     Colon adenoma 8/20/2014    Current use of long term anticoagulation 4/23/2018    ED (erectile dysfunction) 11/28/2012    Encounter for monitoring sotalol therapy 6/13/2018    Hay fever     Hearing loss, sensorineural 6/25/2013    History of adenomatous polyp of colon 10/5/2015    History of pulmonary embolism 5/2/2018     Hyperlipidemia associated with type 2 diabetes mellitus, baseline  11/28/2012    Hypertension associated with diabetes 11/28/2012    Lung nodule, solitary- 5 mm rll 3/2/2018    On home oxygen therapy 3/15/2018    Osteoarthritis of right knee 11/28/2012    Persistent atrial fibrillation 3/1/2018    Pulmonary embolus- large involved central PA, unprovoked. 3/1/2018    Type 2 diabetes mellitus, controlled 7/16/2013    Type 2 diabetes, controlled, with neuropathy 1/20/2014    Type 2 diabetes, uncontrolled, with neuropathy, onset 2003 11/28/2012       Past Surgical History:   Procedure Laterality Date    COLONOSCOPY N/A 10/5/2015    Procedure: COLONOSCOPY;  Surgeon: Pro Jang MD;  Location: [facility] ([address] [place] [address]);  Service: Endoscopy;  Laterality: N/A;    thumb surgery         Family History   Problem Relation Age of Onset    Hypertension Father     Heart disease Father         chf    Cancer Brother         colon    Cataracts Mother     Glaucoma Mother     Pneumonia Mother     Cancer Sister         lymphoma    Cancer Brother         prostate ce    Melanoma Neg Hx     Amblyopia Neg Hx     Blindness Neg Hx     Macular degeneration Neg Hx     Retinal detachment Neg Hx     Strabismus Neg Hx     Diabetes Neg Hx        Social History     Socioeconomic History    Marital status:      Spouse name: Not on file    Number of children: Not on file    Years of education: Not on file    Highest education level: Not on file   Occupational History    Occupation: Self emplyed   Social Needs    Financial resource strain: Not very hard    Food insecurity     Worry: Never true     Inability: Never true    Transportation needs     Medical: No     Non-medical: No   Tobacco Use    Smoking status: Never Smoker    Smokeless tobacco: Never Used   Substance and Sexual Activity    Alcohol use: Yes     Frequency: Never     Drinks per session: Patient refused     Binge frequency: Never      Comment: rare    Drug use: No    Sexual activity: Not on file   Lifestyle    Physical activity     Days per week: 0 days     Minutes per session: 10 min    Stress: Not at all   Relationships    Social connections     Talks on phone: More than three times a week     Gets together: Never     Attends Jew service: Not on file     Active member of club or organization: No     Attends meetings of clubs or organizations: Never     Relationship status:    Other Topics Concern    Not on file   Social History Narrative    Master . 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

## 2020-11-02 ENCOUNTER — TELEPHONE (OUTPATIENT)
Dept: ENDOCRINOLOGY | Facility: CLINIC | Age: 75
End: 2020-11-02

## 2020-11-02 ENCOUNTER — OFFICE VISIT (OUTPATIENT)
Dept: ENDOCRINOLOGY | Facility: CLINIC | Age: 75
End: 2020-11-02
Payer: MEDICARE

## 2020-11-02 VITALS
HEART RATE: 67 BPM | TEMPERATURE: 97 F | DIASTOLIC BLOOD PRESSURE: 80 MMHG | WEIGHT: 249.44 LBS | SYSTOLIC BLOOD PRESSURE: 110 MMHG | HEIGHT: 73 IN | BODY MASS INDEX: 33.06 KG/M2

## 2020-11-02 DIAGNOSIS — E78.5 HYPERLIPIDEMIA ASSOCIATED WITH TYPE 2 DIABETES MELLITUS: ICD-10-CM

## 2020-11-02 DIAGNOSIS — E11.65 TYPE 2 DIABETES MELLITUS WITH HYPERGLYCEMIA, WITHOUT LONG-TERM CURRENT USE OF INSULIN: Primary | ICD-10-CM

## 2020-11-02 DIAGNOSIS — R80.9 MICROALBUMINURIA: ICD-10-CM

## 2020-11-02 DIAGNOSIS — E11.59 HYPERTENSION ASSOCIATED WITH DIABETES: ICD-10-CM

## 2020-11-02 DIAGNOSIS — I15.2 HYPERTENSION ASSOCIATED WITH DIABETES: ICD-10-CM

## 2020-11-02 DIAGNOSIS — E11.69 HYPERLIPIDEMIA ASSOCIATED WITH TYPE 2 DIABETES MELLITUS: ICD-10-CM

## 2020-11-02 DIAGNOSIS — J41.1 BRONCHITIS, CHRONIC, MUCOPURULENT: ICD-10-CM

## 2020-11-02 DIAGNOSIS — I48.0 PAF (PAROXYSMAL ATRIAL FIBRILLATION): ICD-10-CM

## 2020-11-02 DIAGNOSIS — E55.9 HYPOVITAMINOSIS D: ICD-10-CM

## 2020-11-02 DIAGNOSIS — E66.9 OBESITY (BMI 30-39.9): ICD-10-CM

## 2020-11-02 DIAGNOSIS — J47.9 BRONCHIECTASIS WITHOUT COMPLICATION: ICD-10-CM

## 2020-11-02 PROCEDURE — 99214 OFFICE O/P EST MOD 30 MIN: CPT | Mod: S$PBB,,, | Performed by: PHYSICIAN ASSISTANT

## 2020-11-02 PROCEDURE — 99999 PR PBB SHADOW E&M-EST. PATIENT-LVL V: CPT | Mod: PBBFAC,,, | Performed by: PHYSICIAN ASSISTANT

## 2020-11-02 PROCEDURE — 99214 PR OFFICE/OUTPT VISIT, EST, LEVL IV, 30-39 MIN: ICD-10-PCS | Mod: S$PBB,,, | Performed by: PHYSICIAN ASSISTANT

## 2020-11-02 PROCEDURE — 99999 PR PBB SHADOW E&M-EST. PATIENT-LVL V: ICD-10-PCS | Mod: PBBFAC,,, | Performed by: PHYSICIAN ASSISTANT

## 2020-11-02 PROCEDURE — 90694 VACC AIIV4 NO PRSRV 0.5ML IM: CPT | Mod: PBBFAC,PO

## 2020-11-02 PROCEDURE — 99215 OFFICE O/P EST HI 40 MIN: CPT | Mod: PBBFAC,PO | Performed by: PHYSICIAN ASSISTANT

## 2020-11-02 PROCEDURE — G0008 ADMIN INFLUENZA VIRUS VAC: HCPCS | Mod: PBBFAC,PO

## 2020-11-02 RX ORDER — REPAGLINIDE 2 MG/1
TABLET ORAL
Qty: 540 TABLET | Refills: 3 | Status: SHIPPED | OUTPATIENT
Start: 2020-11-02 | End: 2021-02-01

## 2020-11-02 NOTE — PATIENT INSTRUCTIONS
Decrease Lantus and split dose to 8 units in the morning and 7 units in the evening. Check glucose before breakfast and supper.

## 2020-11-02 NOTE — PROGRESS NOTES
"CC: This 75 y.o. male presents for management of Diabetes Mellitus  and chronic conditions pending review including HTN, HLP.     HPI: was diagnosed with T2DM several years ago. He will have a knee replacement next month by Dr. Gore.  Has never been hospitalized r/t DM.  Family hx of DM: daughter  Fhx of thyroid disease: none  Denies missing doses of DM medication.   hypoglycemia at home: He is having hypoglycemia in the morning ~80.   BGmonitoring BG at home:  Fasting: <100    Diet: He  BF-cheese crackers, milk, banana nut bread  LH-sandwich, liver and onions, grilled fish, broccali  DN-cereal (raisain bran and corn flakes)  Snacks on cookies, chips.  Mostly water. Rarely soda.     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 Not too active due to right knee pain.    CURRENT DM MEDS: Metformin 1000 mg BID, prandin 2 mg TID, Lantus 18 units qhs,    Previous meds  Jardiance-yeast infection  Januvia-changed to Trulicity  Invokana-yeast infection  Trulicity-diarrhea    Standards of Care:  Eye exam:8/20 Dr. Dow   Podiatry exam: 2014  DE: years ago    PMHx, PSHx: reviewed in epic.  Social Hx: no E/T use.    ROS:   Gen: Appetite good, + weight gain, denies fatigue and weakness.  Skin: Skin is intact and heals well, no rashes, no hair changes  Eyes: Denies visual disturbances  Resp: + VELASCO, no cough  Cardiac: No palpitations, chest pain, no edema   GI: No nausea or vomiting, diarrhea, constipation, or abdominal pain.  /GYN: No nocturia, burning or pain.   MS/Neuro: + right knee pain, Denies numbness/ tingling in BLE; Gait steady, speech clear  Psych: Denies drug/ETOH abuse, no hx of depression.  Other systems: negative.    /80 (BP Location: Left arm, Patient Position: Sitting, BP Method: Large (Manual))   Pulse 67   Temp 97.2 °F (36.2 °C) (Temporal)   Ht 6' 1" (1.854 m)   Wt 113.2 kg (249 lb 7.2 oz)   BMI 32.91 kg/m²      PE:  GENERAL: elderly male, well developed, well nourished.  PSYCH: AAOx3, appropriate mood and affect, " pleasant expression, conversant, appears relaxed, well groomed.   EYES: Conjunctiva, corneas clear  NECK: Supple, trachea midline,no thyromegaly or nodules  CHEST: Resp even and unlabored, CTA bilateral.  CARDIAC: IRR, S1, S2 heard, no murmurs  ABDOMEN: Soft, non-tender, non-distended. Obese abdomen.  VASCULAR: DP pulses +2/4 bilaterally, +2 edema in the left leg, +1 edema in the right leg.  NEURO: ambulates with a cane  SKIN: Skin warm and dry no acanthosis nigracans.  Exam 10/20  Foot Exam: no sores noted. Maceration on the fifth toe of the right foot.    Protective Sensation (w/ 10 gram monofilament):  Right: Intact  Left: Intact    Visual Inspection:  Normal -  Bilateral and Nails Intact - without Evidence of Foot Deformity- Bilateral    Pedal Pulses:   Right: Present  Left: Present    Posterior tibialis:   Right:Present  Left: Present     Vibratory Sensation  Right:Positive  Left:Positive     Personally reviewed labs below:    Lab Visit on 10/30/2020   Component Date Value Ref Range Status    Hemoglobin A1C 10/30/2020 7.7* 4.0 - 5.6 % Final    Comment: ADA Screening Guidelines:  5.7-6.4%  Consistent with prediabetes  >or=6.5%  Consistent with diabetes  High levels of fetal hemoglobin interfere with the HbA1C  assay. Heterozygous hemoglobin variants (HbS, HgC, etc)do  not significantly interfere with this assay.   However, presence of multiple variants may affect accuracy.      Estimated Avg Glucose 10/30/2020 174* 68 - 131 mg/dL Final    Glucose 10/30/2020 160* 70 - 110 mg/dL Final    Sodium 10/30/2020 138  136 - 145 mmol/L Final    Potassium 10/30/2020 4.2  3.5 - 5.1 mmol/L Final    Chloride 10/30/2020 101  95 - 110 mmol/L Final    CO2 10/30/2020 24  23 - 29 mmol/L Final    BUN 10/30/2020 24* 8 - 23 mg/dL Final    Calcium 10/30/2020 9.8  8.7 - 10.5 mg/dL Final    Creatinine 10/30/2020 1.3  0.5 - 1.4 mg/dL Final    Albumin 10/30/2020 3.6  3.5 - 5.2 g/dL Final    Phosphorus 10/30/2020 3.8  2.7 - 4.5  mg/dL Final    eGFR if African American 10/30/2020 >60.0  >60 mL/min/1.73 m^2 Final    eGFR if non African American 10/30/2020 53.8* >60 mL/min/1.73 m^2 Final    Comment: Calculation used to obtain the estimated glomerular filtration  rate (eGFR) is the CKD-EPI equation.       Anion Gap 10/30/2020 13  8 - 16 mmol/L Final   Lab Visit on 10/30/2020   Component Date Value Ref Range Status    Microalbumin, Urine 10/30/2020 20.0  ug/mL Final    Creatinine, Urine 10/30/2020 108.0  23.0 - 375.0 mg/dL Final    Comment: The random urine reference ranges provided were established   for 24 hour urine collections.  No reference ranges exist for  random urine specimens.  Correlate clinically.      Microalb/Creat Ratio 10/30/2020 18.5  0.0 - 30.0 ug/mg Final       ASSESSMENT and PLAN:    1. Type 2 diabetes mellitus with hyperglycemia, without long-term current use of insulin     2. Hypertension associated with diabetes     3. Hyperlipidemia associated with type 2 diabetes mellitus, baseline      4. Hypovitaminosis D     5. Obesity (BMI 30-39.9)     6. PAF (paroxysmal atrial fibrillation), CHADS-VAS score 3, onset 3/2018     7. Microalbuminuria       T2DM with hyperglycemia-A1c is above goal. Pt states glucose is elevated due to cough syrup that was taken over a few weeks. Decrease Lantus and split dose to 8 units in the morning and 7 units in the evening. Check glucose before breakfast and supper. Pt does not want to start a new medication at this time. Would restart Januvia.    Instructed to monitor BG and bring meter/ log to every clinic visit.   - takes ASA, ARB, statin  Neuropathy-Start Gabapentin for Neuropathy. Take one capsule at bedtime. Increase the dose up to 3 capsule if you do not have relief.  HTN - stable continue meds as previously prescribed and monitor.     HLP - stable-LDL , on statin therapy, LFTs WNL  Hypovitaminosis d-stable-monitor  Obesity-Body mass index is 32.91 kg/m².  Increase exercise to  30 minutes daily.  NEjy-ihmfld-hzafq hypoglycemia  MAC-wnl     Follow-up: in 3 months with lab prior

## 2020-11-03 ENCOUNTER — PATIENT MESSAGE (OUTPATIENT)
Dept: ADMINISTRATIVE | Facility: OTHER | Age: 75
End: 2020-11-03

## 2020-11-03 DIAGNOSIS — M17.11 PRIMARY OSTEOARTHRITIS OF RIGHT KNEE: Primary | ICD-10-CM

## 2020-11-05 ENCOUNTER — TELEPHONE (OUTPATIENT)
Dept: CARDIOLOGY | Facility: CLINIC | Age: 75
End: 2020-11-05

## 2020-11-05 DIAGNOSIS — R00.2 PALPITATION: Primary | ICD-10-CM

## 2020-11-05 DIAGNOSIS — Z01.818 PRE-OP TESTING: Primary | ICD-10-CM

## 2020-11-05 DIAGNOSIS — M79.604 PAIN OF RIGHT LOWER EXTREMITY: ICD-10-CM

## 2020-11-05 NOTE — PRE-PROCEDURE INSTRUCTIONS
Chart review; triage plan initiated.  Phone contact-medication reconciliation completed; instructed to hold vitamins, supplements and NSAIDs for one week prior to surgery. Informed that the remaining medication instructions will be provided at the POC visit; pt knows he will need to stop Xarelto prior to surgery and instructions are pending from cardiology;  scheduled appointments for POC and lab-will mail appointment slip as requested by pt. Pt verbalized understanding.

## 2020-11-05 NOTE — ANESTHESIA PAT ROS NOTE
11/05/2020  Des Junior is a 75 y.o., male.      Pre-op Assessment          Review of Systems  Anesthesia Hx:  No problems with previous Anesthesia  History of prior surgery of interest to airway management or planning: Denies Family Hx of Anesthesia complications.   Denies Personal Hx of Anesthesia complications.   Social:  Non-Smoker, No Alcohol Use    Hematology/Oncology:  Hematology Normal   Oncology Normal     EENT/Dental:EENT/Dental Normal   Cardiovascular:    Denies Angina. hyperlipidemia  Functional Capacity good / => 4 METS  Deep Venous Thrombosis (DVT) (3/2018), Hx of DVT, right lower extremity  Hypertension , Fairly Controlled on RX , Recent typical clinic B/P of 165/80  Disorder of Cardiac Rhythm, Atrial Fibrillation, S/P surgical ablation    Pulmonary:   Denies Shortness of breath.  Denies Recent URI. Occ wheezes with URI-has not required inhaler in weeks. HX of asbestos exposure and lung nodule. Pulmonary Symptoms:  Pulmonary Embolism, > 6 months ago    Hepatic/GI:  Esophageal / Stomach Disorders Gerd Controlled by chronic antireflux medication.    Musculoskeletal:  Musculoskeletal General/Symptoms: joint pain, joint stiffness. Functional capacity is ambulatory with walker.  Joint Disease:  Arthritis, Osteoarthritis, knee  Lumbar Spine Disorders, Lumbar Disc Disease Sees chiropractor for adjustments in lumbar spine.  Neurological:  Pain , onset is chronic , location of right knee , alleviating factors are prn tylenol. Arthritis, Osteoarthritis, knee    Endocrine:  Diabetes, Type 2 Diabetes , controlled by insulin. Typical AM glucose range: 80s , most recent HgA1c value was 7.7 on 10/30/20.  Metabolic Disorders, Obesity / BMI > 30  Psych:  Psychiatric Normal           Physical Exam  General:  Well nourished    Airway/Jaw/Neck:  Airway Findings: Mouth Opening: Normal Tongue: Normal   Jaw/Neck Findings:  Neck ROM: Normal ROM      Dental:  Dental Findings: In tact        Mental Status:  Mental Status Findings:  Cooperative, Alert and Oriented       11/18/20 Cards clearance and Dr Soria clearance noted in Epic:  Preoperative cardiac risk assessment-  The patient does not have any active cardiac conditions . Revised cardiac risk index predictors- 1---.Functional capacity is more than 4 Mets. He will be undergoing a Orthopedic procedure that carries a Moderate Risk risk      Risk of a major Cardiac event ( Defined as death, myocardial infarction, or cardiac arrest at 30 days after noncardiac surgery), based on RCRI score      -6.0%         Will follow up on EP evaluation         American Society of Anesthesiologists Physical status classification ( ASA ) class: 3     Postoperative pulmonary complication risk assessment:      ARISCAT ( Canet) risk index- risk class -  Low, if duration of surgery is under 3 hours, intermediate, if duration of surgery is over 3 hours      [facility] Respiratory failure index- percentage risk of respiratory failure: 0.5 %       Anesthesia Assessment: Preoperative EQUATION    Planned Procedure: Procedure(s) (LRB):  ARTHROPLASTY, KNEE:RIGHT:DEPUY-SIGMA (Right)  Requested Anesthesia Type:Regional  Surgeon: Hua Gore III, MD  Service: Orthopedics  Known or anticipated Date of Surgery:12/7/2020    Surgeon notes: reviewed    Previous anesthesia records:colonoscopy 2015    Last PCP note: within [facility] , Dr Dvorin 6/19/20  Subspecialty notes: Endocrine, MARYAM Bowens 11/2/20;  Pulmonary, Dr Eladio Sandoval 8/24/20;   (Cardiology notes from 2018)    Other important co-morbidities: A FIB, DM2, GERD, HLD, HTN and h/o PE, DVT 3/2018 unprovoked, chronic bronchitis, asbestos exposure, stable lung nodules (CT chest 8/10/20); neuropathy      Tests already available:  A1C (7.7), RENAL FUNCTION PANEL 10/30/20; EKG 9/9/18; TTE 9/17/18 (EF 53%); NM PHARM STRESS TEST 3/26/18 (EF 39%)  CT  CHEST 8/10/20; US Kidney 8/28/20             Plan:    Testing:  Hematology Profile and PT/INR   Pre-anesthesia  visit       Visit focus: possible regional anesthesia and/or nerve block      Consultation:[facility] Perioperative Hospitalist Dr Soria appt 11/11/20; CARDS (appt with MARYAM House 11/11/20)will request instructions for holding Xarelto 3-4 days based on Cr.Cl for neuraxial anesthesia (modified for overweight pt-Cr. Cl 66 ml/min=hold x 72 hrs per current standardized protocol)  Pulmonary clearance/pt recommendations per Dr Sandoval:  Your lungs are in fairly good shape, you have minimal scarring and minimal chronic bronchial problems - would expect no issues with knee operation.   Use trelegy daily, may use albuterol as needed-   May use prednisone if wheezes.  May use augmentin antibiotic if yellow mucous.   Lung nodules and scarring are stable - would check ct November next year and f/u.  Sooner if concerned.       Navigation: Tests Scheduled.              Consults scheduled.             Results will be tracked by Preop Clinic.      Addendum 11/12/20 per Amie Kiran NP in cardiology:       Preop cardiovascular exam  Would not recommend he has surgery until he sees EP as his ECG today shows a retrograde P wave which is consistent with a re-entry tachycardia.  Would like him to see them and get that taken care of first.         AV billy re-entry tachycardia  New Re-entry tachycardia.  Reviewed ECG with Dr. Luis.  Increase metoprolol to 50 mg daily and stop the amlodipine.  Was able to get an urgent EP consult for Friday with Dr. Mccauley so that his knee surgery will hopefull not have to be canceled for next month.      Per Dr Soria 11/11/20: Coming back on 11/13 to have EP evaluation  Current use of long term anticoagulation  Xarelto   CR CL 66   Hold for 72 hours pre op   Date of surgery - 12/7/2020   Last day of Xareto use pre op - 12/3/2020    Patient is pending optimization- Cardiology ( EP ) evaluation     /Lissa  Addendum 11/12/20-Cards EP consult now on Mon 11/16/20 with Dr Nik Palma/Lissa    Addendum 11/17/20- pt saw arrhythmia cardiologist, Dr Mccauley, on 11/13/20. 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 Hx of PAF. However, today he's in NSR and is unclear whether he feels any different.  Agree with increase in BB.  Zio patch to follow whether arrhythmia recurs and decide if there's any associated sx.  There's no arrhythmic contraindication to his planned knee surgery.  /Lissa

## 2020-11-09 ENCOUNTER — PATIENT MESSAGE (OUTPATIENT)
Dept: ADMINISTRATIVE | Facility: OTHER | Age: 75
End: 2020-11-09

## 2020-11-11 ENCOUNTER — OFFICE VISIT (OUTPATIENT)
Dept: CARDIOLOGY | Facility: CLINIC | Age: 75
End: 2020-11-11
Payer: MEDICARE

## 2020-11-11 ENCOUNTER — INITIAL CONSULT (OUTPATIENT)
Dept: INTERNAL MEDICINE | Facility: CLINIC | Age: 75
End: 2020-11-11
Payer: MEDICARE

## 2020-11-11 ENCOUNTER — HOSPITAL ENCOUNTER (OUTPATIENT)
Dept: CARDIOLOGY | Facility: CLINIC | Age: 75
Discharge: HOME | End: 2020-11-11
Payer: MEDICARE

## 2020-11-11 VITALS
TEMPERATURE: 98 F | BODY MASS INDEX: 33 KG/M2 | HEIGHT: 73 IN | DIASTOLIC BLOOD PRESSURE: 83 MMHG | SYSTOLIC BLOOD PRESSURE: 133 MMHG | HEART RATE: 85 BPM | WEIGHT: 249 LBS | RESPIRATION RATE: 18 BRPM | OXYGEN SATURATION: 97 %

## 2020-11-11 VITALS
HEART RATE: 66 BPM | SYSTOLIC BLOOD PRESSURE: 92 MMHG | DIASTOLIC BLOOD PRESSURE: 65 MMHG | HEIGHT: 73 IN | BODY MASS INDEX: 32.9 KG/M2 | WEIGHT: 248.25 LBS | OXYGEN SATURATION: 94 %

## 2020-11-11 DIAGNOSIS — I48.0 PAF (PAROXYSMAL ATRIAL FIBRILLATION): ICD-10-CM

## 2020-11-11 DIAGNOSIS — Z01.810 PREOP CARDIOVASCULAR EXAM: ICD-10-CM

## 2020-11-11 DIAGNOSIS — Z77.090 ASBESTOS EXPOSURE: ICD-10-CM

## 2020-11-11 DIAGNOSIS — I49.8 OTHER SPECIFIED CARDIAC ARRHYTHMIAS: Primary | ICD-10-CM

## 2020-11-11 DIAGNOSIS — I15.2 HYPERTENSION ASSOCIATED WITH DIABETES: ICD-10-CM

## 2020-11-11 DIAGNOSIS — R00.2 PALPITATION: ICD-10-CM

## 2020-11-11 DIAGNOSIS — N18.31 STAGE 3A CHRONIC KIDNEY DISEASE: ICD-10-CM

## 2020-11-11 DIAGNOSIS — D64.9 ANEMIA, UNSPECIFIED TYPE: ICD-10-CM

## 2020-11-11 DIAGNOSIS — E11.40 TYPE 2 DIABETES, CONTROLLED, WITH NEUROPATHY: ICD-10-CM

## 2020-11-11 DIAGNOSIS — E66.09 CLASS 1 OBESITY DUE TO EXCESS CALORIES WITH SERIOUS COMORBIDITY AND BODY MASS INDEX (BMI) OF 32.0 TO 32.9 IN ADULT: ICD-10-CM

## 2020-11-11 DIAGNOSIS — I51.9 ASYMPTOMATIC LV DYSFUNCTION: ICD-10-CM

## 2020-11-11 DIAGNOSIS — K21.9 GASTROESOPHAGEAL REFLUX DISEASE WITHOUT ESOPHAGITIS: ICD-10-CM

## 2020-11-11 DIAGNOSIS — I25.10 CORONARY ARTERY CALCIFICATION SEEN ON CT SCAN: ICD-10-CM

## 2020-11-11 DIAGNOSIS — E11.59 HYPERTENSION ASSOCIATED WITH DIABETES: ICD-10-CM

## 2020-11-11 DIAGNOSIS — Z79.01 CURRENT USE OF LONG TERM ANTICOAGULATION: ICD-10-CM

## 2020-11-11 DIAGNOSIS — Z86.711 HISTORY OF PULMONARY EMBOLISM: ICD-10-CM

## 2020-11-11 DIAGNOSIS — I48.0 PAF (PAROXYSMAL ATRIAL FIBRILLATION): Primary | ICD-10-CM

## 2020-11-11 DIAGNOSIS — E78.00 HYPERCHOLESTEROLEMIA: ICD-10-CM

## 2020-11-11 DIAGNOSIS — I47.19 AV NODAL RE-ENTRY TACHYCARDIA: ICD-10-CM

## 2020-11-11 DIAGNOSIS — H90.3 SENSORINEURAL HEARING LOSS (SNHL) OF BOTH EARS: ICD-10-CM

## 2020-11-11 DIAGNOSIS — J47.9 BRONCHIECTASIS WITHOUT COMPLICATION: ICD-10-CM

## 2020-11-11 DIAGNOSIS — R91.1 LUNG NODULE: ICD-10-CM

## 2020-11-11 PROBLEM — Z79.899 ENCOUNTER FOR MONITORING SOTALOL THERAPY: Status: RESOLVED | Noted: 2018-06-13 | Resolved: 2020-11-11

## 2020-11-11 PROBLEM — N18.30 CKD (CHRONIC KIDNEY DISEASE) STAGE 3, GFR 30-59 ML/MIN: Status: ACTIVE | Noted: 2020-11-11

## 2020-11-11 PROBLEM — Z51.81 ENCOUNTER FOR MONITORING SOTALOL THERAPY: Status: RESOLVED | Noted: 2018-06-13 | Resolved: 2020-11-11

## 2020-11-11 PROBLEM — I49.9 IRREGULAR HEARTBEAT: Status: RESOLVED | Noted: 2019-08-29 | Resolved: 2020-11-11

## 2020-11-11 PROCEDURE — 93010 ELECTROCARDIOGRAM REPORT: CPT | Mod: S$PBB,,, | Performed by: INTERNAL MEDICINE

## 2020-11-11 PROCEDURE — 99213 OFFICE O/P EST LOW 20 MIN: CPT | Mod: PBBFAC,25 | Performed by: HOSPITALIST

## 2020-11-11 PROCEDURE — 99215 OFFICE O/P EST HI 40 MIN: CPT | Mod: PBBFAC,27,25 | Performed by: NURSE PRACTITIONER

## 2020-11-11 PROCEDURE — 99999 PR PBB SHADOW E&M-EST. PATIENT-LVL III: CPT | Mod: PBBFAC,,, | Performed by: HOSPITALIST

## 2020-11-11 PROCEDURE — 99214 OFFICE O/P EST MOD 30 MIN: CPT | Mod: S$PBB,,, | Performed by: HOSPITALIST

## 2020-11-11 PROCEDURE — 99214 PR OFFICE/OUTPT VISIT, EST, LEVL IV, 30-39 MIN: ICD-10-PCS | Mod: S$PBB,,, | Performed by: NURSE PRACTITIONER

## 2020-11-11 PROCEDURE — 99999 PR PBB SHADOW E&M-EST. PATIENT-LVL V: CPT | Mod: PBBFAC,,, | Performed by: NURSE PRACTITIONER

## 2020-11-11 PROCEDURE — 93005 ELECTROCARDIOGRAM TRACING: CPT | Mod: PBBFAC | Performed by: INTERNAL MEDICINE

## 2020-11-11 PROCEDURE — 99214 PR OFFICE/OUTPT VISIT, EST, LEVL IV, 30-39 MIN: ICD-10-PCS | Mod: S$PBB,,, | Performed by: HOSPITALIST

## 2020-11-11 PROCEDURE — 99999 PR PBB SHADOW E&M-EST. PATIENT-LVL III: ICD-10-PCS | Mod: PBBFAC,,, | Performed by: HOSPITALIST

## 2020-11-11 PROCEDURE — 99214 OFFICE O/P EST MOD 30 MIN: CPT | Mod: S$PBB,,, | Performed by: NURSE PRACTITIONER

## 2020-11-11 PROCEDURE — 99999 PR PBB SHADOW E&M-EST. PATIENT-LVL V: ICD-10-PCS | Mod: PBBFAC,,, | Performed by: NURSE PRACTITIONER

## 2020-11-11 PROCEDURE — 93010 EKG 12-LEAD: ICD-10-PCS | Mod: S$PBB,,, | Performed by: INTERNAL MEDICINE

## 2020-11-11 RX ORDER — METOPROLOL SUCCINATE 50 MG/1
50 TABLET, EXTENDED RELEASE ORAL DAILY
Qty: 90 TABLET | Refills: 3 | Status: SHIPPED | OUTPATIENT
Start: 2020-11-11 | End: 2020-12-22

## 2020-11-11 RX ORDER — AMLODIPINE BESYLATE 5 MG/1
5 TABLET ORAL DAILY
Qty: 90 TABLET | Refills: 3 | Status: SHIPPED | OUTPATIENT
Start: 2020-11-11 | End: 2021-11-02

## 2020-11-11 RX ORDER — ACETAMINOPHEN 500 MG
1000 TABLET ORAL DAILY PRN
COMMUNITY

## 2020-11-11 NOTE — ASSESSMENT & PLAN NOTE
2018   Was SOB at that time   Had Cardioversion in 2018 ( none since )   Doing good     On Sotalol, Toprol XL    Suggest rama op cardiac monitoring     Had pulmonary embolism at around the same time    He feels that he went back to A fib 4-5 days ago  Noticed SOB on exertion    Had Cardiology evaluation today   Coming back on 11/13 to have EP evaluation

## 2020-11-11 NOTE — OUTPATIENT SUBJECTIVE & OBJECTIVE
"Outpatient Subjective & Objective     Chief complaint-Preoperative evaluation, Perioperative Medical management, complication reduction plan     Active cardiac conditions- none    Revised cardiac risk index predictors- insulin requiring diabetes mellitus    Functional capacity -Examples of physical activity , stays active ,  can take 1 flight of stairs----- He can undertake all the above activities without  chest pain,chest tightness, Shortness of breath ,dizziness,lightheadedness making his exercise tolerance more,   than 4 Mets.       Review of Systems   Constitutional: Negative for chills and fever.          Weight gain from reduced activity   HENT:        STOPBANG score 4 / 8        Elevated BP  Age over 50 years  Neck size over 40 CM  Male gender   Eyes:        No unusual vision changes   Respiratory:          Cough with phlegm   No change in color , consistency, amount of phlegm     No Hemoptysis   Cardiovascular:        As noted   Gastrointestinal:        Bowels- Regular   No overt GI/ blood losses   Endocrine:        Prednisone use > 20 mg daily for 3 weeks- none   Genitourinary: Negative for dysuria.        No urinary hesitancy    Musculoskeletal:        As above      Skin: Negative for rash.   Neurological: Negative for syncope.        No unilateral weakness   Hematological:        Current use of Anticoagulants  None  Was on ASA full dose for 10 years, after which changed to ASA 81 mg po daily -for 2 -3 years - changed to Xarelto   Psychiatric/Behavioral:        No Depression,Anxiety     No vascular stenting             No anesthesia, bleeding, cardiac problems , PONVwith previous surgeries/procedures.  Medications and Allergies reviewed in epic.   FH- No anesthesia,bleeding / venous thrombosis , problemsin family     Physical Exam  Blood pressure 133/83, pulse 85, temperature 97.8 °F (36.6 °C), temperature source Oral, resp. rate 18, height 6' 1" (1.854 m), weight 112.9 kg (249 lb), SpO2 97 " %.      Physical Exam  Constitutional- Vitals - Body mass index is 32.85 kg/m².,   Vitals:    11/11/20 1256   BP: 133/83   Pulse: 85   Resp:    Temp:      General appearance-Conscious,Coherent  Eyes- No conjunctival icterus,pupils  round  and reactive to light   ENT-Oral cavity- moist  , Hearing grossly normal   Neck- No thyromegaly ,Trachea -central, No jugular venous distension,   No Carotid Bruit   Cardiovascular -Heart Sounds-irregular  and  no murmur   , No gallop rhythm   Respiratory - Normal Respiratory Effort, Normal breath sounds, crepitations bases,  no wheeze  and  no forced expiratory wheeze    Peripheral pitting pedal edema-- mild, no calf pain   Gastrointestinal -Soft abdomen, No palpable masses, Non Tender,Liver,Spleen not palpable. No-- free fluid and shifting dullness  Musculoskeletal- No finger Clubbing. Strength grossly normal   Lymphatic-No Palpable cervical, axillary,Inguinal lymphadenopathy   Psychiatric - normal effect,Orientation  Rt Dorsalis pedis pulses-palpable    Lt Dorsalis pedis pulses- palpable   Rt Posterior tibial pulses -palpable   Left posterior tibial pulses -palpable   Miscellaneous -  no renal bruit  Investigations  Lab and Imaging have been reviewed in [facility].    Review of Medicine tests    EKG- I had independently reviewed the EKG from--today       Review of clinical lab tests:  Lab Results   Component Value Date    CREATININE 1.3 10/30/2020    HGB 13.1 (L) 11/11/2020     11/11/2020     Echo Sept 2018     · The left ventricle cavity is normal.  · Left ventricle ejection fraction is low normal at 53%  · Normal LV diastolic function.  · RV systolic function is normal.  · Tricuspid valve shows mild regurgitation.  · Left atrium is mildly dilated.  · Right atrium is mildly dilated.  · Normal central venous pressure (3 mm Hg).  · The estimated PA systolic pressure is 20.31 mm Hg  · There is improved LV function from Echo in 3/2018    March 2018     The perfusion scan is free  of evidence for myocardial ischemia or injury      Review of old records- Was done and information gathered regards to events leading to surgery and health conditions of significance in the perioperative period.    Outpatient Subjective & Objective

## 2020-11-11 NOTE — ASSESSMENT & PLAN NOTE
Xeralto   CR CL 66     Hold for 72 hours pre op    Date of surgery - 12/7/2020   Last day of Xareto use pre op - 12/3/2020

## 2020-11-11 NOTE — ASSESSMENT & PLAN NOTE
Base line creatinine 1.3     Not known to take NSAID in the long term    Stages of CKD discussed  Deleterious effects NSAID's , Beneficial effects of Hydration discussed   Tylenol as needed for pain     I  suggest monitoring renal function, in put and out put status rama-operatively. I  suggest avoiding nephrotoxic medication including NSAIDs, COX2 inhibitors, intravenous contrast agent,avoiding hypotension to prevent further renal impairment.

## 2020-11-11 NOTE — ASSESSMENT & PLAN NOTE
Type 2 Diabetes Mellitus  On treatment with oral agents, Insulin    Hemoglobin A1c- 7.7- Oct 2020-  Capillary glucose check-every morning   Pre breakfast -850 110     DM complications     - Neuropathy - feet care suggested  - Renal impairment     No stroke, TIA, Coronary disease , Lower extremity claudication    I had educated that uncontrolled DM can cause post op complications,risk of infection, wound healing problem,increased length of stay in hospital and its associated complications.I suggest exercise as much as possible and follow diabetic diet      Diabetes Mellitus-I suggest monitoring the glucose in the perioperative period ( Before meals and bed time,if the patient is on oral feeds or every 6 hourly ,if the patient is NPO )  Blood glucose target in hospitalized patients is 140-180. Oral Hypoglycemic agents are generally avoided during the hospital stay . If glucose is consistently elevated ,I suggest using basal ,prandial Insulin regimen to control the glucose , as elevated glucose can be associated with adverse surgical out comes. Please consider involving Hospital Medicine or Endocrinology ,if any help is needed with Glucose control. Patient will be instructed based on the pre op clinic guidelines  about adjustment of diabetic treatment (If applicable )  considering the NPO status for Surgery

## 2020-11-11 NOTE — PROGRESS NOTES
Mr. Junior is a former patient of Dr. Barajas and was last seen in [facility] Cardiology Visit 10/24/18.      Subjective:   Patient ID:  Des Junior is a 75 y.o. male who presents for follow-up of Pre-op Exam (ARTHROPLASTY, KNEE:RIGHT)    Problems:  Paroxysmal atrial fibrillation  HTN  Coronary artery calcification noted on CT 8/2020  HLD  PE ~2018  DM type II    HPI  Mr. Junior is in clinic today for preoperative risk assessment prior to right knee surgery.  Patient denies chest pain with exertion or at rest, palpitations, SOB, VELASCO, dizziness, syncope, edema, orthopnea, PND, or claudication.  Reports no routine exercise.  He climbs a flight of stairs at home and walked from the parking garage to the clinic without sx.  Patient is taking pravastatin 40 mg and LDL is 76.  Patient is taking xarelto 20 mg for thromboembolic prophylaxis.  Denies bleeding gums, epistaxis, hemoptysis, hematuria, and hematochezia.        Revised Cardiac Risk Index for Pre-Operative Risk Yes +1 No 0   1. High-risk surgery: Intraperitoneal; intrathoracic; suprainguinal vascular    0   2. History of ischemic heart disease:  History of myocardial infarction (MI); history of positive exercise test; current chest paint considered due to myocardial ischemia; use of nitrate therapy or ECG with pathological Q waves    0   3. History of congestive heart failure:  Pulmonary edema, bilateral rales or S3 gallop; paroxysmal nocturnal dyspnea; chest x-ray (CXR) showing pulmonary vascular redistribution    0   4. History of cerebrovascular disease: Prior transient ischemic attack (TIA) or stroke    0   5.  Pre-operative treatment with insulin 1    6.  Creatinine >2  0 (Cr 1.3)     Total Risk for rama-operative major cardiac event, 6%      Review of Systems   Constitution: Negative for decreased appetite, diaphoresis, malaise/fatigue, weight gain and weight loss.   Eyes: Negative for visual disturbance.   Cardiovascular: Negative for chest pain,  claudication, dyspnea on exertion, irregular heartbeat, leg swelling, near-syncope, orthopnea, palpitations, paroxysmal nocturnal dyspnea and syncope.        Denies chest pressure   Respiratory: Negative for cough, hemoptysis, shortness of breath, sleep disturbances due to breathing and snoring.    Endocrine: Negative for cold intolerance and heat intolerance.   Hematologic/Lymphatic: Negative for bleeding problem. Does not bruise/bleed easily.   Musculoskeletal: Negative for myalgias.   Gastrointestinal: Negative for bloating, abdominal pain, anorexia, change in bowel habit, constipation, diarrhea, nausea and vomiting.   Neurological: Negative for difficulty with concentration, disturbances in coordination, excessive daytime sleepiness, dizziness, headaches, light-headedness, loss of balance, numbness and weakness.   Psychiatric/Behavioral: The patient does not have insomnia.        Allergies and current medications updated and reviewed:  Review of patient's allergies indicates:   Allergen Reactions    Benicar [olmesartan]      Other reaction(s): SPOTS IN FRONT OF EYES    Codeine Nausea And Vomiting     Other reaction(s): Nausea     Current Outpatient Medications   Medication Sig    acetaminophen (TYLENOL) 500 MG tablet Take 1,000 mg by mouth daily as needed for Pain.    albuterol (PROVENTIL/VENTOLIN HFA) 90 mcg/actuation inhaler 2 puffs every 4 hours as needed for cough, wheeze, or shortness of breath    amLODIPine (NORVASC) 10 MG tablet Take 1 tablet (10 mg total) by mouth once daily.    blood sugar diagnostic Strp 1 strip by Misc.(Non-Drug; Combo Route) route 2 (two) times daily with meals.    cholecalciferol, vitamin D3, (VITAMIN D3) 1,000 unit capsule Take 1,000 Units by mouth once daily.    fluorouracil (EFUDEX) 5 % cream Use qd-bid to red or rough precancer spots until irritation occurs and then stop.  Resume as needed.    fluticasone-umeclidin-vilanter (TRELEGY ELLIPTA) 100-62.5-25 mcg DsDv Inhale  "1 puff into the lungs once daily.    gabapentin (NEURONTIN) 100 MG capsule Take one capsule nightly. Increase to two capsule if no relief. Increase to three capsules if you still have symptoms.    hydroCHLOROthiazide (HYDRODIURIL) 25 MG tablet Take 1 tablet (25 mg total) by mouth once daily.    insulin (LANTUS SOLOSTAR U-100 INSULIN) glargine 100 units/mL (3mL) SubQ pen Inject 9 units every morning and evening. (Patient taking differently: Inject 10 units every morning and evening.)    ketoconazole (NIZORAL) 2 % cream Apply topically once daily.    LANCETS & BLOOD GLUCOSE STRIPS MISC * * * Twice a day .  check glucose twice a day    losartan (COZAAR) 50 MG tablet Take 1 tablet (50 mg total) by mouth once daily.    metFORMIN (GLUCOPHAGE-XR) 500 MG 24 hr tablet Take 2 tablets (1,000 mg total) by mouth 2 (two) times daily with meals.    metoprolol succinate (TOPROL-XL) 25 MG 24 hr tablet Take 1 tablet (25 mg total) by mouth once daily.    olopatadine (PAZEO) 0.7 % Drop Apply 1 drop to eye once daily.    omega-3 fatty acids-vitamin E (OMEGA-3 FISH OIL) 1,000-5 mg-unit Cap Take by mouth 2 (two) times daily. 1 Capsule Oral Every day    omeprazole (PRILOSEC OTC) 20 MG tablet Take by mouth. 1 Tablet, Delayed Release (E.C.) Oral Every day    pen needle, diabetic 29 gauge x 1/2" Ndle Inject insulin daily    pravastatin (PRAVACHOL) 40 MG tablet Take 1 tablet (40 mg total) by mouth once daily.    repaglinide (PRANDIN) 2 MG tablet Take 2 tablets with meals. (Patient taking differently: Take 2 tablets with lunch.)    rivaroxaban (XARELTO) 20 mg Tab TAKE 1 TABLET  MOUTH DAILY WITH  EVENING MEAL.    cetirizine (ZYRTEC) 10 MG tablet Take 1 tablet (10 mg total) by mouth once daily.     No current facility-administered medications for this visit.        Objective:     Right Arm BP - Sittin/69 (20 1418)  Left Arm BP - Sittin/65 (20 1418)    BP 92/65 (BP Location: Left arm, Patient Position: " "Sitting, BP Method: Large (Automatic))   Pulse 66   Ht 6' 1" (1.854 m)   Wt 112.6 kg (248 lb 3.8 oz)   SpO2 (!) 94%   BMI 32.75 kg/m²       Physical Exam   Constitutional: He is oriented to person, place, and time. Vital signs are normal. He appears well-developed and well-nourished. He is active. No distress.   HENT:   Head: Normocephalic and atraumatic.   Eyes: Conjunctivae and lids are normal. No scleral icterus.   Neck: Neck supple. Normal carotid pulses, no hepatojugular reflux and no JVD present. Carotid bruit is not present.   Cardiovascular: Normal rate, S1 normal, S2 normal and intact distal pulses. An irregularly irregular rhythm present. PMI is not displaced. Exam reveals no gallop and no friction rub.   No murmur heard.  Pulses:       Carotid pulses are 2+ on the right side and 2+ on the left side.       Radial pulses are 2+ on the right side and 2+ on the left side.        Dorsalis pedis pulses are 2+ on the right side and 2+ on the left side.        Posterior tibial pulses are 1+ on the right side and 1+ on the left side.   Pulmonary/Chest: Effort normal and breath sounds normal. No respiratory distress. He has no decreased breath sounds. He has no wheezes. He has no rhonchi. He has no rales. He exhibits no tenderness.   Abdominal: Soft. Normal appearance and bowel sounds are normal. He exhibits no distension, no fluid wave, no abdominal bruit, no ascites and no pulsatile midline mass. There is no hepatosplenomegaly. There is no abdominal tenderness.   Musculoskeletal:         General: Edema (BLE +1 pitting to knees) present.   Neurological: He is alert and oriented to person, place, and time. Gait normal.   Skin: Skin is warm, dry and intact. No rash noted. He is not diaphoretic. Nails show no clubbing.   Psychiatric: He has a normal mood and affect. His speech is normal and behavior is normal. Judgment and thought content normal. Cognition and memory are normal.   Nursing note and vitals " reviewed.      Chemistry        Component Value Date/Time     10/30/2020 1052    K 4.2 10/30/2020 1052     10/30/2020 1052    CO2 24 10/30/2020 1052    BUN 24 (H) 10/30/2020 1052    CREATININE 1.3 10/30/2020 1052     (H) 10/30/2020 1052        Component Value Date/Time    CALCIUM 9.8 10/30/2020 1052    ALKPHOS 90 04/29/2020 1417    AST 13 04/29/2020 1417    ALT 10 04/29/2020 1417    BILITOT 0.8 04/29/2020 1417    ESTGFRAFRICA >60.0 10/30/2020 1052    EGFRNONAA 53.8 (A) 10/30/2020 1052        Lab Results   Component Value Date    HGBA1C 7.7 (H) 10/30/2020     Recent Labs   Lab 02/27/18  1605  04/29/20  1417 07/30/20  1402 11/11/20  1220   WBC 12.74 H   < >  --   --  10.08   Hemoglobin 12.7 L   < >  --   --  13.1 L   Hematocrit 38.4 L   < >  --   --  42.2   MCV 88   < >  --   --  96   Platelets 208   < >  --   --  253    H  --   --   --   --    TSH  --    < >  --  1.237  --    Cholesterol  --    < > 133  --   --    HDL  --    < > 36 L  --   --    LDL Cholesterol  --    < > 76.2  --   --    Triglycerides  --    < > 104  --   --    HDL/Cholesterol Ratio  --    < > 27.1  --   --     < > = values in this interval not displayed.       Recent Labs   Lab 03/01/18  0910 12/04/18  1616 11/11/20  1220   INR 1.3 H 1.2 1.1        Test(s) Reviewed  I have reviewed the following in detail:  [] Stress test   [] Angiography   [x] Echocardiogram   [] Labs   [] Other:         Assessment/Plan:   1. PAF (paroxysmal atrial fibrillation), CHADS-VAS score 3, onset 3/2018    - Ambulatory referral/consult to Electrophysiology; Future  - metoprolol succinate (TOPROL-XL) 50 MG 24 hr tablet; Take 1 tablet (50 mg total) by mouth once daily.  Dispense: 90 tablet; Refill: 3    2. Current use of long term anticoagulation  Denies bleeding.  Discussed when to seek immediate medical attention.       3. Hypertension associated with diabetes  Decrease amlodipine to 5 mg d/t LE edema and allow BP room to increase metoprolol for  better HR control.  Requested a 2 week BP log.     - amLODIPine (NORVASC) 5 MG tablet; Take 1 tablet (5 mg total) by mouth once daily.  Dispense: 90 tablet; Refill: 3    4. Coronary artery calcification seen on CT scan  Asymptomatic. Taking low intensity statin and ASA. No changes.      5. Hypercholesterolemia  LDL at goal <100. No changes      6. History of pulmonary embolism  Continue anticoagulation therapy.     7. Class 1 obesity due to excess calories with serious comorbidity and body mass index (BMI) of 32.0 to 32.9 in adult, today 32.5  BMI 32.8     8. Type 2 diabetes, controlled, with neuropathy  A1C not at goal <7. Recommend consideration of GLP1 agonist for the MACE and weight loss benefits in addition to his BG control.  F/U with PCP as planned      9. Preop cardiovascular exam  Would not recommend he has surgery until he sees EP as his ECG today shows a retrograde P wave which is consistent with a re-entry tachycardia.  Would like him to see them and get that taken care of first.    10. AV billy re-entry tachycardia  New Re-entry tachycardia.  Reviewed ECG with Dr. Luis.  Increase metoprolol to 50 mg daily and stop the amlodipine.  Was able to get an urgent EP consult for Friday with Dr. Mccauley so that his knee surgery will hopefull not have to be canceled for next month.      - Ambulatory referral/consult to Electrophysiology; Future    A copy of this note will be forwarded to Dr. Cortez, Dr. Mccauley and Dr. Evangelista.    Follow up in about 3 months (around 2/11/2021).

## 2020-11-11 NOTE — ASSESSMENT & PLAN NOTE
Not using Albuterol much   Under Pulmonology care   Coughs phlegm in the morning - some mornings attributes to moisture     No recent problem with wheezing    Uses Prednisone , Abx , if needed, if sputum changes color    No longer using  Trelegy   since had penile thrush ( Not oral thrush )     Satys active   Does not seem troubled with Breathing much    No longer needing home Oxygen

## 2020-11-11 NOTE — ASSESSMENT & PLAN NOTE
HCTZ, Losartan   Amlodipine, Toprol XL    Home BP readings -110/80's  Recent BP readings in the record-120/80's  Hypertension-  Blood pressure is acceptable . I suggest continuation of -Amlodipine, Toprol XL   during the entire perioperative period. I suggest holding  HCTZ, Losartan- on the morning of the surgery and can continue that  post operatively under blood pressure, electrolyte and renal function monitoring as long as they are acceptable.I suggest addressing pain control as uncontrolled pain can increased blood pressure      - Had Low BP in Cardiology clinic     Amlodipine was reduced   Toprol XL increased   Sotalol stopped

## 2020-11-11 NOTE — ASSESSMENT & PLAN NOTE
No problem with heart failure   Patient has history of heart failure that seems  compensated . Please keep a record of the Input and Out put and weigh patient daily so that fluid overload can be detected and acted upon promptly . I suggest providing low  sodium diet   I suggest avoidance of the ordering of continuous IV fluids and if IV fluids are required ,to order for a specific duration of time and consider ordering lower IV fluid rate

## 2020-11-11 NOTE — PROGRESS NOTES
"[facility]  Progress Note    Patient Name: Des Junior  MRN: [id number]  Date of Evaluation- 11/11/2020  PCP- Christopher Cortez MD    Future cases for Des Junior [[id number]]     Case ID Status Date Time Kayden Procedure Provider Location    [id number] [facility] 12/7/2020 12:07  ARTHROPLASTY, KNEE:RIGHT:DEPUY-SIGMA Hua Gore III, MD [[id number]] [facility] OR      Rt     HPI:  History of present illness- I had the pleasure of meeting this pleasant 75 y.o. gentleman in the pre op clinic prior to his elective Orthopedic surgery. The patient is new to me .   Goes by " Bill"   I have obtained the history by speaking to the patient and by reviewing the electronic health records.    Events leading up to surgery / History of presenting illness -    He has been troubled with Rt knee  severe  knee  pain for 6 months  . Pain with weight bearing , certain positions in sleeping and decreases with Tylenol and resting.    No pain upon sitting , driving     Occasionally knee gives out   No fall in 1 year   Care suggested    Surgery was delayed since  originally scheduled in 2018 due to  Uncontrolled Diabetes     Relevant health conditions of significance for the perioperative period/ History of presenting illness -     He is a master    Still working     Lives with wife   Single level house   Elevated  House   Has an elevator             Subjective/ Objective:     Chief complaint-Preoperative evaluation, Perioperative Medical management, complication reduction plan     Active cardiac conditions- none    Revised cardiac risk index predictors- insulin requiring diabetes mellitus    Functional capacity -Examples of physical activity , stays active ,  can take 1 flight of stairs----- He can undertake all the above activities without  chest pain,chest tightness, Shortness of breath ,dizziness,lightheadedness making his exercise tolerance more,   than 4 Mets.       Review of Systems   Constitutional: " "Negative for chills and fever.          Weight gain from reduced activity   HENT:        WILBERG score 4 / 8        Elevated BP  Age over 50 years  Neck size over 40 CM  Male gender   Eyes:        No unusual vision changes   Respiratory:          Cough with phlegm   No change in color , consistency, amount of phlegm     No Hemoptysis   Cardiovascular:        As noted   Gastrointestinal:        Bowels- Regular   No overt GI/ blood losses   Endocrine:        Prednisone use > 20 mg daily for 3 weeks- none   Genitourinary: Negative for dysuria.        No urinary hesitancy    Musculoskeletal:        As above      Skin: Negative for rash.   Neurological: Negative for syncope.        No unilateral weakness   Hematological:        Current use of Anticoagulants  None  Was on ASA full dose for 10 years, after which changed to ASA 81 mg po daily -for 2 -3 years - changed to Xarelto   Psychiatric/Behavioral:        No Depression,Anxiety     No vascular stenting             No anesthesia, bleeding, cardiac problems , PONVwith previous surgeries/procedures.  Medications and Allergies reviewed in epic.   FH- No anesthesia,bleeding / venous thrombosis , problemsin family     Physical Exam  Blood pressure 133/83, pulse 85, temperature 97.8 °F (36.6 °C), temperature source Oral, resp. rate 18, height 6' 1" (1.854 m), weight 112.9 kg (249 lb), SpO2 97 %.      Physical Exam  Constitutional- Vitals - Body mass index is 32.85 kg/m².,   Vitals:    11/11/20 1256   BP: 133/83   Pulse: 85   Resp:    Temp:      General appearance-Conscious,Coherent  Eyes- No conjunctival icterus,pupils  round  and reactive to light   ENT-Oral cavity- moist  , Hearing grossly normal   Neck- No thyromegaly ,Trachea -central, No jugular venous distension,   No Carotid Bruit   Cardiovascular -Heart Sounds-irregular  and  no murmur   , No gallop rhythm   Respiratory - Normal Respiratory Effort, Normal breath sounds, crepitations bases,  no wheeze  and  no " forced expiratory wheeze    Peripheral pitting pedal edema-- mild, no calf pain   Gastrointestinal -Soft abdomen, No palpable masses, Non Tender,Liver,Spleen not palpable. No-- free fluid and shifting dullness  Musculoskeletal- No finger Clubbing. Strength grossly normal   Lymphatic-No Palpable cervical, axillary,Inguinal lymphadenopathy   Psychiatric - normal effect,Orientation  Rt Dorsalis pedis pulses-palpable    Lt Dorsalis pedis pulses- palpable   Rt Posterior tibial pulses -palpable   Left posterior tibial pulses -palpable   Miscellaneous -  no renal bruit  Investigations  Lab and Imaging have been reviewed in epic.    Review of Medicine tests    EKG- I had independently reviewed the EKG from--today       Review of clinical lab tests:  Lab Results   Component Value Date    CREATININE 1.3 10/30/2020    HGB 13.1 (L) 11/11/2020     11/11/2020     Echo Sept 2018     · The left ventricle cavity is normal.  · Left ventricle ejection fraction is low normal at 53%  · Normal LV diastolic function.  · RV systolic function is normal.  · Tricuspid valve shows mild regurgitation.  · Left atrium is mildly dilated.  · Right atrium is mildly dilated.  · Normal central venous pressure (3 mm Hg).  · The estimated PA systolic pressure is 20.31 mm Hg  · There is improved LV function from Echo in 3/2018    March 2018     The perfusion scan is free of evidence for myocardial ischemia or injury      Review of old records- Was done and information gathered regards to events leading to surgery and health conditions of significance in the perioperative period.        Preoperative cardiac risk assessment-  The patient does not have any active cardiac conditions . Revised cardiac risk index predictors- 1---.Functional capacity is more than 4 Mets. He will be undergoing a Orthopedic procedure that carries a Moderate Risk risk     Risk of a major Cardiac event ( Defined as death, myocardial infarction, or cardiac arrest at 30 days  after noncardiac surgery), based on RCRI score     -6.0%       Will follow up on EP evaluation          American Society of Anesthesiologists Physical status classification ( ASA ) class: 3     Postoperative pulmonary complication risk assessment:      ARISCAT ( Canet) risk index- risk class -  Low, if duration of surgery is under 3 hours, intermediate, if duration of surgery is over 3 hours      Suzanne Respiratory failure index- percentage risk of respiratory failure: 0.5 %     Assessment/Plan:     History of pulmonary embolism  March 2018   Also had Rt leg DVT,  PE    Symptoms- Tiredness, SOB    History of DVT/PE  1 Episode  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  Small pulmonary infarct involving the superior segment of the left lower lobe and possible small pulmonary infarcts involving the lateral segment of the right middle lobe also noted.  There is also flattening of the interventricular septum which may indicate developing right heart strain.         Bronchiectasis without complication  Not using Albuterol much   Under Pulmonology care   Coughs phlegm in the morning - some mornings attributes to moisture     No recent problem with wheezing    Uses Prednisone , Abx , if needed, if sputum changes color    No longer using  Trelegy   since had penile thrush ( Not oral thrush )     Satys active   Does not seem troubled with Breathing much    No longer needing home  Oxygen        Hypertension associated with diabetes  HCTZ, Losartan   Amlodipine, Toprol XL    Home BP readings -110/80's  Recent BP readings in the record-120/80's  Hypertension-  Blood pressure is acceptable . I suggest continuation of -Amlodipine, Toprol XL   during the entire perioperative period. I suggest holding  HCTZ, Losartan- on the morning of the surgery and can continue that  post operatively under blood pressure, electrolyte and renal function monitoring as long as they are acceptable.I suggest addressing pain control as uncontrolled pain can increased blood pressure      - Had Low BP in Cardiology clinic     Amlodipine was reduced   Toprol XL increased   Sotalol stopped     Hearing loss, sensorineural  Not wearing hearing aids  Left > Rt  Watches TV with increased volume     Lung nodule  Suggested follow up     Asymptomatic LV dysfunction  No problem with heart failure   Patient has history of heart failure that seems  compensated . Please keep a record of the Input and Out put and weigh patient daily so that fluid overload can be detected and acted upon promptly . I suggest providing low  sodium diet   I suggest avoidance of the ordering of continuous IV fluids and if IV fluids are required ,to order for a specific duration of time and consider ordering lower IV fluid rate     PAF (paroxysmal atrial fibrillation), CHADS-VAS score 3, onset 3/2018  2018   Was SOB at that time   Had Cardioversion in 2018 ( none since )   Doing good     On Sotalol, Toprol XL    Suggest rama op cardiac monitoring     Had pulmonary embolism at around the same time    He feels that he went back to A fib 4-5 days ago  Noticed SOB on exertion    Had Cardiology evaluation today   Coming back on 11/13 to have EP evaluation    Current use of long term anticoagulation  Xeralto   CR CL 66     Hold for 72 hours pre op    Date of surgery - 12/7/2020   Last day of Xareto use pre op - 12/3/2020         Type 2 diabetes, controlled, with  neuropathy  Type 2 Diabetes Mellitus  On treatment with oral agents, Insulin    Hemoglobin A1c- 7.7- Oct 2020-  Capillary glucose check-every morning   Pre breakfast -850 110     DM complications     - Neuropathy - feet care suggested  - Renal impairment     No stroke, TIA, Coronary disease , Lower extremity claudication    I had educated that uncontrolled DM can cause post op complications,risk of infection, wound healing problem,increased length of stay in hospital and its associated complications.I suggest exercise as much as possible and follow diabetic diet      Diabetes Mellitus-I suggest monitoring the glucose in the perioperative period ( Before meals and bed time,if the patient is on oral feeds or every 6 hourly ,if the patient is NPO )  Blood glucose target in hospitalized patients is 140-180. Oral Hypoglycemic agents are generally avoided during the hospital stay . If glucose is consistently elevated ,I suggest using basal ,prandial Insulin regimen to control the glucose , as elevated glucose can be associated with adverse surgical out comes. Please consider involving Hospital Medicine or Endocrinology ,if any help is needed with Glucose control. Patient will be instructed based on the pre op clinic guidelines  about adjustment of diabetic treatment (If applicable )  considering the NPO status for Surgery               Gastroesophageal reflux disease without esophagitis  Doing good   No recent problem    GERD-  I suggest continuation of the Proton pump inhibitor in the perioperative period . I suggest aspiration precautions    Asbestos exposure  Under Pulmonary care   As per his understanding not known to lung being affected     CKD (chronic kidney disease) stage 3, GFR 30-59 ml/min  Base line creatinine 1.3     Not known to take NSAID in the long term    Stages of CKD discussed  Deleterious effects NSAID's , Beneficial effects of Hydration discussed   Tylenol as needed for pain     I  suggest monitoring  renal function, in put and out put status rama-operatively. I  suggest avoiding nephrotoxic medication including NSAIDs, COX2 inhibitors, intravenous contrast agent,avoiding hypotension to prevent further renal impairment.     Anemia  Hb about 13  Reports long standing anemia        Preventive perioperative care    Thromboembolic prophylaxis:  His risk factors for thrombosis include surgical procedure, previous history of thrombosis and age.I suggest  thromboembolic prophylaxis ( mechanical/pharmacological, weighing the risk benefits of pharmacological agent use considering rama procedural bleeding )  during the perioperative period.I suggested being active in the post operative period.   The patient is a candidate for extended DVT prophylaxis     Postoperative pulmonary complication prophylaxis-Risk factors for post operative pulmonary complications include age over 65 years and ASA class >2- I suggest incentive spirometry use, early ambulation and end tidal carbon dioxide monitoring  , oral care , head end of bed elevation      Renal complication prophylaxis-Risk factors for renal complications include pre-existing renal disease, diabetes mellitus and hypertension . I suggest keeping him well hydrated and avoidance/ minimizing the use of  NSAID's,MADDOX 2 Inhibitors ,IV contrast if possible in the perioperative period.  Passes light colored urine      Surgical site Infection Prophylaxis-I  suggest appropriate antibiotic for Prophylaxis against Surgical site infections  No reported Staph infection   Skin antibacterial discussed       In view of regional anesthesia  the patient  is at risk of postoperative urinary retention.  I suggest avoidance / minimizing the of  Benzodiazepines,Anticholinergic medication,antihistamines ( Benadryl) , if possible in the perioperative period. I suggest using the minimum possible use of opioids for the minimum period of time in the perioperative period. Benadryl avoidance  suggested      This visit was focused on Preoperative evaluation, Perioperative Medical management, complication reduction plans. I suggest that the patient follows up with primary care or relevant sub specialists for ongoing health care.    I appreciate the opportunity to be involved in this patients care. Please feel free to contact me if there were any questions about this consultation.    Patient is pending optimization- Cardiology ( EP ) evaluation     Patient was instructed to call and update me about any changes to health,  medication, office visits ,testing out side of the rama operative care center , hospitalizations between now and surgery     Katie Soria MD  Perioperative Medicine  [facility]   Pager [phone]    COVID screening     No fever   No newcough   No sore throat   No loss of taste or smell   No muscle aches   No nausea, vomiting , diarrhea  --  11/17- 14 32    Had Cardiology ( EP ) evaluation   It was felt that - no arrhythmic contraindication to his planned knee surgery.  --  12/1- 16 45     Called to follow up   He is doing fine   Breathing good   Working on boat building   Discussed Xarelto hold time   --  12/4/2020 - 19 41    Called to follow up , to address any concerns with the up coming surgery or any questions on Medication instructions   Unable to speak   Left a message to call, if needed , if had changes to Medication, health, if have  any concerns with the up coming surgery or any questions on Medication instructions  To stay on ToproL XL rama op

## 2020-11-11 NOTE — LETTER
November 11, 2020      Hua Gore III, MD  [address] [facility] [address]           [facility]  [address] [facility] [address]  Phone: [phone]          Patient: Des Junior   MR Number: [id number]   YOB: 1945   Date of Visit: 11/11/2020       Dear Dr. Hau Gore III:    Thank you for referring Des Junior to me for evaluation. Attached you will find relevant portions of my assessment and plan of care.    If you have questions, please do not hesitate to call me. I look forward to following Des Junior along with you.    Sincerely,    Katie Soria MD    Enclosure  CC:  No Recipients    If you would like to receive this communication electronically, please contact [email or web address] or [phone] to request more information on [employer] Link access.    For providers and/or their staff who would like to refer a patient to [facility], please contact us through our one-stop-shop provider referral line, [facility], at [phone].    If you feel you have received this communication in error or would no longer like to receive these types of communications, please e-mail [email or web address]

## 2020-11-11 NOTE — PATIENT INSTRUCTIONS
Decrease the amlodipine to 5 mg daily.  You can cut the 10 mg tablets in half and take 1/2 tablet a day until you run out.  Then start the new prescription    Increase the metoprolol to 50 mg.  You can take 2 of the 25 mg tablets a day until you run out.  Then start the new prescription.

## 2020-11-11 NOTE — HPI
"History of present illness- I had the pleasure of meeting this pleasant 75 y.o. gentleman in the pre op clinic prior to his elective Orthopedic surgery. The patient is new to me .   Goes by " Bill"   I have obtained the history by speaking to the patient and by reviewing the electronic health records.    Events leading up to surgery / History of presenting illness -    He has been troubled with Rt knee  severe  knee  pain for 6 months  . Pain with weight bearing , certain positions in sleeping and decreases with Tylenol and resting.    No pain upon sitting , driving     Occasionally knee gives out   No fall in 1 year   Care suggested    Surgery was delayed since  originally scheduled in 2018 due to  Uncontrolled Diabetes     Relevant health conditions of significance for the perioperative period/ History of presenting illness -     He is a master    Still working     Lives with wife   Single level house   Elevated  House   Has an elevator         "

## 2020-11-11 NOTE — ASSESSMENT & PLAN NOTE
Doing good   No recent problem    GERD-  I suggest continuation of the Proton pump inhibitor in the perioperative period . I suggest aspiration precautions

## 2020-11-11 NOTE — ASSESSMENT & PLAN NOTE
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  Small pulmonary infarct involving the superior segment of the left lower lobe and possible small pulmonary infarcts involving the lateral segment of the right middle lobe also noted.  There is also flattening of the interventricular septum which may indicate developing right heart strain.

## 2020-11-12 ENCOUNTER — TELEPHONE (OUTPATIENT)
Dept: ELECTROPHYSIOLOGY | Facility: CLINIC | Age: 75
End: 2020-11-12

## 2020-11-12 NOTE — TELEPHONE ENCOUNTER
Spoke w/ pt on the phone to let him know that he is an established [facility] pt, and I could schedule him with Dr. Palma on 11/16. Pt said that he would like to now establish w/ Dr. Mccauley after not seeing GP for 2 years and see DM on 11/13. He confirmed he would be at his ekg at 2:30pm and appt at 3pm on 11/13.

## 2020-11-13 ENCOUNTER — OFFICE VISIT (OUTPATIENT)
Dept: ELECTROPHYSIOLOGY | Facility: CLINIC | Age: 75
End: 2020-11-13
Payer: MEDICARE

## 2020-11-13 ENCOUNTER — HOSPITAL ENCOUNTER (OUTPATIENT)
Dept: CARDIOLOGY | Facility: CLINIC | Age: 75
Discharge: HOME | End: 2020-11-13
Payer: MEDICARE

## 2020-11-13 ENCOUNTER — DOCUMENTATION ONLY (OUTPATIENT)
Dept: CARDIOLOGY | Facility: HOSPITAL | Age: 75
End: 2020-11-13

## 2020-11-13 ENCOUNTER — CLINICAL SUPPORT (OUTPATIENT)
Dept: CARDIOLOGY | Facility: HOSPITAL | Age: 75
End: 2020-11-13
Attending: INTERNAL MEDICINE
Payer: MEDICARE

## 2020-11-13 VITALS
BODY MASS INDEX: 32.98 KG/M2 | OXYGEN SATURATION: 97 % | WEIGHT: 248.88 LBS | HEIGHT: 73 IN | HEART RATE: 93 BPM | DIASTOLIC BLOOD PRESSURE: 85 MMHG | SYSTOLIC BLOOD PRESSURE: 164 MMHG

## 2020-11-13 DIAGNOSIS — I49.8 OTHER SPECIFIED CARDIAC ARRHYTHMIAS: ICD-10-CM

## 2020-11-13 DIAGNOSIS — I48.0 PAF (PAROXYSMAL ATRIAL FIBRILLATION): Primary | ICD-10-CM

## 2020-11-13 DIAGNOSIS — Z79.01 CURRENT USE OF LONG TERM ANTICOAGULATION: ICD-10-CM

## 2020-11-13 DIAGNOSIS — E11.65 TYPE 2 DIABETES MELLITUS WITH HYPERGLYCEMIA, WITHOUT LONG-TERM CURRENT USE OF INSULIN: ICD-10-CM

## 2020-11-13 DIAGNOSIS — Z86.711 HISTORY OF PULMONARY EMBOLISM: ICD-10-CM

## 2020-11-13 DIAGNOSIS — I48.0 PAF (PAROXYSMAL ATRIAL FIBRILLATION): ICD-10-CM

## 2020-11-13 PROCEDURE — 99999 PR PBB SHADOW E&M-EST. PATIENT-LVL IV: CPT | Mod: PBBFAC,,, | Performed by: INTERNAL MEDICINE

## 2020-11-13 PROCEDURE — 0296T CV CARDIAC MONITOR - 3-14 DAY ADULT (CUPID ONLY): ICD-10-PCS | Mod: ,,, | Performed by: INTERNAL MEDICINE

## 2020-11-13 PROCEDURE — 0296T CV CARDIAC MONITOR - 3-14 DAY ADULT (CUPID ONLY): CPT | Mod: ,,, | Performed by: INTERNAL MEDICINE

## 2020-11-13 PROCEDURE — 93005 ELECTROCARDIOGRAM TRACING: CPT | Mod: PBBFAC | Performed by: INTERNAL MEDICINE

## 2020-11-13 PROCEDURE — 99215 PR OFFICE/OUTPT VISIT, EST, LEVL V, 40-54 MIN: ICD-10-PCS | Mod: S$PBB,,, | Performed by: INTERNAL MEDICINE

## 2020-11-13 PROCEDURE — 93010 RHYTHM STRIP: ICD-10-PCS | Mod: S$PBB,,, | Performed by: INTERNAL MEDICINE

## 2020-11-13 PROCEDURE — 93010 ELECTROCARDIOGRAM REPORT: CPT | Mod: S$PBB,,, | Performed by: INTERNAL MEDICINE

## 2020-11-13 PROCEDURE — 0298T CV CARDIAC MONITOR - 3-14 DAY ADULT (CUPID ONLY): ICD-10-PCS | Mod: ,,, | Performed by: INTERNAL MEDICINE

## 2020-11-13 PROCEDURE — 99214 OFFICE O/P EST MOD 30 MIN: CPT | Mod: PBBFAC,25 | Performed by: INTERNAL MEDICINE

## 2020-11-13 PROCEDURE — 99215 OFFICE O/P EST HI 40 MIN: CPT | Mod: S$PBB,,, | Performed by: INTERNAL MEDICINE

## 2020-11-13 PROCEDURE — 99999 PR PBB SHADOW E&M-EST. PATIENT-LVL IV: ICD-10-PCS | Mod: PBBFAC,,, | Performed by: INTERNAL MEDICINE

## 2020-11-13 PROCEDURE — 0298T CV CARDIAC MONITOR - 3-14 DAY ADULT (CUPID ONLY): CPT | Mod: ,,, | Performed by: INTERNAL MEDICINE

## 2020-11-13 NOTE — LETTER
November 13, 2020      Christopher Cortez MD  [address]  [facility] [address]           [facility]  [address]  [facility] [address]  Phone: [phone]  Fax: [phone]          Patient: Des Junior   MR Number: [id number]   YOB: 1945   Date of Visit: 11/13/2020       Dear Dr. Christopher Cortez:    Thank you for referring Des Junior to me for evaluation. Attached you will find relevant portions of my assessment and plan of care.    If you have questions, please do not hesitate to call me. I look forward to following Des Junior along with you.    Sincerely,    Carlos Mccauley MD    Enclosure  CC:  No Recipients    If you would like to receive this communication electronically, please contact [email or web address] or [phone] to request more information on [facility] Link access.    For providers and/or their staff who would like to refer a patient to [facility], please contact us through our one-stop-shop provider referral line, [facility], at [phone].    If you feel you have received this communication in error or would no longer like to receive these types of communications, please e-mail [email or web address]

## 2020-11-13 NOTE — PROGRESS NOTES
"Subjective:    Patient ID:  Des Junior is a 75 y.o. male who presents for evaluation of     HPI   75 y.o. M  PAF, persistent -> DCCV 4/2018  HTN on meds  HL on meds  unprovoked PE 2018  DM2    He started sotalol in 2018, which converted him from AF to SR. She stopped sotalol months ago as "no one would prescribe it for [him]."  Was undergoing preop risk stratification in cardiology clinic 11/11/20, when ECG showed NCT, short RP. Rx with BB increase, which he's not done yet.    He c/o tired x 1.5 wk, SOB. No palps.   Has sx when he climbs under house to do work. Sitting still, no sx.    My interpretation of today's ECG is NSR with APCs and 1st degree AVB      Review of Systems   Constitution: Positive for malaise/fatigue.   HENT: Negative.  Negative for ear pain and tinnitus.    Eyes: Negative for blurred vision.   Cardiovascular: Positive for dyspnea on exertion. Negative for chest pain, near-syncope, palpitations and syncope.   Respiratory: Negative.  Negative for shortness of breath.    Endocrine: Negative.  Negative for polyuria.   Hematologic/Lymphatic: Does not bruise/bleed easily.   Skin: Negative.  Negative for rash.   Musculoskeletal: Negative.  Negative for joint pain and muscle weakness.   Gastrointestinal: Negative.  Negative for abdominal pain and change in bowel habit.   Genitourinary: Negative for frequency.   Neurological: Negative.  Negative for dizziness and weakness.   Psychiatric/Behavioral: Negative.  Negative for depression. The patient is not nervous/anxious.    Allergic/Immunologic: Negative for environmental allergies.        Objective:    Physical Exam   Constitutional: He is oriented to person, place, and time. He appears well-developed and well-nourished.   HENT:   Head: Normocephalic and atraumatic.   Eyes: Conjunctivae, EOM and lids are normal. No scleral icterus.   Neck: Normal range of motion. No JVD present. No tracheal deviation present. No thyromegaly present. "   Cardiovascular: Normal rate and regular rhythm. Exam reveals no friction rub.   Pulses:       Radial pulses are 2+ on the right side and 2+ on the left side.   Pulmonary/Chest: Effort normal and breath sounds normal. No accessory muscle usage. No tachypnea. No respiratory distress. He has no wheezes.   Abdominal: He exhibits no distension. There is no hepatosplenomegaly.   Musculoskeletal: Normal range of motion.         General: No edema.   Neurological: He is alert and oriented to person, place, and time. He has normal reflexes. He exhibits normal muscle tone.   Skin: Skin is warm and dry. No rash noted.   Psychiatric: He has a normal mood and affect. His behavior is normal.   Nursing note and vitals reviewed.        Assessment:       1. PAF (paroxysmal atrial fibrillation), CHADS-VAS score 3, onset 3/2018    2. Current use of long term anticoagulation    3. History of pulmonary embolism    4. 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 Hx of PAF. However, today he's in NSR and is unclear whether he feels any different.  Agree with increase in BB.  Zio patch to follow whether arrhythmia recurs and decide if there's any associated sx.  There's no arrhythmic contraindication to his planned knee surgery.    f/u after Zio patch.

## 2020-11-13 NOTE — PROGRESS NOTES
Contacted Devon with [facility] to verify benefit eligibility for  insurance.  Devon informed me that the above patient's insurance is in network  And the out pocket cost will range from $0- $62.22 if supplemental insurance doesn't  the remaining cost. was provided with the above information and wishes to proceed with monitoring.

## 2020-11-16 ENCOUNTER — PATIENT MESSAGE (OUTPATIENT)
Dept: ADMINISTRATIVE | Facility: OTHER | Age: 75
End: 2020-11-16

## 2020-11-17 ENCOUNTER — HOSPITAL ENCOUNTER (OUTPATIENT)
Dept: RADIOLOGY | Facility: HOSPITAL | Age: 75
Discharge: HOME | End: 2020-11-17
Attending: NURSE PRACTITIONER
Payer: MEDICARE

## 2020-11-17 ENCOUNTER — OFFICE VISIT (OUTPATIENT)
Dept: ORTHOPEDICS | Facility: CLINIC | Age: 75
End: 2020-11-17
Payer: MEDICARE

## 2020-11-17 ENCOUNTER — HOSPITAL ENCOUNTER (OUTPATIENT)
Dept: PREADMISSION TESTING | Facility: HOSPITAL | Age: 75
Discharge: HOME | End: 2020-11-17
Attending: ANESTHESIOLOGY
Payer: MEDICARE

## 2020-11-17 ENCOUNTER — ANESTHESIA EVENT (OUTPATIENT)
Dept: SURGERY | Facility: HOSPITAL | Age: 75
End: 2020-11-17
Payer: MEDICARE

## 2020-11-17 VITALS
WEIGHT: 250 LBS | RESPIRATION RATE: 16 BRPM | HEART RATE: 66 BPM | TEMPERATURE: 98 F | HEIGHT: 73 IN | DIASTOLIC BLOOD PRESSURE: 80 MMHG | BODY MASS INDEX: 33.13 KG/M2 | SYSTOLIC BLOOD PRESSURE: 165 MMHG | OXYGEN SATURATION: 98 %

## 2020-11-17 DIAGNOSIS — M25.561 RIGHT KNEE PAIN, UNSPECIFIED CHRONICITY: Primary | ICD-10-CM

## 2020-11-17 DIAGNOSIS — M25.561 RIGHT KNEE PAIN, UNSPECIFIED CHRONICITY: ICD-10-CM

## 2020-11-17 PROCEDURE — 99499 UNLISTED E&M SERVICE: CPT | Mod: S$PBB,,, | Performed by: NURSE PRACTITIONER

## 2020-11-17 PROCEDURE — 73560 X-RAY EXAM OF KNEE 1 OR 2: CPT | Mod: 26,RT,, | Performed by: RADIOLOGY

## 2020-11-17 PROCEDURE — 99214 OFFICE O/P EST MOD 30 MIN: CPT | Mod: PBBFAC,25 | Performed by: NURSE PRACTITIONER

## 2020-11-17 PROCEDURE — 73560 X-RAY EXAM OF KNEE 1 OR 2: CPT | Mod: TC,RT

## 2020-11-17 PROCEDURE — 99499 NO LOS: ICD-10-PCS | Mod: S$PBB,,, | Performed by: NURSE PRACTITIONER

## 2020-11-17 PROCEDURE — 99999 PR PBB SHADOW E&M-EST. PATIENT-LVL IV: ICD-10-PCS | Mod: PBBFAC,,, | Performed by: NURSE PRACTITIONER

## 2020-11-17 PROCEDURE — 73560 XR KNEE 1 OR 2 VIEW RIGHT: ICD-10-PCS | Mod: 26,RT,, | Performed by: RADIOLOGY

## 2020-11-17 PROCEDURE — 99999 PR PBB SHADOW E&M-EST. PATIENT-LVL IV: CPT | Mod: PBBFAC,,, | Performed by: NURSE PRACTITIONER

## 2020-11-17 NOTE — DISCHARGE INSTRUCTIONS
Your surgery has been scheduled for:__________________________________________    You should report to:  ____[facility], located on the [place] side of the first floor of the           [facility] ([phone])  ____[facility], located on the [facility] side of the            Second floor of the [facility] ([phone])  ____[facility] ([facility]) Located at [address]  Please Note   - Tell your doctor if you take Aspirin, products containing Aspirin, herbal medications  or blood thinners, such as Coumadin, Ticlid, or Plavix.  (Consult your provider regarding holding or stopping before surgery).  - Arrange for someone to drive you home following surgery.  You will not be allowed to leave the surgical facility alone or drive yourself home following sedation and anesthesia.  Before Surgery  - Stop taking all herbal medications 14days prior to surgery  - No Motrin/Advil (Ibuprofen) 7 days before surgery  - No Aleve (Naproxen) 7 days before surgery  - Stop Taking Aspirin, products containing Aspirin _____days before surgery  - Stop taking blood thinners_______days before surgery  - No Goody's/BC  Powder 7 days before surgery  - Refrain from drinking alcoholic beverages for 24hours before and after surgery  - Stop or limit smoking _________days before surgery  - You may take Tylenol for pain  Night before Surgery   Stop ALL solid food, gum, candy (including vitamins) 8 hours before arrival time.  (Please note: If your surgeon gives you different eating and drinking instructions, please follow surgeon's directions.)   Stop all CLOUDY liquids: coffee with creamer, formula, tube feeds, cloudy juices, non-human milk and breast milk with additives, 6 hours prior to arrival time.   Stop plain breast milk 4 hours prior to arrival time.   The patient should be ENCOURAGED to drink carbohydrate-rich clear liquids (sports  drinks, clear juices) until 2 hours prior to arrival time.   CLEAR liquids include only water, black coffee NO creamer, clear oral rehydration drinks, clear sports drinks or clear fruit juices (no orange juice, no pulpy juices, no apple cider). Advise patients if they can read newsprint through the liquid, it qualifies as clear liquid.    IF IN DOUBT, drink water instead.   - Take a shower or bath (shower is recommended).  Bathe with Hibiclens soap or an antibacterial soap from the neck down.  If not supplied by your surgeon, hibiclens soap will need to be purchased over the counter in pharmacy.  Rinse soap off thoroughly.  - Shampoo your hair with your regular shampoo  The Day of Surgery  · NOTHING TO  DRINK 2 hours before arrival time. If you are told to take medication on the morning of surgery, it may be taken with a sip of water.   - Take another bath or shower with hibiclens or any antibacterial soap, to reduce the chance of infection.  - Take heart and blood pressure medications with a small sip of water, as advised by the perioperative team.  - Do not take fluid pills  - You may brush your teeth and rinse your mouth, but do not swallow any additional water.   - Do not apply perfumes, powder, body lotions or deodorant on the day of surgery.  - Nail polish should be removed.  - Do not wear makeup or moisturizer  - Wear comfortable clothes, such as a button front shirt and loose fitting pants.  - Leave all jewelry, including body piercings, and valuables at home.    - Bring any devices you will neeed after surgery such as crutches or canes.  - If you have sleep apnea, please bring your CPAP machine  In the event that your physical condition changes including the onset of a cold or respiratory illness, or if you have to delay or cancel your surgery, please notify your surgeon.  Anesthesia: Regional Anesthesia    Youre scheduled for surgery. During surgery, youll receive medicine called anesthesia to keep you  comfortable and pain-free. Your surgeon has decided that youll receive regional anesthesia. This sheet tells you what to expect with this type of anesthesia.  What is regional anesthesia?  Regional anesthesia numbs one region of your body. The anesthesia may be given around nerves or into veins in your arms, neck, or legs (nerve block or Lamar block). Or it may be sent into the spinal fluid (spinal anesthesia) or into the space just outside the spinal fluid (epidural anesthesia). You may also be given sedatives to help you relax.  Nerve block or Lamar block  A small area of the body, such as an arm or leg, can be numbed using a nerve block or Minong block.  · Nerve block. During a nerve block, your skin is numbed. A needle is then inserted near nerves that serve the area to be numbed. Anesthetic is sent through the needle.  · IV regional or Lamar block. For this type of block, an IV line is put into a vein. The blood flow to the area to be numbed is blocked for a short time. Anesthetic is sent through the IV.  Spinal anesthesia  Spinal anesthesia numbs your body from about the waist down.  · Anesthetic is injected into the spinal fluid. This is a substance that surrounds the spinal cord in your spinal column. The anesthetic blocks pain traveling from the body to the brain.  · To receive the anesthetic, your skin is numbed at the injection site on your back.  · A needle is then inserted into the spinal space. Anesthetic is sent into the spinal fluid through the needle.  Epidural anesthesia  Epidural anesthesia is most commonly used during childbirth and may also be used after surgical procedures of the chest, belly, and legs.  · Anesthetic is injected into the epidural space. This is just outside the dural sac which contains the spinal fluid.  · To receive the anesthetic, your skin is numbed at the injection site on your back.  · A needle is then inserted into the epidural space. Anesthetic is sent into the epidural  space through the needle.  · A small flexible catheter may be attached to the needle and left in place. This allows for continuous injections or infusions of anesthetic.  Anesthesia tools and medicines that might be near you during your procedure  · Local anesthetic. This medicine is given through a needle numbs one region of your body.  · Electrocardiography leads (electrodes). These are used to record your heart rate and rhythm.  · Blood pressure cuff. A cuff is placed on your arm to keep track of your blood pressure.  · Pulse oximeter. This small clip is placed on the end of the finger. It measures your blood oxygen level.  · Sedatives. These medicines may be given through an IV. They help to relax you and keep you comfortable. You may stay awake or sleep lightly.  · Oxygen. You may be given oxygen through a facemask.  Risks and possible complications  Regional anesthesia carries some risks. These include:  · Nausea and vomiting  · Headache  · Backache  · Decreased blood pressure  · Allergic reaction to the anesthetic  · Ongoing numbness (rare)  · Irregular heartbeat (rare)  · Cardiac arrest (rare)   Date Last Reviewed: 12/1/2016  © 0644-1004 "[employer]". [address] [place] [address]. All rights reserved. This information is not intended as a substitute for professional medical care. Always follow your healthcare professional's instructions.

## 2020-11-18 RX ORDER — MUPIROCIN 20 MG/G
1 OINTMENT TOPICAL 2 TIMES DAILY
Status: CANCELLED | OUTPATIENT
Start: 2020-11-18 | End: 2020-11-23

## 2020-11-18 RX ORDER — SODIUM CHLORIDE 0.9 % (FLUSH) 0.9 %
10 SYRINGE (ML) INJECTION
Status: CANCELLED | OUTPATIENT
Start: 2020-11-18

## 2020-11-18 RX ORDER — POLYETHYLENE GLYCOL 3350 17 G/17G
17 POWDER, FOR SOLUTION ORAL DAILY
Status: CANCELLED | OUTPATIENT
Start: 2020-11-18

## 2020-11-18 RX ORDER — MUPIROCIN 20 MG/G
1 OINTMENT TOPICAL
Status: CANCELLED | OUTPATIENT
Start: 2020-11-18

## 2020-11-18 RX ORDER — ACETAMINOPHEN 500 MG
1000 TABLET ORAL
Status: CANCELLED | OUTPATIENT
Start: 2020-11-18

## 2020-11-18 RX ORDER — SODIUM CHLORIDE 9 MG/ML
INJECTION, SOLUTION INTRAVENOUS CONTINUOUS
Status: CANCELLED | OUTPATIENT
Start: 2020-11-18 | End: 2020-11-19

## 2020-11-18 RX ORDER — MORPHINE SULFATE 10 MG/ML
2 INJECTION, SOLUTION INTRAMUSCULAR; INTRAVENOUS
Status: CANCELLED | OUTPATIENT
Start: 2020-11-18

## 2020-11-18 RX ORDER — OXYCODONE HYDROCHLORIDE 5 MG/1
10 TABLET ORAL
Status: CANCELLED | OUTPATIENT
Start: 2020-11-18

## 2020-11-18 RX ORDER — OXYCODONE HYDROCHLORIDE 5 MG/1
5 TABLET ORAL
Status: CANCELLED | OUTPATIENT
Start: 2020-11-18

## 2020-11-18 RX ORDER — BISACODYL 10 MG
10 SUPPOSITORY, RECTAL RECTAL EVERY 12 HOURS PRN
Status: CANCELLED | OUTPATIENT
Start: 2020-11-18

## 2020-11-18 RX ORDER — SODIUM CHLORIDE 9 MG/ML
INJECTION, SOLUTION INTRAVENOUS
Status: CANCELLED | OUTPATIENT
Start: 2020-11-18

## 2020-11-18 RX ORDER — FAMOTIDINE 20 MG/1
20 TABLET, FILM COATED ORAL 2 TIMES DAILY
Status: CANCELLED | OUTPATIENT
Start: 2020-11-18

## 2020-11-18 RX ORDER — LIDOCAINE HYDROCHLORIDE 10 MG/ML
1 INJECTION, SOLUTION EPIDURAL; INFILTRATION; INTRACAUDAL; PERINEURAL
Status: CANCELLED | OUTPATIENT
Start: 2020-11-18

## 2020-11-18 RX ORDER — ROPIVACAINE HYDROCHLORIDE 2 MG/ML
8 INJECTION, SOLUTION EPIDURAL; INFILTRATION; PERINEURAL CONTINUOUS
Status: CANCELLED | OUTPATIENT
Start: 2020-11-18

## 2020-11-18 RX ORDER — ASPIRIN 81 MG/1
81 TABLET ORAL 2 TIMES DAILY
Status: CANCELLED | OUTPATIENT
Start: 2020-11-18

## 2020-11-18 RX ORDER — ONDANSETRON 2 MG/ML
4 INJECTION INTRAMUSCULAR; INTRAVENOUS EVERY 8 HOURS PRN
Status: CANCELLED | OUTPATIENT
Start: 2020-11-18

## 2020-11-18 RX ORDER — NALOXONE HCL 0.4 MG/ML
0.02 VIAL (ML) INJECTION
Status: CANCELLED | OUTPATIENT
Start: 2020-11-18

## 2020-11-18 RX ORDER — CELECOXIB 100 MG/1
200 CAPSULE ORAL DAILY
Status: CANCELLED | OUTPATIENT
Start: 2020-11-18

## 2020-11-18 RX ORDER — FENTANYL CITRATE 50 UG/ML
25 INJECTION, SOLUTION INTRAMUSCULAR; INTRAVENOUS EVERY 5 MIN PRN
Status: CANCELLED | OUTPATIENT
Start: 2020-11-18

## 2020-11-18 RX ORDER — MIDAZOLAM HYDROCHLORIDE 1 MG/ML
1 INJECTION INTRAMUSCULAR; INTRAVENOUS EVERY 5 MIN PRN
Status: CANCELLED | OUTPATIENT
Start: 2020-11-18

## 2020-11-18 RX ORDER — CELECOXIB 100 MG/1
400 CAPSULE ORAL
Status: CANCELLED | OUTPATIENT
Start: 2020-11-18

## 2020-11-18 RX ORDER — TALC
6 POWDER (GRAM) TOPICAL NIGHTLY PRN
Status: CANCELLED | OUTPATIENT
Start: 2020-11-18

## 2020-11-18 RX ORDER — AMOXICILLIN 250 MG
1 CAPSULE ORAL 2 TIMES DAILY
Status: CANCELLED | OUTPATIENT
Start: 2020-11-18

## 2020-11-18 RX ORDER — PREGABALIN 25 MG/1
75 CAPSULE ORAL
Status: CANCELLED | OUTPATIENT
Start: 2020-11-18

## 2020-11-18 RX ORDER — PREGABALIN 25 MG/1
75 CAPSULE ORAL NIGHTLY
Status: CANCELLED | OUTPATIENT
Start: 2020-11-18

## 2020-11-18 RX ORDER — ACETAMINOPHEN 500 MG
1000 TABLET ORAL EVERY 6 HOURS
Status: CANCELLED | OUTPATIENT
Start: 2020-11-18 | End: 2020-11-20

## 2020-11-18 NOTE — H&P (VIEW-ONLY)
CC: Right knee pain    Des Junior is a 75 y.o. male with history of Right knee pain. Pain is worse with activity and weight bearing.  Patient has experienced interference of activities of daily living due to decreased range of motion and an increase in joint pain and swelling.  Patient has failed non-operative treatment including NSAIDs, corticosteroid injections, viscosupplement injections, and activity modification.  Des Junior currently ambulates independently.     Relevant medical conditions of significance in perioperative period:  HTN- on medication managed by cardiologist  HLD- on medication managed by cardiologist  A-fib with history of DVT and PE on xarelto managed by cardiology      Past Medical History:   Diagnosis Date    Abdominal obesity 3/15/2018    Acute deep vein thrombosis (DVT) of popliteal vein of right lower extremity 3/2/2018    AK (actinic keratosis) 11/15/2012    S/p 1st PDT face (80 min incubation) - did great! S/p PDT face - 90 min  - 12/15 - did great S/p PDT face - 90 min - 10/17 - great response    Asbestos exposure 3/2/2018    Asymptomatic LV dysfunction 3/2/2018    Bronchitis, chronic, mucopurulent 3/2/2018    Cataract     Colon adenoma 8/20/2014    Current use of long term anticoagulation 4/23/2018    Deep vein thrombosis     ED (erectile dysfunction) 11/28/2012    Encounter for monitoring sotalol therapy 6/13/2018    Hay fever     Hearing loss, sensorineural 6/25/2013    History of adenomatous polyp of colon 10/5/2015    History of pulmonary embolism 5/2/2018    Hyperlipidemia associated with type 2 diabetes mellitus, baseline  11/28/2012    Hypertension associated with diabetes 11/28/2012    Lung nodule, solitary- 5 mm rll 3/2/2018    On home oxygen therapy 3/15/2018    Osteoarthritis of right knee 11/28/2012    Persistent atrial fibrillation 3/1/2018    Pulmonary embolus- large involved central PA, unprovoked. 3/1/2018    Type  2 diabetes mellitus, controlled 7/16/2013    Type 2 diabetes, controlled, with neuropathy 1/20/2014    Type 2 diabetes, uncontrolled, with neuropathy, onset 2003 11/28/2012       Past Surgical History:   Procedure Laterality Date    COLONOSCOPY N/A 10/5/2015    Procedure: COLONOSCOPY;  Surgeon: Pro Jang MD;  Location: [facility] ([address] [place] [address]);  Service: Endoscopy;  Laterality: N/A;    thumb surgery         Family History   Problem Relation Age of Onset    Hypertension Father     Pneumonia Father     Cancer Brother         colon    Cataracts Mother     Glaucoma Mother     Heart disease Mother 87        CHF    Cancer Sister         lymphoma    Melanoma Neg Hx     Amblyopia Neg Hx     Blindness Neg Hx     Macular degeneration Neg Hx     Retinal detachment Neg Hx     Strabismus Neg Hx     Diabetes Neg Hx        Review of patient's allergies indicates:   Allergen Reactions    Benicar [olmesartan]      Other reaction(s): SPOTS IN FRONT OF EYES    Codeine Nausea And Vomiting     Other reaction(s): Nausea         Current Outpatient Medications:     acetaminophen (TYLENOL) 500 MG tablet, Take 1,000 mg by mouth daily as needed for Pain., Disp: , Rfl:     albuterol (PROVENTIL/VENTOLIN HFA) 90 mcg/actuation inhaler, 2 puffs every 4 hours as needed for cough, wheeze, or shortness of breath, Disp: 18 g, Rfl: 11    amLODIPine (NORVASC) 5 MG tablet, Take 1 tablet (5 mg total) by mouth once daily., Disp: 90 tablet, Rfl: 3    blood sugar diagnostic Strp, 1 strip by Misc.(Non-Drug; Combo Route) route 2 (two) times daily with meals., Disp: 100 strip, Rfl: 11    cetirizine (ZYRTEC) 10 MG tablet, Take 1 tablet (10 mg total) by mouth once daily. (Patient not taking: Reported on 11/13/2020), Disp: , Rfl: 0    cholecalciferol, vitamin D3, (VITAMIN D3) 1,000 unit capsule, Take 1,000 Units by mouth once daily., Disp: , Rfl:     fluorouracil (EFUDEX) 5 % cream, Use qd-bid to red or rough precancer spots until  "irritation occurs and then stop.  Resume as needed. (Patient not taking: Reported on 11/13/2020), Disp: 40 g, Rfl: 1    gabapentin (NEURONTIN) 100 MG capsule, Take one capsule nightly. Increase to two capsule if no relief. Increase to three capsules if you still have symptoms., Disp: 90 capsule, Rfl: 11    hydroCHLOROthiazide (HYDRODIURIL) 25 MG tablet, Take 1 tablet (25 mg total) by mouth once daily., Disp: 90 tablet, Rfl: 11    insulin (LANTUS SOLOSTAR U-100 INSULIN) glargine 100 units/mL (3mL) SubQ pen, Inject 9 units every morning and evening. (Patient taking differently: Inject 10 units every morning and evening.), Disp: 16 mL, Rfl: 3    ketoconazole (NIZORAL) 2 % cream, Apply topically once daily. (Patient not taking: Reported on 11/13/2020), Disp: 1 Tube, Rfl: 0    LANCETS & BLOOD GLUCOSE STRIPS MISC, * * * Twice a day .  check glucose twice a day, Disp: , Rfl:     losartan (COZAAR) 50 MG tablet, Take 1 tablet (50 mg total) by mouth once daily., Disp: 90 tablet, Rfl: 11    metFORMIN (GLUCOPHAGE-XR) 500 MG 24 hr tablet, Take 2 tablets (1,000 mg total) by mouth 2 (two) times daily with meals., Disp: 360 tablet, Rfl: 11    metoprolol succinate (TOPROL-XL) 50 MG 24 hr tablet, Take 1 tablet (50 mg total) by mouth once daily., Disp: 90 tablet, Rfl: 3    olopatadine (PAZEO) 0.7 % Drop, Apply 1 drop to eye once daily. (Patient not taking: Reported on 11/13/2020), Disp: 2.5 mL, Rfl: 12    omega-3 fatty acids-vitamin E (OMEGA-3 FISH OIL) 1,000-5 mg-unit Cap, Take by mouth 2 (two) times daily. 1 Capsule Oral Every day, Disp: , Rfl:     omeprazole (PRILOSEC OTC) 20 MG tablet, Take by mouth. 1 Tablet, Delayed Release (E.C.) Oral Every day, Disp: , Rfl:     pen needle, diabetic 29 gauge x 1/2" Ndle, Inject insulin daily, Disp: 50 each, Rfl: 11    pravastatin (PRAVACHOL) 40 MG tablet, Take 1 tablet (40 mg total) by mouth once daily., Disp: 30 tablet, Rfl: 11    repaglinide (PRANDIN) 2 MG tablet, Take 2 tablets " with meals. (Patient taking differently: Take 2 tablets with lunch.), Disp: 540 tablet, Rfl: 3    rivaroxaban (XARELTO) 20 mg Tab, TAKE 1 TABLET  MOUTH DAILY WITH  EVENING MEAL., Disp: 30 tablet, Rfl: 11    Review of Systems:  Constitutional: no fever or chills  Eyes: no visual changes  ENT: no nasal congestion or sore throat  Respiratory: no cough or shortness of breath  Cardiovascular: no chest pain or palpitations  Gastrointestinal: no nausea or vomiting, tolerating diet  Genitourinary: no hematuria or dysuria  Integument/Breast: no rash or pruritis  Hematologic/Lymphatic: no easy bruising or lymphadenopathy  Musculoskeletal: positive for knee pain  Neurological: no seizures or tremors  Behavioral/Psych: no auditory or visual hallucinations  Endocrine: no heat or cold intolerance    PE:  There were no vitals taken for this visit.  General: Pleasant, cooperative, NAD   Gait: antalgic  HEENT: NCAT, sclera nonicteric   Lungs: Respirations clear bilaterally; equal and unlabored.   CV: S1S2; 2+ bilateral upper and lower extremity pulses.   Skin: Intact throughout with no rashes, erythema, or lesions  Extremities: No LE edema,  no erythema or warmth of the skin in either lower extremity.    Right knee exam:  Knee Range of Motion:normal, pain with passive range of motion  Effusion:none  Condition of skin:intact  Location of tenderness:Medial joint line and Lateral joint line   Strength:normal  Stability: stable to testing    Hip Examination: painless PROM of hip     Radiographs: Radiographs reveal advanced degenerative changes including subchondral cyst formation, subchondral sclerosis, osteophyte formation, joint space narrowing.     Knee Alignment:  Moderate varus    Diagnosis: osteoarthritis Right knee    Plan: Right total knee arthroplasty    Due to the serious nature of total joint infection and the high prevalence of community acquired MRSA, vancomycin will be used perioperatively.

## 2020-11-18 NOTE — PROGRESS NOTES
Des Junior is a 75 y.o. year old here today for a pre-operative visit in preparation for a Right total knee arthroplasty to be performed by Dr. Gore  on 12/7/2020.  he was last seen and treated in the clinic on 10/30/2020. he will be medically optimized by the pre op center. There has been no significant change in medical status since last visit. No fever, chills, malaise, or unexplained weight change.      Allergies, Medications, past medical and surgical history reviewed.    Focused examination performed.    Patient declined to see surgeon today. All questions answered. Patient encouraged to call with questions. Contact information given.     Pre, rama, and post operative procedures and expectations discussed. Questions were answered. Des Junior has been educated and is ready to proceed with surgery. Approximately 30 minutes was spent discussing surgical outcomes, plans, procedures pre, rama, and post operative expections and care.  Surgical consent signed.    Des Junior will contact us if there are any questions, concerns, or changes in medical status prior to surgery.       Joint class in person 11/17/2020    COVID-19 test date: 12/4/2020     patient will be scheduled with Home Health during hospitalization. Pt states that he and his wife discussed home health with Dr. Anderson previously and he will go to [facility] once home therapy is done.

## 2020-11-18 NOTE — H&P
CC: Right knee pain    Des Junior is a 75 y.o. male with history of Right knee pain. Pain is worse with activity and weight bearing.  Patient has experienced interference of activities of daily living due to decreased range of motion and an increase in joint pain and swelling.  Patient has failed non-operative treatment including NSAIDs, corticosteroid injections, viscosupplement injections, and activity modification.  Des Junior currently ambulates independently.     Relevant medical conditions of significance in perioperative period:  HTN- on medication managed by cardiologist  HLD- on medication managed by cardiologist  A-fib with history of DVT and PE on xarelto managed by cardiology      Past Medical History:   Diagnosis Date    Abdominal obesity 3/15/2018    Acute deep vein thrombosis (DVT) of popliteal vein of right lower extremity 3/2/2018    AK (actinic keratosis) 11/15/2012    S/p 1st PDT face (80 min incubation) - did great! S/p PDT face - 90 min  - 12/15 - did great S/p PDT face - 90 min - 10/17 - great response    Asbestos exposure 3/2/2018    Asymptomatic LV dysfunction 3/2/2018    Bronchitis, chronic, mucopurulent 3/2/2018    Cataract     Colon adenoma 8/20/2014    Current use of long term anticoagulation 4/23/2018    Deep vein thrombosis     ED (erectile dysfunction) 11/28/2012    Encounter for monitoring sotalol therapy 6/13/2018    Hay fever     Hearing loss, sensorineural 6/25/2013    History of adenomatous polyp of colon 10/5/2015    History of pulmonary embolism 5/2/2018    Hyperlipidemia associated with type 2 diabetes mellitus, baseline  11/28/2012    Hypertension associated with diabetes 11/28/2012    Lung nodule, solitary- 5 mm rll 3/2/2018    On home oxygen therapy 3/15/2018    Osteoarthritis of right knee 11/28/2012    Persistent atrial fibrillation 3/1/2018    Pulmonary embolus- large involved central PA, unprovoked. 3/1/2018    Type  2 diabetes mellitus, controlled 7/16/2013    Type 2 diabetes, controlled, with neuropathy 1/20/2014    Type 2 diabetes, uncontrolled, with neuropathy, onset 2003 11/28/2012       Past Surgical History:   Procedure Laterality Date    COLONOSCOPY N/A 10/5/2015    Procedure: COLONOSCOPY;  Surgeon: Pro Jang MD;  Location: [facility] ([address] [place] [address]);  Service: Endoscopy;  Laterality: N/A;    thumb surgery         Family History   Problem Relation Age of Onset    Hypertension Father     Pneumonia Father     Cancer Brother         colon    Cataracts Mother     Glaucoma Mother     Heart disease Mother 87        CHF    Cancer Sister         lymphoma    Melanoma Neg Hx     Amblyopia Neg Hx     Blindness Neg Hx     Macular degeneration Neg Hx     Retinal detachment Neg Hx     Strabismus Neg Hx     Diabetes Neg Hx        Review of patient's allergies indicates:   Allergen Reactions    Benicar [olmesartan]      Other reaction(s): SPOTS IN FRONT OF EYES    Codeine Nausea And Vomiting     Other reaction(s): Nausea         Current Outpatient Medications:     acetaminophen (TYLENOL) 500 MG tablet, Take 1,000 mg by mouth daily as needed for Pain., Disp: , Rfl:     albuterol (PROVENTIL/VENTOLIN HFA) 90 mcg/actuation inhaler, 2 puffs every 4 hours as needed for cough, wheeze, or shortness of breath, Disp: 18 g, Rfl: 11    amLODIPine (NORVASC) 5 MG tablet, Take 1 tablet (5 mg total) by mouth once daily., Disp: 90 tablet, Rfl: 3    blood sugar diagnostic Strp, 1 strip by Misc.(Non-Drug; Combo Route) route 2 (two) times daily with meals., Disp: 100 strip, Rfl: 11    cetirizine (ZYRTEC) 10 MG tablet, Take 1 tablet (10 mg total) by mouth once daily. (Patient not taking: Reported on 11/13/2020), Disp: , Rfl: 0    cholecalciferol, vitamin D3, (VITAMIN D3) 1,000 unit capsule, Take 1,000 Units by mouth once daily., Disp: , Rfl:     fluorouracil (EFUDEX) 5 % cream, Use qd-bid to red or rough precancer spots until  "irritation occurs and then stop.  Resume as needed. (Patient not taking: Reported on 11/13/2020), Disp: 40 g, Rfl: 1    gabapentin (NEURONTIN) 100 MG capsule, Take one capsule nightly. Increase to two capsule if no relief. Increase to three capsules if you still have symptoms., Disp: 90 capsule, Rfl: 11    hydroCHLOROthiazide (HYDRODIURIL) 25 MG tablet, Take 1 tablet (25 mg total) by mouth once daily., Disp: 90 tablet, Rfl: 11    insulin (LANTUS SOLOSTAR U-100 INSULIN) glargine 100 units/mL (3mL) SubQ pen, Inject 9 units every morning and evening. (Patient taking differently: Inject 10 units every morning and evening.), Disp: 16 mL, Rfl: 3    ketoconazole (NIZORAL) 2 % cream, Apply topically once daily. (Patient not taking: Reported on 11/13/2020), Disp: 1 Tube, Rfl: 0    LANCETS & BLOOD GLUCOSE STRIPS MISC, * * * Twice a day .  check glucose twice a day, Disp: , Rfl:     losartan (COZAAR) 50 MG tablet, Take 1 tablet (50 mg total) by mouth once daily., Disp: 90 tablet, Rfl: 11    metFORMIN (GLUCOPHAGE-XR) 500 MG 24 hr tablet, Take 2 tablets (1,000 mg total) by mouth 2 (two) times daily with meals., Disp: 360 tablet, Rfl: 11    metoprolol succinate (TOPROL-XL) 50 MG 24 hr tablet, Take 1 tablet (50 mg total) by mouth once daily., Disp: 90 tablet, Rfl: 3    olopatadine (PAZEO) 0.7 % Drop, Apply 1 drop to eye once daily. (Patient not taking: Reported on 11/13/2020), Disp: 2.5 mL, Rfl: 12    omega-3 fatty acids-vitamin E (OMEGA-3 FISH OIL) 1,000-5 mg-unit Cap, Take by mouth 2 (two) times daily. 1 Capsule Oral Every day, Disp: , Rfl:     omeprazole (PRILOSEC OTC) 20 MG tablet, Take by mouth. 1 Tablet, Delayed Release (E.C.) Oral Every day, Disp: , Rfl:     pen needle, diabetic 29 gauge x 1/2" Ndle, Inject insulin daily, Disp: 50 each, Rfl: 11    pravastatin (PRAVACHOL) 40 MG tablet, Take 1 tablet (40 mg total) by mouth once daily., Disp: 30 tablet, Rfl: 11    repaglinide (PRANDIN) 2 MG tablet, Take 2 tablets " with meals. (Patient taking differently: Take 2 tablets with lunch.), Disp: 540 tablet, Rfl: 3    rivaroxaban (XARELTO) 20 mg Tab, TAKE 1 TABLET  MOUTH DAILY WITH  EVENING MEAL., Disp: 30 tablet, Rfl: 11    Review of Systems:  Constitutional: no fever or chills  Eyes: no visual changes  ENT: no nasal congestion or sore throat  Respiratory: no cough or shortness of breath  Cardiovascular: no chest pain or palpitations  Gastrointestinal: no nausea or vomiting, tolerating diet  Genitourinary: no hematuria or dysuria  Integument/Breast: no rash or pruritis  Hematologic/Lymphatic: no easy bruising or lymphadenopathy  Musculoskeletal: positive for knee pain  Neurological: no seizures or tremors  Behavioral/Psych: no auditory or visual hallucinations  Endocrine: no heat or cold intolerance    PE:  There were no vitals taken for this visit.  General: Pleasant, cooperative, NAD   Gait: antalgic  HEENT: NCAT, sclera nonicteric   Lungs: Respirations clear bilaterally; equal and unlabored.   CV: S1S2; 2+ bilateral upper and lower extremity pulses.   Skin: Intact throughout with no rashes, erythema, or lesions  Extremities: No LE edema,  no erythema or warmth of the skin in either lower extremity.    Right knee exam:  Knee Range of Motion:normal, pain with passive range of motion  Effusion:none  Condition of skin:intact  Location of tenderness:Medial joint line and Lateral joint line   Strength:normal  Stability: stable to testing    Hip Examination: painless PROM of hip     Radiographs: Radiographs reveal advanced degenerative changes including subchondral cyst formation, subchondral sclerosis, osteophyte formation, joint space narrowing.     Knee Alignment:  Moderate varus    Diagnosis: osteoarthritis Right knee    Plan: Right total knee arthroplasty    Due to the serious nature of total joint infection and the high prevalence of community acquired MRSA, vancomycin will be used perioperatively.

## 2020-11-23 ENCOUNTER — PATIENT MESSAGE (OUTPATIENT)
Dept: ADMINISTRATIVE | Facility: OTHER | Age: 75
End: 2020-11-23

## 2020-11-25 ENCOUNTER — TELEPHONE (OUTPATIENT)
Dept: CARDIOLOGY | Facility: CLINIC | Age: 75
End: 2020-11-25

## 2020-11-25 NOTE — TELEPHONE ENCOUNTER
----- Message from Cedric Evangelista MD sent at 11/12/2020  8:48 AM CST -----  Sounds good!  Happy to see him, have several new patient openings this month    Cedric  ----- Message -----  From: Amie Kiran NP  Sent: 11/11/2020   6:01 PM CST  To: MD Dr. Garrick Disla,   You are seeing him Friday so I wanted to send you a copy of my note.    Dr. Evangelista,   I recommended he establish with you when he RTC as he does not have a cardiologist at present.   Thanks!

## 2020-11-25 NOTE — TELEPHONE ENCOUNTER
Patient made appt to see Dr Evangelista Feb 22 at 2:00 . Spoke with Amie to see if that date was ok or if we needed to give him something sooner and she said that date was ok.         MD Amie Disla NP   Cc: Yvonne Reese MA             Sounds good!  Happy to see him, have several new patient openings this month     Cedric    Previous Messages    ----- Message -----   From: Amie Kiran NP   Sent: 11/11/2020   6:01 PM CST   To: MD Dr. Garrick Disla,   You are seeing him Friday so I wanted to send you a copy of my note.     Dr. Evangelista,   I recommended he establish with you when he RTC as he does not have a cardiologist at present.   Thanks!

## 2020-11-30 ENCOUNTER — PATIENT MESSAGE (OUTPATIENT)
Dept: ADMINISTRATIVE | Facility: OTHER | Age: 75
End: 2020-11-30

## 2020-12-04 ENCOUNTER — PATIENT MESSAGE (OUTPATIENT)
Dept: ADMINISTRATIVE | Facility: HOSPITAL | Age: 75
End: 2020-12-04

## 2020-12-04 ENCOUNTER — LAB VISIT (OUTPATIENT)
Dept: PRIMARY CARE CLINIC | Facility: CLINIC | Age: 75
End: 2020-12-04
Payer: MEDICARE

## 2020-12-04 ENCOUNTER — PATIENT OUTREACH (OUTPATIENT)
Dept: ADMINISTRATIVE | Facility: HOSPITAL | Age: 75
End: 2020-12-04

## 2020-12-04 ENCOUNTER — TELEPHONE (OUTPATIENT)
Dept: ORTHOPEDICS | Facility: CLINIC | Age: 75
End: 2020-12-04

## 2020-12-04 DIAGNOSIS — Z12.11 SCREENING FOR COLON CANCER: Primary | ICD-10-CM

## 2020-12-04 DIAGNOSIS — Z01.818 PRE-OP TESTING: ICD-10-CM

## 2020-12-04 PROCEDURE — U0003 INFECTIOUS AGENT DETECTION BY NUCLEIC ACID (DNA OR RNA); SEVERE ACUTE RESPIRATORY SYNDROME CORONAVIRUS 2 (SARS-COV-2) (CORONAVIRUS DISEASE [COVID-19]), AMPLIFIED PROBE TECHNIQUE, MAKING USE OF HIGH THROUGHPUT TECHNOLOGIES AS DESCRIBED BY CMS-2020-01-R: HCPCS

## 2020-12-04 RX ORDER — DOCUSATE SODIUM 100 MG/1
100 CAPSULE, LIQUID FILLED ORAL 2 TIMES DAILY PRN
Qty: 60 CAPSULE | Refills: 0 | Status: SHIPPED | OUTPATIENT
Start: 2020-12-04 | End: 2020-12-22

## 2020-12-04 RX ORDER — OXYCODONE HYDROCHLORIDE 5 MG/1
TABLET ORAL
Qty: 50 TABLET | Refills: 0 | Status: SHIPPED | OUTPATIENT
Start: 2020-12-04 | End: 2020-12-22 | Stop reason: SDUPTHER

## 2020-12-04 NOTE — ANESTHESIA PREPROCEDURE EVALUATION
Mr. Junior is a 75 year old M here for total knee, right. Extensive pre op notes reviewed.  Has been off eliquis since Thursday so he is appropriate for neuraxial anesthesia.  Labs reviewed and are acceptable.       Anesthesia Assessment: Preoperative EQUATION    Planned Procedure: Procedure(s) (LRB):  ARTHROPLASTY, KNEE:RIGHT:DEPUY-SIGMA (Right)  Requested Anesthesia Type:Regional  Surgeon: Hua Gore III, MD  Service: Orthopedics  Known or anticipated Date of Surgery:12/7/2020    Surgeon notes: reviewed    Previous anesthesia records:colonoscopy 2015    Last PCP note: within [facility] , Dr Dvorin 6/19/20  Subspecialty notes: Endocrine, MARYAM Bowens 11/2/20;  Pulmonary, Dr Eladio Sandoval 8/24/20;   (Cardiology notes from 2018)    Other important co-morbidities: A FIB, DM2, GERD, HLD, HTN and h/o PE, DVT 3/2018 unprovoked, chronic bronchitis, asbestos exposure, stable lung nodules (CT chest 8/10/20); neuropathy      Tests already available:  A1C (7.7), RENAL FUNCTION PANEL 10/30/20; EKG 9/9/18; TTE 9/17/18 (EF 53%); NM PHARM STRESS TEST 3/26/18 (EF 39%)  CT CHEST 8/10/20; US Kidney 8/28/20             Plan:    Testing:  Hematology Profile and PT/INR   Pre-anesthesia  visit       Visit focus: possible regional anesthesia and/or nerve block      Consultation:[facility] Perioperative Hospitalist Dr Soria appt 11/11/20; CARDS (appt with MARYAM House 11/11/20)will request instructions for holding Xarelto 3-4 days based on Cr.Cl for neuraxial anesthesia (modified for overweight pt-Cr. Cl 66 ml/min=hold x 72 hrs per current standardized protocol)  Pulmonary clearance/pt recommendations per Dr Sandoval:  Your lungs are in fairly good shape, you have minimal scarring and minimal chronic bronchial problems - would expect no issues with knee operation.   Use trelegy daily, may use albuterol as needed-   May use prednisone if wheezes.  May use augmentin antibiotic if yellow mucous.   Lung nodules and scarring are stable - would check ct  November next year and f/u.  Sooner if concerned.       Navigation: Tests Scheduled.              Consults scheduled.             Results will be tracked by Preop Clinic.                                                                                                                12/04/2020     Des Junior is a 75 y.o., male.      Anesthesia Evaluation          Review of Systems  Anesthesia Hx:  No problems with previous Anesthesia History of prior surgery of interest to airway management or planning: Denies Family Hx of Anesthesia complications.   Denies Personal Hx of Anesthesia complications.   Social:  Non-Smoker, No Alcohol Use    Hematology/Oncology:  Hematology Normal   Oncology Normal     EENT/Dental:EENT/Dental Normal   Cardiovascular:    Denies Angina. hyperlipidemia  Functional Capacity good / => 4 METS  Deep Venous Thrombosis (DVT) (3/2018), Hx of DVT, right lower extremity  Hypertension , Fairly Controlled on RX , Recent typical clinic B/P of 165/80  Disorder of Cardiac Rhythm, Atrial Fibrillation, S/P surgical ablation    Pulmonary:   Denies Shortness of breath.  Denies Recent URI. Occ wheezes with URI-has not required inhaler in weeks. HX of asbestos exposure and lung nodule. Pulmonary Symptoms:  Pulmonary Embolism, > 6 months ago    Hepatic/GI:  Esophageal / Stomach Disorders Gerd Controlled by chronic antireflux medication.    Musculoskeletal:  Musculoskeletal General/Symptoms: joint pain, joint stiffness. Functional capacity is ambulatory with walker.  Joint Disease:  Arthritis, Osteoarthritis, knee  Lumbar Spine Disorders, Lumbar Disc Disease Sees chiropractor for adjustments in lumbar spine.  Neurological:  Pain , onset is chronic , location of right knee , alleviating factors are prn tylenol. Arthritis, Osteoarthritis, knee    Endocrine:  Diabetes, Type 2 Diabetes , controlled by insulin. Typical AM glucose range: 80s , most recent HgA1c value was 7.7 on 10/30/20.  Metabolic  Disorders, Obesity / BMI > 30  Psych:  Psychiatric Normal           Physical Exam  General:  Well nourished    Airway/Jaw/Neck:  Airway Findings: Mouth Opening: Normal Tongue: Normal  Jaw/Neck Findings:  Neck ROM: Normal ROM      Dental:  Dental Findings: In tact        Mental Status:  Mental Status Findings:  Cooperative, Alert and Oriented       Per Dr. Soria:  Preoperative cardiac risk assessment-  The patient does not have any active cardiac conditions . Revised cardiac risk index predictors- 1---.Functional capacity is more than 4 Mets. He will be undergoing a Orthopedic procedure that carries a Moderate Risk risk      Risk of a major Cardiac event ( Defined as death, myocardial infarction, or cardiac arrest at 30 days after noncardiac surgery), based on RCRI score - 6.0%       Will follow up on EP evaluation       Postoperative pulmonary complication risk assessment:      ARISCAT ( Canet) risk index- risk class -  Low, if duration of surgery is under 3 hours, intermediate, if duration of surgery is over 3 hours      Teodorozull Respiratory failure index- percentage risk of respiratory failure: 0.5 %     Coming back on 11/13 to have EP evaluation  Current use of long term anticoagulation  Xarelto   CR CL 66   Hold for 72 hours pre op   Date of surgery - 12/7/2020   Last day of Xareto use pre op - 12/3/2020    Patient is pending optimization- Cardiology ( EP ) evaluation    /BGintherRN      11/11/20 Cardiology note:  Total Risk for rama-operative major cardiac event, 6%.       Addendum 11/12/20 per Amie Kiran NP in cardiology:       Preop cardiovascular exam  Would not recommend he has surgery until he sees EP as his ECG today shows a retrograde P wave which is consistent with a re-entry tachycardia.  Would like him to see them and get that taken care of first.         AV billy re-entry tachycardia  New Re-entry tachycardia.  Reviewed ECG with Dr. Luis.  Increase metoprolol to 50 mg daily and stop the  amlodipine.  Was able to get an urgent EP consult for Friday with Dr. Mccauley so that his knee surgery will hopefull not have to be canceled for next month.      Addendum 11/17/20- pt saw arrhythmia cardiologist, Dr Mccauley, on 11/13/20. 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 Hx of PAF. However, today he's in NSR and is unclear whether he feels any different.  Agree with increase in BB.  Zio patch to follow whether arrhythmia recurs and decide if there's any associated sx.  There's no arrhythmic contraindication to his planned knee surgery.  /BGintherRN      Anesthesia Plan  Type of Anesthesia, risks & benefits discussed:  Anesthesia Type:  general, MAC, CSE, spinal  Patient's Preference:   Intra-op Monitoring Plan: standard ASA monitors  Intra-op Monitoring Plan Comments:   Post Op Pain Control Plan: multimodal analgesia, IV/PO Opioids PRN and per primary service following discharge from PACU  Post Op Pain Control Plan Comments:   Induction:   IV  Beta Blocker:  Patient is on a Beta-Blocker and has received one dose within the past 24 hours (No further documentation required).       Informed Consent: Patient understands risks and agrees with Anesthesia plan.  Questions answered. Anesthesia consent signed with patient.  ASA Score: 3     Day of Surgery Review of History & Physical: I have interviewed and examined the patient. I have reviewed the patient's H&P dated:  There are no significant changes.          Ready For Surgery From Anesthesia Perspective.

## 2020-12-04 NOTE — TELEPHONE ENCOUNTER
I called the patient today regarding surgery on 12/7/2020 with Dr. Hua Gore. I informed the patient that his surgery will be at  [facility]. I informed the patient they must arrive at 5:00am and their surgery will start at 7:00am.     I informed the patient about the current visitor policy. Currently only 1 visitor may assist them in pre-op, post-op holding area, and where they will be spending the night. I told the patient to check with the  the day of surgery to find out what the visiting hours are. The patient is aware that it is a fluid situation due to the COVID-19 pandemic and our visitor policies and procedures may change between now and when they have surgery.     I reminded the patient about his COVID-19 swab test. The patient completed his COVID-19 pre-procedural test today.    Informed the patient that they cannot eat or drink after midnight, the night before surgery.     The patient verbalized understanding and has no further questions.

## 2020-12-04 NOTE — PROGRESS NOTES
Health Maintenance Due   Topic Date Due    Colorectal Cancer Screening  10/05/2020     Order has been placed for colonoscopy.  Portal message has been sent to patient regarding overdue colonoscopy.  Chart review completed.

## 2020-12-05 LAB — SARS-COV-2 RNA RESP QL NAA+PROBE: NOT DETECTED

## 2020-12-07 ENCOUNTER — ANESTHESIA (OUTPATIENT)
Dept: SURGERY | Facility: HOSPITAL | Age: 75
End: 2020-12-07
Payer: MEDICARE

## 2020-12-07 ENCOUNTER — HOSPITAL ENCOUNTER (OUTPATIENT)
Facility: HOSPITAL | Age: 75
Discharge: HOME | End: 2020-12-08
Attending: ORTHOPAEDIC SURGERY | Admitting: ORTHOPAEDIC SURGERY
Payer: MEDICARE

## 2020-12-07 DIAGNOSIS — M25.561 RIGHT KNEE PAIN, UNSPECIFIED CHRONICITY: ICD-10-CM

## 2020-12-07 DIAGNOSIS — M25.561 ACUTE PAIN OF RIGHT KNEE: Primary | ICD-10-CM

## 2020-12-07 LAB
BUN SERPL-MCNC: 22 MG/DL (ref 6–30)
CHLORIDE SERPL-SCNC: 101 MMOL/L (ref 95–110)
CREAT SERPL-MCNC: 1 MG/DL (ref 0.5–1.4)
GLUCOSE SERPL-MCNC: 258 MG/DL (ref 70–110)
HCT VFR BLD CALC: 37 %PCV (ref 36–54)
POC IONIZED CALCIUM: 1.14 MMOL/L (ref 1.06–1.42)
POC TCO2 (MEASURED): 23 MMOL/L (ref 23–29)
POCT GLUCOSE: 175 MG/DL (ref 70–110)
POCT GLUCOSE: 185 MG/DL (ref 70–110)
POCT GLUCOSE: 247 MG/DL (ref 70–110)
POCT GLUCOSE: 275 MG/DL (ref 70–110)
POCT GLUCOSE: 312 MG/DL (ref 70–110)
POCT GLUCOSE: 335 MG/DL (ref 70–110)
POCT GLUCOSE: 341 MG/DL (ref 70–110)
POTASSIUM BLD-SCNC: 4.5 MMOL/L (ref 3.5–5.1)
SAMPLE: ABNORMAL
SODIUM BLD-SCNC: 136 MMOL/L (ref 136–145)

## 2020-12-07 PROCEDURE — 25000003 PHARM REV CODE 250: Performed by: ANESTHESIOLOGY

## 2020-12-07 PROCEDURE — C1713 ANCHOR/SCREW BN/BN,TIS/BN: HCPCS | Performed by: ORTHOPAEDIC SURGERY

## 2020-12-07 PROCEDURE — 37000008 HC ANESTHESIA 1ST 15 MINUTES: Performed by: ORTHOPAEDIC SURGERY

## 2020-12-07 PROCEDURE — 99900035 HC TECH TIME PER 15 MIN (STAT)

## 2020-12-07 PROCEDURE — 25000003 PHARM REV CODE 250: Performed by: STUDENT IN AN ORGANIZED HEALTH CARE EDUCATION/TRAINING PROGRAM

## 2020-12-07 PROCEDURE — C9399 UNCLASSIFIED DRUGS OR BIOLOG: HCPCS | Performed by: ANESTHESIOLOGY

## 2020-12-07 PROCEDURE — 63600175 PHARM REV CODE 636 W HCPCS: Performed by: ORTHOPAEDIC SURGERY

## 2020-12-07 PROCEDURE — 63600175 PHARM REV CODE 636 W HCPCS: Performed by: NURSE PRACTITIONER

## 2020-12-07 PROCEDURE — D9220A PRA ANESTHESIA: ICD-10-PCS | Mod: CRNA,,, | Performed by: NURSE ANESTHETIST, CERTIFIED REGISTERED

## 2020-12-07 PROCEDURE — 37000009 HC ANESTHESIA EA ADD 15 MINS: Performed by: ORTHOPAEDIC SURGERY

## 2020-12-07 PROCEDURE — 71000033 HC RECOVERY, INTIAL HOUR: Performed by: ORTHOPAEDIC SURGERY

## 2020-12-07 PROCEDURE — 63600175 PHARM REV CODE 636 W HCPCS: Performed by: ANESTHESIOLOGY

## 2020-12-07 PROCEDURE — 84132 ASSAY OF SERUM POTASSIUM: CPT

## 2020-12-07 PROCEDURE — D9220A PRA ANESTHESIA: Mod: ANES,,, | Performed by: ANESTHESIOLOGY

## 2020-12-07 PROCEDURE — 25000003 PHARM REV CODE 250: Performed by: NURSE ANESTHETIST, CERTIFIED REGISTERED

## 2020-12-07 PROCEDURE — 25000003 PHARM REV CODE 250: Performed by: NURSE PRACTITIONER

## 2020-12-07 PROCEDURE — D9220A PRA ANESTHESIA: ICD-10-PCS | Mod: ANES,,, | Performed by: ANESTHESIOLOGY

## 2020-12-07 PROCEDURE — 82330 ASSAY OF CALCIUM: CPT

## 2020-12-07 PROCEDURE — D9220A PRA ANESTHESIA: Mod: CRNA,,, | Performed by: NURSE ANESTHETIST, CERTIFIED REGISTERED

## 2020-12-07 PROCEDURE — 27447 PR TOTAL KNEE ARTHROPLASTY: ICD-10-PCS | Mod: RT,GC,, | Performed by: ORTHOPAEDIC SURGERY

## 2020-12-07 PROCEDURE — 82565 ASSAY OF CREATININE: CPT

## 2020-12-07 PROCEDURE — 63600175 PHARM REV CODE 636 W HCPCS: Performed by: PHYSICIAN ASSISTANT

## 2020-12-07 PROCEDURE — 97165 OT EVAL LOW COMPLEX 30 MIN: CPT

## 2020-12-07 PROCEDURE — 97535 SELF CARE MNGMENT TRAINING: CPT

## 2020-12-07 PROCEDURE — 84295 ASSAY OF SERUM SODIUM: CPT

## 2020-12-07 PROCEDURE — 63600175 PHARM REV CODE 636 W HCPCS: Performed by: NURSE ANESTHETIST, CERTIFIED REGISTERED

## 2020-12-07 PROCEDURE — 85014 HEMATOCRIT: CPT

## 2020-12-07 PROCEDURE — 94761 N-INVAS EAR/PLS OXIMETRY MLT: CPT

## 2020-12-07 PROCEDURE — 97116 GAIT TRAINING THERAPY: CPT

## 2020-12-07 PROCEDURE — 27447 TOTAL KNEE ARTHROPLASTY: CPT | Mod: RT,GC,, | Performed by: ORTHOPAEDIC SURGERY

## 2020-12-07 PROCEDURE — 97530 THERAPEUTIC ACTIVITIES: CPT

## 2020-12-07 PROCEDURE — 97162 PT EVAL MOD COMPLEX 30 MIN: CPT

## 2020-12-07 PROCEDURE — 27201423 OPTIME MED/SURG SUP & DEVICES STERILE SUPPLY: Performed by: ORTHOPAEDIC SURGERY

## 2020-12-07 PROCEDURE — 71000039 HC RECOVERY, EACH ADD'L HOUR: Performed by: ORTHOPAEDIC SURGERY

## 2020-12-07 PROCEDURE — 82962 GLUCOSE BLOOD TEST: CPT | Performed by: ORTHOPAEDIC SURGERY

## 2020-12-07 PROCEDURE — C1776 JOINT DEVICE (IMPLANTABLE): HCPCS | Performed by: ORTHOPAEDIC SURGERY

## 2020-12-07 PROCEDURE — 36000710: Performed by: ORTHOPAEDIC SURGERY

## 2020-12-07 PROCEDURE — 36000711: Performed by: ORTHOPAEDIC SURGERY

## 2020-12-07 DEVICE — CEMENT BONE SURG SMPLX P RADPQ: Type: IMPLANTABLE DEVICE | Site: KNEE | Status: FUNCTIONAL

## 2020-12-07 DEVICE — PATELLA OVAL 38MM: Type: IMPLANTABLE DEVICE | Site: KNEE | Status: FUNCTIONAL

## 2020-12-07 DEVICE — COMPONENT FEM POST SZ 5 RIGHT: Type: IMPLANTABLE DEVICE | Site: KNEE | Status: FUNCTIONAL

## 2020-12-07 DEVICE — IMPLANTABLE DEVICE: Type: IMPLANTABLE DEVICE | Site: KNEE | Status: FUNCTIONAL

## 2020-12-07 DEVICE — TRAY TIBIAL CEMENT SZ 5 COBALT: Type: IMPLANTABLE DEVICE | Site: KNEE | Status: FUNCTIONAL

## 2020-12-07 RX ORDER — SODIUM CHLORIDE 9 MG/ML
INJECTION, SOLUTION INTRAVENOUS
Status: COMPLETED | OUTPATIENT
Start: 2020-12-07 | End: 2020-12-07

## 2020-12-07 RX ORDER — SODIUM CHLORIDE 0.9 % (FLUSH) 0.9 %
10 SYRINGE (ML) INJECTION
Status: DISCONTINUED | OUTPATIENT
Start: 2020-12-07 | End: 2020-12-07 | Stop reason: HOSPADM

## 2020-12-07 RX ORDER — TRANEXAMIC ACID 100 MG/ML
1000 INJECTION, SOLUTION INTRAVENOUS
Status: DISCONTINUED | OUTPATIENT
Start: 2020-12-07 | End: 2020-12-07 | Stop reason: HOSPADM

## 2020-12-07 RX ORDER — INSULIN ASPART 100 [IU]/ML
0-5 INJECTION, SOLUTION INTRAVENOUS; SUBCUTANEOUS
Status: DISCONTINUED | OUTPATIENT
Start: 2020-12-07 | End: 2020-12-07

## 2020-12-07 RX ORDER — ASPIRIN 81 MG/1
81 TABLET ORAL ONCE
Status: COMPLETED | OUTPATIENT
Start: 2020-12-07 | End: 2020-12-07

## 2020-12-07 RX ORDER — ROPIVACAINE HYDROCHLORIDE 5 MG/ML
INJECTION, SOLUTION EPIDURAL; INFILTRATION; PERINEURAL
Status: DISCONTINUED | OUTPATIENT
Start: 2020-12-07 | End: 2020-12-07 | Stop reason: HOSPADM

## 2020-12-07 RX ORDER — SODIUM CHLORIDE 9 MG/ML
INJECTION, SOLUTION INTRAVENOUS CONTINUOUS
Status: ACTIVE | OUTPATIENT
Start: 2020-12-07 | End: 2020-12-08

## 2020-12-07 RX ORDER — OXYCODONE HYDROCHLORIDE 5 MG/1
5 TABLET ORAL
Status: DISCONTINUED | OUTPATIENT
Start: 2020-12-07 | End: 2020-12-07 | Stop reason: HOSPADM

## 2020-12-07 RX ORDER — MIDAZOLAM HYDROCHLORIDE 1 MG/ML
INJECTION, SOLUTION INTRAMUSCULAR; INTRAVENOUS
Status: DISCONTINUED | OUTPATIENT
Start: 2020-12-07 | End: 2020-12-07

## 2020-12-07 RX ORDER — FENTANYL CITRATE 50 UG/ML
INJECTION, SOLUTION INTRAMUSCULAR; INTRAVENOUS
Status: DISCONTINUED | OUTPATIENT
Start: 2020-12-07 | End: 2020-12-07

## 2020-12-07 RX ORDER — IBUPROFEN 200 MG
16 TABLET ORAL
Status: DISCONTINUED | OUTPATIENT
Start: 2020-12-07 | End: 2020-12-07

## 2020-12-07 RX ORDER — TALC
6 POWDER (GRAM) TOPICAL NIGHTLY PRN
Status: DISCONTINUED | OUTPATIENT
Start: 2020-12-07 | End: 2020-12-07 | Stop reason: HOSPADM

## 2020-12-07 RX ORDER — METFORMIN HYDROCHLORIDE 500 MG/1
1000 TABLET ORAL 2 TIMES DAILY WITH MEALS
Status: DISCONTINUED | OUTPATIENT
Start: 2020-12-07 | End: 2020-12-08 | Stop reason: HOSPADM

## 2020-12-07 RX ORDER — PRAVASTATIN SODIUM 20 MG/1
40 TABLET ORAL DAILY
Status: DISCONTINUED | OUTPATIENT
Start: 2020-12-07 | End: 2020-12-08 | Stop reason: HOSPADM

## 2020-12-07 RX ORDER — POLYETHYLENE GLYCOL 3350 17 G/17G
17 POWDER, FOR SOLUTION ORAL DAILY
Status: DISCONTINUED | OUTPATIENT
Start: 2020-12-07 | End: 2020-12-08 | Stop reason: HOSPADM

## 2020-12-07 RX ORDER — METOCLOPRAMIDE HYDROCHLORIDE 5 MG/ML
10 INJECTION INTRAMUSCULAR; INTRAVENOUS EVERY 10 MIN PRN
Status: COMPLETED | OUTPATIENT
Start: 2020-12-07 | End: 2020-12-07

## 2020-12-07 RX ORDER — PROPOFOL 10 MG/ML
VIAL (ML) INTRAVENOUS CONTINUOUS PRN
Status: DISCONTINUED | OUTPATIENT
Start: 2020-12-07 | End: 2020-12-07

## 2020-12-07 RX ORDER — IBUPROFEN 200 MG
24 TABLET ORAL
Status: DISCONTINUED | OUTPATIENT
Start: 2020-12-07 | End: 2020-12-08 | Stop reason: HOSPADM

## 2020-12-07 RX ORDER — GLUCAGON 1 MG
1 KIT INJECTION
Status: DISCONTINUED | OUTPATIENT
Start: 2020-12-07 | End: 2020-12-07

## 2020-12-07 RX ORDER — INSULIN ASPART 100 [IU]/ML
1-10 INJECTION, SOLUTION INTRAVENOUS; SUBCUTANEOUS
Status: DISCONTINUED | OUTPATIENT
Start: 2020-12-07 | End: 2020-12-08 | Stop reason: HOSPADM

## 2020-12-07 RX ORDER — LIDOCAINE HYDROCHLORIDE 10 MG/ML
1 INJECTION, SOLUTION EPIDURAL; INFILTRATION; INTRACAUDAL; PERINEURAL
Status: DISCONTINUED | OUTPATIENT
Start: 2020-12-07 | End: 2020-12-07 | Stop reason: HOSPADM

## 2020-12-07 RX ORDER — PREGABALIN 75 MG/1
75 CAPSULE ORAL NIGHTLY
Status: DISCONTINUED | OUTPATIENT
Start: 2020-12-07 | End: 2020-12-07

## 2020-12-07 RX ORDER — ASPIRIN 81 MG/1
81 TABLET ORAL 2 TIMES DAILY
Status: DISCONTINUED | OUTPATIENT
Start: 2020-12-07 | End: 2020-12-08

## 2020-12-07 RX ORDER — IBUPROFEN 200 MG
24 TABLET ORAL
Status: DISCONTINUED | OUTPATIENT
Start: 2020-12-07 | End: 2020-12-07

## 2020-12-07 RX ORDER — GLUCAGON 1 MG
1 KIT INJECTION
Status: DISCONTINUED | OUTPATIENT
Start: 2020-12-07 | End: 2020-12-08 | Stop reason: HOSPADM

## 2020-12-07 RX ORDER — FENTANYL CITRATE 50 UG/ML
25 INJECTION, SOLUTION INTRAMUSCULAR; INTRAVENOUS EVERY 5 MIN PRN
Status: DISCONTINUED | OUTPATIENT
Start: 2020-12-07 | End: 2020-12-07 | Stop reason: HOSPADM

## 2020-12-07 RX ORDER — FAMOTIDINE 20 MG/1
20 TABLET, FILM COATED ORAL 2 TIMES DAILY
Status: DISCONTINUED | OUTPATIENT
Start: 2020-12-07 | End: 2020-12-08 | Stop reason: HOSPADM

## 2020-12-07 RX ORDER — LIDOCAINE HYDROCHLORIDE 20 MG/ML
INJECTION INTRAVENOUS
Status: DISCONTINUED | OUTPATIENT
Start: 2020-12-07 | End: 2020-12-07

## 2020-12-07 RX ORDER — MIDAZOLAM HYDROCHLORIDE 1 MG/ML
1 INJECTION INTRAMUSCULAR; INTRAVENOUS EVERY 5 MIN PRN
Status: DISCONTINUED | OUTPATIENT
Start: 2020-12-07 | End: 2020-12-07 | Stop reason: HOSPADM

## 2020-12-07 RX ORDER — OXYCODONE HYDROCHLORIDE 10 MG/1
10 TABLET ORAL
Status: DISCONTINUED | OUTPATIENT
Start: 2020-12-07 | End: 2020-12-08 | Stop reason: HOSPADM

## 2020-12-07 RX ORDER — SODIUM CHLORIDE 9 MG/ML
INJECTION, SOLUTION INTRAVENOUS CONTINUOUS PRN
Status: DISCONTINUED | OUTPATIENT
Start: 2020-12-07 | End: 2020-12-07

## 2020-12-07 RX ORDER — MORPHINE SULFATE 2 MG/ML
2 INJECTION, SOLUTION INTRAMUSCULAR; INTRAVENOUS
Status: DISCONTINUED | OUTPATIENT
Start: 2020-12-07 | End: 2020-12-08 | Stop reason: HOSPADM

## 2020-12-07 RX ORDER — METOPROLOL SUCCINATE 50 MG/1
50 TABLET, EXTENDED RELEASE ORAL DAILY
Status: DISCONTINUED | OUTPATIENT
Start: 2020-12-07 | End: 2020-12-08 | Stop reason: HOSPADM

## 2020-12-07 RX ORDER — DEXAMETHASONE SODIUM PHOSPHATE 4 MG/ML
INJECTION, SOLUTION INTRA-ARTICULAR; INTRALESIONAL; INTRAMUSCULAR; INTRAVENOUS; SOFT TISSUE
Status: DISCONTINUED | OUTPATIENT
Start: 2020-12-07 | End: 2020-12-07

## 2020-12-07 RX ORDER — ONDANSETRON 2 MG/ML
4 INJECTION INTRAMUSCULAR; INTRAVENOUS EVERY 8 HOURS PRN
Status: DISCONTINUED | OUTPATIENT
Start: 2020-12-07 | End: 2020-12-08 | Stop reason: HOSPADM

## 2020-12-07 RX ORDER — VANCOMYCIN HYDROCHLORIDE 1 G/20ML
INJECTION, POWDER, LYOPHILIZED, FOR SOLUTION INTRAVENOUS
Status: DISCONTINUED | OUTPATIENT
Start: 2020-12-07 | End: 2020-12-07 | Stop reason: HOSPADM

## 2020-12-07 RX ORDER — CEFAZOLIN SODIUM 1 G/3ML
2 INJECTION, POWDER, FOR SOLUTION INTRAMUSCULAR; INTRAVENOUS
Status: COMPLETED | OUTPATIENT
Start: 2020-12-07 | End: 2020-12-07

## 2020-12-07 RX ORDER — AMOXICILLIN 250 MG
1 CAPSULE ORAL 2 TIMES DAILY
Status: DISCONTINUED | OUTPATIENT
Start: 2020-12-07 | End: 2020-12-08 | Stop reason: HOSPADM

## 2020-12-07 RX ORDER — SODIUM CHLORIDE 9 MG/ML
INJECTION, SOLUTION INTRAVENOUS ONCE
Status: COMPLETED | OUTPATIENT
Start: 2020-12-08 | End: 2020-12-07

## 2020-12-07 RX ORDER — MUPIROCIN 20 MG/G
1 OINTMENT TOPICAL
Status: COMPLETED | OUTPATIENT
Start: 2020-12-07 | End: 2020-12-07

## 2020-12-07 RX ORDER — OXYCODONE HYDROCHLORIDE 5 MG/1
5 TABLET ORAL
Status: DISCONTINUED | OUTPATIENT
Start: 2020-12-07 | End: 2020-12-08 | Stop reason: HOSPADM

## 2020-12-07 RX ORDER — PHENYLEPHRINE HYDROCHLORIDE 10 MG/ML
INJECTION INTRAVENOUS
Status: DISCONTINUED | OUTPATIENT
Start: 2020-12-07 | End: 2020-12-07

## 2020-12-07 RX ORDER — TRANEXAMIC ACID 100 MG/ML
1000 INJECTION, SOLUTION INTRAVENOUS
Status: COMPLETED | OUTPATIENT
Start: 2020-12-07 | End: 2020-12-07

## 2020-12-07 RX ORDER — KETAMINE HCL IN 0.9 % NACL 50 MG/5 ML
SYRINGE (ML) INTRAVENOUS
Status: DISCONTINUED | OUTPATIENT
Start: 2020-12-07 | End: 2020-12-07

## 2020-12-07 RX ORDER — ACETAMINOPHEN 500 MG
1000 TABLET ORAL EVERY 6 HOURS
Status: DISCONTINUED | OUTPATIENT
Start: 2020-12-07 | End: 2020-12-08 | Stop reason: HOSPADM

## 2020-12-07 RX ORDER — NALOXONE HCL 0.4 MG/ML
0.02 VIAL (ML) INJECTION
Status: DISCONTINUED | OUTPATIENT
Start: 2020-12-07 | End: 2020-12-08 | Stop reason: HOSPADM

## 2020-12-07 RX ORDER — AMLODIPINE BESYLATE 5 MG/1
5 TABLET ORAL DAILY
Status: DISCONTINUED | OUTPATIENT
Start: 2020-12-07 | End: 2020-12-08 | Stop reason: HOSPADM

## 2020-12-07 RX ORDER — PREGABALIN 75 MG/1
75 CAPSULE ORAL
Status: COMPLETED | OUTPATIENT
Start: 2020-12-07 | End: 2020-12-07

## 2020-12-07 RX ORDER — ALBUTEROL SULFATE 90 UG/1
1 AEROSOL, METERED RESPIRATORY (INHALATION) EVERY 4 HOURS PRN
Status: DISCONTINUED | OUTPATIENT
Start: 2020-12-07 | End: 2020-12-08 | Stop reason: HOSPADM

## 2020-12-07 RX ORDER — ACETAMINOPHEN 500 MG
1000 TABLET ORAL
Status: COMPLETED | OUTPATIENT
Start: 2020-12-07 | End: 2020-12-07

## 2020-12-07 RX ORDER — CELECOXIB 200 MG/1
400 CAPSULE ORAL
Status: DISCONTINUED | OUTPATIENT
Start: 2020-12-07 | End: 2020-12-07 | Stop reason: HOSPADM

## 2020-12-07 RX ORDER — METHYLPREDNISOLONE ACETATE 40 MG/ML
INJECTION, SUSPENSION INTRA-ARTICULAR; INTRALESIONAL; INTRAMUSCULAR; SOFT TISSUE
Status: DISCONTINUED | OUTPATIENT
Start: 2020-12-07 | End: 2020-12-07 | Stop reason: HOSPADM

## 2020-12-07 RX ORDER — MORPHINE SULFATE 4 MG/ML
INJECTION, SOLUTION INTRAMUSCULAR; INTRAVENOUS
Status: DISCONTINUED | OUTPATIENT
Start: 2020-12-07 | End: 2020-12-07 | Stop reason: HOSPADM

## 2020-12-07 RX ORDER — PREGABALIN 75 MG/1
75 CAPSULE ORAL NIGHTLY
Status: DISCONTINUED | OUTPATIENT
Start: 2020-12-08 | End: 2020-12-08 | Stop reason: HOSPADM

## 2020-12-07 RX ORDER — MUPIROCIN 20 MG/G
1 OINTMENT TOPICAL 2 TIMES DAILY
Status: DISCONTINUED | OUTPATIENT
Start: 2020-12-07 | End: 2020-12-08 | Stop reason: HOSPADM

## 2020-12-07 RX ORDER — LOSARTAN POTASSIUM 25 MG/1
50 TABLET ORAL DAILY
Status: DISCONTINUED | OUTPATIENT
Start: 2020-12-07 | End: 2020-12-08 | Stop reason: HOSPADM

## 2020-12-07 RX ORDER — HYDROCHLOROTHIAZIDE 25 MG/1
25 TABLET ORAL DAILY
Status: DISCONTINUED | OUTPATIENT
Start: 2020-12-07 | End: 2020-12-08 | Stop reason: HOSPADM

## 2020-12-07 RX ORDER — BISACODYL 10 MG
10 SUPPOSITORY, RECTAL RECTAL EVERY 12 HOURS PRN
Status: DISCONTINUED | OUTPATIENT
Start: 2020-12-07 | End: 2020-12-08 | Stop reason: HOSPADM

## 2020-12-07 RX ORDER — REPAGLINIDE 2 MG/1
4 TABLET ORAL
Status: DISCONTINUED | OUTPATIENT
Start: 2020-12-07 | End: 2020-12-08 | Stop reason: HOSPADM

## 2020-12-07 RX ORDER — IBUPROFEN 200 MG
16 TABLET ORAL
Status: DISCONTINUED | OUTPATIENT
Start: 2020-12-07 | End: 2020-12-08 | Stop reason: HOSPADM

## 2020-12-07 RX ORDER — FENTANYL CITRATE 50 UG/ML
25 INJECTION, SOLUTION INTRAMUSCULAR; INTRAVENOUS EVERY 5 MIN PRN
Status: COMPLETED | OUTPATIENT
Start: 2020-12-07 | End: 2020-12-07

## 2020-12-07 RX ORDER — CELECOXIB 200 MG/1
200 CAPSULE ORAL DAILY
Status: DISCONTINUED | OUTPATIENT
Start: 2020-12-08 | End: 2020-12-08 | Stop reason: HOSPADM

## 2020-12-07 RX ORDER — ONDANSETRON 2 MG/ML
INJECTION INTRAMUSCULAR; INTRAVENOUS
Status: DISCONTINUED | OUTPATIENT
Start: 2020-12-07 | End: 2020-12-07

## 2020-12-07 RX ORDER — METFORMIN HYDROCHLORIDE 500 MG/1
1000 TABLET ORAL 2 TIMES DAILY WITH MEALS
Status: DISCONTINUED | OUTPATIENT
Start: 2020-12-07 | End: 2020-12-07

## 2020-12-07 RX ORDER — ROPIVACAINE HYDROCHLORIDE 2 MG/ML
8 INJECTION, SOLUTION EPIDURAL; INFILTRATION; PERINEURAL CONTINUOUS
Status: DISCONTINUED | OUTPATIENT
Start: 2020-12-07 | End: 2020-12-08 | Stop reason: HOSPADM

## 2020-12-07 RX ORDER — METFORMIN HYDROCHLORIDE 500 MG/1
500 TABLET ORAL ONCE
Status: COMPLETED | OUTPATIENT
Start: 2020-12-07 | End: 2020-12-07

## 2020-12-07 RX ADMIN — ACETAMINOPHEN 1000 MG: 500 TABLET ORAL at 06:12

## 2020-12-07 RX ADMIN — SODIUM CHLORIDE 150 ML/HR: 0.9 INJECTION, SOLUTION INTRAVENOUS at 09:12

## 2020-12-07 RX ADMIN — PRAVASTATIN SODIUM 40 MG: 20 TABLET ORAL at 10:12

## 2020-12-07 RX ADMIN — FENTANYL CITRATE 25 MCG: 50 INJECTION INTRAMUSCULAR; INTRAVENOUS at 10:12

## 2020-12-07 RX ADMIN — POLYETHYLENE GLYCOL 3350 17 G: 17 POWDER, FOR SOLUTION ORAL at 12:12

## 2020-12-07 RX ADMIN — OXYCODONE HYDROCHLORIDE 5 MG: 5 TABLET ORAL at 09:12

## 2020-12-07 RX ADMIN — PHENYLEPHRINE HYDROCHLORIDE 100 MCG: 10 INJECTION INTRAVENOUS at 08:12

## 2020-12-07 RX ADMIN — CEFAZOLIN 2 G: 330 INJECTION, POWDER, FOR SOLUTION INTRAMUSCULAR; INTRAVENOUS at 03:12

## 2020-12-07 RX ADMIN — LIDOCAINE HYDROCHLORIDE 75 MG: 20 INJECTION, SOLUTION INTRAVENOUS at 07:12

## 2020-12-07 RX ADMIN — ACETAMINOPHEN 1000 MG: 500 TABLET ORAL at 11:12

## 2020-12-07 RX ADMIN — MIDAZOLAM HYDROCHLORIDE 1 MG: 1 INJECTION, SOLUTION INTRAMUSCULAR; INTRAVENOUS at 06:12

## 2020-12-07 RX ADMIN — PROPOFOL 50 MCG/KG/MIN: 10 INJECTION, EMULSION INTRAVENOUS at 07:12

## 2020-12-07 RX ADMIN — PHENYLEPHRINE HYDROCHLORIDE 100 MCG: 10 INJECTION INTRAVENOUS at 07:12

## 2020-12-07 RX ADMIN — AMLODIPINE BESYLATE 5 MG: 5 TABLET ORAL at 10:12

## 2020-12-07 RX ADMIN — DOCUSATE SODIUM 50MG AND SENNOSIDES 8.6MG 1 TABLET: 8.6; 5 TABLET, FILM COATED ORAL at 09:12

## 2020-12-07 RX ADMIN — METFORMIN HYDROCHLORIDE 1000 MG: 500 TABLET ORAL at 06:12

## 2020-12-07 RX ADMIN — TRANEXAMIC ACID 1000 MG: 100 INJECTION, SOLUTION INTRAVENOUS at 07:12

## 2020-12-07 RX ADMIN — SODIUM CHLORIDE: 0.9 INJECTION, SOLUTION INTRAVENOUS at 06:12

## 2020-12-07 RX ADMIN — SODIUM CHLORIDE 100 ML/HR: 0.9 INJECTION, SOLUTION INTRAVENOUS at 06:12

## 2020-12-07 RX ADMIN — SODIUM CHLORIDE 500 ML/HR: 0.9 INJECTION, SOLUTION INTRAVENOUS at 11:12

## 2020-12-07 RX ADMIN — INSULIN HUMAN 4 UNITS: 100 INJECTION, SOLUTION PARENTERAL at 09:12

## 2020-12-07 RX ADMIN — MUPIROCIN 1 G: 20 OINTMENT TOPICAL at 06:12

## 2020-12-07 RX ADMIN — INSULIN HUMAN 5 UNITS: 100 INJECTION, SOLUTION PARENTERAL at 04:12

## 2020-12-07 RX ADMIN — MIDAZOLAM HYDROCHLORIDE 1 MG: 1 INJECTION, SOLUTION INTRAMUSCULAR; INTRAVENOUS at 07:12

## 2020-12-07 RX ADMIN — DOCUSATE SODIUM 50MG AND SENNOSIDES 8.6MG 1 TABLET: 8.6; 5 TABLET, FILM COATED ORAL at 12:12

## 2020-12-07 RX ADMIN — TRANEXAMIC ACID 1000 MG: 100 INJECTION, SOLUTION INTRAVENOUS at 08:12

## 2020-12-07 RX ADMIN — FAMOTIDINE 20 MG: 20 TABLET, FILM COATED ORAL at 09:12

## 2020-12-07 RX ADMIN — INSULIN ASPART 4 UNITS: 100 INJECTION, SOLUTION INTRAVENOUS; SUBCUTANEOUS at 09:12

## 2020-12-07 RX ADMIN — VANCOMYCIN HYDROCHLORIDE 1500 MG: 1.5 INJECTION, POWDER, LYOPHILIZED, FOR SOLUTION INTRAVENOUS at 06:12

## 2020-12-07 RX ADMIN — ONDANSETRON 4 MG: 2 INJECTION, SOLUTION INTRAMUSCULAR; INTRAVENOUS at 08:12

## 2020-12-07 RX ADMIN — Medication 10 MG: at 08:12

## 2020-12-07 RX ADMIN — ACETAMINOPHEN 1000 MG: 500 TABLET ORAL at 12:12

## 2020-12-07 RX ADMIN — FENTANYL CITRATE 25 MCG: 50 INJECTION, SOLUTION INTRAMUSCULAR; INTRAVENOUS at 08:12

## 2020-12-07 RX ADMIN — DEXAMETHASONE SODIUM PHOSPHATE 8 MG: 4 INJECTION, SOLUTION INTRAMUSCULAR; INTRAVENOUS at 07:12

## 2020-12-07 RX ADMIN — METFORMIN HYDROCHLORIDE 500 MG: 500 TABLET ORAL at 12:12

## 2020-12-07 RX ADMIN — SODIUM CHLORIDE, SODIUM GLUCONATE, SODIUM ACETATE, POTASSIUM CHLORIDE, MAGNESIUM CHLORIDE, SODIUM PHOSPHATE, DIBASIC, AND POTASSIUM PHOSPHATE: .53; .5; .37; .037; .03; .012; .00082 INJECTION, SOLUTION INTRAVENOUS at 08:12

## 2020-12-07 RX ADMIN — Medication 20 MG: at 07:12

## 2020-12-07 RX ADMIN — MUPIROCIN 1 G: 20 OINTMENT TOPICAL at 09:12

## 2020-12-07 RX ADMIN — INSULIN ASPART 2 UNITS: 100 INJECTION, SOLUTION INTRAVENOUS; SUBCUTANEOUS at 12:12

## 2020-12-07 RX ADMIN — PREGABALIN 75 MG: 75 CAPSULE ORAL at 06:12

## 2020-12-07 RX ADMIN — ASPIRIN 81 MG: 81 TABLET, COATED ORAL at 09:12

## 2020-12-07 RX ADMIN — ONDANSETRON 4 MG: 2 INJECTION INTRAMUSCULAR; INTRAVENOUS at 10:12

## 2020-12-07 RX ADMIN — LOSARTAN POTASSIUM 50 MG: 25 TABLET, FILM COATED ORAL at 09:12

## 2020-12-07 RX ADMIN — MEPIVACAINE HYDROCHLORIDE 3.5 ML: 15 INJECTION, SOLUTION EPIDURAL; INFILTRATION at 07:12

## 2020-12-07 RX ADMIN — CEFAZOLIN 2 G: 330 INJECTION, POWDER, FOR SOLUTION INTRAMUSCULAR; INTRAVENOUS at 11:12

## 2020-12-07 RX ADMIN — SODIUM CHLORIDE 150 ML/HR: 0.9 INJECTION, SOLUTION INTRAVENOUS at 04:12

## 2020-12-07 RX ADMIN — INSULIN HUMAN 4 UNITS: 100 INJECTION, SOLUTION PARENTERAL at 03:12

## 2020-12-07 RX ADMIN — CEFAZOLIN 2 G: 330 INJECTION, POWDER, FOR SOLUTION INTRAMUSCULAR; INTRAVENOUS at 07:12

## 2020-12-07 RX ADMIN — OXYCODONE HYDROCHLORIDE 5 MG: 10 TABLET ORAL at 10:12

## 2020-12-07 RX ADMIN — INSULIN DETEMIR 9 UNITS: 100 INJECTION, SOLUTION SUBCUTANEOUS at 09:12

## 2020-12-07 RX ADMIN — OXYCODONE HYDROCHLORIDE 10 MG: 10 TABLET ORAL at 02:12

## 2020-12-07 RX ADMIN — METOCLOPRAMIDE 10 MG: 5 INJECTION, SOLUTION INTRAMUSCULAR; INTRAVENOUS at 10:12

## 2020-12-07 RX ADMIN — FENTANYL CITRATE 75 MCG: 50 INJECTION, SOLUTION INTRAMUSCULAR; INTRAVENOUS at 07:12

## 2020-12-07 NOTE — OP NOTE
[facility] Right [facility]  OPERATIVE NOTE    Date of Operation:   12/7/2020    Providers Performing:   Surgeon(s):  Hua Gore III, MD  Assistant: Erwin Jama MD    Operative Procedure:   Right Total Knee Arthroplasty, CPT 21424    Preoperative Diagnosis:   Right Knee Osteoarthritis, ICD-10 M17.11    Postoperative Diagnosis:   SAME    Components Used:   Depuy  Femur: Sigma PS Size 5  Tibia: Sigma fixed bearing PS Size 5  Patella: Sigma Oval Dome 38 mm  Tibial Insert: Sigma fixed bearing PS inset size 10 mm  2 packs cement: Simplex    Implant Name Type Inv. Item Serial No.  Lot No. LRB No. Used Action   CEMENT BONE SURG SMPLX P RADPQ - [id number]  CEMENT BONE SURG SMPLX P RADPQ  [employer] PARI. [id number] Right 1 Implanted   CEMENT BONE SURG SMPLX P RADPQ - [id number]  CEMENT BONE SURG SMPLX P RADPQ  [employer] PARI. [id number] Right 1 Implanted   PATELLA OVAL 38MM - [id number]  PATELLA OVAL 38MM  [employer] [id number] Right 1 Implanted   COMPONENT FEM POST SZ 5 RIGHT - [id number]  COMPONENT FEM POST SZ 5 RIGHT  [employer] [id number] Right 1 Implanted   TRAY TIBIAL CEMENT SZ 5 CO[place]ALT - [id number]  TRAY TIBIAL CEMENT SZ 5 COBALT  [employer] [id number] Right 1 Implanted   P.F.C. SIGMA TIBIAL INSERT FIXED BEARING STABILIZED    [employer] [id number] Right 1 Implanted       Anesthesia:   Spinal    FABIANO cocktail: Gore Block, no toradol    Estimated Blood Loss:   200 cc    Specimens:   None    Complications:   None    Indications:   The patient has failed non-operative measures including medications, injections and activity modifications for their knee.  After an explanation of risks and benefits of knee arthroplasty surgery, the patient wishes to proceed with surgery.    Operative Notes:   The patient was greeted in the pre-op holding area.  The patients knee was marked with a surgical marker by the surgeon.  Spinal-Epidural anesthesia was administered by the anesthesia team.  The patient was placed in the supine position  on the OR table with all bony prominences padded.  A tourniquet was not used.  IV antibiotics were administered.  The leg was prepped and draped in the usual sterile fashion.  A timeout was performed and the correct patient, site and procedure were identified.    A midline incision was made with a standard medical parapatellar arthrotomy.  The standard medial capsular release was performed along with the lateral gutter.  The patella was mobilized from the lateral parapatellar ligament and bony osteophytes were removed.  The intercondylar notch was cleared of the ACL/PCL.    The extramedullary tibial alignment guide was placed in the appropriate slope and varus/valgus orientation and the proximal cutting block secured by pins.  Measured resection with a 10mm cut was accounted for from the high side of the plateau, perpendicular to the ankle.  The cut was made with an oscillating saw.  We then obtained access to the femoral canal with the stepped drill.  The intramedullary juan was placed in 5 degrees of valgus with a 9mm planned resection.  Our distal femoral cuts were made.  The knee was placed in extension and the medical and lateral meniscal remnants removed.  A spacer block was placed with the knee in extension checking for alignment and balance which we were happy with.    The knee was then brought into flexion and the distal femoral gap assessed for appropriate rotation.  Our distal femoral sizing guide was placed and sized appropriately.  Guide pins were placed in the appropriate external rotation.  This was assessed with the 4 in 1 cutting block and the flexion gap sizing block which was appropriate.  The distal femoral preparation was completed after the anterior, posterior and chamfer cuts were made.  The box cutting guide was placed and assessed.  The box cut was made.    At this time the trial implants were placed and knee assessed for stability, and flexion arc. The patella was prepared using free-hand  technique and the three-holed lug drill guide was sized and appropriately assessed. Patella tracking was found to be acceptable. The tibial component rotation was marked. The trials were removed. The tibial component was positioned and the keel was drilled and punched.    The wound was copiously irrigated with pulsitile lavage and the bony surfaces dried.  Cement was mixed on the back table and applied to all components.  Cementation of the femoral, tibial and patellar components was performed sequentially with removal of all excess cement. A trial insert was placed to provide compression while the cement dried and a 0.35% betadine solution was left to soak in the surgical field.     After the cement was dry, the trial poly insert was removed. Hemostasis was obtained with bovie electrocautery.  The knee was again copiously irrigated with pulsatile lavage. The final polyethylene insert was placed and the knee reduced.      1 gram of vancomycin powder was placed in the knee. The arthrotomy was closed with interrupted #1 vicryl suture and #2 quill, the subcuticular layer was closed with 2-0 vicryl suture and a running 4-0 monocryl was used to closed the skin surface.  Surgicel sealant was placed over the top of the incision and sterile dressing placed over the wound.    Sponge and needle counts were correct.    The first-assistant was critical to all steps of the operation, including retraction and leg stabilization during exposure and bone preparation, as well as the deep and superficial wound closure.  Dr. Gore performed and/or supervised the key portions of this surgical procedure, including evaluation of the bone cuts, reshaping of the bony elements, and insertion of the provisional and final components.    Postoperative Plan:  DVT Prophylaxis: The patient will be anticoagulated with 81mg aspirin x1 dose 12hrs post op then resume Xarelto starting 24hrs post op  They will receive 24 hours of post-operative  antibiotics.    Activity will be weight bearing as tolerated.  No celebrex    Signed by: Hua Gore III, MD

## 2020-12-07 NOTE — PROGRESS NOTES
Dr. Martins notified that per pharmacy, repaglinide is a non-formulary med and pt does not have it with him.  Order given to check pt's bg at this time.

## 2020-12-07 NOTE — NURSING
Pt arrived to unit via hospital bed from [facility].  Pt aaox4, vss, no s/s of distress noted.  Pt states pain to right knee 9/10.  Dressing cdi, polar ice in place. +2 DP pulses.  Pt denies n/t.   IVF infusing.  FCDs placed.  Bed locked and in lowest position.  Pt instructed to call for assistance when needing to get up and he verbalized understanding.  Call light within reach.  Spouse at bedside.

## 2020-12-07 NOTE — PROGRESS NOTES
Pt with a history of Diabetes that had a TKA today.  Postoperatively he did well with the exception of poorly controlled blood glucose levels.  He has a home regimen that includes two oral medications and long acting SQ insulin that is administered in the AM and PM.  We are unable to give the oral medications at this facility as one is non-formulary and the other is contraindicated in the perioperative period.  Historically, his HA1C demonstrates poorly controlled blood sugar with consistent values >7.  This problem continues postoperatively.  He does have a diabetic diet ordered.  I have given two doses of IV Regular insulin to treat hyperglycemia all the while being conscientious of meals and long acting Detimir that is also being given which can have a 4 hour time of onset.  We will continue to monitor blood glucose overnight and treat aggressively with a Moderate Insulin SS and possibly additional boluses outside the SS parameters.

## 2020-12-07 NOTE — PLAN OF CARE
Problem: Occupational Therapy Goal  Goal: Occupational Therapy Goal  Description: Goals to be met by: 12/8/20     Patient will increase functional independence with ADLs by performing:    UE Dressing with [place].  LE Dressing with Modified [place] and Assistive Devices as needed.  Grooming while standing at sink with Modified [place].  Toileting from bedside commode with Modified [place] for hygiene and clothing management.   Bathing from  shower chair/bench with Modified [place].  Toilet transfer to bedside commode with Modified [place].    Outcome: Ongoing, Progressing

## 2020-12-07 NOTE — ANESTHESIA PROCEDURE NOTES
Spinal    Diagnosis: Right knee pain  Patient location during procedure: OR  Start time: 12/7/2020 7:03 AM  Timeout: 12/7/2020 7:03 AM  End time: 12/7/2020 7:08 AM    Staffing  Authorizing Provider: Nino Mcnally MD  Performing Provider: Nino Mcnally MD    Preanesthetic Checklist  Completed: patient identified, site marked, surgical consent, pre-op evaluation, timeout performed, IV checked, risks and benefits discussed and monitors and equipment checked  Spinal Block  Patient position: sitting  Prep: ChloraPrep  Patient monitoring: heart rate, cardiac monitor, continuous pulse ox, frequent blood pressure checks and continuous capnometry  Approach: midline  Location: L4-5  Injection technique: single shot  CSF Fluid: clear free-flowing CSF  Needle  Needle type: pencil-tip   Needle gauge: 25 G  Needle length: 3.5 in  Additional Documentation: incremental injection, negative aspiration for heme and no paresthesia on injection  Needle localization: anatomical landmarks  Assessment  Sensory level: T6   Dermatomal levels determined by alcohol wipe  Ease of block: easy  Patient's tolerance of the procedure: comfortable throughout block and no complaints  Additional Notes  3.5 mL mepivacaine 1.5% preservative free, given intrathecally

## 2020-12-07 NOTE — PLAN OF CARE
12/07/20 1502   Discharge Assessment   Assessment Type Discharge Planning Assessment   Confirmed/corrected address and phone number on facesheet? Yes   Assessment information obtained from? Patient;Medical Record   Expected Length of Stay (days) 1   Communicated expected length of stay with patient/caregiver yes   Prior to hospitilization cognitive status: Alert/Oriented   Prior to hospitalization functional status: Assistive Equipment   Current cognitive status: Alert/Oriented   Current Functional Status: Assistive Equipment;Needs Assistance   Facility Arrived From: N/A   Lives With spouse;child(jose guadalupe), adult   Able to Return to Prior Arrangements yes   Is patient able to care for self after discharge? Unable to determine at this time (comments)   Who are your caregiver(s) and their phone number(s)? dgtr; spouse - Opal [phone]   Patient's perception of discharge disposition home or selfcare   Readmission Within the Last 30 Days no previous admission in last 30 days   Patient currently being followed by outpatient case management? No   Patient currently receives any other outside agency services? No   Equipment Currently Used at Home cane, straight   Do you have any problems affording any of your prescribed medications? No   Is the patient taking medications as prescribed? yes   Does the patient have transportation home? Yes  (pt's spouse will pickup pt at d/c)   Transportation Anticipated car, drives self;family or friend will provide   Does the patient receive services at the [facility]? No   Discharge Plan A Home with family   Discharge Plan B Home with family;Home Health   DME Needed Upon Discharge  bedside commode;walker, rolling   Patient/Family in Agreement with Plan yes

## 2020-12-07 NOTE — ANESTHESIA POSTPROCEDURE EVALUATION
Anesthesia Post Evaluation    Patient: Des Junior    Procedure(s) Performed: Procedure(s) (LRB):  ARTHROPLASTY, KNEE:RIGHT:DEPUY-SIGMA (Right)    Final Anesthesia Type: spinal    Patient location during evaluation: PACU  Patient participation: Yes- Able to Participate  Level of consciousness: awake and alert  Post-procedure vital signs: reviewed and stable  Pain management: adequate  Airway patency: patent    PONV status at discharge: nausea (controlled)  Anesthetic complications: no      Cardiovascular status: blood pressure returned to baseline  Respiratory status: unassisted  Hydration status: euvolemic  Follow-up not needed.          Vitals Value Taken Time   /92 12/07/20 1103   Temp 35.3 °C (95.5 °F) 12/07/20 1103   Pulse 88 12/07/20 1103   Resp 16 12/07/20 1103   SpO2 93 % 12/07/20 1103         Event Time   Out of Recovery 10:48:00         Pain/Diego Score: Pain Rating Prior to Med Admin: 10 (12/7/2020 10:33 AM)  Pain Rating Post Med Admin: 8 (12/7/2020 10:47 AM)  Diego Score: 10 (12/7/2020 10:10 AM)

## 2020-12-07 NOTE — INTERVAL H&P NOTE
The patient has been examined and the H&P has been reviewed:    I concur with the findings and no changes have occurred since H+P was written    Surgery risks, benefits and alternative options discussed and understood by patient/family.    In accordance with [facility] notifications, [facility] policy (tier 2 orthopedic condition causing severe pain), and guidance from my /section head, I believe this to be a time sensitive procedure that needs to be performed because delay will cause pain, distress, worsening of the underlying disease process, and/or further negative outcomes for the patient.    I discussed COVID-19 with the patient, they are aware of our current policies and procedures, were given the option of delaying surgery, and they elect to proceed

## 2020-12-07 NOTE — PROGRESS NOTES
Dr. Martins notified of pt's bg = 335.  (Pt had eaten lunch approx 2 hrs prior)  Order given to administer Regular Insulin 4 units IV push.  Will carry out order and continue to monitor.

## 2020-12-07 NOTE — PLAN OF CARE
AAOX3, on stretcher in semi patino position. No obvious signs of distress noted. Wife to keep all belongings. Will continue to monitor.

## 2020-12-07 NOTE — PLAN OF CARE
Pt up in chair.  No s/s of distress noted.  Pt c/o pain to right knee 8/10.  PRN pain meds administered as ordered.  Polar Ice in place.  Pt remains free of falls and calls for assistance when getting up.  Call light within reach.

## 2020-12-07 NOTE — PT/OT/SLP EVAL
Physical Therapy Evaluation and Treatment     Patient Name:  Des Junior   MRN:  [id number]    Recommendations:     Discharge Recommendations:  home health PT   Discharge Equipment Recommendations: bedside commode, walker, rolling   Barriers to discharge: Inaccessible home  - 14 steps into elevated house but there is an elevator present     Assessment:     Des Junior is a 75 y.o. male admitted with a medical diagnosis of Right knee pain.  He presents with the following impairments/functional limitations:  weakness, impaired functional mobilty, gait instability, impaired balance, decreased lower extremity function, pain, decreased ROM, impaired skin, orthopedic precautions. Patient tolerated PT session well. Patient ambulated 90ft with RW and contact guard assistance . No LOB or SOB noted. He is okay to ambulate with nursing staff assistance and RW while in the hospital.     Rehab Prognosis: Good; patient would benefit from acute skilled PT services to address these deficits and reach maximum level of function.    Recent Surgery: Procedure(s) (LRB):  ARTHROPLASTY, KNEE:RIGHT:DEPUY-SIGMA (Right) Day of Surgery    Plan:     During this hospitalization, patient to be seen daily to address the identified rehab impairments via gait training, therapeutic activities, therapeutic exercises and progress toward the following goals:    · Plan of Care Expires:  12/11/20    Subjective     Chief Complaint: Pain in right knee. Needs to take a nap.   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   Pain/Comfort:  · Pain Rating 1: 8/10  · Location - Side 1: Right  · Location - Orientation 1: generalized  · Location 1: knee  · Pain Addressed 1: Pre-medicate for activity, Distraction, Cessation of Activity, Nurse notified  · Pain Rating Post-Intervention 1: 10/10(patient smiling while rating his pain)    Living Environment:  Patient lives with his wife and daughter in an elevated H with 14 steps and  bilateral handrails to enter. He has an elevator to get into the house.Prior to admission, patients level of function was ambulatory with straight cane. He still drives and builds boats. Equipment used at home: cane, straight. Upon discharge, patient will have assistance from wife and daughters.    Objective:     Communicated with RN prior to session.  Patient found supine with cryotherapy, FCD, peripheral IV  upon PT entry to room.    General Precautions: Standard, fall   Orthopedic Precautions:RLE weight bearing as tolerated   Braces: N/A     Exams:  · Cognitive Exam:  Patient is oriented to Person, Place, Time and Situation  · Sensation:    · -       Intact  · Skin Integrity/Edema:      · -       Skin integrity: Visible skin intact with surgical dressing/ ace bandage intact to knee   · RLE ROM: WFL at hip and ankle but limited at knee due to pain   · RLE Strength: appears WFL but unable to formally assess due to pain  · LLE ROM: WFL  · LLE Strength: 4+/5    Functional Mobility:  · Bed Mobility:     · Scooting: stand by assistance  · Supine to Sit: stand by assistance  · Transfers:     · Sit to Stand:  minimum assistance with rolling walker x1 from bed with verbal cues for hand placement   · Gait:  Patient ambulated 90ft with Rolling Walker and contact guard assistance  using swing-through gait. Patient demonstrated decreased lorenza during gait due to pain, decreased ROM and decreased strength. Verbal cues provided for gait sequence and RW management.   · Balance:  Patient demonstrated fair standing balance while standing at the toilet to urinate in the urinal and at the sink to wash his hands. Patient educated in RW management while in the bathroom.     Therapeutic Activities and Exercises:  Patient educated in:  -PT role and POC  -safety with transfers including hand placement  -gait sequencing and RW management  -OOB activity to maximize recovery including ambulating with nursing staff assistance and RW while  in the hospital     AM-PAC 6 CLICK MOBILITY  Total Score:17     Patient left up in chair with all lines intact, call button in reach and RN notified that patient was able to urinate in the urinal.    GOALS:   Multidisciplinary Problems     Physical Therapy Goals        Problem: Physical Therapy Goal    Goal Priority Disciplines Outcome Goal Variances Interventions   Physical Therapy Goal     PT, PT/OT Ongoing, Progressing     Description: Goals to be met by: 2020    Patient will increase functional independence with mobility by performin. Supine to sit with supervision   2. Sit to stand transfer with Supervision  3. Gait  x 200 feet with Supervision using Rolling Walker  4. Ascend/descend 4 stairs with bilateral handrails and stand by assistance   5. Lower extremity exercise program x30 reps per handout, with supervision                     History:     Past Medical History:   Diagnosis Date    Abdominal obesity 3/15/2018    Acute deep vein thrombosis (DVT) of popliteal vein of right lower extremity 3/2/2018    AK (actinic keratosis) 11/15/2012    S/p 1st PDT face (80 min incubation) - did great! S/p PDT face - 90 min  - 12/15 - did great S/p PDT face - 90 min - 10/17 - great response    Asbestos exposure 3/2/2018    Asymptomatic LV dysfunction 3/2/2018    Bronchitis, chronic, mucopurulent 3/2/2018    Cataract     Colon adenoma 2014    Current use of long term anticoagulation 2018    Deep vein thrombosis     ED (erectile dysfunction) 2012    Encounter for monitoring sotalol therapy 2018    Hay fever     Hearing loss, sensorineural 2013    History of adenomatous polyp of colon 10/5/2015    History of pulmonary embolism 2018    [place] (hard of hearing)     Left ear    Hyperlipidemia associated with type 2 diabetes mellitus, baseline  2012    Hypertension associated with diabetes 2012    Lung nodule, solitary- 5 mm rll 3/2/2018    On home  oxygen therapy 3/15/2018    Osteoarthritis of right knee 11/28/2012    Persistent atrial fibrillation 3/1/2018    Pulmonary embolus- large involved central PA, unprovoked. 3/1/2018    Type 2 diabetes mellitus, controlled 7/16/2013    Type 2 diabetes, controlled, with neuropathy 1/20/2014    Type 2 diabetes, uncontrolled, with neuropathy, onset 2003 11/28/2012       Past Surgical History:   Procedure Laterality Date    COLONOSCOPY N/A 10/5/2015    Procedure: COLONOSCOPY;  Surgeon: Pro Jang MD;  Location: [address]);  Service: Endoscopy;  Laterality: N/A;    thumb surgery         Time Tracking:     PT Received On: 12/07/20  PT Start Time: 1315     PT Stop Time: 1346  PT Total Time (min): 31 min     Billable Minutes: Evaluation 8 Gait Training 13 and Therapeutic Activity 10      Diamante Garcias, PT  12/07/2020

## 2020-12-07 NOTE — PROGRESS NOTES
Dr. Mcnally notified that pt has metformin po and repaglinide po ordered for DM, no SSI orders noted.  MD stated that pt has complicated home regimen for controlling bg.  BG prior to eating lunch = 247.  Order given to administer Metformin 500mg po x1 and repaglinide po as ordered.  Order put in for SSI and MD stated to admin insulin per sliding scale as well.  (2 units given)  MD made aware that repaglinide is not a med in pyxis and may be administered when received.

## 2020-12-07 NOTE — PROGRESS NOTES
Dr. Martins notified of pt's bg = 341.  Order given to admin Regular Insulin 5 units and change SSI from low dose to moderate dose.  Per MD, ok for pt to have dinner and check bg next at 2100.  Will continue to monitor.

## 2020-12-07 NOTE — TRANSFER OF CARE
"Anesthesia Transfer of Care Note    Patient: Des Junior    Procedure(s) Performed: Procedure(s) (LRB):  ARTHROPLASTY, KNEE:RIGHT:DEPUY-SIGMA (Right)    Patient location: PACU    Anesthesia Type: general    Transport from OR: Transported from OR on 6-10 L/min O2 by face mask with adequate spontaneous ventilation    Post pain: adequate analgesia    Post assessment: no apparent anesthetic complications and tolerated procedure well    Post vital signs: stable    Level of consciousness: awake, alert and oriented    Nausea/Vomiting: no nausea/vomiting    Complications: none    Transfer of care protocol was followed      Last vitals:   Visit Vitals  BP (!) 148/80 (BP Location: Right arm, Patient Position: Lying)   Pulse 76   Temp 36.9 °C (98.5 °F) (Oral)   Resp 18   Ht 6' 1" (1.854 m)   Wt 113.4 kg (250 lb)   SpO2 95%   BMI 32.98 kg/m²     "

## 2020-12-07 NOTE — PT/OT/SLP EVAL
Occupational Therapy   Evaluation    Name: Des Junior  MRN: [id number]  Admitting Diagnosis:  Right knee pain Day of Surgery    Recommendations:     Discharge Recommendations: home  Discharge Equipment Recommendations:  bedside commode, walker, rolling  Barriers to discharge:  None    Assessment:     Des Junior is a 75 y.o. male with a medical diagnosis of Right knee pain.  He was able to perform sit/stand T/F and walk to bathroom c CGA and RW.  Pt was able to perform sit/supine T/F c min A.  Encouraged pt to sit up in chair and pt requested multiple times to go back to bed d/t feeling drowsy.   Able to perform toilet hygiene and grooming  c mod I while standing in bathroom.  Pt is progressing well. Performance deficits affecting function: impaired self care skills, impaired functional mobilty, orthopedic precautions.      Rehab Prognosis: Good; patient would benefit from acute skilled OT services to address these deficits and reach maximum level of function.       Plan:     Patient to be seen daily to address the above listed problems via self-care/home management, therapeutic activities, therapeutic exercises  · Plan of Care Expires: 12/08/20  · Plan of Care Reviewed with: patient    Subjective     Chief Complaint: Pt is s/p R TKA and is WBAT R LE  Patient/Family Comments/goals: To go home.    Occupational Profile:  Living Environment: Pt lives in a raised house and has elevator access to house.  Has a walk-in shower c a built in shower chair.  Previous level of function: I PTA  Roles and Routines: Master .  Equipment Used at Home:  cane, straight, shower chair  Assistance upon Discharge: Wife     Pain/Comfort:  · Pain Rating 1: 10/10(R knee)    Patients cultural, spiritual, Advent conflicts given the current situation: no    Objective:     Communicated with: RN prior to session.  Patient found up in chair with cryotherapy, FCD, peripheral IV upon OT entry to room.    General  Precautions: Standard, fall   Orthopedic Precautions:RLE weight bearing as tolerated   Braces: N/A     Occupational Performance:    Bed Mobility:    · Patient completed Sit to Supine with minimum assistance    Functional Mobility/Transfers:  · Patient completed Sit <> Stand Transfer with contact guard assistance  with  rolling walker   · Patient completed Toilet Transfer Stand Pivot technique with contact guard assistance with  rolling walker  · Functional Mobility: Pt was able to walk to bathroom c CGA and RW.    Activities of Daily Living:  · Grooming: stand by assistance to wash hands while standing at sink.  · Toileting: stand by assistance for toilet hygiene.    Physical Exam:  Upper Extremity Range of Motion:     -       Right Upper Extremity: WFL  -       Left Upper Extremity: WFL  Upper Extremity Strength:    -       Right Upper Extremity: WFL  -       Left Upper Extremity: WFL    AMPAC 6 Click ADL:  AMPAC Total Score: 16  Education:    Patient left supine with all lines intact, call button in reach and RN notified    GOALS:   Multidisciplinary Problems     Occupational Therapy Goals        Problem: Occupational Therapy Goal    Goal Priority Disciplines Outcome Interventions   Occupational Therapy Goal     OT, PT/OT Ongoing, Progressing    Description: Goals to be met by: 12/8/20     Patient will increase functional independence with ADLs by performing:    UE Dressing with [place].  LE Dressing with Modified [place] and Assistive Devices as needed.  Grooming while standing at sink with Modified [place].  Toileting from bedside commode with Modified [place] for hygiene and clothing management.   Bathing from  shower chair/bench with Modified [place].  Toilet transfer to bedside commode with Modified [place].                     History:     Past Medical History:   Diagnosis Date    Abdominal obesity 3/15/2018    Acute deep vein thrombosis (DVT) of popliteal vein of right lower  extremity 3/2/2018    AK (actinic keratosis) 11/15/2012    S/p 1st PDT face (80 min incubation) - did great! S/p PDT face - 90 min  - 12/15 - did great S/p PDT face - 90 min - 10/17 - great response    Asbestos exposure 3/2/2018    Asymptomatic LV dysfunction 3/2/2018    Bronchitis, chronic, mucopurulent 3/2/2018    Cataract     Colon adenoma 8/20/2014    Current use of long term anticoagulation 4/23/2018    Deep vein thrombosis     ED (erectile dysfunction) 11/28/2012    Encounter for monitoring sotalol therapy 6/13/2018    Hay fever     Hearing loss, sensorineural 6/25/2013    History of adenomatous polyp of colon 10/5/2015    History of pulmonary embolism 5/2/2018    [place] (hard of hearing)     Left ear    Hyperlipidemia associated with type 2 diabetes mellitus, baseline  11/28/2012    Hypertension associated with diabetes 11/28/2012    Lung nodule, solitary- 5 mm rll 3/2/2018    On home oxygen therapy 3/15/2018    Osteoarthritis of right knee 11/28/2012    Persistent atrial fibrillation 3/1/2018    Pulmonary embolus- large involved central PA, unprovoked. 3/1/2018    Type 2 diabetes mellitus, controlled 7/16/2013    Type 2 diabetes, controlled, with neuropathy 1/20/2014    Type 2 diabetes, uncontrolled, with neuropathy, onset 2003 11/28/2012       Past Surgical History:   Procedure Laterality Date    COLONOSCOPY N/A 10/5/2015    Procedure: COLONOSCOPY;  Surgeon: Pro Jang MD;  Location: [address]);  Service: Endoscopy;  Laterality: N/A;    thumb surgery         Time Tracking:     OT Date of Treatment: 12/07/20  OT Start Time: 1428  OT Stop Time: 1449  OT Total Time (min): 21 min    Billable Minutes:Evaluation 11  Self Care/Home Management 10    DONELL Polk  12/7/2020

## 2020-12-07 NOTE — PLAN OF CARE
Patient tolerated PT session well. Patient ambulated 90ft with RW and contact guard assistance . No LOB or SOB noted. He is okay to ambulate with nursing staff assistance and RW while in the hospital.         Problem: Physical Therapy Goal  Goal: Physical Therapy Goal  Description: Goals to be met by: 2020    Patient will increase functional independence with mobility by performin. Supine to sit with supervision   2. Sit to stand transfer with Supervision  3. Gait  x 200 feet with Supervision using Rolling Walker  4. Ascend/descend 4 stairs with bilateral handrails and stand by assistance   5. Lower extremity exercise program x30 reps per handout, with supervision    Outcome: Ongoing, Progressing

## 2020-12-08 VITALS
SYSTOLIC BLOOD PRESSURE: 135 MMHG | DIASTOLIC BLOOD PRESSURE: 83 MMHG | RESPIRATION RATE: 18 BRPM | HEART RATE: 81 BPM | BODY MASS INDEX: 33.13 KG/M2 | WEIGHT: 250 LBS | OXYGEN SATURATION: 96 % | TEMPERATURE: 98 F | HEIGHT: 73 IN

## 2020-12-08 LAB
POCT GLUCOSE: 163 MG/DL (ref 70–110)
POCT GLUCOSE: 203 MG/DL (ref 70–110)
POCT GLUCOSE: 220 MG/DL (ref 70–110)
POCT GLUCOSE: 236 MG/DL (ref 70–110)

## 2020-12-08 PROCEDURE — 97116 GAIT TRAINING THERAPY: CPT

## 2020-12-08 PROCEDURE — C9399 UNCLASSIFIED DRUGS OR BIOLOG: HCPCS | Performed by: ANESTHESIOLOGY

## 2020-12-08 PROCEDURE — 97530 THERAPEUTIC ACTIVITIES: CPT

## 2020-12-08 PROCEDURE — 25000003 PHARM REV CODE 250: Performed by: PHYSICIAN ASSISTANT

## 2020-12-08 PROCEDURE — 97535 SELF CARE MNGMENT TRAINING: CPT | Mod: 59

## 2020-12-08 PROCEDURE — 94761 N-INVAS EAR/PLS OXIMETRY MLT: CPT

## 2020-12-08 PROCEDURE — 99204 OFFICE O/P NEW MOD 45 MIN: CPT | Mod: ,,, | Performed by: PHYSICIAN ASSISTANT

## 2020-12-08 PROCEDURE — 63600175 PHARM REV CODE 636 W HCPCS: Performed by: PHYSICIAN ASSISTANT

## 2020-12-08 PROCEDURE — 99204 PR OFFICE/OUTPT VISIT, NEW, LEVL IV, 45-59 MIN: ICD-10-PCS | Mod: ,,, | Performed by: PHYSICIAN ASSISTANT

## 2020-12-08 PROCEDURE — 25000003 PHARM REV CODE 250: Performed by: ANESTHESIOLOGY

## 2020-12-08 PROCEDURE — 25000003 PHARM REV CODE 250: Performed by: NURSE PRACTITIONER

## 2020-12-08 PROCEDURE — 97110 THERAPEUTIC EXERCISES: CPT

## 2020-12-08 PROCEDURE — 99900035 HC TECH TIME PER 15 MIN (STAT)

## 2020-12-08 PROCEDURE — 25000003 PHARM REV CODE 250: Performed by: STUDENT IN AN ORGANIZED HEALTH CARE EDUCATION/TRAINING PROGRAM

## 2020-12-08 RX ADMIN — PRAVASTATIN SODIUM 40 MG: 20 TABLET ORAL at 09:12

## 2020-12-08 RX ADMIN — HYDROCHLOROTHIAZIDE 25 MG: 25 TABLET ORAL at 09:12

## 2020-12-08 RX ADMIN — POLYETHYLENE GLYCOL 3350 17 G: 17 POWDER, FOR SOLUTION ORAL at 09:12

## 2020-12-08 RX ADMIN — INSULIN ASPART 2 UNITS: 100 INJECTION, SOLUTION INTRAVENOUS; SUBCUTANEOUS at 06:12

## 2020-12-08 RX ADMIN — LOSARTAN POTASSIUM 50 MG: 25 TABLET, FILM COATED ORAL at 09:12

## 2020-12-08 RX ADMIN — INSULIN HUMAN 3 UNITS: 100 INJECTION, SOLUTION PARENTERAL at 05:12

## 2020-12-08 RX ADMIN — RIVAROXABAN 20 MG: 10 TABLET, FILM COATED ORAL at 09:12

## 2020-12-08 RX ADMIN — MUPIROCIN 1 G: 20 OINTMENT TOPICAL at 09:12

## 2020-12-08 RX ADMIN — AMLODIPINE BESYLATE 5 MG: 5 TABLET ORAL at 09:12

## 2020-12-08 RX ADMIN — ACETAMINOPHEN 1000 MG: 500 TABLET ORAL at 12:12

## 2020-12-08 RX ADMIN — FAMOTIDINE 20 MG: 20 TABLET, FILM COATED ORAL at 09:12

## 2020-12-08 RX ADMIN — SODIUM CHLORIDE 150 ML/HR: 0.9 INJECTION, SOLUTION INTRAVENOUS at 04:12

## 2020-12-08 RX ADMIN — DOCUSATE SODIUM 50MG AND SENNOSIDES 8.6MG 1 TABLET: 8.6; 5 TABLET, FILM COATED ORAL at 09:12

## 2020-12-08 RX ADMIN — METOPROLOL SUCCINATE 50 MG: 50 TABLET, EXTENDED RELEASE ORAL at 09:12

## 2020-12-08 RX ADMIN — INSULIN HUMAN 4 UNITS: 100 INJECTION, SOLUTION PARENTERAL at 01:12

## 2020-12-08 RX ADMIN — INSULIN DETEMIR 9 UNITS: 100 INJECTION, SOLUTION SUBCUTANEOUS at 09:12

## 2020-12-08 RX ADMIN — ACETAMINOPHEN 1000 MG: 500 TABLET ORAL at 06:12

## 2020-12-08 RX ADMIN — METFORMIN HYDROCHLORIDE 1000 MG: 500 TABLET ORAL at 07:12

## 2020-12-08 NOTE — PLAN OF CARE
12/08/20 1000   ARMSTRONG Message   Medicare Outpatient and Observation Notification regarding financial responsibility Given to patient/caregiver;Explained to patient/caregiver;Signed/date by patient/caregiver   Date ARMSTRONG was signed 12/08/20   Time ARMSTRONG was signed [id number]

## 2020-12-08 NOTE — PLAN OF CARE
Patient choice form signed for SCHUYLER ALVAREZ.       12/08/20 1000   Discharge Reassessment   Assessment Type Discharge Planning Reassessment   Provided patient/caregiver education on the expected discharge date and the discharge plan Yes   Discharge Plan A Home with family;Home Health   Discharge Plan B Home with family   Patient choice form signed by patient/caregiver Yes  (signed by patient)   Post-Acute Status   Post-Acute Authorization Home Health   Home Health Status Set-up Complete   Discharge Delays None known at this time

## 2020-12-08 NOTE — ADDENDUM NOTE
Addendum  created 12/07/20 1236 by Nino Mcnally MD    Order list changed      
Addendum  created 12/07/20 1510 by Tomy Martins MD    Order list changed      
Addendum  created 12/07/20 1812 by Tomy Martins MD    Clinical Note Signed, Order Reconciliation Section accessed, Order list changed      
Addendum  created 12/07/20 2147 by Lester Nicole PA-C    Clinical Note Signed, Order list changed      
Addendum  created 12/07/20 2315 by Lester Nicole PA-C    Order list changed      
Addendum  created 12/08/20 0111 by Lester Nicole PA-C    Order list changed      
Addendum  created 12/08/20 0526 by Lester Nicole PA-C    Clinical Note Signed, Order list changed      
Addendum  created 12/08/20 0801 by Apollo Beatty MD    Charge Capture section accepted, Cosign clinical note with attestation      
none

## 2020-12-08 NOTE — PLAN OF CARE
Pt to be discharged shortly.  PT / OT completed.  Pt in chair.  Denies pain.  Call light within reach.

## 2020-12-08 NOTE — PLAN OF CARE
Pt discharged w/ [facility]. Pt received RW and BSC from [facility].     Christopher Cortez MD  Call  As needed  [address]  [facility] [address]  [phone]   Ignacia Danielle NP  On 12/22/2020  Post op Tuesday 12/22 @ 9:45am  [address]  [facility] [address]  [phone]   [facility]    Home Health  [address]  [facility] [address]  [phone]      12/08/20 1429   Final Note   Assessment Type Final Discharge Note   Anticipated Discharge Disposition Home-Health   Hospital Follow Up  Appt(s) scheduled? Yes   Right Care Referral Info   Post Acute Recommendation Home-care   Facility Name [facility]   Post-Acute Status   Post-Acute Authorization HME;Home Health   HME Status Set-up Complete   Home Health Status Set-up Complete   Discharge Delays None known at this time

## 2020-12-08 NOTE — PT/OT/SLP PROGRESS
Physical Therapy Treatment    Patient Name:  Des Junior   MRN:  [id number]    Recommendations:     Discharge Recommendations:  home health PT   Discharge Equipment Recommendations: bedside commode, walker, rolling   Barriers to discharge: None    Assessment:     Des Junior is a 75 y.o. male admitted with a medical diagnosis of Right knee pain.  He presents with the following impairments/functional limitations:  weakness, impaired functional mobilty, gait instability, impaired balance, decreased lower extremity function, pain, decreased ROM, impaired skin, orthopedic precautions. Patient tolerated PT session well. Patient ambulated 120ft with RW and supervision . No LOB or SOB noted. Patient ascended/descended 4 steps with bilateral handrails and SBA. Patient performed R LE therex x10 reps. Patient is getting HH PT. Patient ready to discharge home from PT standpoint.     Rehab Prognosis: Good; patient would benefit from acute skilled PT services to address these deficits and reach maximum level of function.    Recent Surgery: Procedure(s) (LRB):  ARTHROPLASTY, KNEE:RIGHT:DEPUY-SIGMA (Right) 1 Day Post-Op    Plan:     During this hospitalization, patient to be seen daily to address the identified rehab impairments via gait training, therapeutic activities, therapeutic exercises and progress toward the following goals:    · Plan of Care Expires:  12/11/20    Subjective     Chief Complaint: Pain in back of right knee.   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   Pain/Comfort:  · Pain Rating 1: 2/10(with rest)  · Location - Side 1: Right  · Location - Orientation 1: posterior  · Location 1: knee  · Pain Addressed 1: Pre-medicate for activity, Cessation of Activity, Nurse notified  · Pain Rating Post-Intervention 1: 3/10(with ambulation)      Objective:     Communicated with RN prior to session.  Patient found up in chair with cryotherapy upon PT entry to room. Wife  present in patient's room.     General Precautions: Standard, fall   Orthopedic Precautions:RLE weight bearing as tolerated   Braces: N/A     Functional Mobility:  · Mat Mobility:     · Supine to Sit: supervision  · Sit to Supine: supervision  · Transfers:     · Sit to Stand:  supervision with rolling walker  · Mat to Chair: 6ft with supervision and rolling walker   · Gait: Patient ambulated 120ft with Rolling Walker and supervision  using swing-through gait. Patient demonstrated decreased lorenza during gait due to pain, decreased ROM and decreased strength.  · Stairs:  Pt ascended/descended 4 stair(s) with bilateral handrails and Stand-by Assistance. Verbal cues provided for technique.       AM-PAC 6 CLICK MOBILITY  Turning over in bed (including adjusting bedclothes, sheets and blankets)?: 4  Sitting down on and standing up from a chair with arms (e.g., wheelchair, bedside commode, etc.): 4  Moving from lying on back to sitting on the side of the bed?: 4  Moving to and from a bed to a chair (including a wheelchair)?: 4  Need to walk in hospital room?: 4  Climbing 3-5 steps with a railing?: 3  Basic Mobility Total Score: 23       Therapeutic Activities and Exercises:  Patient educated in and performed R LE exercises x10 reps for ankle pumps, quad set, glute set, SAQ over bolster, heel slides, hip abd/add, SLR, and LAQ.      Patient educated in:  -PT role and POC  -safety with transfers including hand placement  -gait sequencing and RW management  -OOB activity to maximize recovery including ambulating at home to prevent DVT   - SUV with running board transfer  -stair training  -HEP for therex at home with handout provided       Patient left up in chair with all lines intact, call button in reach, RN notified and wife present..    GOALS:   Multidisciplinary Problems     Physical Therapy Goals     Not on file          Multidisciplinary Problems (Resolved)        Problem: Physical Therapy Goal    Goal Priority  Disciplines Outcome Goal Variances Interventions   Physical Therapy Goal   (Resolved)     PT, PT/OT Met     Description: Goals to be met by: 2020    Patient will increase functional independence with mobility by performin. Supine to sit with supervision   2. Sit to stand transfer with Supervision  3. Gait  x 200 feet with Supervision using Rolling Walker  4. Ascend/descend 4 stairs with bilateral handrails and stand by assistance   5. Lower extremity exercise program x30 reps per handout, with supervision                     Time Tracking:     PT Received On: 20  PT Start Time: 930     PT Stop Time: 953  PT Total Time (min): 23 min     Billable Minutes: Gait Training 13 and Therapeutic Exercise 10    Treatment Type: Treatment  PT/PTA: PT           Diamante Garcias, PT  2020

## 2020-12-08 NOTE — PROGRESS NOTES
Pt discharged from unit.  Pt aaox4, vss, no s/s of distress noted.  Pt states pain 1/10.  Discharge instructions given to pt and he verbalized understanding.  Home meds and f/u appts reviewed as well.  IV removed from left hand with catheter intact, no redness or swelling noted to area.  Pt left unit via w/c and was accompanied by staff to front of hospital.  All personal belongings sent with pt, including walker, bsc, and polar ice.  No complications with discharge noted.

## 2020-12-08 NOTE — PLAN OF CARE
[facility] HOME HEALTH ORDERS  FACE TO FACE ENCOUNTER    Patient Name: Des Junior  YOB: 1945    PCP: Christopher Cortez MD   PCP Address: [address] / [id number]  PCP Phone Number: [phone]  PCP Fax: [phone]    Encounter Date: 12/08/2020    Admit to Home Health    Diagnoses:  Active Hospital Problems    Diagnosis  POA    *Right knee pain [M25.561]  Yes      Resolved Hospital Problems   No resolved problems to display.       Future Appointments   Date Time Provider Department Center   12/18/2020  1:40 PM Christopher Cortez MD [facility] IM [facility]   12/22/2020  9:45 AM Ignacia Danielle NP [facility] ORTHO [facility]   12/22/2020  1:30 PM EKG, APPT [facility] EKG [facility]   12/22/2020  2:00 PM Lou Simmons NP [facility] ARRHYTH [facility]   1/29/2021 10:10 AM LAB, [facility] LAB [place]   2/5/2021  2:00 PM YASIR Bowens PA-C [facility] ENDOCRN [place]   2/22/2021  2:00 PM Cedric Evangelista MD [facility] CARDIO [facility]     Follow-up Information     Call Christopher Cortez MD.    Specialty: Internal Medicine  Why: As needed  Contact information:  [address]  [facility] [address]  [phone]             Outpatient Therapy.    Contact information:  [facility]  [address] [place] [address]  Phone: [phone]           Ignacia Danielle NP On 12/22/2020.    Specialty: Orthopedic Surgery  Why: Post op Tuesday 12/22 @ 9:45am  Contact information:  [address]  [facility] [address]  [phone]                     I have seen and examined this patient face to face today. My clinical findings that support the need for the home health skilled services and home bound status are the following:  Weakness/numbness causing balance and gait disturbance due to Joint Replacement making it taxing to leave home.    Allergies:  Review of patient's allergies indicates:   Allergen Reactions    Benicar [olmesartan]      Other  reaction(s): SPOTS IN FRONT OF EYES    Codeine Nausea And Vomiting     Other reaction(s): Nausea       Diet: regular diet    Activities: activity as tolerated    Home Health Admitting Clinician:   SN/PT to complete comprehensive assessment including routine vital signs. Instruct on disease process and s/s of complications to report to MD. Follow specific home health arthoplasty protocol. Review/verify medication list sent home with the patient at time of discharge  and instruct patient/caregiver as needed. If coumadin ordered, coumadin clinic to manage INR with INR draws 2x per week with a goal to maintain INR between 1.8 and 2.2. Frequency may be adjusted depending on start of care date.    Notify MD if SBP > 160 or < 90; DBP > 90 or < 50; HR > 120 or < 50; Temp > 101    Home Medical Equipment:  Walker, 3-1 bedside commode, transfer tub bench    CONSULTS:    Physical Therapy may admit if patient not on coumadin, PT to perform comprehensive assessment if performing admit visit and generate therapy plan of care. Evaluate for home safety and equipment needs; Establish/upgrade home exercise program. Perform/instruct on therapeutic exercises, gait training, transfer training, and Range of Motion.      OTHER: (only select if patient needs other therapy disciplines)  Occupational Therapy to evaluate and treat. Evaluate home environment for safety and equipment needs. Perform/Instruct on transfers, ADL training, ROM, and therapeutic exercises.    MISCELLANEOUS CARE:  Routine Skin for Bedridden Patients: Instruct patient/caregiver to apply moisture barrier cream to all skin folds and wet areas in perineal area daily and after baths and all bowel movements.    WOUND CARE ORDERS:  Assess Surgical Incision/DSRG each TX  Aquacel AG drsg applied post-op leave on 14 days post op. Call MD if any drainage reaches border to border of drsg horizontally, s/s of infection, temp >101, induration, swelling or redness.  If dressing is  removed per MD order, then apply island dressing, change/teach caregiver to perform daily dressing change if island dressing present.    Medications: Review discharge medications with patient and family and provide education.      Current Discharge Medication List      START taking these medications    Details   docusate sodium (COLACE) 100 MG capsule Take 1 capsule (100 mg total) by mouth 2 (two) times daily as needed for Constipation.  Qty: 60 capsule, Refills: 0      oxyCODONE (ROXICODONE) 5 MG immediate release tablet Take 1-2 tablets by mouth every 4-6 hours as needed for pain  Qty: 50 tablet, Refills: 0    Comments: Greater than 7 day supply is medically necessary. Deliver to [place] for surgery 12/7/2020.         CONTINUE these medications which have NOT CHANGED    Details   acetaminophen (TYLENOL) 500 MG tablet Take 1,000 mg by mouth daily as needed for Pain.      albuterol (PROVENTIL/VENTOLIN HFA) 90 mcg/actuation inhaler 2 puffs every 4 hours as needed for cough, wheeze, or shortness of breath  Qty: 18 g, Refills: 11    Associated Diagnoses: Bronchiectasis without complication; Bronchitis, chronic, mucopurulent      amLODIPine (NORVASC) 5 MG tablet Take 1 tablet (5 mg total) by mouth once daily.  Qty: 90 tablet, Refills: 3    Comments: .  Associated Diagnoses: Hypertension associated with diabetes      gabapentin (NEURONTIN) 100 MG capsule Take one capsule nightly. Increase to two capsule if no relief. Increase to three capsules if you still have symptoms.  Qty: 90 capsule, Refills: 11    Associated Diagnoses: Type 2 diabetes mellitus with hyperglycemia, without long-term current use of insulin      hydroCHLOROthiazide (HYDRODIURIL) 25 MG tablet Take 1 tablet (25 mg total) by mouth once daily.  Qty: 90 tablet, Refills: 11    Associated Diagnoses: Essential hypertension      insulin (LANTUS SOLOSTAR U-100 INSULIN) glargine 100 units/mL (3mL) SubQ pen Inject 9 units every morning and evening.  Qty: 16 mL,  "Refills: 3    Associated Diagnoses: Type 2 diabetes, uncontrolled, with neuropathy      losartan (COZAAR) 50 MG tablet Take 1 tablet (50 mg total) by mouth once daily.  Qty: 90 tablet, Refills: 11    Associated Diagnoses: Essential hypertension      metFORMIN (GLUCOPHAGE-XR) 500 MG 24 hr tablet Take 2 tablets (1,000 mg total) by mouth 2 (two) times daily with meals.  Qty: 360 tablet, Refills: 11    Associated Diagnoses: Type 2 diabetes, uncontrolled, with neuropathy      omega-3 fatty acids-vitamin E (OMEGA-3 FISH OIL) 1,000-5 mg-unit Cap Take by mouth 2 (two) times daily. 1 Capsule Oral Every day      omeprazole (PRILOSEC OTC) 20 MG tablet Take by mouth. 1 Tablet, Delayed Release (E.C.) Oral Every day      blood sugar diagnostic Strp 1 strip by Misc.(Non-Drug; Combo Route) route 2 (two) times daily with meals.  Qty: 100 strip, Refills: 11      cetirizine (ZYRTEC) 10 MG tablet Take 1 tablet (10 mg total) by mouth once daily.  Refills: 0      cholecalciferol, vitamin D3, (VITAMIN D3) 1,000 unit capsule Take 1,000 Units by mouth once daily.      fluorouracil (EFUDEX) 5 % cream Use qd-bid to red or rough precancer spots until irritation occurs and then stop.  Resume as needed.  Qty: 40 g, Refills: 1    Associated Diagnoses: AK (actinic keratosis)      ketoconazole (NIZORAL) 2 % cream Apply topically once daily.  Qty: 1 Tube, Refills: 0      LANCETS & BLOOD GLUCOSE STRIPS MISC * * * Twice a day .  check glucose twice a day      metoprolol succinate (TOPROL-XL) 50 MG 24 hr tablet Take 1 tablet (50 mg total) by mouth once daily.  Qty: 90 tablet, Refills: 3    Comments: .  Associated Diagnoses: PAF (paroxysmal atrial fibrillation)      olopatadine (PAZEO) 0.7 % Drop Apply 1 drop to eye once daily.  Qty: 2.5 mL, Refills: 12      pen needle, diabetic 29 gauge x 1/2" Ndle Inject insulin daily  Qty: 50 each, Refills: 11    Associated Diagnoses: Type 2 diabetes mellitus with hyperglycemia, without long-term current use of " insulin      pravastatin (PRAVACHOL) 40 MG tablet Take 1 tablet (40 mg total) by mouth once daily.  Qty: 30 tablet, Refills: 11    Associated Diagnoses: Hyperlipidemia, unspecified hyperlipidemia type      repaglinide (PRANDIN) 2 MG tablet Take 2 tablets with meals.  Qty: 540 tablet, Refills: 3    Associated Diagnoses: Type 2 diabetes mellitus with hyperglycemia, without long-term current use of insulin      rivaroxaban (XARELTO) 20 mg Tab TAKE 1 TABLET  MOUTH DAILY WITH  EVENING MEAL.  Qty: 30 tablet, Refills: 11    Associated Diagnoses: Persistent atrial fibrillation             I certify that this patient is confined to his home and needs intermittent skilled nursing care, physical therapy and occupational therapy.

## 2020-12-08 NOTE — PROGRESS NOTES
Acute Pain Service and Perioperative Surgical Home Progress Note    HPI  Des Junior is a 75 y.o., male with right knee pain.    Interval history      Surgery:  Procedure(s) (LRB):  ARTHROPLASTY, KNEE:RIGHT:DEPUY-SIGMA (Right)    Post Op Day #: 1    Mr. Junior is doing well with pain controlled. He is tolerating his diet. His glucose has been significantly elevated overnight, as high as 340mg/dl. He had gradual improvement overnight requiring multiple insulin boluses with close monitoring, as he is also on SSI and metformin. Will continue to monitor.    Problem List:    Active Hospital Problems    Diagnosis  POA    *Right knee pain [M25.561]  Yes      Resolved Hospital Problems   No resolved problems to display.       Subjective:       General appearance of alert, oriented, no complaints   Pain with rest: 4    Numbers   Pain with movement: 4    Numbers   Side Effects    1. Pruritis No    2. Nausea No    3. Motor Blockade No, 0=Ability to raise lower extremities off bed    4. Sedation No, 1=awake and alert    Schedule Medications:    acetaminophen  1,000 mg Oral Q6H    amLODIPine  5 mg Oral Daily    aspirin  81 mg Oral BID    celecoxib  200 mg Oral Daily    famotidine  20 mg Oral BID    hydroCHLOROthiazide  25 mg Oral Daily    insulin detemir U-100  9 Units Subcutaneous QHS    insulin detemir U-100  9 Units Subcutaneous Daily    insulin regular  3 Units Intravenous Once    losartan  50 mg Oral Daily    metFORMIN  1,000 mg Oral BID WM    metoprolol succinate  50 mg Oral Daily    mupirocin  1 g Nasal BID    polyethylene glycol  17 g Oral Daily    pravastatin  40 mg Oral Daily    pregabalin  75 mg Oral QHS    repaglinide  4 mg Oral TID AC    rivaroxaban  20 mg Oral Once    senna-docusate 8.6-50 mg  1 tablet Oral BID        Continuous Infusions:       PRN Medications:  albuterol, bisacodyL, dextrose 50%, dextrose 50%, glucagon (human recombinant), glucose, glucose, insulin aspart U-100,  morphine, naloxone, ondansetron, oxyCODONE, oxyCODONE, promethazine (PHENERGAN) IVPB, ropivacaine       Antibiotics:  Antibiotics (From admission, onward)    Start     Stop Route Frequency Ordered    12/07/20 1130  mupirocin 2 % ointment 1 g      12/12 0859 Nasl 2 times daily 12/07/20 1129             Objective:              Vital Signs (Most Recent):  Temp: 36.4 °C (97.6 °F) (12/08/20 0414)  Pulse: 86 (12/08/20 0414)  Resp: 16 (12/08/20 0414)  BP: (!) 141/83 (12/08/20 0414)  SpO2: (!) 94 % (12/08/20 0414) Vital Signs Range (Last 24H):  Temp:  [35.2 °C (95.3 °F)-36.9 °C (98.5 °F)]   Pulse:  []   Resp:  [13-20]   BP: (115-156)/(71-96)   SpO2:  [93 %-97 %]          I & O (Last 24H):    Intake/Output Summary (Last 24 hours) at 12/8/2020 0525  Last data filed at 12/8/2020 0400  Gross per 24 hour   Intake 3620 ml   Output 2000 ml   Net 1620 ml       Physical Exam:    GA: Alert, comfortable, no acute distress.   Pulmonary: Clear to auscultation A/P/L. No wheezing, crackles, or rhonchi.  Cardiac: RRR S1 & S2 w/o rubs/murmurs/gallops.   Abdominal:Bowel sounds present. No tenderness to palpation or distension. No appreciable hepatosplenomegaly.   Skin: No jaundice, rashes, or visible lesions.         Laboratory:  CBC:   Recent Labs     12/07/20  2326   HCT 37       BMP: No results for input(s): NA, K, CO2, CL, BUN, CREATININE, GLU, MG, PHOS, CALCIUM in the last 72 hours.    No results for input(s): PT, INR, PROTIME, APTT in the last 72 hours.      Anticoagulant dose ASA at 81mg.    Assessment:         Pain control adequate    Plan:     1) Pain:    Multimodal pain regimen with acetaminophen, Celebrex, Lyrica, and prn oxycodone given  Will continue to monitor. Plan to discharge with On-Q on POD #1.   2) DM: poorly controlled. SSI, metformin 1g bid. Insulin boluses prn for glucose >200. (potassium 4.5)   3) HTN: Continue home meds.   4) VTE: No evidence of DVT or PE. Restart rivaroxiban POD1   4) FEN/GI: Tolerating  liquids, advance diet as tolerated. - flatus. - BM.   5) Dispo: Pt working well with PT/OT. Case management and SW for placement. Plan for discharge POD #1.        Evaluator Lester Nicole PA-C

## 2020-12-08 NOTE — PLAN OF CARE
Problem: Occupational Therapy Goal  Goal: Occupational Therapy Goal  Description: Goals to be met by: 12/8/20     Patient will increase functional independence with ADLs by performing:    UE Dressing with [place].  LE Dressing with Modified [place] and Assistive Devices as needed.  Grooming while standing at sink with Modified [place].  Toileting from bedside commode with Modified [place] for hygiene and clothing management.   Bathing from  shower chair/bench with Modified [place].  Toilet transfer to bedside commode with Modified [place].    Outcome: Met

## 2020-12-08 NOTE — SUBJECTIVE & OBJECTIVE
Principal Problem:Right knee pain    Principal Orthopedic Problem: Same    Interval History: Patient seen and examined at bedside. Pain very well controlled.  Ambulatedf 90ft with rolling walker yesterday.  Poor glucose control yesterday as high as 300 however improving control overnight and 163 this morning.  Urinating without issues.    Review of patient's allergies indicates:   Allergen Reactions    Benicar [olmesartan]      Other reaction(s): SPOTS IN FRONT OF EYES    Codeine Nausea And Vomiting     Other reaction(s): Nausea       Current Facility-Administered Medications   Medication    0.9%  NaCl infusion    acetaminophen tablet 1,000 mg    albuterol inhaler 1 puff    amLODIPine tablet 5 mg    bisacodyL suppository 10 mg    celecoxib capsule 200 mg    dextrose 50% injection 12.5 g    dextrose 50% injection 25 g    famotidine tablet 20 mg    glucagon (human recombinant) injection 1 mg    glucose chewable tablet 16 g    glucose chewable tablet 24 g    hydroCHLOROthiazide tablet 25 mg    insulin aspart U-100 pen 1-10 Units    insulin detemir U-100 pen 9 Units    insulin detemir U-100 pen 9 Units    losartan tablet 50 mg    metFORMIN tablet 1,000 mg    metoprolol succinate (TOPROL-XL) 24 hr tablet 50 mg    morphine injection 2 mg    mupirocin 2 % ointment 1 g    naloxone 0.4 mg/mL injection 0.02 mg    ondansetron injection 4 mg    oxyCODONE immediate release tablet 10 mg    oxyCODONE immediate release tablet 5 mg    polyethylene glycol packet 17 g    pravastatin tablet 40 mg    pregabalin capsule 75 mg    promethazine (PHENERGAN) 6.25 mg in dextrose 5 % 50 mL IVPB    repaglinide tablet 4 mg    rivaroxaban tablet 20 mg    ropivacaine (PF) 2 mg/ml (0.2%) solution    ropivacaine 0.2% ON-Q C-BLOC 400 ML (SELECT A FLOW)    senna-docusate 8.6-50 mg per tablet 1 tablet     Objective:     Vital Signs (Most Recent):  Temp: 97.6 °F (36.4 °C) (12/08/20 0414)  Pulse: 86 (12/08/20  "0414)  Resp: 16 (12/08/20 0414)  BP: (!) 141/83 (12/08/20 0414)  SpO2: (!) 94 % (12/08/20 0414) Vital Signs (24h Range):  Temp:  [95.3 °F (35.2 °C)-97.6 °F (36.4 °C)] 97.6 °F (36.4 °C)  Pulse:  [] 86  Resp:  [13-20] 16  SpO2:  [93 %-97 %] 94 %  BP: (115-156)/(71-96) 141/83     Weight: 113.4 kg (250 lb)  Height: 6' 1" (185.4 cm)  Body mass index is 32.98 kg/m².      Intake/Output Summary (Last 24 hours) at 12/8/2020 0656  Last data filed at 12/8/2020 0600  Gross per 24 hour   Intake 6610 ml   Output 2400 ml   Net 4210 ml       Ortho/SPM Exam     AAOx4  NAD  RRR  No increased WOB  Dressings c/d/i  SILT and motor intact T/SP/DP  WWP extremities  FCDs in place and functioning      Significant Labs: All pertinent labs within the past 24 hours have been reviewed.    Significant Imaging: I have reviewed and interpreted all pertinent imaging results/findings.  "

## 2020-12-08 NOTE — PLAN OF CARE
Pt up in chair, legs elevated.  VSS.  Pt states pain to right knee 1/10.  Dressing cdi.  Polar Ice in place.  Accuchecks ac/hs.  Safety precautions maintained, pt remains free of falls.  PT / OT this am.  Call light within reach.  Spouse at bedside.

## 2020-12-08 NOTE — PROGRESS NOTES
[facility]  Orthopedics  Progress Note    Patient Name: Des Junior  MRN: [id number]  Admission Date: 12/7/2020  Hospital Length of Stay: 0 days  Attending Provider: Hua Gore III, MD  Primary Care Provider: Christopher Cortez MD  Follow-up For: Procedure(s) (LRB):  ARTHROPLASTY, KNEE:RIGHT:DEPUY-SIGMA (Right)    Post-Operative Day: 1 Day Post-Op  Subjective:     Principal Problem:Right knee pain    Principal Orthopedic Problem: Same    Interval History: Patient seen and examined at bedside. Pain very well controlled.  Ambulatedf 90ft with rolling walker yesterday.  Poor glucose control yesterday as high as 300 however improving control overnight and 163 this morning.  Urinating without issues.    Review of patient's allergies indicates:   Allergen Reactions    Benicar [olmesartan]      Other reaction(s): SPOTS IN FRONT OF EYES    Codeine Nausea And Vomiting     Other reaction(s): Nausea       Current Facility-Administered Medications   Medication    0.9%  NaCl infusion    acetaminophen tablet 1,000 mg    albuterol inhaler 1 puff    amLODIPine tablet 5 mg    bisacodyL suppository 10 mg    celecoxib capsule 200 mg    dextrose 50% injection 12.5 g    dextrose 50% injection 25 g    famotidine tablet 20 mg    glucagon (human recombinant) injection 1 mg    glucose chewable tablet 16 g    glucose chewable tablet 24 g    hydroCHLOROthiazide tablet 25 mg    insulin aspart U-100 pen 1-10 Units    insulin detemir U-100 pen 9 Units    insulin detemir U-100 pen 9 Units    losartan tablet 50 mg    metFORMIN tablet 1,000 mg    metoprolol succinate (TOPROL-XL) 24 hr tablet 50 mg    morphine injection 2 mg    mupirocin 2 % ointment 1 g    naloxone 0.4 mg/mL injection 0.02 mg    ondansetron injection 4 mg    oxyCODONE immediate release tablet 10 mg    oxyCODONE immediate release tablet 5 mg    polyethylene glycol packet 17 g    pravastatin tablet 40 mg    pregabalin  "capsule 75 mg    promethazine (PHENERGAN) 6.25 mg in dextrose 5 % 50 mL IVPB    repaglinide tablet 4 mg    rivaroxaban tablet 20 mg    ropivacaine (PF) 2 mg/ml (0.2%) solution    ropivacaine 0.2% ON-Q C-BLOC 400 ML (SELECT A FLOW)    senna-docusate 8.6-50 mg per tablet 1 tablet     Objective:     Vital Signs (Most Recent):  Temp: 97.6 °F (36.4 °C) (12/08/20 0414)  Pulse: 86 (12/08/20 0414)  Resp: 16 (12/08/20 0414)  BP: (!) 141/83 (12/08/20 0414)  SpO2: (!) 94 % (12/08/20 0414) Vital Signs (24h Range):  Temp:  [95.3 °F (35.2 °C)-97.6 °F (36.4 °C)] 97.6 °F (36.4 °C)  Pulse:  [] 86  Resp:  [13-20] 16  SpO2:  [93 %-97 %] 94 %  BP: (115-156)/(71-96) 141/83     Weight: 113.4 kg (250 lb)  Height: 6' 1" (185.4 cm)  Body mass index is 32.98 kg/m².      Intake/Output Summary (Last 24 hours) at 12/8/2020 0656  Last data filed at 12/8/2020 0600  Gross per 24 hour   Intake 6610 ml   Output 2400 ml   Net 4210 ml       Ortho/SPM Exam     AAOx4  NAD  RRR  No increased WOB  Dressings c/d/i  SILT and motor intact T/SP/DP  WWP extremities  FCDs in place and functioning      Significant Labs: All pertinent labs within the past 24 hours have been reviewed.    Significant Imaging: I have reviewed and interpreted all pertinent imaging results/findings.    Assessment/Plan:     * Right knee pain  75 y.o. male POD1 s/p R TKA    Pain control: per APS  PT/OT: WBAT RLE  DVT PPx: 81mg aspirin x1 dose 12hrs post op (given) then resume Xarelto starting 24hrs post op , FCDs at all times when not ambulating  Abx: postop Ancef    Dispo: f/u PT recs, improve blood glucose control and likely dc home today             Erwin Jama MD  Orthopedics  [facility]  "

## 2020-12-08 NOTE — PT/OT/SLP PROGRESS
Occupational Therapy   Treatment and Discharge Note    Name: Des Junior  MRN: [id number]  Admitting Diagnosis:  Right knee pain  1 Day Post-Op    Recommendations:     Discharge Recommendations: home  Discharge Equipment Recommendations:  bedside commode, walker, rolling  Barriers to discharge:  None    Assessment:     Des Junior is a 75 y.o. male with a medical diagnosis of Right knee pain.  He was able to perform supine/sit, sit/stand, and walk to bathroom c CGA and RW.  Able to perform UB/LB dressing c mod I.  Pt was able to perform toilet T/F c CGA and RW.  Performed toilet hygiene and grooming c set-up.  Educated pt on bathing, car T/F's and [place]. Performance deficits affecting function are impaired self care skills, impaired functional mobilty, orthopedic precautions.     Rehab Prognosis:  Good; patient would benefit from acute skilled OT services to address these deficits and reach maximum level of function.       Plan:     Patient to be seen daily to address the above listed problems via self-care/home management, therapeutic activities, therapeutic exercises  · Plan of Care Expires: 12/08/20  · Plan of Care Reviewed with: patient, spouse    Subjective     Pain/Comfort:  · Pain Rating 1: 0/10    Objective:     Communicated with: RN prior to session.  Patient found supine with cryotherapy upon OT entry to room.    General Precautions: Standard, fall   Orthopedic Precautions:RLE weight bearing as tolerated   Braces: N/A     Occupational Performance:     Bed Mobility:    · Patient completed Supine to Sit with stand by assistance     Functional Mobility/Transfers:  · Patient completed Sit <> Stand Transfer with stand by assistance  with  rolling walker   · Patient completed Bed <> Chair Transfer using Stand Pivot technique with stand by assistance with rolling walker  · Patient completed Toilet Transfer Stand Pivot technique with stand by assistance with  rolling walker  · Functional  Mobility: Pt was able to walk to bathroom c SBA and RW.    Activities of Daily Living:  · Grooming: modified independence to wash hands and brush teeth while standing at sink.  · Upper Body Dressing: modified independence to don shirt.  · Lower Body Dressing: modified independence to don underwear, shorts, socks, and shoes.  · Toileting: modified independence for toilet hygiene.      [facility] 6 Click ADL: 24    Treatment & Education:  Educated pt on bathing, car T/F's, and polar ice.    Patient left up in chair with all lines intact, call button in reach, RN notified and wife presentEducation:      GOALS:   Multidisciplinary Problems     Occupational Therapy Goals     Not on file          Multidisciplinary Problems (Resolved)        Problem: Occupational Therapy Goal    Goal Priority Disciplines Outcome Interventions   Occupational Therapy Goal   (Resolved)     OT, PT/OT Met    Description: Goals to be met by: 12/8/20     Patient will increase functional independence with ADLs by performing:    UE Dressing with [place].  LE Dressing with Modified [place] and Assistive Devices as needed.  Grooming while standing at sink with Modified [place].  Toileting from bedside commode with Modified [place] for hygiene and clothing management.   Bathing from  shower chair/bench with Modified [place].  Toilet transfer to bedside commode with Modified [place].                     Time Tracking:     OT Date of Treatment: 12/08/20  OT Start Time: 0810  OT Stop Time: 0845  OT Total Time (min): 35 min    Billable Minutes:Self Care/Home Management 20  Therapeutic Activity 15    DONELL Polk  12/8/2020

## 2020-12-08 NOTE — PROGRESS NOTES
Mr. Junior remains hyperglycemic with glucose 320mg/dl. He is getting moderate sliding-scale insulin, as well as metformin.  Will give additional IV insulin bolus, fluids, and check potassium.

## 2020-12-08 NOTE — PLAN OF CARE
Patient tolerated PT session well. Patient ambulated 120ft with RW and supervision . No LOB or SOB noted. Patient ascended/descended 4 steps with bilateral handrails and SBA. Patient performed R LE therex x10 reps. Patient is getting  PT. Patient ready to discharge home from PT standpoint.         Problem: Physical Therapy Goal  Goal: Physical Therapy Goal  Description: Goals to be met by: 2020    Patient will increase functional independence with mobility by performin. Supine to sit with supervision   2. Sit to stand transfer with Supervision  3. Gait  x 200 feet with Supervision using Rolling Walker  4. Ascend/descend 4 stairs with bilateral handrails and stand by assistance   5. Lower extremity exercise program x30 reps per handout, with supervision    2020 1142 by Diamante Garcias, PT  Outcome: Met

## 2020-12-08 NOTE — PLAN OF CARE
Safety maintained throughout this shift. Patient's blood sugar has consistently been above 200. GIOVANNI notified with each result, orders received. Voiding clear yellow urine without difficulty, in sufficient quantities. Dressing to R knee clean, dry and intact. Neurovascular status unchanged since initial assessment. Patient denies pain. No S/S of infection. Will continue to monitor.

## 2020-12-08 NOTE — ASSESSMENT & PLAN NOTE
75 y.o. male POD1 s/p R TKA    Pain control: per APS  PT/OT: WBAT RLE  DVT PPx: 81mg aspirin x1 dose 12hrs post op (given) then resume Xarelto starting 24hrs post op , FCDs at all times when not ambulating  Abx: postop Ancef    Dispo: f/u PT recs, improve blood glucose control and likely dc home today

## 2020-12-11 ENCOUNTER — PATIENT MESSAGE (OUTPATIENT)
Dept: OTHER | Facility: OTHER | Age: 75
End: 2020-12-11

## 2020-12-17 ENCOUNTER — PATIENT OUTREACH (OUTPATIENT)
Dept: ADMINISTRATIVE | Facility: OTHER | Age: 75
End: 2020-12-17

## 2020-12-18 NOTE — PROGRESS NOTES
Patient's chart was reviewed for overdue WHITNEY topics.  Open case request for colonoscopy.  Immunizations reconciled.

## 2020-12-21 NOTE — PROGRESS NOTES
"Mr. Junior is a patient of Dr. Mccauley and was last seen in clinic 11/13/2020.      Subjective:   Patient ID:  Des Junior is a 75 y.o. male who presents for follow-up of Tachycardia  .     HPI:    Mr. Junior is a 75 y.o. male with pAF, HTN, HLD, PE, DM, SVT here for follow up.    Background:    PAF, persistent -> DCCV 4/2018  HTN on meds  HL on meds  unprovoked PE 2018  DM2    He started sotalol in 2018, which converted him from AF to SR. He stopped sotalol months ago as "no one would prescribe it for [him]."  Was undergoing preop risk stratification in cardiology clinic 11/11/20, when ECG showed NCT, short RP. Rx with BB increase, which he's not done yet.    He c/o tired x 1.5 wk, SOB. No palps.   Has sx when he climbs under house to do work. Sitting still, no sx.    My interpretation of today's ECG is NSR with APCs and 1st degree AVB  Rhythm the other day looks quite c/w AVNRT. Hx of PAF. However, today he's in NSR and is unclear whether he feels any different.  Agree with increase in BB.  Zio patch to follow whether arrhythmia recurs and decide if there's any associated sx.  There's no arrhythmic contraindication to his planned knee surgery.    f/u after Zio patch.    Update (12/22/2020):    Monitor 11/13/2020: Impression:  Sinus rhythm with episodes of symptomatic supraventricular tachycardia. Further rhythm classification is complicated by significant signal noise.    Today he had knee replacement surgery 2 weeks ago. Is recovering well. Feels well. Does not feel his usual palpitations he has had in the past. Ready to start PT. Denies CP, worsening VELASCO, LH, syncope.    He is currently taking xarelto 20mg daily for stroke prophylaxis and denies significant bleeding episodes. He is currently being treated with metoprolol succinate 50mg daily for HR control.  Kidney function is stable, with a creatinine of 1.3 on 10/30/2020.    I have personally reviewed the patient's EKG today, which shows coarse AF " with PVCs at 116bpm. QRS is 118. QTc is 528.    Relevant Cardiac Test Results:    2D Echo (9/17/2018):  · The left ventricle cavity is normal.  · Left ventricle ejection fraction is low normal at 53%  · Normal LV diastolic function.  · RV systolic function is normal.  · Tricuspid valve shows mild regurgitation.  · Left atrium is mildly dilated.  · Right atrium is mildly dilated.  · Normal central venous pressure (3 mm Hg).  · The estimated PA systolic pressure is 20.31 mm Hg  · There is improved LV function from Echo in 3/2018    Current Outpatient Medications   Medication Sig    acetaminophen (TYLENOL) 500 MG tablet Take 1,000 mg by mouth daily as needed for Pain.    albuterol (PROVENTIL/VENTOLIN HFA) 90 mcg/actuation inhaler 2 puffs every 4 hours as needed for cough, wheeze, or shortness of breath    amLODIPine (NORVASC) 5 MG tablet Take 1 tablet (5 mg total) by mouth once daily.    blood sugar diagnostic Strp 1 strip by Misc.(Non-Drug; Combo Route) route 2 (two) times daily with meals.    cholecalciferol, vitamin D3, (VITAMIN D3) 1,000 unit capsule Take 1,000 Units by mouth once daily.    fluorouracil (EFUDEX) 5 % cream Use qd-bid to red or rough precancer spots until irritation occurs and then stop.  Resume as needed.    gabapentin (NEURONTIN) 100 MG capsule Take one capsule nightly. Increase to two capsule if no relief. Increase to three capsules if you still have symptoms.    hydroCHLOROthiazide (HYDRODIURIL) 25 MG tablet Take 1 tablet (25 mg total) by mouth once daily.    insulin (LANTUS SOLOSTAR U-100 INSULIN) glargine 100 units/mL (3mL) SubQ pen Inject 9 units every morning and evening. (Patient taking differently: Inject 10 units every morning and evening.)    LANCETS & BLOOD GLUCOSE STRIPS MISC * * * Twice a day .  check glucose twice a day    losartan (COZAAR) 50 MG tablet Take 1 tablet (50 mg total) by mouth once daily.    metFORMIN (GLUCOPHAGE-XR) 500 MG 24 hr tablet Take 2 tablets (1,000  "mg total) by mouth 2 (two) times daily with meals.    metoprolol succinate (TOPROL-XL) 50 MG 24 hr tablet Take 1 tablet (50 mg total) by mouth once daily.    omega-3 fatty acids-vitamin E (OMEGA-3 FISH OIL) 1,000-5 mg-unit Cap Take by mouth 2 (two) times daily. 1 Capsule Oral Every day    omeprazole (PRILOSEC OTC) 20 MG tablet Take by mouth. 1 Tablet, Delayed Release (E.C.) Oral Every day    oxyCODONE (ROXICODONE) 5 MG immediate release tablet Take 1-2 tablets by mouth every 4-6 hours as needed for pain    pen needle, diabetic 29 gauge x 1/2" Ndle Inject insulin daily    pravastatin (PRAVACHOL) 40 MG tablet Take 1 tablet (40 mg total) by mouth once daily.    repaglinide (PRANDIN) 2 MG tablet Take 2 tablets with meals. (Patient taking differently: Take 2 tablets with lunch.)    rivaroxaban (XARELTO) 20 mg Tab TAKE 1 TABLET  MOUTH DAILY WITH  EVENING MEAL.    cetirizine (ZYRTEC) 10 MG tablet Take 1 tablet (10 mg total) by mouth once daily. (Patient not taking: Reported on 11/13/2020)    docusate sodium (COLACE) 100 MG capsule Take 1 capsule (100 mg total) by mouth 2 (two) times daily as needed for Constipation. (Patient not taking: Reported on 12/22/2020)    ketoconazole (NIZORAL) 2 % cream Apply topically once daily. (Patient not taking: Reported on 11/13/2020)    olopatadine (PAZEO) 0.7 % Drop Apply 1 drop to eye once daily. (Patient not taking: Reported on 12/22/2020)     No current facility-administered medications for this visit.      Review of Systems   Constitution: Negative for malaise/fatigue.   Cardiovascular: Positive for palpitations. Negative for chest pain, dyspnea on exertion, irregular heartbeat and leg swelling.   Respiratory: Negative for shortness of breath.    Hematologic/Lymphatic: Negative for bleeding problem.   Skin: Negative for rash.   Musculoskeletal: Negative for myalgias.   Gastrointestinal: Negative for hematemesis, hematochezia and nausea.   Genitourinary: Negative for " "hematuria.   Neurological: Negative for light-headedness.   Psychiatric/Behavioral: Negative for altered mental status.   Allergic/Immunologic: Negative for persistent infections.       Objective:        /70   Pulse 100   Ht 6' 1" (1.854 m)   Wt 109 kg (240 lb 4.8 oz)   BMI 31.70 kg/m²     Physical Exam   Constitutional: He is oriented to person, place, and time. He appears well-developed and well-nourished.   HENT:   Head: Normocephalic.   Nose: Nose normal.   Eyes: Pupils are equal, round, and reactive to light.   Cardiovascular: An irregularly irregular rhythm present. Tachycardia present.   Pulmonary/Chest: Breath sounds normal. No respiratory distress.   Abdominal: Normal appearance.   Musculoskeletal: Normal range of motion.         General: No edema.   Neurological: He is alert and oriented to person, place, and time.   Skin: Skin is warm and dry. No erythema.   Psychiatric: He has a normal mood and affect. His speech is normal and behavior is normal.   Nursing note and vitals reviewed.    Lab Results   Component Value Date     10/30/2020    K 4.2 10/30/2020    MG 1.7 03/02/2018    BUN 24 (H) 10/30/2020    CREATININE 1.3 10/30/2020    ALT 10 04/29/2020    AST 13 04/29/2020    HGB 13.1 (L) 11/11/2020    HCT 37 12/07/2020    TSH 1.237 07/30/2020    LDLCALC 76.2 04/29/2020       Recent Labs   Lab 03/01/18  0910 12/04/18  1616 11/11/20  1220   INR 1.3 H 1.2 1.1         Assessment:     1. SVT (supraventricular tachycardia)    2. PAF (paroxysmal atrial fibrillation), CHADS-VAS score 3, onset 3/2018    3. Current use of long term anticoagulation      Plan:     In summary, Mr. Junior is a 75 y.o. male with pAF, HTN, HLD, PE, DM, SVT here for follow up.  Symptomatic SVT identified on event monitor. Case previously discussed with Dr. Mccauley. Patient is a candidate for ablation. . Recently had knee surgery 2 weeks ago. We discussed risks, benefits, and alternatives. He would like to proceed with SVT " ablation but would like to finish PT 1st.   EKG today looks like coarse AF. Unclear if persistent, as he does not have noticeable symptoms. Will obtain Holter. In interim, will increase metoprolol for more optimal rate control. He is on xarelto for CVA prophylaxis.    Increase metoprolol to 75mg daily.  Holter. If persistent -> DCCV.  RTC as scheduled following procedure    *A copy of this note has been sent to Dr. Mccauley*    Follow up as scheduled following procedure.    ------------------------------------------------------------------    CHRIS Aldridge, NP-C  Cardiac Electrophysiology

## 2020-12-22 ENCOUNTER — OFFICE VISIT (OUTPATIENT)
Dept: ELECTROPHYSIOLOGY | Facility: CLINIC | Age: 75
End: 2020-12-22
Payer: MEDICARE

## 2020-12-22 ENCOUNTER — HOSPITAL ENCOUNTER (OUTPATIENT)
Dept: CARDIOLOGY | Facility: CLINIC | Age: 75
Discharge: HOME | End: 2020-12-22
Payer: MEDICARE

## 2020-12-22 ENCOUNTER — OFFICE VISIT (OUTPATIENT)
Dept: ORTHOPEDICS | Facility: CLINIC | Age: 75
End: 2020-12-22
Payer: MEDICARE

## 2020-12-22 VITALS
HEIGHT: 73 IN | WEIGHT: 240.31 LBS | HEART RATE: 100 BPM | SYSTOLIC BLOOD PRESSURE: 132 MMHG | DIASTOLIC BLOOD PRESSURE: 70 MMHG | BODY MASS INDEX: 31.85 KG/M2

## 2020-12-22 DIAGNOSIS — I47.10 SVT (SUPRAVENTRICULAR TACHYCARDIA): Primary | ICD-10-CM

## 2020-12-22 DIAGNOSIS — Z96.651 STATUS POST RIGHT KNEE REPLACEMENT: Primary | ICD-10-CM

## 2020-12-22 DIAGNOSIS — I48.0 PAF (PAROXYSMAL ATRIAL FIBRILLATION): ICD-10-CM

## 2020-12-22 DIAGNOSIS — Z79.01 CURRENT USE OF LONG TERM ANTICOAGULATION: ICD-10-CM

## 2020-12-22 DIAGNOSIS — I49.8 OTHER SPECIFIED CARDIAC ARRHYTHMIAS: ICD-10-CM

## 2020-12-22 PROCEDURE — 99214 OFFICE O/P EST MOD 30 MIN: CPT | Mod: S$PBB,,, | Performed by: NURSE PRACTITIONER

## 2020-12-22 PROCEDURE — 99999 PR PBB SHADOW E&M-EST. PATIENT-LVL IV: ICD-10-PCS | Mod: PBBFAC,,, | Performed by: NURSE PRACTITIONER

## 2020-12-22 PROCEDURE — 93010 ELECTROCARDIOGRAM REPORT: CPT | Mod: S$PBB,,, | Performed by: INTERNAL MEDICINE

## 2020-12-22 PROCEDURE — 99214 OFFICE O/P EST MOD 30 MIN: CPT | Mod: PBBFAC,25 | Performed by: NURSE PRACTITIONER

## 2020-12-22 PROCEDURE — 99999 PR PBB SHADOW E&M-EST. PATIENT-LVL V: ICD-10-PCS | Mod: PBBFAC,,, | Performed by: NURSE PRACTITIONER

## 2020-12-22 PROCEDURE — 93005 ELECTROCARDIOGRAM TRACING: CPT | Mod: PBBFAC | Performed by: INTERNAL MEDICINE

## 2020-12-22 PROCEDURE — 99215 OFFICE O/P EST HI 40 MIN: CPT | Mod: PBBFAC,27 | Performed by: NURSE PRACTITIONER

## 2020-12-22 PROCEDURE — 99214 PR OFFICE/OUTPT VISIT, EST, LEVL IV, 30-39 MIN: ICD-10-PCS | Mod: S$PBB,,, | Performed by: NURSE PRACTITIONER

## 2020-12-22 PROCEDURE — 99024 POSTOP FOLLOW-UP VISIT: CPT | Mod: POP,,, | Performed by: NURSE PRACTITIONER

## 2020-12-22 PROCEDURE — 99999 PR PBB SHADOW E&M-EST. PATIENT-LVL V: CPT | Mod: PBBFAC,,, | Performed by: NURSE PRACTITIONER

## 2020-12-22 PROCEDURE — 99024 PR POST-OP FOLLOW-UP VISIT: ICD-10-PCS | Mod: POP,,, | Performed by: NURSE PRACTITIONER

## 2020-12-22 PROCEDURE — 99999 PR PBB SHADOW E&M-EST. PATIENT-LVL IV: CPT | Mod: PBBFAC,,, | Performed by: NURSE PRACTITIONER

## 2020-12-22 PROCEDURE — 93010 RHYTHM STRIP: ICD-10-PCS | Mod: S$PBB,,, | Performed by: INTERNAL MEDICINE

## 2020-12-22 RX ORDER — METOPROLOL SUCCINATE 50 MG/1
75 TABLET, EXTENDED RELEASE ORAL DAILY
Qty: 90 TABLET | Refills: 3 | Status: SHIPPED | OUTPATIENT
Start: 2020-12-22 | End: 2021-08-20

## 2020-12-22 RX ORDER — OXYCODONE HYDROCHLORIDE 5 MG/1
TABLET ORAL
Qty: 30 TABLET | Refills: 0 | Status: ON HOLD | OUTPATIENT
Start: 2020-12-22 | End: 2021-03-05 | Stop reason: HOSPADM

## 2020-12-22 NOTE — PROGRESS NOTES
Des Patel Vernon presents for initial post-operative visit following a right total knee arthroplasty performed by Dr. Gore on 12/7/2020.      Exam:    Ambulating well with assistive device.  Incision is clean and dry without drainage or erythema.   ROM:-10-90    Initial post-operative radiographs reviewed today revealing a well fixed and aligned prosthesis.    A/P:  2 weeks s/p right total knee replacement  - The patient was advised to keep the incision clean and dry for the next 24 hours after which he may wash the area with antibacterial soap in the shower. Will not submerge until the incision is completely healed.   - Outpatient PT ongoing with home health. [facility] in [place] for outpatient PT  - Continue xarelto as prescribed  - Pain medication refilled  - Reviewed antibiotic prophylaxis   - Follow up in 4 weeks with new x-rays. Pt will call clinic with problems/concerns.

## 2020-12-22 NOTE — PATIENT INSTRUCTIONS
Having Catheter Ablation  A heart rhythm problem (arrhythmia) can make your heart beat too fast or in an irregular pattern. The problem is often caused by cells in your heart that arent working as they should. Or, it may be the result of an abnormal electric circuit. It may cause bothersome symptoms, such as an irregular heartbeat, dizziness, shortness of breath, chest pain, or fainting. Your doctor has recommended catheter ablation to treat your arrhythmia. This non-surgical procedure destroys the cells that are causing the problem.  Before the procedure    Before your catheter ablation, you will meet with a specially trained heart doctor (cardiac electrophysiologist) who will do the procedure. He or she will tell you how to get ready. You will likely be told to stop or change your heart rhythm medicines for a period of time before the procedure. Follow your doctors instructions. Also:  · Tell the doctor about all prescription and over-the-counter medicines you take. This includes herbs, supplements, and vitamins. It also includes daily medicines, such as insulin or blood thinners. If you are allergic to any medicines, tell the doctor.  · Have any routine tests, such as blood tests, as recommended.  · Dont eat or drink anything 12 hours before the procedure.  How catheter ablation is done  Catheter ablation uses thin, flexible wires (electrode catheters) to find and destroy (ablate) problem cells. Heres how the procedure is done:  · The hearts signals are mapped. To find the problem, an electrophysiology study (EPS) is done. During this study, the doctor tries to start (induce) your arrhythmia. An electrical map of the heart is then created. This shows the type of arrhythmia you have and where the problem is. Using the map as a guide, the doctor knows where to ablate.  · Problem areas are destroyed using heat or cold therapy. Once the EPS shows where the problem is, the doctor threads an electrode  catheter through a blood vessel to that area in the heart. Energy is sent through the catheter to destroy the problem cells.  · The hearts rhythm is tested again. After ablating the problem cells, the doctor tries to restart (reinduce) your arrhythmia. If a fast rhythm cant be induced, the ablation is a success. But if a fast rhythm does start again, you may need more ablation.  Your experience during catheter ablation  In most cases, catheter ablation is done in an electrophysiology (EP) lab. It often takes 2 to 4 hours, and sometimes longer. Youll receive medicine to prevent pain. Medicine will also help you relax or sleep during the procedure. If you feel uncomfortable during the procedure, tell the doctor or nurse:  · Getting started. The healthcare team washes the skin on your groin (or rarely, the neck). Any hair in that area may be removed. This is where the catheters will be inserted. An IV (intravenous) line is started in your arm. Medicines and fluids are given through this IV. To help keep the insertion site germ-free (sterile), your body is draped with sheets. Only the area where the catheters will be inserted is exposed.  · Inserting the catheters. The healthcare provider numbs the skin where the catheters will be inserted with pain medicine (local anesthetic). Then the provider uses a small needle to make punctures in your vein or artery. He or she puts catheters through these punctures and guides them to your heart. The provider uses X-ray monitors to help guide the catheters.  · The provider then puts wires in several places in the heart to map the electrical signals. The wires also stimulate the heart.  · Finishing up. When the procedure is finished, the provider takes the catheters out of your body. He or she puts pressure on the puncture sites to stop any bleeding. No stitches are needed. Youre then taken to a recovery room to rest. You'll need to remain lying down for 2 to 6 hours. You'll  also be asked not to move the leg where the catheters were inserted for a few hours. This is to make sure the insertion sites don't bleed.  Risks and complications  The risks of catheter ablation are fairly low compared to the benefits you receive. Discuss these risks with your doctor before the procedure. Possible risks and complications include:  · Bleeding or bruising at the catheter insertion site  · Blood clots  · A slow heart rhythm (requiring a permanent pacemaker)  · Perforation of the heart muscle, blood vessel, or lung (may require an emergency procedure)  · Damage to a heart valve (rare)  · Stroke or heart attack, also known as acute myocardial infarction, or AMI (rare)  · Infection, which is a risk after any invasive procedure. This may be indicated by a fever of 100.4°F (38.0°C) or higher, drainage, or redness and pain at the catheter insertion site.  · Death (extremely rare)   Date Last Reviewed: 5/1/2016  © 3890-1422 [employer]. [address] [place] [address], [place] [address]. All rights reserved. This information is not intended as a substitute for professional medical care. Always follow your healthcare professional's instructions.

## 2020-12-29 ENCOUNTER — PATIENT MESSAGE (OUTPATIENT)
Dept: INTERNAL MEDICINE | Facility: CLINIC | Age: 75
End: 2020-12-29

## 2021-01-05 ENCOUNTER — PATIENT MESSAGE (OUTPATIENT)
Dept: INTERNAL MEDICINE | Facility: CLINIC | Age: 76
End: 2021-01-05

## 2021-01-05 RX ORDER — COLCHICINE 0.6 MG/1
0.6 TABLET ORAL 2 TIMES DAILY
Qty: 30 TABLET | Refills: 0 | Status: SHIPPED | OUTPATIENT
Start: 2021-01-05 | End: 2021-02-01

## 2021-01-06 DIAGNOSIS — I10 ESSENTIAL HYPERTENSION: ICD-10-CM

## 2021-01-07 RX ORDER — HYDROCHLOROTHIAZIDE 25 MG/1
12.5 TABLET ORAL DAILY
Qty: 90 TABLET | Refills: 11 | Status: SHIPPED | OUTPATIENT
Start: 2021-01-07 | End: 2022-04-06

## 2021-01-13 ENCOUNTER — PATIENT MESSAGE (OUTPATIENT)
Dept: INTERNAL MEDICINE | Facility: CLINIC | Age: 76
End: 2021-01-13

## 2021-01-18 RX ORDER — METFORMIN HYDROCHLORIDE 500 MG/1
1000 TABLET, EXTENDED RELEASE ORAL 2 TIMES DAILY WITH MEALS
Qty: 360 TABLET | Refills: 11 | Status: SHIPPED | OUTPATIENT
Start: 2021-01-18 | End: 2022-04-06

## 2021-01-19 ENCOUNTER — OFFICE VISIT (OUTPATIENT)
Dept: ORTHOPEDICS | Facility: CLINIC | Age: 76
End: 2021-01-19
Payer: MEDICARE

## 2021-01-19 ENCOUNTER — HOSPITAL ENCOUNTER (OUTPATIENT)
Dept: RADIOLOGY | Facility: HOSPITAL | Age: 76
Discharge: HOME | End: 2021-01-19
Attending: ORTHOPAEDIC SURGERY
Payer: MEDICARE

## 2021-01-19 VITALS — BODY MASS INDEX: 31.95 KG/M2 | WEIGHT: 241.06 LBS | HEIGHT: 73 IN

## 2021-01-19 DIAGNOSIS — Z96.651 STATUS POST RIGHT KNEE REPLACEMENT: ICD-10-CM

## 2021-01-19 DIAGNOSIS — Z96.651 STATUS POST RIGHT KNEE REPLACEMENT: Primary | ICD-10-CM

## 2021-01-19 PROCEDURE — 73562 X-RAY EXAM OF KNEE 3: CPT | Mod: 26,RT,, | Performed by: RADIOLOGY

## 2021-01-19 PROCEDURE — 99214 OFFICE O/P EST MOD 30 MIN: CPT | Mod: PBBFAC,25 | Performed by: ORTHOPAEDIC SURGERY

## 2021-01-19 PROCEDURE — 99999 PR PBB SHADOW E&M-EST. PATIENT-LVL IV: CPT | Mod: PBBFAC,,, | Performed by: ORTHOPAEDIC SURGERY

## 2021-01-19 PROCEDURE — 99024 PR POST-OP FOLLOW-UP VISIT: ICD-10-PCS | Mod: POP,,, | Performed by: ORTHOPAEDIC SURGERY

## 2021-01-19 PROCEDURE — 73562 XR KNEE 3 VIEW RIGHT: ICD-10-PCS | Mod: 26,RT,, | Performed by: RADIOLOGY

## 2021-01-19 PROCEDURE — 73562 X-RAY EXAM OF KNEE 3: CPT | Mod: TC,RT

## 2021-01-19 PROCEDURE — 99999 PR PBB SHADOW E&M-EST. PATIENT-LVL IV: ICD-10-PCS | Mod: PBBFAC,,, | Performed by: ORTHOPAEDIC SURGERY

## 2021-01-19 PROCEDURE — 99024 POSTOP FOLLOW-UP VISIT: CPT | Mod: POP,,, | Performed by: ORTHOPAEDIC SURGERY

## 2021-01-22 ENCOUNTER — PATIENT MESSAGE (OUTPATIENT)
Dept: ADMINISTRATIVE | Facility: OTHER | Age: 76
End: 2021-01-22

## 2021-02-01 ENCOUNTER — CLINICAL SUPPORT (OUTPATIENT)
Dept: CARDIOLOGY | Facility: HOSPITAL | Age: 76
End: 2021-02-01
Attending: NURSE PRACTITIONER
Payer: MEDICARE

## 2021-02-01 ENCOUNTER — OFFICE VISIT (OUTPATIENT)
Dept: INTERNAL MEDICINE | Facility: CLINIC | Age: 76
End: 2021-02-01
Payer: MEDICARE

## 2021-02-01 VITALS
BODY MASS INDEX: 33.54 KG/M2 | WEIGHT: 254.19 LBS | DIASTOLIC BLOOD PRESSURE: 80 MMHG | SYSTOLIC BLOOD PRESSURE: 130 MMHG

## 2021-02-01 DIAGNOSIS — I10 ESSENTIAL HYPERTENSION: ICD-10-CM

## 2021-02-01 DIAGNOSIS — M10.9 GOUT, ARTHRITIS: ICD-10-CM

## 2021-02-01 DIAGNOSIS — E11.40 TYPE 2 DIABETES, CONTROLLED, WITH NEUROPATHY: Primary | ICD-10-CM

## 2021-02-01 DIAGNOSIS — I48.0 PAF (PAROXYSMAL ATRIAL FIBRILLATION): ICD-10-CM

## 2021-02-01 DIAGNOSIS — Z12.5 SCREENING PSA (PROSTATE SPECIFIC ANTIGEN): ICD-10-CM

## 2021-02-01 DIAGNOSIS — N52.01 ERECTILE DYSFUNCTION DUE TO ARTERIAL INSUFFICIENCY: ICD-10-CM

## 2021-02-01 DIAGNOSIS — N18.31 STAGE 3A CHRONIC KIDNEY DISEASE: ICD-10-CM

## 2021-02-01 PROCEDURE — 99999 PR PBB SHADOW E&M-EST. PATIENT-LVL III: CPT | Mod: PBBFAC,,, | Performed by: INTERNAL MEDICINE

## 2021-02-01 PROCEDURE — 93227 HOLTER MONITOR - 48 HOUR (CUPID ONLY): ICD-10-PCS | Mod: ,,, | Performed by: INTERNAL MEDICINE

## 2021-02-01 PROCEDURE — 99214 PR OFFICE/OUTPT VISIT, EST, LEVL IV, 30-39 MIN: ICD-10-PCS | Mod: S$PBB,,, | Performed by: INTERNAL MEDICINE

## 2021-02-01 PROCEDURE — 99214 OFFICE O/P EST MOD 30 MIN: CPT | Mod: S$PBB,,, | Performed by: INTERNAL MEDICINE

## 2021-02-01 PROCEDURE — 93227 XTRNL ECG REC<48 HR R&I: CPT | Mod: ,,, | Performed by: INTERNAL MEDICINE

## 2021-02-01 PROCEDURE — 99213 OFFICE O/P EST LOW 20 MIN: CPT | Mod: PBBFAC,25 | Performed by: INTERNAL MEDICINE

## 2021-02-01 PROCEDURE — 99999 PR PBB SHADOW E&M-EST. PATIENT-LVL III: ICD-10-PCS | Mod: PBBFAC,,, | Performed by: INTERNAL MEDICINE

## 2021-02-01 PROCEDURE — 93225 XTRNL ECG REC<48 HRS REC: CPT

## 2021-02-01 RX ORDER — INSULIN GLARGINE 100 [IU]/ML
20 INJECTION, SOLUTION SUBCUTANEOUS NIGHTLY
Qty: 16 ML | Refills: 3
Start: 2021-02-01 | End: 2021-06-25

## 2021-02-01 RX ORDER — COLCHICINE 0.6 MG/1
0.6 TABLET ORAL DAILY
Qty: 30 TABLET | Refills: 0 | Status: SHIPPED | OUTPATIENT
Start: 2021-02-01 | End: 2021-07-02

## 2021-02-03 ENCOUNTER — IMMUNIZATION (OUTPATIENT)
Dept: PRIMARY CARE CLINIC | Facility: CLINIC | Age: 76
End: 2021-02-03
Payer: MEDICARE

## 2021-02-03 DIAGNOSIS — Z23 NEED FOR VACCINATION: Primary | ICD-10-CM

## 2021-02-03 PROBLEM — J47.9 BRONCHIECTASIS WITHOUT COMPLICATION: Status: RESOLVED | Noted: 2019-02-25 | Resolved: 2021-02-03

## 2021-02-03 PROBLEM — R05.9 COUGH: Status: RESOLVED | Noted: 2019-02-25 | Resolved: 2021-02-03

## 2021-02-03 PROCEDURE — 91300 COVID-19, MRNA, LNP-S, PF, 30 MCG/0.3 ML DOSE VACCINE: ICD-10-PCS | Mod: S$GLB,,, | Performed by: FAMILY MEDICINE

## 2021-02-03 PROCEDURE — 0001A COVID-19, MRNA, LNP-S, PF, 30 MCG/0.3 ML DOSE VACCINE: ICD-10-PCS | Mod: S$GLB,,, | Performed by: FAMILY MEDICINE

## 2021-02-03 PROCEDURE — 0001A COVID-19, MRNA, LNP-S, PF, 30 MCG/0.3 ML DOSE VACCINE: CPT | Mod: S$GLB,,, | Performed by: FAMILY MEDICINE

## 2021-02-03 PROCEDURE — 91300 COVID-19, MRNA, LNP-S, PF, 30 MCG/0.3 ML DOSE VACCINE: CPT | Mod: S$GLB,,, | Performed by: FAMILY MEDICINE

## 2021-02-05 LAB
OHS CV EVENT MONITOR DAY: 0
OHS CV HOLTER LENGTH DECIMAL HOURS: 48
OHS CV HOLTER LENGTH HOURS: 48
OHS CV HOLTER LENGTH MINUTES: 0

## 2021-02-08 ENCOUNTER — TELEPHONE (OUTPATIENT)
Dept: ELECTROPHYSIOLOGY | Facility: CLINIC | Age: 76
End: 2021-02-08

## 2021-02-09 DIAGNOSIS — I10 ESSENTIAL HYPERTENSION: ICD-10-CM

## 2021-02-09 RX ORDER — LOSARTAN POTASSIUM 50 MG/1
50 TABLET ORAL DAILY
Qty: 90 TABLET | Refills: 11 | Status: SHIPPED | OUTPATIENT
Start: 2021-02-09 | End: 2022-03-11 | Stop reason: SDUPTHER

## 2021-02-11 ENCOUNTER — HOSPITAL ENCOUNTER (OUTPATIENT)
Dept: CARDIOLOGY | Facility: CLINIC | Age: 76
Discharge: HOME | End: 2021-02-11
Payer: MEDICARE

## 2021-02-11 ENCOUNTER — OFFICE VISIT (OUTPATIENT)
Dept: ELECTROPHYSIOLOGY | Facility: CLINIC | Age: 76
End: 2021-02-11
Payer: MEDICARE

## 2021-02-11 VITALS
WEIGHT: 248.25 LBS | HEIGHT: 73 IN | HEART RATE: 104 BPM | DIASTOLIC BLOOD PRESSURE: 76 MMHG | BODY MASS INDEX: 32.9 KG/M2 | SYSTOLIC BLOOD PRESSURE: 154 MMHG

## 2021-02-11 DIAGNOSIS — I48.0 PAF (PAROXYSMAL ATRIAL FIBRILLATION): Primary | ICD-10-CM

## 2021-02-11 DIAGNOSIS — I47.10 SVT (SUPRAVENTRICULAR TACHYCARDIA): ICD-10-CM

## 2021-02-11 DIAGNOSIS — I49.8 OTHER SPECIFIED CARDIAC ARRHYTHMIAS: ICD-10-CM

## 2021-02-11 DIAGNOSIS — Z79.01 CURRENT USE OF LONG TERM ANTICOAGULATION: ICD-10-CM

## 2021-02-11 PROCEDURE — 99999 PR PBB SHADOW E&M-EST. PATIENT-LVL IV: CPT | Mod: PBBFAC,,, | Performed by: NURSE PRACTITIONER

## 2021-02-11 PROCEDURE — 99999 PR PBB SHADOW E&M-EST. PATIENT-LVL IV: ICD-10-PCS | Mod: PBBFAC,,, | Performed by: NURSE PRACTITIONER

## 2021-02-11 PROCEDURE — 93005 ELECTROCARDIOGRAM TRACING: CPT | Mod: PBBFAC | Performed by: INTERNAL MEDICINE

## 2021-02-11 PROCEDURE — 99214 OFFICE O/P EST MOD 30 MIN: CPT | Mod: S$PBB,,, | Performed by: NURSE PRACTITIONER

## 2021-02-11 PROCEDURE — 93010 ELECTROCARDIOGRAM REPORT: CPT | Mod: S$PBB,,, | Performed by: INTERNAL MEDICINE

## 2021-02-11 PROCEDURE — 99214 PR OFFICE/OUTPT VISIT, EST, LEVL IV, 30-39 MIN: ICD-10-PCS | Mod: S$PBB,,, | Performed by: NURSE PRACTITIONER

## 2021-02-11 PROCEDURE — 99214 OFFICE O/P EST MOD 30 MIN: CPT | Mod: PBBFAC,25 | Performed by: NURSE PRACTITIONER

## 2021-02-11 PROCEDURE — 93010 RHYTHM STRIP: ICD-10-PCS | Mod: S$PBB,,, | Performed by: INTERNAL MEDICINE

## 2021-02-19 ENCOUNTER — PATIENT OUTREACH (OUTPATIENT)
Dept: ADMINISTRATIVE | Facility: OTHER | Age: 76
End: 2021-02-19

## 2021-02-22 ENCOUNTER — OFFICE VISIT (OUTPATIENT)
Dept: CARDIOLOGY | Facility: CLINIC | Age: 76
End: 2021-02-22
Payer: MEDICARE

## 2021-02-22 VITALS
BODY MASS INDEX: 33.36 KG/M2 | HEART RATE: 92 BPM | DIASTOLIC BLOOD PRESSURE: 64 MMHG | SYSTOLIC BLOOD PRESSURE: 138 MMHG | WEIGHT: 251.75 LBS | HEIGHT: 73 IN

## 2021-02-22 DIAGNOSIS — I47.10 SVT (SUPRAVENTRICULAR TACHYCARDIA): ICD-10-CM

## 2021-02-22 DIAGNOSIS — I48.0 PAF (PAROXYSMAL ATRIAL FIBRILLATION): Primary | ICD-10-CM

## 2021-02-22 DIAGNOSIS — E11.65 TYPE 2 DIABETES MELLITUS WITH HYPERGLYCEMIA, WITHOUT LONG-TERM CURRENT USE OF INSULIN: ICD-10-CM

## 2021-02-22 DIAGNOSIS — N18.31 STAGE 3A CHRONIC KIDNEY DISEASE: ICD-10-CM

## 2021-02-22 DIAGNOSIS — I15.2 HYPERTENSION ASSOCIATED WITH DIABETES: ICD-10-CM

## 2021-02-22 DIAGNOSIS — Z79.01 CURRENT USE OF LONG TERM ANTICOAGULATION: ICD-10-CM

## 2021-02-22 DIAGNOSIS — E11.69 HYPERLIPIDEMIA ASSOCIATED WITH TYPE 2 DIABETES MELLITUS: ICD-10-CM

## 2021-02-22 DIAGNOSIS — I51.9 ASYMPTOMATIC LV DYSFUNCTION: ICD-10-CM

## 2021-02-22 DIAGNOSIS — E78.5 HYPERLIPIDEMIA ASSOCIATED WITH TYPE 2 DIABETES MELLITUS: ICD-10-CM

## 2021-02-22 DIAGNOSIS — E11.59 HYPERTENSION ASSOCIATED WITH DIABETES: ICD-10-CM

## 2021-02-22 DIAGNOSIS — Z86.711 HISTORY OF PULMONARY EMBOLISM: ICD-10-CM

## 2021-02-22 PROCEDURE — 99214 OFFICE O/P EST MOD 30 MIN: CPT | Mod: PBBFAC | Performed by: INTERNAL MEDICINE

## 2021-02-22 PROCEDURE — 99999 PR PBB SHADOW E&M-EST. PATIENT-LVL IV: CPT | Mod: PBBFAC,,, | Performed by: INTERNAL MEDICINE

## 2021-02-22 PROCEDURE — 99214 OFFICE O/P EST MOD 30 MIN: CPT | Mod: S$PBB,,, | Performed by: INTERNAL MEDICINE

## 2021-02-22 PROCEDURE — 99999 PR PBB SHADOW E&M-EST. PATIENT-LVL IV: ICD-10-PCS | Mod: PBBFAC,,, | Performed by: INTERNAL MEDICINE

## 2021-02-22 PROCEDURE — 99214 PR OFFICE/OUTPT VISIT, EST, LEVL IV, 30-39 MIN: ICD-10-PCS | Mod: S$PBB,,, | Performed by: INTERNAL MEDICINE

## 2021-02-23 DIAGNOSIS — I48.0 PAF (PAROXYSMAL ATRIAL FIBRILLATION): ICD-10-CM

## 2021-02-23 RX ORDER — METOPROLOL SUCCINATE 50 MG/1
75 TABLET, EXTENDED RELEASE ORAL DAILY
Qty: 90 TABLET | Refills: 3 | OUTPATIENT
Start: 2021-02-23

## 2021-02-24 ENCOUNTER — IMMUNIZATION (OUTPATIENT)
Dept: PRIMARY CARE CLINIC | Facility: CLINIC | Age: 76
End: 2021-02-24
Payer: MEDICARE

## 2021-02-24 DIAGNOSIS — Z23 NEED FOR VACCINATION: Primary | ICD-10-CM

## 2021-02-24 PROCEDURE — 0002A COVID-19, MRNA, LNP-S, PF, 30 MCG/0.3 ML DOSE VACCINE: ICD-10-PCS | Mod: S$GLB,,, | Performed by: FAMILY MEDICINE

## 2021-02-24 PROCEDURE — 91300 COVID-19, MRNA, LNP-S, PF, 30 MCG/0.3 ML DOSE VACCINE: CPT | Mod: S$GLB,,, | Performed by: FAMILY MEDICINE

## 2021-02-24 PROCEDURE — 91300 COVID-19, MRNA, LNP-S, PF, 30 MCG/0.3 ML DOSE VACCINE: ICD-10-PCS | Mod: S$GLB,,, | Performed by: FAMILY MEDICINE

## 2021-02-24 PROCEDURE — 0002A COVID-19, MRNA, LNP-S, PF, 30 MCG/0.3 ML DOSE VACCINE: CPT | Mod: S$GLB,,, | Performed by: FAMILY MEDICINE

## 2021-03-01 DIAGNOSIS — Z96.651 STATUS POST RIGHT KNEE REPLACEMENT: Primary | ICD-10-CM

## 2021-03-02 ENCOUNTER — OFFICE VISIT (OUTPATIENT)
Dept: ORTHOPEDICS | Facility: CLINIC | Age: 76
End: 2021-03-02
Payer: MEDICARE

## 2021-03-02 ENCOUNTER — PATIENT MESSAGE (OUTPATIENT)
Dept: ELECTROPHYSIOLOGY | Facility: CLINIC | Age: 76
End: 2021-03-02

## 2021-03-02 ENCOUNTER — HOSPITAL ENCOUNTER (OUTPATIENT)
Dept: RADIOLOGY | Facility: HOSPITAL | Age: 76
Discharge: HOME | End: 2021-03-02
Attending: ORTHOPAEDIC SURGERY
Payer: MEDICARE

## 2021-03-02 VITALS — HEIGHT: 73 IN | BODY MASS INDEX: 33.36 KG/M2 | WEIGHT: 251.75 LBS

## 2021-03-02 DIAGNOSIS — Z96.651 STATUS POST RIGHT KNEE REPLACEMENT: ICD-10-CM

## 2021-03-02 DIAGNOSIS — Z96.651 STATUS POST RIGHT KNEE REPLACEMENT: Primary | ICD-10-CM

## 2021-03-02 DIAGNOSIS — I48.19 PERSISTENT ATRIAL FIBRILLATION: Primary | ICD-10-CM

## 2021-03-02 PROCEDURE — 99024 POSTOP FOLLOW-UP VISIT: CPT | Mod: POP,,, | Performed by: ORTHOPAEDIC SURGERY

## 2021-03-02 PROCEDURE — 73560 X-RAY EXAM OF KNEE 1 OR 2: CPT | Mod: TC,RT

## 2021-03-02 PROCEDURE — 99999 PR PBB SHADOW E&M-EST. PATIENT-LVL III: ICD-10-PCS | Mod: PBBFAC,,, | Performed by: ORTHOPAEDIC SURGERY

## 2021-03-02 PROCEDURE — 73560 XR KNEE 1 OR 2 VIEW RIGHT: ICD-10-PCS | Mod: 26,RT,, | Performed by: RADIOLOGY

## 2021-03-02 PROCEDURE — 73560 X-RAY EXAM OF KNEE 1 OR 2: CPT | Mod: 26,RT,, | Performed by: RADIOLOGY

## 2021-03-02 PROCEDURE — 99999 PR PBB SHADOW E&M-EST. PATIENT-LVL III: CPT | Mod: PBBFAC,,, | Performed by: ORTHOPAEDIC SURGERY

## 2021-03-02 PROCEDURE — 99213 OFFICE O/P EST LOW 20 MIN: CPT | Mod: PBBFAC,25 | Performed by: ORTHOPAEDIC SURGERY

## 2021-03-02 PROCEDURE — 99024 PR POST-OP FOLLOW-UP VISIT: ICD-10-PCS | Mod: POP,,, | Performed by: ORTHOPAEDIC SURGERY

## 2021-03-03 ENCOUNTER — LAB VISIT (OUTPATIENT)
Dept: LAB | Facility: HOSPITAL | Age: 76
End: 2021-03-03
Attending: INTERNAL MEDICINE
Payer: MEDICARE

## 2021-03-03 DIAGNOSIS — Z01.818 PRE-OP TESTING: ICD-10-CM

## 2021-03-03 DIAGNOSIS — I48.19 PERSISTENT ATRIAL FIBRILLATION: ICD-10-CM

## 2021-03-03 LAB
ANION GAP SERPL CALC-SCNC: 12 MMOL/L (ref 8–16)
APTT BLDCRRT: 29.6 SEC (ref 21–32)
BASOPHILS # BLD AUTO: 0.02 K/UL (ref 0–0.2)
BASOPHILS NFR BLD: 0.2 % (ref 0–1.9)
BUN SERPL-MCNC: 23 MG/DL (ref 8–23)
CALCIUM SERPL-MCNC: 9.2 MG/DL (ref 8.7–10.5)
CHLORIDE SERPL-SCNC: 101 MMOL/L (ref 95–110)
CO2 SERPL-SCNC: 26 MMOL/L (ref 23–29)
CREAT SERPL-MCNC: 1.3 MG/DL (ref 0.5–1.4)
DIFFERENTIAL METHOD: ABNORMAL
EOSINOPHIL # BLD AUTO: 0.3 K/UL (ref 0–0.5)
EOSINOPHIL NFR BLD: 3.6 % (ref 0–8)
ERYTHROCYTE [DISTWIDTH] IN BLOOD BY AUTOMATED COUNT: 13.4 % (ref 11.5–14.5)
EST. GFR  (AFRICAN AMERICAN): >60 ML/MIN/1.73 M^2
EST. GFR  (NON AFRICAN AMERICAN): 54 ML/MIN/1.73 M^2
GLUCOSE SERPL-MCNC: 167 MG/DL (ref 70–110)
HCT VFR BLD AUTO: 39.8 % (ref 40–54)
HGB BLD-MCNC: 12.5 G/DL (ref 14–18)
IMM GRANULOCYTES # BLD AUTO: 0.03 K/UL (ref 0–0.04)
IMM GRANULOCYTES NFR BLD AUTO: 0.3 % (ref 0–0.5)
INR PPP: 1.1 (ref 0.8–1.2)
LYMPHOCYTES # BLD AUTO: 1.9 K/UL (ref 1–4.8)
LYMPHOCYTES NFR BLD: 20.1 % (ref 18–48)
MCH RBC QN AUTO: 29.6 PG (ref 27–31)
MCHC RBC AUTO-ENTMCNC: 31.4 G/DL (ref 32–36)
MCV RBC AUTO: 94 FL (ref 82–98)
MONOCYTES # BLD AUTO: 0.5 K/UL (ref 0.3–1)
MONOCYTES NFR BLD: 5.7 % (ref 4–15)
NEUTROPHILS # BLD AUTO: 6.6 K/UL (ref 1.8–7.7)
NEUTROPHILS NFR BLD: 70.1 % (ref 38–73)
NRBC BLD-RTO: 0 /100 WBC
PLATELET # BLD AUTO: 265 K/UL (ref 150–350)
PMV BLD AUTO: 11.7 FL (ref 9.2–12.9)
POTASSIUM SERPL-SCNC: 5.1 MMOL/L (ref 3.5–5.1)
PROTHROMBIN TIME: 11.2 SEC (ref 9–12.5)
RBC # BLD AUTO: 4.22 M/UL (ref 4.6–6.2)
SODIUM SERPL-SCNC: 139 MMOL/L (ref 136–145)
WBC # BLD AUTO: 9.46 K/UL (ref 3.9–12.7)

## 2021-03-03 PROCEDURE — 85730 THROMBOPLASTIN TIME PARTIAL: CPT | Performed by: INTERNAL MEDICINE

## 2021-03-03 PROCEDURE — 36415 COLL VENOUS BLD VENIPUNCTURE: CPT | Performed by: INTERNAL MEDICINE

## 2021-03-03 PROCEDURE — U0003 INFECTIOUS AGENT DETECTION BY NUCLEIC ACID (DNA OR RNA); SEVERE ACUTE RESPIRATORY SYNDROME CORONAVIRUS 2 (SARS-COV-2) (CORONAVIRUS DISEASE [COVID-19]), AMPLIFIED PROBE TECHNIQUE, MAKING USE OF HIGH THROUGHPUT TECHNOLOGIES AS DESCRIBED BY CMS-2020-01-R: HCPCS | Performed by: INTERNAL MEDICINE

## 2021-03-03 PROCEDURE — 85025 COMPLETE CBC W/AUTO DIFF WBC: CPT | Performed by: INTERNAL MEDICINE

## 2021-03-03 PROCEDURE — 80048 BASIC METABOLIC PNL TOTAL CA: CPT | Performed by: INTERNAL MEDICINE

## 2021-03-03 PROCEDURE — U0005 INFEC AGEN DETEC AMPLI PROBE: HCPCS | Performed by: INTERNAL MEDICINE

## 2021-03-03 PROCEDURE — 85610 PROTHROMBIN TIME: CPT | Performed by: INTERNAL MEDICINE

## 2021-03-04 ENCOUNTER — TELEPHONE (OUTPATIENT)
Dept: ELECTROPHYSIOLOGY | Facility: CLINIC | Age: 76
End: 2021-03-04

## 2021-03-04 LAB — SARS-COV-2 RNA RESP QL NAA+PROBE: NOT DETECTED

## 2021-03-05 ENCOUNTER — PATIENT MESSAGE (OUTPATIENT)
Dept: ENDOSCOPY | Facility: HOSPITAL | Age: 76
End: 2021-03-05

## 2021-03-05 ENCOUNTER — ANESTHESIA EVENT (OUTPATIENT)
Dept: MEDSURG UNIT | Facility: HOSPITAL | Age: 76
End: 2021-03-05
Payer: MEDICARE

## 2021-03-05 ENCOUNTER — CLINICAL SUPPORT (OUTPATIENT)
Dept: CARDIOLOGY | Facility: HOSPITAL | Age: 76
End: 2021-03-05
Attending: INTERNAL MEDICINE
Payer: MEDICARE

## 2021-03-05 ENCOUNTER — HOSPITAL ENCOUNTER (OUTPATIENT)
Facility: HOSPITAL | Age: 76
Discharge: HOME | End: 2021-03-05
Attending: INTERNAL MEDICINE | Admitting: INTERNAL MEDICINE
Payer: MEDICARE

## 2021-03-05 ENCOUNTER — HOSPITAL ENCOUNTER (OUTPATIENT)
Dept: CARDIOLOGY | Facility: HOSPITAL | Age: 76
Discharge: HOME | End: 2021-03-05
Attending: INTERNAL MEDICINE
Payer: MEDICARE

## 2021-03-05 ENCOUNTER — ANESTHESIA (OUTPATIENT)
Dept: MEDSURG UNIT | Facility: HOSPITAL | Age: 76
End: 2021-03-05
Payer: MEDICARE

## 2021-03-05 VITALS
WEIGHT: 251 LBS | SYSTOLIC BLOOD PRESSURE: 129 MMHG | BODY MASS INDEX: 33.27 KG/M2 | DIASTOLIC BLOOD PRESSURE: 95 MMHG | HEIGHT: 73 IN

## 2021-03-05 VITALS
TEMPERATURE: 98 F | OXYGEN SATURATION: 96 % | HEART RATE: 115 BPM | DIASTOLIC BLOOD PRESSURE: 92 MMHG | WEIGHT: 251 LBS | RESPIRATION RATE: 20 BRPM | SYSTOLIC BLOOD PRESSURE: 132 MMHG | HEIGHT: 73 IN | BODY MASS INDEX: 33.27 KG/M2

## 2021-03-05 DIAGNOSIS — I48.19 PERSISTENT ATRIAL FIBRILLATION: ICD-10-CM

## 2021-03-05 DIAGNOSIS — I48.91 ATRIAL FIBRILLATION: ICD-10-CM

## 2021-03-05 DIAGNOSIS — I48.19 PERSISTENT ATRIAL FIBRILLATION: Primary | ICD-10-CM

## 2021-03-05 LAB
BSA FOR ECHO PROCEDURE: 2.42 M2
POCT GLUCOSE: 97 MG/DL (ref 70–110)
PROX AORTA: 3.4 CM
SINUS: 3.3 CM
STJ: 3.2 CM

## 2021-03-05 PROCEDURE — 93005 ELECTROCARDIOGRAM TRACING: CPT

## 2021-03-05 PROCEDURE — 37000009 HC ANESTHESIA EA ADD 15 MINS: Performed by: INTERNAL MEDICINE

## 2021-03-05 PROCEDURE — 92960 CARDIOVERSION ELECTRIC EXT: CPT | Mod: ,,, | Performed by: INTERNAL MEDICINE

## 2021-03-05 PROCEDURE — 93325 DOPPLER ECHO COLOR FLOW MAPG: CPT | Mod: 26,,, | Performed by: INTERNAL MEDICINE

## 2021-03-05 PROCEDURE — 92960 CARDIOVERSION ELECTRIC EXT: CPT | Performed by: INTERNAL MEDICINE

## 2021-03-05 PROCEDURE — 25000003 PHARM REV CODE 250: Performed by: STUDENT IN AN ORGANIZED HEALTH CARE EDUCATION/TRAINING PROGRAM

## 2021-03-05 PROCEDURE — 93010 ELECTROCARDIOGRAM REPORT: CPT | Mod: ,,, | Performed by: INTERNAL MEDICINE

## 2021-03-05 PROCEDURE — 82962 GLUCOSE BLOOD TEST: CPT | Performed by: INTERNAL MEDICINE

## 2021-03-05 PROCEDURE — D9220A PRA ANESTHESIA: ICD-10-PCS | Mod: CRNA,,, | Performed by: STUDENT IN AN ORGANIZED HEALTH CARE EDUCATION/TRAINING PROGRAM

## 2021-03-05 PROCEDURE — 93271 ECG/MONITORING AND ANALYSIS: CPT

## 2021-03-05 PROCEDURE — 93010 EKG 12-LEAD: ICD-10-PCS | Mod: ,,, | Performed by: INTERNAL MEDICINE

## 2021-03-05 PROCEDURE — 63600175 PHARM REV CODE 636 W HCPCS: Performed by: STUDENT IN AN ORGANIZED HEALTH CARE EDUCATION/TRAINING PROGRAM

## 2021-03-05 PROCEDURE — 93272 CARDIAC EVENT MONITOR (CUPID ONLY): ICD-10-PCS | Mod: ,,, | Performed by: INTERNAL MEDICINE

## 2021-03-05 PROCEDURE — 93325 TRANSESOPHAGEAL ECHO (TEE) (CUPID ONLY): ICD-10-PCS | Mod: 26,,, | Performed by: INTERNAL MEDICINE

## 2021-03-05 PROCEDURE — 93320 TRANSESOPHAGEAL ECHO (TEE) (CUPID ONLY): ICD-10-PCS | Mod: 26,,, | Performed by: INTERNAL MEDICINE

## 2021-03-05 PROCEDURE — 93272 ECG/REVIEW INTERPRET ONLY: CPT | Mod: ,,, | Performed by: INTERNAL MEDICINE

## 2021-03-05 PROCEDURE — 93312 TRANSESOPHAGEAL ECHO (TEE) (CUPID ONLY): ICD-10-PCS | Mod: 26,,, | Performed by: INTERNAL MEDICINE

## 2021-03-05 PROCEDURE — 37000008 HC ANESTHESIA 1ST 15 MINUTES: Performed by: INTERNAL MEDICINE

## 2021-03-05 PROCEDURE — 93312 ECHO TRANSESOPHAGEAL: CPT | Mod: 26,,, | Performed by: INTERNAL MEDICINE

## 2021-03-05 PROCEDURE — D9220A PRA ANESTHESIA: Mod: ANES,,, | Performed by: ANESTHESIOLOGY

## 2021-03-05 PROCEDURE — D9220A PRA ANESTHESIA: Mod: CRNA,,, | Performed by: STUDENT IN AN ORGANIZED HEALTH CARE EDUCATION/TRAINING PROGRAM

## 2021-03-05 PROCEDURE — 92960 PR CARDIOVERSION, ELECTIVE;EXTERN: ICD-10-PCS | Mod: ,,, | Performed by: INTERNAL MEDICINE

## 2021-03-05 PROCEDURE — D9220A PRA ANESTHESIA: ICD-10-PCS | Mod: ANES,,, | Performed by: ANESTHESIOLOGY

## 2021-03-05 PROCEDURE — 93320 DOPPLER ECHO COMPLETE: CPT | Mod: 26,,, | Performed by: INTERNAL MEDICINE

## 2021-03-05 PROCEDURE — 25000003 PHARM REV CODE 250: Performed by: INTERNAL MEDICINE

## 2021-03-05 PROCEDURE — 93312 ECHO TRANSESOPHAGEAL: CPT

## 2021-03-05 PROCEDURE — 27000221 HC OXYGEN, UP TO 24 HOURS

## 2021-03-05 RX ORDER — PHENYLEPHRINE HCL IN 0.9% NACL 1 MG/10 ML
SYRINGE (ML) INTRAVENOUS
Status: DISCONTINUED | OUTPATIENT
Start: 2021-03-05 | End: 2021-03-05

## 2021-03-05 RX ORDER — FENTANYL CITRATE 50 UG/ML
25 INJECTION, SOLUTION INTRAMUSCULAR; INTRAVENOUS EVERY 5 MIN PRN
Status: DISCONTINUED | OUTPATIENT
Start: 2021-03-05 | End: 2021-03-05 | Stop reason: HOSPADM

## 2021-03-05 RX ORDER — ONDANSETRON 2 MG/ML
4 INJECTION INTRAMUSCULAR; INTRAVENOUS ONCE AS NEEDED
Status: DISCONTINUED | OUTPATIENT
Start: 2021-03-05 | End: 2021-03-05 | Stop reason: HOSPADM

## 2021-03-05 RX ORDER — PROPOFOL 10 MG/ML
VIAL (ML) INTRAVENOUS
Status: DISCONTINUED | OUTPATIENT
Start: 2021-03-05 | End: 2021-03-05

## 2021-03-05 RX ORDER — PROPOFOL 10 MG/ML
VIAL (ML) INTRAVENOUS CONTINUOUS PRN
Status: DISCONTINUED | OUTPATIENT
Start: 2021-03-05 | End: 2021-03-05

## 2021-03-05 RX ORDER — SILVER SULFADIAZINE 10 G/1000G
CREAM TOPICAL
Status: DISCONTINUED | OUTPATIENT
Start: 2021-03-05 | End: 2021-03-05 | Stop reason: HOSPADM

## 2021-03-05 RX ORDER — HYDROMORPHONE HYDROCHLORIDE 1 MG/ML
0.2 INJECTION, SOLUTION INTRAMUSCULAR; INTRAVENOUS; SUBCUTANEOUS EVERY 5 MIN PRN
Status: DISCONTINUED | OUTPATIENT
Start: 2021-03-05 | End: 2021-03-05 | Stop reason: HOSPADM

## 2021-03-05 RX ORDER — LIDOCAINE HYDROCHLORIDE 20 MG/ML
INJECTION, SOLUTION EPIDURAL; INFILTRATION; INTRACAUDAL; PERINEURAL
Status: DISCONTINUED | OUTPATIENT
Start: 2021-03-05 | End: 2021-03-05

## 2021-03-05 RX ORDER — AMIODARONE HYDROCHLORIDE 200 MG/1
400 TABLET ORAL 2 TIMES DAILY
Qty: 120 TABLET | Refills: 2 | Status: SHIPPED | OUTPATIENT
Start: 2021-03-05 | End: 2021-04-28

## 2021-03-05 RX ORDER — DIPHENHYDRAMINE HYDROCHLORIDE 50 MG/ML
25 INJECTION INTRAMUSCULAR; INTRAVENOUS EVERY 6 HOURS PRN
Status: DISCONTINUED | OUTPATIENT
Start: 2021-03-05 | End: 2021-03-05 | Stop reason: HOSPADM

## 2021-03-05 RX ORDER — FENTANYL CITRATE 50 UG/ML
INJECTION, SOLUTION INTRAMUSCULAR; INTRAVENOUS
Status: DISCONTINUED | OUTPATIENT
Start: 2021-03-05 | End: 2021-03-05

## 2021-03-05 RX ADMIN — PROPOFOL 150 MCG/KG/MIN: 10 INJECTION, EMULSION INTRAVENOUS at 11:03

## 2021-03-05 RX ADMIN — PROPOFOL 60 MG: 10 INJECTION, EMULSION INTRAVENOUS at 11:03

## 2021-03-05 RX ADMIN — Medication 100 MCG: at 11:03

## 2021-03-05 RX ADMIN — FENTANYL CITRATE 25 MCG: 50 INJECTION, SOLUTION INTRAMUSCULAR; INTRAVENOUS at 11:03

## 2021-03-05 RX ADMIN — SODIUM CHLORIDE: 9 INJECTION, SOLUTION INTRAVENOUS at 11:03

## 2021-03-05 RX ADMIN — LIDOCAINE HYDROCHLORIDE 100 MG: 20 INJECTION, SOLUTION EPIDURAL; INFILTRATION; INTRACAUDAL at 11:03

## 2021-03-05 RX ADMIN — GLYCOPYRROLATE 0.2 MG: 0.2 INJECTION, SOLUTION INTRAMUSCULAR; INTRAVITREAL at 11:03

## 2021-03-05 RX ADMIN — RIVAROXABAN 20 MG: 20 TABLET, FILM COATED ORAL at 09:03

## 2021-03-08 LAB — POCT GLUCOSE: 74 MG/DL (ref 70–110)

## 2021-03-11 ENCOUNTER — HOSPITAL ENCOUNTER (OUTPATIENT)
Dept: CARDIOLOGY | Facility: CLINIC | Age: 76
Discharge: HOME | End: 2021-03-11
Payer: MEDICARE

## 2021-03-11 DIAGNOSIS — I48.19 PERSISTENT ATRIAL FIBRILLATION: ICD-10-CM

## 2021-03-11 PROCEDURE — 93005 ELECTROCARDIOGRAM TRACING: CPT | Mod: PBBFAC | Performed by: INTERNAL MEDICINE

## 2021-03-11 PROCEDURE — 93010 EKG 12-LEAD: ICD-10-PCS | Mod: S$PBB,,, | Performed by: INTERNAL MEDICINE

## 2021-03-11 PROCEDURE — 93010 ELECTROCARDIOGRAM REPORT: CPT | Mod: S$PBB,,, | Performed by: INTERNAL MEDICINE

## 2021-03-16 DIAGNOSIS — I48.19 PERSISTENT ATRIAL FIBRILLATION: Primary | ICD-10-CM

## 2021-03-22 ENCOUNTER — CLINICAL SUPPORT (OUTPATIENT)
Dept: CARDIOLOGY | Facility: HOSPITAL | Age: 76
End: 2021-03-22
Attending: INTERNAL MEDICINE
Payer: MEDICARE

## 2021-03-22 DIAGNOSIS — I48.19 PERSISTENT ATRIAL FIBRILLATION: ICD-10-CM

## 2021-03-22 PROCEDURE — 93271 ECG/MONITORING AND ANALYSIS: CPT

## 2021-04-01 ENCOUNTER — PATIENT MESSAGE (OUTPATIENT)
Dept: PULMONOLOGY | Facility: CLINIC | Age: 76
End: 2021-04-01

## 2021-04-01 DIAGNOSIS — R05.9 COUGH: ICD-10-CM

## 2021-04-01 DIAGNOSIS — J47.9 BRONCHIECTASIS WITHOUT COMPLICATION: ICD-10-CM

## 2021-04-01 DIAGNOSIS — J41.1 BRONCHITIS, CHRONIC, MUCOPURULENT: ICD-10-CM

## 2021-04-01 RX ORDER — ALBUTEROL SULFATE 1.25 MG/3ML
1.25 SOLUTION RESPIRATORY (INHALATION) EVERY 4 HOURS PRN
Qty: 120 VIAL | Refills: 11 | Status: SHIPPED | OUTPATIENT
Start: 2021-04-01 | End: 2022-04-01

## 2021-04-07 ENCOUNTER — OFFICE VISIT (OUTPATIENT)
Dept: INTERNAL MEDICINE | Facility: CLINIC | Age: 76
End: 2021-04-07
Payer: MEDICARE

## 2021-04-07 VITALS
WEIGHT: 262.81 LBS | DIASTOLIC BLOOD PRESSURE: 84 MMHG | OXYGEN SATURATION: 97 % | BODY MASS INDEX: 34.83 KG/M2 | SYSTOLIC BLOOD PRESSURE: 136 MMHG | HEART RATE: 88 BPM | HEIGHT: 73 IN

## 2021-04-07 DIAGNOSIS — R09.89 CHRONIC SINUS COMPLAINTS: Primary | ICD-10-CM

## 2021-04-07 PROCEDURE — 99214 OFFICE O/P EST MOD 30 MIN: CPT | Mod: PBBFAC | Performed by: STUDENT IN AN ORGANIZED HEALTH CARE EDUCATION/TRAINING PROGRAM

## 2021-04-07 PROCEDURE — 99214 OFFICE O/P EST MOD 30 MIN: CPT | Mod: S$PBB,GC,, | Performed by: STUDENT IN AN ORGANIZED HEALTH CARE EDUCATION/TRAINING PROGRAM

## 2021-04-07 PROCEDURE — 99999 PR PBB SHADOW E&M-EST. PATIENT-LVL IV: CPT | Mod: PBBFAC,GC,, | Performed by: STUDENT IN AN ORGANIZED HEALTH CARE EDUCATION/TRAINING PROGRAM

## 2021-04-07 PROCEDURE — 99214 PR OFFICE/OUTPT VISIT, EST, LEVL IV, 30-39 MIN: ICD-10-PCS | Mod: S$PBB,GC,, | Performed by: STUDENT IN AN ORGANIZED HEALTH CARE EDUCATION/TRAINING PROGRAM

## 2021-04-07 PROCEDURE — 99999 PR PBB SHADOW E&M-EST. PATIENT-LVL IV: ICD-10-PCS | Mod: PBBFAC,GC,, | Performed by: STUDENT IN AN ORGANIZED HEALTH CARE EDUCATION/TRAINING PROGRAM

## 2021-04-07 RX ORDER — GUAIFENESIN 200 MG/1
400 TABLET ORAL EVERY 4 HOURS PRN
Qty: 40 TABLET | Refills: 0 | Status: SHIPPED | OUTPATIENT
Start: 2021-04-07 | End: 2021-04-17

## 2021-04-07 RX ORDER — CETIRIZINE HYDROCHLORIDE 10 MG/1
10 TABLET ORAL DAILY
Qty: 20 TABLET | Refills: 3 | Status: SHIPPED | OUTPATIENT
Start: 2021-04-07

## 2021-04-26 ENCOUNTER — PATIENT OUTREACH (OUTPATIENT)
Dept: ADMINISTRATIVE | Facility: OTHER | Age: 76
End: 2021-04-26

## 2021-04-28 ENCOUNTER — OFFICE VISIT (OUTPATIENT)
Dept: ELECTROPHYSIOLOGY | Facility: CLINIC | Age: 76
End: 2021-04-28
Payer: MEDICARE

## 2021-04-28 ENCOUNTER — HOSPITAL ENCOUNTER (OUTPATIENT)
Dept: CARDIOLOGY | Facility: CLINIC | Age: 76
Discharge: HOME | End: 2021-04-28
Payer: MEDICARE

## 2021-04-28 VITALS
HEIGHT: 73 IN | HEART RATE: 70 BPM | DIASTOLIC BLOOD PRESSURE: 78 MMHG | SYSTOLIC BLOOD PRESSURE: 132 MMHG | WEIGHT: 262.38 LBS | BODY MASS INDEX: 34.77 KG/M2

## 2021-04-28 DIAGNOSIS — I49.8 OTHER SPECIFIED CARDIAC ARRHYTHMIAS: ICD-10-CM

## 2021-04-28 DIAGNOSIS — I48.0 PAF (PAROXYSMAL ATRIAL FIBRILLATION): Primary | ICD-10-CM

## 2021-04-28 DIAGNOSIS — E11.59 HYPERTENSION ASSOCIATED WITH DIABETES: ICD-10-CM

## 2021-04-28 DIAGNOSIS — I15.2 HYPERTENSION ASSOCIATED WITH DIABETES: ICD-10-CM

## 2021-04-28 DIAGNOSIS — I47.10 SVT (SUPRAVENTRICULAR TACHYCARDIA): ICD-10-CM

## 2021-04-28 PROCEDURE — 99214 OFFICE O/P EST MOD 30 MIN: CPT | Mod: S$PBB,,, | Performed by: NURSE PRACTITIONER

## 2021-04-28 PROCEDURE — 93010 ELECTROCARDIOGRAM REPORT: CPT | Mod: S$PBB,,, | Performed by: INTERNAL MEDICINE

## 2021-04-28 PROCEDURE — 99214 OFFICE O/P EST MOD 30 MIN: CPT | Mod: PBBFAC,25 | Performed by: NURSE PRACTITIONER

## 2021-04-28 PROCEDURE — 99999 PR PBB SHADOW E&M-EST. PATIENT-LVL IV: ICD-10-PCS | Mod: PBBFAC,,, | Performed by: NURSE PRACTITIONER

## 2021-04-28 PROCEDURE — 99214 PR OFFICE/OUTPT VISIT, EST, LEVL IV, 30-39 MIN: ICD-10-PCS | Mod: S$PBB,,, | Performed by: NURSE PRACTITIONER

## 2021-04-28 PROCEDURE — 93005 ELECTROCARDIOGRAM TRACING: CPT | Mod: PBBFAC | Performed by: INTERNAL MEDICINE

## 2021-04-28 PROCEDURE — 99999 PR PBB SHADOW E&M-EST. PATIENT-LVL IV: CPT | Mod: PBBFAC,,, | Performed by: NURSE PRACTITIONER

## 2021-04-28 PROCEDURE — 93010 RHYTHM STRIP: ICD-10-PCS | Mod: S$PBB,,, | Performed by: INTERNAL MEDICINE

## 2021-04-28 RX ORDER — REPAGLINIDE 2 MG/1
TABLET ORAL
COMMUNITY
Start: 2021-04-27 | End: 2021-12-30 | Stop reason: SDUPTHER

## 2021-05-05 ENCOUNTER — LAB VISIT (OUTPATIENT)
Dept: LAB | Facility: HOSPITAL | Age: 76
End: 2021-05-05
Attending: INTERNAL MEDICINE
Payer: MEDICARE

## 2021-05-05 DIAGNOSIS — E11.40 TYPE 2 DIABETES, CONTROLLED, WITH NEUROPATHY: ICD-10-CM

## 2021-05-05 DIAGNOSIS — M10.9 GOUT, ARTHRITIS: ICD-10-CM

## 2021-05-05 DIAGNOSIS — Z12.5 SCREENING PSA (PROSTATE SPECIFIC ANTIGEN): ICD-10-CM

## 2021-05-05 LAB
ALBUMIN SERPL BCP-MCNC: 3.9 G/DL (ref 3.5–5.2)
ALP SERPL-CCNC: 65 U/L (ref 55–135)
ALT SERPL W/O P-5'-P-CCNC: 17 U/L (ref 10–44)
ANION GAP SERPL CALC-SCNC: 11 MMOL/L (ref 8–16)
AST SERPL-CCNC: 14 U/L (ref 10–40)
BASOPHILS # BLD AUTO: 0.03 K/UL (ref 0–0.2)
BASOPHILS NFR BLD: 0.4 % (ref 0–1.9)
BILIRUB SERPL-MCNC: 0.6 MG/DL (ref 0.1–1)
BUN SERPL-MCNC: 20 MG/DL (ref 8–23)
CALCIUM SERPL-MCNC: 9.7 MG/DL (ref 8.7–10.5)
CHLORIDE SERPL-SCNC: 106 MMOL/L (ref 95–110)
CHOLEST SERPL-MCNC: 116 MG/DL (ref 120–199)
CHOLEST/HDLC SERPL: 2.5 {RATIO} (ref 2–5)
CO2 SERPL-SCNC: 27 MMOL/L (ref 23–29)
COMPLEXED PSA SERPL-MCNC: 0.57 NG/ML (ref 0–4)
CREAT SERPL-MCNC: 1.3 MG/DL (ref 0.5–1.4)
DIFFERENTIAL METHOD: ABNORMAL
EOSINOPHIL # BLD AUTO: 0.3 K/UL (ref 0–0.5)
EOSINOPHIL NFR BLD: 4.7 % (ref 0–8)
ERYTHROCYTE [DISTWIDTH] IN BLOOD BY AUTOMATED COUNT: 14.4 % (ref 11.5–14.5)
EST. GFR  (AFRICAN AMERICAN): >60 ML/MIN/1.73 M^2
EST. GFR  (NON AFRICAN AMERICAN): 53.4 ML/MIN/1.73 M^2
ESTIMATED AVG GLUCOSE: 171 MG/DL (ref 68–131)
GLUCOSE SERPL-MCNC: 105 MG/DL (ref 70–110)
HBA1C MFR BLD: 7.6 % (ref 4.5–6.2)
HCT VFR BLD AUTO: 36 % (ref 40–54)
HDLC SERPL-MCNC: 47 MG/DL (ref 40–75)
HDLC SERPL: 40.5 % (ref 20–50)
HGB BLD-MCNC: 11.3 G/DL (ref 14–18)
IMM GRANULOCYTES # BLD AUTO: 0.02 K/UL (ref 0–0.04)
IMM GRANULOCYTES NFR BLD AUTO: 0.3 % (ref 0–0.5)
LDLC SERPL CALC-MCNC: 64.4 MG/DL (ref 63–159)
LYMPHOCYTES # BLD AUTO: 1.5 K/UL (ref 1–4.8)
LYMPHOCYTES NFR BLD: 20.9 % (ref 18–48)
MCH RBC QN AUTO: 29.4 PG (ref 27–31)
MCHC RBC AUTO-ENTMCNC: 31.4 G/DL (ref 32–36)
MCV RBC AUTO: 94 FL (ref 82–98)
MONOCYTES # BLD AUTO: 0.5 K/UL (ref 0.3–1)
MONOCYTES NFR BLD: 6.9 % (ref 4–15)
NEUTROPHILS # BLD AUTO: 4.7 K/UL (ref 1.8–7.7)
NEUTROPHILS NFR BLD: 66.8 % (ref 38–73)
NONHDLC SERPL-MCNC: 69 MG/DL
NRBC BLD-RTO: 0 /100 WBC
PLATELET # BLD AUTO: 281 K/UL (ref 150–450)
PMV BLD AUTO: 11.6 FL (ref 9.2–12.9)
POTASSIUM SERPL-SCNC: 4.8 MMOL/L (ref 3.5–5.1)
PROT SERPL-MCNC: 7.1 G/DL (ref 6–8.4)
RBC # BLD AUTO: 3.84 M/UL (ref 4.6–6.2)
SODIUM SERPL-SCNC: 144 MMOL/L (ref 136–145)
TRIGL SERPL-MCNC: 23 MG/DL (ref 30–150)
URATE SERPL-MCNC: 7 MG/DL (ref 3.4–7)
WBC # BLD AUTO: 7 K/UL (ref 3.9–12.7)

## 2021-05-05 PROCEDURE — 85025 COMPLETE CBC W/AUTO DIFF WBC: CPT | Performed by: INTERNAL MEDICINE

## 2021-05-05 PROCEDURE — 84550 ASSAY OF BLOOD/URIC ACID: CPT | Performed by: INTERNAL MEDICINE

## 2021-05-05 PROCEDURE — 36415 COLL VENOUS BLD VENIPUNCTURE: CPT | Performed by: INTERNAL MEDICINE

## 2021-05-05 PROCEDURE — 83036 HEMOGLOBIN GLYCOSYLATED A1C: CPT | Performed by: INTERNAL MEDICINE

## 2021-05-05 PROCEDURE — 80053 COMPREHEN METABOLIC PANEL: CPT | Performed by: INTERNAL MEDICINE

## 2021-05-05 PROCEDURE — 80061 LIPID PANEL: CPT | Performed by: INTERNAL MEDICINE

## 2021-05-05 PROCEDURE — 84153 ASSAY OF PSA TOTAL: CPT | Performed by: INTERNAL MEDICINE

## 2021-05-13 ENCOUNTER — OFFICE VISIT (OUTPATIENT)
Dept: INTERNAL MEDICINE | Facility: CLINIC | Age: 76
End: 2021-05-13
Payer: MEDICARE

## 2021-05-13 VITALS
HEIGHT: 73 IN | WEIGHT: 266.75 LBS | SYSTOLIC BLOOD PRESSURE: 136 MMHG | OXYGEN SATURATION: 94 % | DIASTOLIC BLOOD PRESSURE: 80 MMHG | HEART RATE: 80 BPM | BODY MASS INDEX: 35.35 KG/M2

## 2021-05-13 DIAGNOSIS — I48.19 PERSISTENT ATRIAL FIBRILLATION: ICD-10-CM

## 2021-05-13 DIAGNOSIS — I10 ESSENTIAL HYPERTENSION: ICD-10-CM

## 2021-05-13 DIAGNOSIS — R09.89 CHRONIC SINUS COMPLAINTS: ICD-10-CM

## 2021-05-13 DIAGNOSIS — J47.9 BRONCHIECTASIS WITHOUT COMPLICATION: ICD-10-CM

## 2021-05-13 PROCEDURE — 99214 PR OFFICE/OUTPT VISIT, EST, LEVL IV, 30-39 MIN: ICD-10-PCS | Mod: S$PBB,,, | Performed by: INTERNAL MEDICINE

## 2021-05-13 PROCEDURE — 99214 OFFICE O/P EST MOD 30 MIN: CPT | Mod: S$PBB,,, | Performed by: INTERNAL MEDICINE

## 2021-05-13 PROCEDURE — 99999 PR PBB SHADOW E&M-EST. PATIENT-LVL V: ICD-10-PCS | Mod: PBBFAC,,, | Performed by: INTERNAL MEDICINE

## 2021-05-13 PROCEDURE — 99215 OFFICE O/P EST HI 40 MIN: CPT | Mod: PBBFAC | Performed by: INTERNAL MEDICINE

## 2021-05-13 PROCEDURE — 99999 PR PBB SHADOW E&M-EST. PATIENT-LVL V: CPT | Mod: PBBFAC,,, | Performed by: INTERNAL MEDICINE

## 2021-05-13 RX ORDER — AMOXICILLIN AND CLAVULANATE POTASSIUM 875; 125 MG/1; MG/1
1 TABLET, FILM COATED ORAL 2 TIMES DAILY
Qty: 14 TABLET | Refills: 0 | Status: SHIPPED | OUTPATIENT
Start: 2021-05-13 | End: 2022-08-24 | Stop reason: SDUPTHER

## 2021-06-17 DIAGNOSIS — E11.65 TYPE 2 DIABETES MELLITUS WITH HYPERGLYCEMIA, WITHOUT LONG-TERM CURRENT USE OF INSULIN: ICD-10-CM

## 2021-06-18 RX ORDER — PEN NEEDLE, DIABETIC 29 G X1/2"
NEEDLE, DISPOSABLE MISCELLANEOUS
Qty: 100 EACH | Refills: 3 | Status: SHIPPED | OUTPATIENT
Start: 2021-06-18 | End: 2022-04-29 | Stop reason: SDUPTHER

## 2021-06-25 ENCOUNTER — TELEPHONE (OUTPATIENT)
Dept: INTERNAL MEDICINE | Facility: CLINIC | Age: 76
End: 2021-06-25

## 2021-06-25 RX ORDER — INSULIN GLARGINE 100 [IU]/ML
20 INJECTION, SOLUTION SUBCUTANEOUS NIGHTLY
Qty: 18 ML | Refills: 11 | Status: SHIPPED | OUTPATIENT
Start: 2021-06-25 | End: 2022-06-06 | Stop reason: SDUPTHER

## 2021-07-26 ENCOUNTER — PATIENT OUTREACH (OUTPATIENT)
Dept: ADMINISTRATIVE | Facility: OTHER | Age: 76
End: 2021-07-26

## 2021-07-28 ENCOUNTER — OFFICE VISIT (OUTPATIENT)
Dept: ELECTROPHYSIOLOGY | Facility: CLINIC | Age: 76
End: 2021-07-28
Payer: MEDICARE

## 2021-07-28 ENCOUNTER — HOSPITAL ENCOUNTER (OUTPATIENT)
Dept: CARDIOLOGY | Facility: CLINIC | Age: 76
Discharge: HOME | End: 2021-07-28
Payer: MEDICARE

## 2021-07-28 VITALS
HEART RATE: 79 BPM | HEIGHT: 73 IN | WEIGHT: 260.81 LBS | SYSTOLIC BLOOD PRESSURE: 130 MMHG | DIASTOLIC BLOOD PRESSURE: 78 MMHG | BODY MASS INDEX: 34.57 KG/M2

## 2021-07-28 DIAGNOSIS — I49.8 OTHER SPECIFIED CARDIAC ARRHYTHMIAS: ICD-10-CM

## 2021-07-28 DIAGNOSIS — E11.59 HYPERTENSION ASSOCIATED WITH DIABETES: ICD-10-CM

## 2021-07-28 DIAGNOSIS — I47.10 SVT (SUPRAVENTRICULAR TACHYCARDIA): ICD-10-CM

## 2021-07-28 DIAGNOSIS — Z79.01 CURRENT USE OF LONG TERM ANTICOAGULATION: ICD-10-CM

## 2021-07-28 DIAGNOSIS — I15.2 HYPERTENSION ASSOCIATED WITH DIABETES: ICD-10-CM

## 2021-07-28 DIAGNOSIS — I48.0 PAF (PAROXYSMAL ATRIAL FIBRILLATION): Primary | ICD-10-CM

## 2021-07-28 DIAGNOSIS — N18.31 STAGE 3A CHRONIC KIDNEY DISEASE: ICD-10-CM

## 2021-07-28 PROCEDURE — 99214 OFFICE O/P EST MOD 30 MIN: CPT | Mod: PBBFAC | Performed by: NURSE PRACTITIONER

## 2021-07-28 PROCEDURE — 99214 OFFICE O/P EST MOD 30 MIN: CPT | Mod: S$PBB,,, | Performed by: NURSE PRACTITIONER

## 2021-07-28 PROCEDURE — 99214 PR OFFICE/OUTPT VISIT, EST, LEVL IV, 30-39 MIN: ICD-10-PCS | Mod: S$PBB,,, | Performed by: NURSE PRACTITIONER

## 2021-07-28 PROCEDURE — 93005 ELECTROCARDIOGRAM TRACING: CPT | Mod: PBBFAC | Performed by: INTERNAL MEDICINE

## 2021-07-28 PROCEDURE — 93010 RHYTHM STRIP: ICD-10-PCS | Mod: S$PBB,,, | Performed by: INTERNAL MEDICINE

## 2021-07-28 PROCEDURE — 93010 ELECTROCARDIOGRAM REPORT: CPT | Mod: S$PBB,,, | Performed by: INTERNAL MEDICINE

## 2021-07-28 PROCEDURE — 99999 PR PBB SHADOW E&M-EST. PATIENT-LVL IV: ICD-10-PCS | Mod: PBBFAC,,, | Performed by: NURSE PRACTITIONER

## 2021-07-28 PROCEDURE — 99999 PR PBB SHADOW E&M-EST. PATIENT-LVL IV: CPT | Mod: PBBFAC,,, | Performed by: NURSE PRACTITIONER

## 2021-08-17 ENCOUNTER — PATIENT MESSAGE (OUTPATIENT)
Dept: INTERNAL MEDICINE | Facility: CLINIC | Age: 76
End: 2021-08-17

## 2021-08-17 ENCOUNTER — LAB VISIT (OUTPATIENT)
Dept: LAB | Facility: HOSPITAL | Age: 76
End: 2021-08-17
Attending: INTERNAL MEDICINE
Payer: MEDICARE

## 2021-08-17 ENCOUNTER — OFFICE VISIT (OUTPATIENT)
Dept: INTERNAL MEDICINE | Facility: CLINIC | Age: 76
End: 2021-08-17
Payer: MEDICARE

## 2021-08-17 VITALS
BODY MASS INDEX: 35.74 KG/M2 | HEIGHT: 72 IN | HEART RATE: 69 BPM | SYSTOLIC BLOOD PRESSURE: 130 MMHG | OXYGEN SATURATION: 98 % | RESPIRATION RATE: 18 BRPM | DIASTOLIC BLOOD PRESSURE: 80 MMHG | WEIGHT: 263.88 LBS

## 2021-08-17 DIAGNOSIS — Z12.11 COLON CANCER SCREENING: Primary | ICD-10-CM

## 2021-08-17 LAB
ESTIMATED AVG GLUCOSE: 157 MG/DL (ref 68–131)
HBA1C MFR BLD: 7.1 % (ref 4–5.6)

## 2021-08-17 PROCEDURE — 99215 OFFICE O/P EST HI 40 MIN: CPT | Mod: PBBFAC | Performed by: INTERNAL MEDICINE

## 2021-08-17 PROCEDURE — 99999 PR PBB SHADOW E&M-EST. PATIENT-LVL V: CPT | Mod: PBBFAC,,, | Performed by: INTERNAL MEDICINE

## 2021-08-17 PROCEDURE — 83036 HEMOGLOBIN GLYCOSYLATED A1C: CPT | Performed by: INTERNAL MEDICINE

## 2021-08-17 PROCEDURE — 99999 PR PBB SHADOW E&M-EST. PATIENT-LVL V: ICD-10-PCS | Mod: PBBFAC,,, | Performed by: INTERNAL MEDICINE

## 2021-08-17 PROCEDURE — 99214 PR OFFICE/OUTPT VISIT, EST, LEVL IV, 30-39 MIN: ICD-10-PCS | Mod: S$PBB,,, | Performed by: INTERNAL MEDICINE

## 2021-08-17 PROCEDURE — 99214 OFFICE O/P EST MOD 30 MIN: CPT | Mod: S$PBB,,, | Performed by: INTERNAL MEDICINE

## 2021-08-17 PROCEDURE — 36415 COLL VENOUS BLD VENIPUNCTURE: CPT | Performed by: INTERNAL MEDICINE

## 2021-09-22 ENCOUNTER — OFFICE VISIT (OUTPATIENT)
Dept: OPTOMETRY | Facility: CLINIC | Age: 76
End: 2021-09-22
Payer: MEDICARE

## 2021-09-22 DIAGNOSIS — H25.13 NS (NUCLEAR SCLEROSIS), BILATERAL: ICD-10-CM

## 2021-09-22 DIAGNOSIS — E11.40 TYPE 2 DIABETES, CONTROLLED, WITH NEUROPATHY: Primary | ICD-10-CM

## 2021-09-22 DIAGNOSIS — H04.123 DRY EYE SYNDROME, BILATERAL: ICD-10-CM

## 2021-09-22 DIAGNOSIS — I15.2 HYPERTENSION ASSOCIATED WITH DIABETES: ICD-10-CM

## 2021-09-22 DIAGNOSIS — E11.59 HYPERTENSION ASSOCIATED WITH DIABETES: ICD-10-CM

## 2021-09-22 DIAGNOSIS — H52.4 PRESBYOPIA: ICD-10-CM

## 2021-09-22 DIAGNOSIS — E11.9 TYPE 2 DIABETES MELLITUS WITHOUT OPHTHALMIC MANIFESTATIONS: ICD-10-CM

## 2021-09-22 PROCEDURE — 92015 PR REFRACTION: ICD-10-PCS | Mod: ,,, | Performed by: OPTOMETRIST

## 2021-09-22 PROCEDURE — 99999 PR PBB SHADOW E&M-EST. PATIENT-LVL III: CPT | Mod: PBBFAC,,, | Performed by: OPTOMETRIST

## 2021-09-22 PROCEDURE — 99213 OFFICE O/P EST LOW 20 MIN: CPT | Mod: PBBFAC | Performed by: OPTOMETRIST

## 2021-09-22 PROCEDURE — 99999 PR PBB SHADOW E&M-EST. PATIENT-LVL III: ICD-10-PCS | Mod: PBBFAC,,, | Performed by: OPTOMETRIST

## 2021-09-22 PROCEDURE — 92014 PR EYE EXAM, EST PATIENT,COMPREHESV: ICD-10-PCS | Mod: S$PBB,,, | Performed by: OPTOMETRIST

## 2021-09-22 PROCEDURE — 92015 DETERMINE REFRACTIVE STATE: CPT | Mod: ,,, | Performed by: OPTOMETRIST

## 2021-09-22 PROCEDURE — 92014 COMPRE OPH EXAM EST PT 1/>: CPT | Mod: S$PBB,,, | Performed by: OPTOMETRIST

## 2021-10-21 ENCOUNTER — PATIENT MESSAGE (OUTPATIENT)
Dept: ORTHOPEDICS | Facility: CLINIC | Age: 76
End: 2021-10-21
Payer: MEDICARE

## 2021-10-21 DIAGNOSIS — Z96.651 STATUS POST RIGHT KNEE REPLACEMENT: Primary | ICD-10-CM

## 2021-10-22 ENCOUNTER — IMMUNIZATION (OUTPATIENT)
Dept: INTERNAL MEDICINE | Facility: CLINIC | Age: 76
End: 2021-10-22
Payer: MEDICARE

## 2021-10-22 DIAGNOSIS — Z23 NEED FOR VACCINATION: Primary | ICD-10-CM

## 2021-10-22 PROCEDURE — 91300 COVID-19, MRNA, LNP-S, PF, 30 MCG/0.3 ML DOSE VACCINE: CPT | Mod: PBBFAC

## 2021-10-22 PROCEDURE — 0003A COVID-19, MRNA, LNP-S, PF, 30 MCG/0.3 ML DOSE VACCINE: CPT | Mod: CV19,PBBFAC | Performed by: INTERNAL MEDICINE

## 2021-11-09 ENCOUNTER — HOSPITAL ENCOUNTER (OUTPATIENT)
Dept: RADIOLOGY | Facility: HOSPITAL | Age: 76
Discharge: HOME | End: 2021-11-09
Attending: ORTHOPAEDIC SURGERY
Payer: MEDICARE

## 2021-11-09 ENCOUNTER — TELEPHONE (OUTPATIENT)
Dept: ORTHOPEDICS | Facility: CLINIC | Age: 76
End: 2021-11-09
Payer: MEDICARE

## 2021-11-09 ENCOUNTER — OFFICE VISIT (OUTPATIENT)
Dept: ORTHOPEDICS | Facility: CLINIC | Age: 76
End: 2021-11-09
Payer: MEDICARE

## 2021-11-09 VITALS — HEIGHT: 72 IN | WEIGHT: 267.5 LBS | BODY MASS INDEX: 36.23 KG/M2

## 2021-11-09 DIAGNOSIS — Z96.651 STATUS POST RIGHT KNEE REPLACEMENT: ICD-10-CM

## 2021-11-09 DIAGNOSIS — M76.31 ILIOTIBIAL BAND SYNDROME OF RIGHT SIDE: ICD-10-CM

## 2021-11-09 DIAGNOSIS — Z96.651 STATUS POST RIGHT KNEE REPLACEMENT: Primary | ICD-10-CM

## 2021-11-09 PROCEDURE — 99214 OFFICE O/P EST MOD 30 MIN: CPT | Mod: PBBFAC | Performed by: ORTHOPAEDIC SURGERY

## 2021-11-09 PROCEDURE — 99213 PR OFFICE/OUTPT VISIT, EST, LEVL III, 20-29 MIN: ICD-10-PCS | Mod: S$PBB,,, | Performed by: ORTHOPAEDIC SURGERY

## 2021-11-09 PROCEDURE — 73562 XR KNEE 3 VIEW RIGHT: ICD-10-PCS | Mod: 26,RT,, | Performed by: RADIOLOGY

## 2021-11-09 PROCEDURE — 99213 OFFICE O/P EST LOW 20 MIN: CPT | Mod: S$PBB,,, | Performed by: ORTHOPAEDIC SURGERY

## 2021-11-09 PROCEDURE — 73562 X-RAY EXAM OF KNEE 3: CPT | Mod: 26,RT,, | Performed by: RADIOLOGY

## 2021-11-09 PROCEDURE — 99999 PR PBB SHADOW E&M-EST. PATIENT-LVL IV: CPT | Mod: PBBFAC,,, | Performed by: ORTHOPAEDIC SURGERY

## 2021-11-09 PROCEDURE — 73562 X-RAY EXAM OF KNEE 3: CPT | Mod: TC,RT

## 2021-11-09 PROCEDURE — 99999 PR PBB SHADOW E&M-EST. PATIENT-LVL IV: ICD-10-PCS | Mod: PBBFAC,,, | Performed by: ORTHOPAEDIC SURGERY

## 2021-11-09 NOTE — PROGRESS NOTES
Subjective:     HPI:   Des Junior is a 76 y.o. male who presents for annual follow up right TKA    Date of surgery: 12/7/20    Medications: tylenol maybe 2x/month    Assistive Devices: cane/walker PRN    Limitations: climbing step ladders    Mostly lateral discomfort  Does ok during day, discomfort at night sleeping on side  Back at work building boats     Objective:   Body mass index is 36.28 kg/m².  Exam:    Gait: limp/antalgic none, B TB    Incision: healed    Stability:  Knee stable anterior-posterior varus and valgus stresses, no extensor lag    Extension: 5    Flexion: 115    Valgus angle: 5    ttp at LCF at IT band  No pain with hip rom, neg SLR      Imaging:  Indication:  Exam status post right total knee arthroplasty  Exam Ordered: Radiographs of the right knee include a standing anteroposterior view, a lateral view, and a sunrise view  Details of Examination: Todays exam show a well fixed, well positioned total knee arthroplasty with no evidence of wear, osteolysis, or loosening.  Impression:  Status post right total knee arthroplasty, implant in good position with no abnormality         Assessment:       ICD-10-CM ICD-9-CM   1. Status post right knee replacement  Z96.651 V43.65   2. Iliotibial band syndrome of right side  M76.31 728.89      TKA Doing well       Plan:       Patient is doing very well with their total knee arthroplasty.  They will continue with their routine care of the knee replacement and see me back for their follow-up at the routine interval.  If there are problems in the interim they will see me back sooner.    Increase tylenol  Ice/heat  Rx compound cream  Use rollator more  Memory foam mattress    F/u if does not improve    5 year follow up for standard xrays      No orders of the defined types were placed in this encounter.            Past Medical History:   Diagnosis Date    Abdominal obesity 3/15/2018    Acute deep vein thrombosis (DVT) of popliteal vein of right  lower extremity 3/2/2018    AK (actinic keratosis) 11/15/2012    S/p 1st PDT face (80 min incubation) - did great! S/p PDT face - 90 min  - 12/15 - did great S/p PDT face - 90 min - 10/17 - great response    Asbestos exposure 3/2/2018    Asymptomatic LV dysfunction 3/2/2018    Bronchiectasis without complication 2/25/2019    Bronchitis, chronic, mucopurulent 3/2/2018    Cataract     Colon adenoma 8/20/2014    Current use of long term anticoagulation 4/23/2018    Deep vein thrombosis     ED (erectile dysfunction) 11/28/2012    Encounter for monitoring sotalol therapy 6/13/2018    Hay fever     Hearing loss, sensorineural 6/25/2013    History of adenomatous polyp of colon 10/5/2015    History of pulmonary embolism 5/2/2018    [place] (hard of hearing)     Left ear    Hyperlipidemia associated with type 2 diabetes mellitus, baseline  11/28/2012    Hypertension associated with diabetes 11/28/2012    Lung nodule, solitary- 5 mm rll 3/2/2018    On home oxygen therapy 3/15/2018    Osteoarthritis of right knee 11/28/2012    Persistent atrial fibrillation 3/1/2018    Pulmonary embolus- large involved central PA, unprovoked. 3/1/2018    Type 2 diabetes mellitus, controlled 7/16/2013    Type 2 diabetes, controlled, with neuropathy 1/20/2014    Type 2 diabetes, uncontrolled, with neuropathy, onset 2003 11/28/2012       Past Surgical History:   Procedure Laterality Date    COLONOSCOPY N/A 10/5/2015    Procedure: COLONOSCOPY;  Surgeon: Pro Jang MD;  Location: [facility] ENDO ([address] [place] [address]);  Service: Endoscopy;  Laterality: N/A;    KNEE ARTHROPLASTY Right 12/7/2020    Procedure: ARTHROPLASTY, KNEE:RIGHT:DEPUY-SIGMA;  Surgeon: Hua Gore III, MD;  Location: [facility] OR;  Service: Orthopedics;  Laterality: Right;    thumb surgery      TRANSESOPHAGEAL ECHOCARDIOGRAPHY N/A 3/5/2021    Procedure: ECHOCARDIOGRAM, TRANSESOPHAGEAL;  Surgeon: Shailesh Salinas III, MD;  Location: [facility] EP LAB;  Service:  Cardiology;  Laterality: N/A;    TREATMENT OF CARDIAC ARRHYTHMIA N/A 3/5/2021    Procedure: CARDIOVERSION;  Surgeon: Carlos Mccauley MD;  Location: [facility];  Service: Cardiology;  Laterality: N/A;  AF, LEO, DCCV, MAC, DM, 3 PREP       Family History   Problem Relation Age of Onset    Hypertension Father     Pneumonia Father     Cancer Brother         colon    Cataracts Mother     Glaucoma Mother     Heart disease Mother 87        CHF    Cancer Sister         lymphoma    Melanoma Neg Hx     Amblyopia Neg Hx     Blindness Neg Hx     Macular degeneration Neg Hx     Retinal detachment Neg Hx     Strabismus Neg Hx     Diabetes Neg Hx        Social History     Socioeconomic History    Marital status:    Occupational History    Occupation: Self emplyed   Tobacco Use    Smoking status: Never Smoker    Smokeless tobacco: Never Used   Substance and Sexual Activity    Alcohol use: Not Currently     Comment: rare    Drug use: No   Social History Narrative    Master . 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     Social Determinants of Health     Financial Resource Strain: Medium Risk    Difficulty of Paying Living Expenses: Somewhat hard   Food Insecurity: Food Insecurity Present    Worried About Running Out of Food in the Last Year: Sometimes true    Ran Out of Food in the Last Year: Sometimes true   Transportation Needs: No Transportation Needs    Lack of Transportation (Medical): No    Lack of Transportation (Non-Medical): No   Physical Activity: Insufficiently Active    Days of Exercise per Week: 1 day    Minutes of Exercise per Session: 10 min   Stress: Stress Concern Present    Feeling of Stress : To some extent   Social Connections: Unknown    Frequency of Communication with Friends and Family: More than three times a week    Frequency of Social Gatherings with Friends and Family: More than three times a week    Active Member of Clubs or Organizations: No     Attends Club or Organization Meetings: Never    Marital Status:    Housing Stability: High Risk    Unable to Pay for Housing in the Last Year: Yes    Number of Places Lived in the Last Year: 1    Unstable Housing in the Last Year: No

## 2021-12-06 ENCOUNTER — LAB VISIT (OUTPATIENT)
Dept: LAB | Facility: HOSPITAL | Age: 76
End: 2021-12-06
Attending: INTERNAL MEDICINE
Payer: MEDICARE

## 2021-12-06 ENCOUNTER — IMMUNIZATION (OUTPATIENT)
Dept: INTERNAL MEDICINE | Facility: CLINIC | Age: 76
End: 2021-12-06
Payer: MEDICARE

## 2021-12-06 ENCOUNTER — OFFICE VISIT (OUTPATIENT)
Dept: INTERNAL MEDICINE | Facility: CLINIC | Age: 76
End: 2021-12-06
Payer: MEDICARE

## 2021-12-06 VITALS
SYSTOLIC BLOOD PRESSURE: 128 MMHG | DIASTOLIC BLOOD PRESSURE: 78 MMHG | OXYGEN SATURATION: 98 % | RESPIRATION RATE: 18 BRPM | HEIGHT: 73 IN | BODY MASS INDEX: 35.64 KG/M2 | HEART RATE: 72 BPM | WEIGHT: 268.94 LBS

## 2021-12-06 DIAGNOSIS — R09.89 CHRONIC SINUS COMPLAINTS: ICD-10-CM

## 2021-12-06 DIAGNOSIS — Z12.11 COLON CANCER SCREENING: Primary | ICD-10-CM

## 2021-12-06 DIAGNOSIS — J47.9 BRONCHIECTASIS WITHOUT COMPLICATION: ICD-10-CM

## 2021-12-06 DIAGNOSIS — E66.01 SEVERE OBESITY (BMI 35.0-39.9) WITH COMORBIDITY: ICD-10-CM

## 2021-12-06 DIAGNOSIS — J42 CHRONIC BRONCHITIS, UNSPECIFIED CHRONIC BRONCHITIS TYPE: ICD-10-CM

## 2021-12-06 DIAGNOSIS — I70.0 THORACIC AORTIC ATHEROSCLEROSIS: ICD-10-CM

## 2021-12-06 DIAGNOSIS — I77.810 ECTATIC THORACIC AORTA: ICD-10-CM

## 2021-12-06 LAB
ESTIMATED AVG GLUCOSE: 160 MG/DL (ref 68–131)
HBA1C MFR BLD: 7.2 % (ref 4–5.6)

## 2021-12-06 PROCEDURE — 36415 COLL VENOUS BLD VENIPUNCTURE: CPT | Performed by: INTERNAL MEDICINE

## 2021-12-06 PROCEDURE — 99215 OFFICE O/P EST HI 40 MIN: CPT | Mod: PBBFAC,25 | Performed by: INTERNAL MEDICINE

## 2021-12-06 PROCEDURE — G0008 ADMIN INFLUENZA VIRUS VAC: HCPCS | Mod: PBBFAC

## 2021-12-06 PROCEDURE — 90694 VACC AIIV4 NO PRSRV 0.5ML IM: CPT | Mod: PBBFAC

## 2021-12-06 PROCEDURE — 99214 PR OFFICE/OUTPT VISIT, EST, LEVL IV, 30-39 MIN: ICD-10-PCS | Mod: S$PBB,,, | Performed by: INTERNAL MEDICINE

## 2021-12-06 PROCEDURE — 99999 PR PBB SHADOW E&M-EST. PATIENT-LVL V: ICD-10-PCS | Mod: PBBFAC,,, | Performed by: INTERNAL MEDICINE

## 2021-12-06 PROCEDURE — 99999 PR PBB SHADOW E&M-EST. PATIENT-LVL V: CPT | Mod: PBBFAC,,, | Performed by: INTERNAL MEDICINE

## 2021-12-06 PROCEDURE — 99214 OFFICE O/P EST MOD 30 MIN: CPT | Mod: S$PBB,,, | Performed by: INTERNAL MEDICINE

## 2021-12-06 PROCEDURE — 83036 HEMOGLOBIN GLYCOSYLATED A1C: CPT | Performed by: INTERNAL MEDICINE

## 2021-12-08 PROBLEM — I77.810 ECTATIC THORACIC AORTA: Status: ACTIVE | Noted: 2021-12-08

## 2021-12-08 PROBLEM — J42 CHRONIC BRONCHITIS: Status: ACTIVE | Noted: 2021-12-08

## 2021-12-13 DIAGNOSIS — J47.9 BRONCHIECTASIS WITHOUT COMPLICATION: Primary | ICD-10-CM

## 2021-12-13 DIAGNOSIS — J41.1 BRONCHITIS, CHRONIC, MUCOPURULENT: ICD-10-CM

## 2021-12-13 DIAGNOSIS — J47.9 BRONCHIECTASIS WITHOUT COMPLICATION: ICD-10-CM

## 2021-12-13 RX ORDER — ALBUTEROL SULFATE 90 UG/1
2 AEROSOL, METERED RESPIRATORY (INHALATION) EVERY 6 HOURS PRN
Qty: 18 G | Refills: 6 | Status: SHIPPED | OUTPATIENT
Start: 2021-12-13 | End: 2021-12-13 | Stop reason: SDUPTHER

## 2021-12-13 RX ORDER — ALBUTEROL SULFATE 90 UG/1
2 AEROSOL, METERED RESPIRATORY (INHALATION) EVERY 6 HOURS PRN
Qty: 18 G | Refills: 6 | Status: SHIPPED | OUTPATIENT
Start: 2021-12-13 | End: 2022-08-24 | Stop reason: SDUPTHER

## 2021-12-14 RX ORDER — REPAGLINIDE 2 MG/1
2 TABLET ORAL
Qty: 30 TABLET | OUTPATIENT
Start: 2021-12-14 | End: 2022-01-13

## 2021-12-15 DIAGNOSIS — R93.429 ABNORMAL CT SCAN, KIDNEY: ICD-10-CM

## 2021-12-15 DIAGNOSIS — R91.1 LUNG NODULE: ICD-10-CM

## 2021-12-15 DIAGNOSIS — J41.1 BRONCHITIS, CHRONIC, MUCOPURULENT: ICD-10-CM

## 2021-12-15 DIAGNOSIS — J47.9 BRONCHIECTASIS WITHOUT COMPLICATION: Primary | ICD-10-CM

## 2021-12-15 DIAGNOSIS — Z77.090 ASBESTOS EXPOSURE: ICD-10-CM

## 2021-12-22 ENCOUNTER — PATIENT MESSAGE (OUTPATIENT)
Dept: PULMONOLOGY | Facility: CLINIC | Age: 76
End: 2021-12-22
Payer: MEDICARE

## 2021-12-22 ENCOUNTER — HOSPITAL ENCOUNTER (OUTPATIENT)
Dept: RADIOLOGY | Facility: HOSPITAL | Age: 76
Discharge: HOME | End: 2021-12-22
Attending: INTERNAL MEDICINE
Payer: MEDICARE

## 2021-12-22 DIAGNOSIS — J47.9 BRONCHIECTASIS WITHOUT COMPLICATION: ICD-10-CM

## 2021-12-22 DIAGNOSIS — Z77.090 ASBESTOS EXPOSURE: ICD-10-CM

## 2021-12-22 DIAGNOSIS — J41.1 BRONCHITIS, CHRONIC, MUCOPURULENT: ICD-10-CM

## 2021-12-22 DIAGNOSIS — R91.1 LUNG NODULE: ICD-10-CM

## 2021-12-22 PROCEDURE — 71250 CT THORAX DX C-: CPT | Mod: 26,,, | Performed by: RADIOLOGY

## 2021-12-22 PROCEDURE — 71250 CT CHEST WITHOUT CONTRAST: ICD-10-PCS | Mod: 26,,, | Performed by: RADIOLOGY

## 2021-12-22 PROCEDURE — 71250 CT THORAX DX C-: CPT | Mod: TC

## 2021-12-23 ENCOUNTER — TELEPHONE (OUTPATIENT)
Dept: ELECTROPHYSIOLOGY | Facility: CLINIC | Age: 76
End: 2021-12-23
Payer: MEDICARE

## 2021-12-23 DIAGNOSIS — I48.0 PAF (PAROXYSMAL ATRIAL FIBRILLATION): Primary | ICD-10-CM

## 2021-12-30 ENCOUNTER — PATIENT MESSAGE (OUTPATIENT)
Dept: INTERNAL MEDICINE | Facility: CLINIC | Age: 76
End: 2021-12-30
Payer: MEDICARE

## 2021-12-30 RX ORDER — REPAGLINIDE 2 MG/1
2 TABLET ORAL
Qty: 90 TABLET | Refills: 1 | Status: SHIPPED | OUTPATIENT
Start: 2021-12-30 | End: 2022-02-25

## 2022-01-18 ENCOUNTER — PATIENT MESSAGE (OUTPATIENT)
Dept: PULMONOLOGY | Facility: CLINIC | Age: 77
End: 2022-01-18
Payer: MEDICARE

## 2022-01-26 DIAGNOSIS — E11.9 TYPE 2 DIABETES MELLITUS WITHOUT COMPLICATION: ICD-10-CM

## 2022-02-23 NOTE — PROGRESS NOTES
"Mr. Junior is a patient of Dr. Mccauley and was last seen in clinic 7/28/2021.      Subjective:   Patient ID:  Des Junior is a 76 y.o. male who presents for follow-up of Atrial Fibrillation  .     HPI:    Mr. Junior is a 76 y.o. male with persistent AF, HTN, HLD, PE, DM, SVT here for follow up.    Background:    PAF, persistent -> DCCV 4/2018  HTN on meds  HL on meds  unprovoked PE 2018  DM2    He started sotalol in 2018, which converted him from AF to SR. He stopped sotalol months ago as "no one would prescribe it for [him]."  Was undergoing preop risk stratification in cardiology clinic 11/11/20, when ECG showed NCT, short RP. Rx with BB increase, which he's not done yet.    He c/o tired x 1.5 wk, SOB. No palps.   Has sx when he climbs under house to do work. Sitting still, no sx.  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 Hx of PAF. However, today he's in NSR and is unclear whether he feels any different.  Agree with increase in BB.    Monitor 11/13/2020: Impression:  Sinus rhythm with episodes of symptomatic supraventricular tachycardia. Further rhythm classification is complicated by significant signal noise.    Clinic visit 12/22/2020: Symptomatic SVT identified on event monitor. Case previously discussed with Dr. Mccauley. Patient is a candidate for ablation. . Recently had knee surgery 2 weeks ago. We discussed risks, benefits, and alternatives. He would like to proceed with SVT ablation but would like to finish PT 1st.  EKG today looks like coarse AF. Unclear if persistent, as he does not have noticeable symptoms. Will obtain Holter. In interim, will increase metoprolol for more optimal rate control. He is on xarelto for CVA prophylaxis.    Holter showed persistent rate controlled AF, 6% PVC burden.   2/11/2021: After knee surgery, was found to be in AF. Possibly asymptomatic. We discussed his options, including AF and SVT ablation. He is interested in cardioversion first to determine whether he " feels symptom improvement in SR. Holter showed controlled V rates on increased metoprolol. Can discuss AF and SVT ablation at clinic follow up depending on symptoms. He is on xarelto for CVA prophylaxis.    3/5/2021: Cardioversion was successfully performed with restoration of normal sinus rhythm. Recurrences of PAF afterward. Plan amio load, then place Bardgiovanna cherryor in 2 weeks, then f/u after that.    4/22/2021 event monitor: Sinus rhythm/sinus bradycardia with the later development of persistent atrial fibrillation/AFL without symptoms and with good rate control.    4/28/2021: He is one month s/p cardioversion and initiation of amiodarone. Event monitor shows that he was in SR and converted back to AF/AFL about 3 weeks after procedure. EKG today shows AF and RBBB. Reports some symptom improvement after his cardioversion (less SOB). However, he also states that he feels very well today and was surprised to find he was out of rhythm. We discussed ablation as an option if he did have symptom improvement in SR. He says he is willing to proceed if necessary, but right now his schedule is such that he would like to defer for a while. He will contact the clinic if he develops worsening symptoms. For now he will RTC in 3 mo to reassess. LEO showed normal EF. He remains on xarelto for CVA prophylaxis. Will stop amiodarone.      7/28/2021: He is here for follow up of AF. At last visit he reported his AF as asymptomatic. He continues to feel well with no limitations - remains very active.  Will continue rate control strategy. CHADSVASc 4 and he remains on xarelto for CVA prophylaxis. On metoprolol for rate control.    Update (03/09/2022):    Today he says he does have some VELASCO, edema. He says he had been eating higher salt meals but has reduced his intake recently with some symptom improvement. Some right leg pain. Diagnosed with lung scarring.    He is currently taking xarelto 20mg daily for stroke prophylaxis and denies  significant bleeding episodes. He is currently being treated with toprol 75mg daily for HR control. Kidney function is stable, with a creatinine of 1.3 on 5/5/2021.     I have personally reviewed the patient's EKG today, which shows AF/AFL with RBBB at 76bpm. QRS is 140. QTc is 465.    Relevant Cardiac Test Results:    2D Echo (3/9/2022):  · Atrial fibrillation observed.  · The estimated ejection fraction is 40-45%. Significant beat to beat variability.  · The left ventricle is normal in size with mildly decreased systolic function.  · Normal right ventricular size with normal right ventricular systolic function.  · Mild mitral regurgitation.  · There is pulmonary hypertension.  · The estimated PA systolic pressure is 58 mmHg.  · Elevated central venous pressure (15 mmHg).    Current Outpatient Medications   Medication Sig    acetaminophen (TYLENOL) 500 MG tablet Take 1,000 mg by mouth daily as needed for Pain.    albuterol (ACCUNEB) 1.25 mg/3 mL Nebu Take 3 mLs (1.25 mg total) by nebulization every 4 (four) hours as needed (cough or wheeze). Rescue    albuterol (PROAIR HFA) 90 mcg/actuation inhaler Inhale 2 puffs into the lungs every 6 (six) hours as needed for Wheezing. Rescue    amLODIPine (NORVASC) 5 MG tablet TAKE 1 TABLET(5 MG) BY MOUTH EVERY DAY    blood sugar diagnostic Strp 1 strip by Misc.(Non-Drug; Combo Route) route 2 (two) times daily with meals.    cetirizine (ZYRTEC) 10 MG tablet Take 1 tablet (10 mg total) by mouth once daily.    cholecalciferol, vitamin D3, (VITAMIN D3) 25 mcg (1,000 unit) capsule Take 1,000 Units by mouth once daily.    colchicine (COLCRYS) 0.6 mg tablet TAKE 1 TABLET(0.6 MG) BY MOUTH EVERY DAY    gabapentin (NEURONTIN) 100 MG capsule TAKE 1 CAPSULE BY MOUTH THREE TIMES DAILY    hydroCHLOROthiazide (HYDRODIURIL) 25 MG tablet Take 0.5 tablets (12.5 mg total) by mouth once daily.    insulin (LANTUS SOLOSTAR U-100 INSULIN) glargine 100 units/mL (3mL) SubQ pen Inject 20  "Units into the skin every evening.    LANCETS & BLOOD GLUCOSE STRIPS MISC * * * Twice a day .  check glucose twice a day    losartan (COZAAR) 50 MG tablet Take 1 tablet (50 mg total) by mouth once daily.    metFORMIN (GLUCOPHAGE-XR) 500 MG ER 24hr tablet Take 2 tablets (1,000 mg total) by mouth 2 (two) times daily with meals.    metoprolol succinate (TOPROL-XL) 50 MG 24 hr tablet TAKE 1 AND 1/2 TABLETS(75 MG) BY MOUTH EVERY DAY    omega-3 fatty acids-vitamin E (OMEGA-3 FISH OIL) 1,000-5 mg-unit Cap Take by mouth 2 (two) times daily. 1 Capsule Oral Every day    omeprazole (PRILOSEC OTC) 20 MG tablet Take by mouth. 1 Tablet, Delayed Release (E.C.) Oral Every day    pen needle, diabetic 29 gauge x 1/2" Ndle Inject insulin daily    pravastatin (PRAVACHOL) 40 MG tablet TAKE 1 TABLET(40 MG) BY MOUTH EVERY DAY    repaglinide (PRANDIN) 2 MG tablet TAKE 1 TABLET(2 MG) BY MOUTH THREE TIMES DAILY BEFORE MEALS    XARELTO 20 mg Tab TAKE 1 TABLET BY MOUTH DAILY WITH THE EVENING MEAL     No current facility-administered medications for this visit.     Review of Systems   Constitutional: Negative for malaise/fatigue.   Cardiovascular: Positive for dyspnea on exertion and leg swelling. Negative for chest pain, irregular heartbeat and palpitations.   Respiratory: Positive for cough and sleep disturbances due to breathing.    Hematologic/Lymphatic: Negative for bleeding problem.   Skin: Negative for rash.   Musculoskeletal: Negative for myalgias.   Gastrointestinal: Negative for hematemesis, hematochezia and nausea.   Genitourinary: Negative for hematuria.   Neurological: Negative for light-headedness.   Psychiatric/Behavioral: Negative for altered mental status.   Allergic/Immunologic: Negative for persistent infections.     Objective:        /78   Pulse 72   Ht 6' 1" (1.854 m)   Wt 122.4 kg (269 lb 13.5 oz)   BMI 35.60 kg/m²     Physical Exam  Vitals and nursing note reviewed.   Constitutional:       Appearance: " Normal appearance. He is well-developed.   HENT:      Head: Normocephalic.      Nose: Nose normal.   Eyes:      Pupils: Pupils are equal, round, and reactive to light.   Cardiovascular:      Rate and Rhythm: Normal rate. Rhythm irregularly irregular.   Pulmonary:      Effort: No respiratory distress.      Breath sounds: Normal breath sounds.   Musculoskeletal:         General: Normal range of motion.   Skin:     General: Skin is warm and dry.      Findings: No erythema.   Neurological:      Mental Status: He is alert and oriented to person, place, and time.   Psychiatric:         Speech: Speech normal.         Behavior: Behavior normal.       Lab Results   Component Value Date     05/05/2021    K 4.8 05/05/2021    MG 1.7 03/02/2018    BUN 20 05/05/2021    CREATININE 1.3 05/05/2021    ALT 17 05/05/2021    AST 14 05/05/2021    HGB 11.3 (L) 05/05/2021    HCT 36.0 (L) 05/05/2021    HCT 37 12/07/2020    TSH 1.237 07/30/2020    LDLCALC 64.4 05/05/2021       Recent Labs   Lab 11/11/20  1220 03/03/21  0915   INR 1.1 1.1         Assessment:     1. Longstanding persistent atrial fibrillation    2. Hypertension associated with diabetes    3. SVT (supraventricular tachycardia)    4. Severe obesity (BMI 35.0-39.9) with comorbidity      Plan:     In summary, Mr. Junior is a 76 y.o. male with persistent AF, HTN, HLD, PE, DM, SVT here for follow up.  He remains in persistent AF/AFL in rate control strategy. Echo today shows decrease in EF to 40-45% and evidence of fluid overload. He is also reporting some CHF symptoms.   Will initiate diuresis and increase metoprolol for GDMT. Will refer to cardiology for CHF management and medication optimization. SPECT 2018 negative for ischemia.   He had AF breakthrough on amiodarone (after load complete). His rates are controlled. We discussed that PVI was the only option for rhythm control at this point if AF is identified as the source of his cardiomyopathy. Patient does not want  invasive procedure if possible. Consider rechecking echo in 3 months after optimized.    Increase metoprolol to 100mg daily  Lasix 20mg daily x 3 days  Low salt diet  Cardiology for CHF optimization  Discuss rhythm control with Dr. Mccauley.    *A copy of this note has been sent to Dr. Mccauley*    Follow up in about 3 months (around 6/9/2022).    ------------------------------------------------------------------    CHRIS Aldridge, NP-C  Cardiac Electrophysiology

## 2022-03-04 DIAGNOSIS — I10 ESSENTIAL HYPERTENSION: ICD-10-CM

## 2022-03-04 NOTE — TELEPHONE ENCOUNTER
No new care gaps identified.  Powered by [employer]. Reference number: [id number].   3/04/2022 4:08:34 AM CST

## 2022-03-08 ENCOUNTER — TELEPHONE (OUTPATIENT)
Dept: ELECTROPHYSIOLOGY | Facility: CLINIC | Age: 77
End: 2022-03-08
Payer: MEDICARE

## 2022-03-08 NOTE — TELEPHONE ENCOUNTER
Spoke with pt to confirm appt on 3/9/22 for 11 am. Pt verbalized understanding of appt date, time, and location.

## 2022-03-09 ENCOUNTER — HOSPITAL ENCOUNTER (OUTPATIENT)
Dept: CARDIOLOGY | Facility: CLINIC | Age: 77
Discharge: HOME | End: 2022-03-09
Attending: INTERNAL MEDICINE
Payer: MEDICARE

## 2022-03-09 ENCOUNTER — OFFICE VISIT (OUTPATIENT)
Dept: ELECTROPHYSIOLOGY | Facility: CLINIC | Age: 77
End: 2022-03-09
Payer: MEDICARE

## 2022-03-09 ENCOUNTER — HOSPITAL ENCOUNTER (OUTPATIENT)
Dept: CARDIOLOGY | Facility: HOSPITAL | Age: 77
Discharge: HOME | End: 2022-03-09
Attending: INTERNAL MEDICINE
Payer: MEDICARE

## 2022-03-09 VITALS
HEIGHT: 73 IN | WEIGHT: 269.81 LBS | DIASTOLIC BLOOD PRESSURE: 78 MMHG | HEART RATE: 72 BPM | BODY MASS INDEX: 35.76 KG/M2 | SYSTOLIC BLOOD PRESSURE: 138 MMHG

## 2022-03-09 VITALS
WEIGHT: 268 LBS | HEART RATE: 80 BPM | SYSTOLIC BLOOD PRESSURE: 130 MMHG | HEIGHT: 73 IN | BODY MASS INDEX: 35.52 KG/M2 | DIASTOLIC BLOOD PRESSURE: 78 MMHG

## 2022-03-09 DIAGNOSIS — E11.59 HYPERTENSION ASSOCIATED WITH DIABETES: ICD-10-CM

## 2022-03-09 DIAGNOSIS — I15.2 HYPERTENSION ASSOCIATED WITH DIABETES: ICD-10-CM

## 2022-03-09 DIAGNOSIS — I48.0 PAF (PAROXYSMAL ATRIAL FIBRILLATION): ICD-10-CM

## 2022-03-09 DIAGNOSIS — I48.19 PERSISTENT ATRIAL FIBRILLATION: ICD-10-CM

## 2022-03-09 DIAGNOSIS — I48.11 LONGSTANDING PERSISTENT ATRIAL FIBRILLATION: Primary | ICD-10-CM

## 2022-03-09 DIAGNOSIS — I49.8 OTHER SPECIFIED CARDIAC ARRHYTHMIAS: ICD-10-CM

## 2022-03-09 DIAGNOSIS — E66.01 SEVERE OBESITY (BMI 35.0-39.9) WITH COMORBIDITY: ICD-10-CM

## 2022-03-09 DIAGNOSIS — I50.20 SYSTOLIC CONGESTIVE HEART FAILURE, UNSPECIFIED HF CHRONICITY: ICD-10-CM

## 2022-03-09 DIAGNOSIS — I47.10 SVT (SUPRAVENTRICULAR TACHYCARDIA): ICD-10-CM

## 2022-03-09 LAB
ASCENDING AORTA: 3.62 CM
AV INDEX (PROSTH): 0.65
AV MEAN GRADIENT: 4 MMHG
AV PEAK GRADIENT: 6 MMHG
AV VALVE AREA: 3.01 CM2
AV VELOCITY RATIO: 0.63
BSA FOR ECHO PROCEDURE: 2.5 M2
CV ECHO LV RWT: 0.38 CM
DOP CALC AO PEAK VEL: 1.25 M/S
DOP CALC AO VTI: 22.86 CM
DOP CALC LVOT AREA: 4.6 CM2
DOP CALC LVOT DIAMETER: 2.43 CM
DOP CALC LVOT PEAK VEL: 0.79 M/S
DOP CALC LVOT STROKE VOLUME: 68.74 CM3
DOP CALCLVOT PEAK VEL VTI: 14.83 CM
E WAVE DECELERATION TIME: 138.84 MSEC
E/A RATIO: 2.39
E/E' RATIO: 9.05 M/S
ECHO LV POSTERIOR WALL: 0.96 CM (ref 0.6–1.1)
EJECTION FRACTION: 40 %
FRACTIONAL SHORTENING: 29 % (ref 28–44)
INTERVENTRICULAR SEPTUM: 1.05 CM (ref 0.6–1.1)
LA MAJOR: 5.02 CM
LA MINOR: 4.8 CM
LA WIDTH: 3.95 CM
LEFT ATRIUM SIZE: 4.71 CM
LEFT ATRIUM VOLUME INDEX MOD: 21.4 ML/M2
LEFT ATRIUM VOLUME INDEX: 31.8 ML/M2
LEFT ATRIUM VOLUME MOD: 52.12 CM3
LEFT ATRIUM VOLUME: 77.61 CM3
LEFT INTERNAL DIMENSION IN SYSTOLE: 3.63 CM (ref 2.1–4)
LEFT VENTRICLE DIASTOLIC VOLUME INDEX: 50.81 ML/M2
LEFT VENTRICLE DIASTOLIC VOLUME: 123.97 ML
LEFT VENTRICLE MASS INDEX: 78 G/M2
LEFT VENTRICLE SYSTOLIC VOLUME INDEX: 22.8 ML/M2
LEFT VENTRICLE SYSTOLIC VOLUME: 55.64 ML
LEFT VENTRICULAR INTERNAL DIMENSION IN DIASTOLE: 5.1 CM (ref 3.5–6)
LEFT VENTRICULAR MASS: 189.28 G
LV LATERAL E/E' RATIO: 7.82 M/S
LV SEPTAL E/E' RATIO: 10.75 M/S
MV A" WAVE DURATION": 11.42 MSEC
MV PEAK A VEL: 0.36 M/S
MV PEAK E VEL: 0.86 M/S
MV STENOSIS PRESSURE HALF TIME: 40.26 MS
MV VALVE AREA P 1/2 METHOD: 5.46 CM2
PISA TR MAX VEL: 3.27 M/S
PULM VEIN S/D RATIO: 0.78
PV PEAK D VEL: 0.59 M/S
PV PEAK S VEL: 0.46 M/S
RA MAJOR: 4.5 CM
RA PRESSURE: 15 MMHG
RA WIDTH: 3.1 CM
RIGHT VENTRICULAR END-DIASTOLIC DIMENSION: 3.54 CM
RV TISSUE DOPPLER FREE WALL SYSTOLIC VELOCITY 1 (APICAL 4 CHAMBER VIEW): 15.32 CM/S
SINUS: 3.36 CM
STJ: 3.19 CM
TDI LATERAL: 0.11 M/S
TDI SEPTAL: 0.08 M/S
TDI: 0.1 M/S
TR MAX PG: 43 MMHG
TRICUSPID ANNULAR PLANE SYSTOLIC EXCURSION: 1.99 CM
TV REST PULMONARY ARTERY PRESSURE: 58 MMHG

## 2022-03-09 PROCEDURE — 93010 ELECTROCARDIOGRAM REPORT: CPT | Mod: S$PBB,,, | Performed by: INTERNAL MEDICINE

## 2022-03-09 PROCEDURE — 93005 ELECTROCARDIOGRAM TRACING: CPT | Mod: PBBFAC | Performed by: INTERNAL MEDICINE

## 2022-03-09 PROCEDURE — 93306 TTE W/DOPPLER COMPLETE: CPT | Mod: 26,,, | Performed by: INTERNAL MEDICINE

## 2022-03-09 PROCEDURE — 93306 ECHO (CUPID ONLY): ICD-10-PCS | Mod: 26,,, | Performed by: INTERNAL MEDICINE

## 2022-03-09 PROCEDURE — 99214 PR OFFICE/OUTPT VISIT, EST, LEVL IV, 30-39 MIN: ICD-10-PCS | Mod: S$PBB,,, | Performed by: NURSE PRACTITIONER

## 2022-03-09 PROCEDURE — 99214 OFFICE O/P EST MOD 30 MIN: CPT | Mod: PBBFAC,25 | Performed by: NURSE PRACTITIONER

## 2022-03-09 PROCEDURE — 99214 OFFICE O/P EST MOD 30 MIN: CPT | Mod: S$PBB,,, | Performed by: NURSE PRACTITIONER

## 2022-03-09 PROCEDURE — C8929 TTE W OR WO FOL WCON,DOPPLER: HCPCS

## 2022-03-09 PROCEDURE — 93010 RHYTHM STRIP: ICD-10-PCS | Mod: S$PBB,,, | Performed by: INTERNAL MEDICINE

## 2022-03-09 PROCEDURE — 99999 PR PBB SHADOW E&M-EST. PATIENT-LVL IV: ICD-10-PCS | Mod: PBBFAC,,, | Performed by: NURSE PRACTITIONER

## 2022-03-09 PROCEDURE — 99999 PR PBB SHADOW E&M-EST. PATIENT-LVL IV: CPT | Mod: PBBFAC,,, | Performed by: NURSE PRACTITIONER

## 2022-03-09 RX ORDER — METOPROLOL SUCCINATE 50 MG/1
100 TABLET, EXTENDED RELEASE ORAL DAILY
Qty: 180 TABLET | Refills: 2 | Status: SHIPPED | OUTPATIENT
Start: 2022-03-09 | End: 2022-08-31 | Stop reason: SDUPTHER

## 2022-03-09 RX ORDER — METOPROLOL SUCCINATE 50 MG/1
100 TABLET, EXTENDED RELEASE ORAL DAILY
Qty: 180 TABLET | Refills: 2 | Status: SHIPPED | OUTPATIENT
Start: 2022-03-09 | End: 2022-03-09 | Stop reason: SDUPTHER

## 2022-03-09 RX ORDER — FUROSEMIDE 20 MG/1
20 TABLET ORAL DAILY
Qty: 10 TABLET | Refills: 0 | Status: SHIPPED | OUTPATIENT
Start: 2022-03-09 | End: 2022-03-09 | Stop reason: SDUPTHER

## 2022-03-09 RX ORDER — FUROSEMIDE 20 MG/1
20 TABLET ORAL DAILY
Qty: 10 TABLET | Refills: 0 | Status: SHIPPED | OUTPATIENT
Start: 2022-03-09 | End: 2022-05-27

## 2022-03-09 NOTE — PATIENT INSTRUCTIONS
Take 1 tablet of lasix once daily for 3 days  Low salt diet  Increase metoprolol to 2 tablets (100mg total) daily  Make appt with Dr. Evangelista in general cardiology.

## 2022-03-09 NOTE — Clinical Note
Patient with AF was in rate control strategy now EF down to 40-45% and fluid overload. Controlled V rates. Negative SPECT 2018. He failed amiodarone. He does not want PVI if possible. AF is otherwise asymptomatic. Would you be ok with sending him to cardiology for CHF optimization and rechecking echo in 3 mo?

## 2022-03-11 ENCOUNTER — TELEPHONE (OUTPATIENT)
Dept: INTERNAL MEDICINE | Facility: CLINIC | Age: 77
End: 2022-03-11
Payer: MEDICARE

## 2022-03-11 DIAGNOSIS — I10 ESSENTIAL HYPERTENSION: ICD-10-CM

## 2022-03-11 RX ORDER — LOSARTAN POTASSIUM 50 MG/1
TABLET ORAL
Qty: 90 TABLET | Refills: 11 | OUTPATIENT
Start: 2022-03-11

## 2022-03-11 RX ORDER — LOSARTAN POTASSIUM 50 MG/1
50 TABLET ORAL DAILY
Qty: 90 TABLET | Refills: 11 | Status: SHIPPED | OUTPATIENT
Start: 2022-03-11

## 2022-03-11 NOTE — TELEPHONE ENCOUNTER
Quick DC. Request already responded to by other means (e.g. phone or fax)   Medication request has been signed for already and receipt confirmed by pharmacy.  Will remove duplicate and close out encounter.

## 2022-04-05 DIAGNOSIS — I10 ESSENTIAL HYPERTENSION: ICD-10-CM

## 2022-04-05 NOTE — TELEPHONE ENCOUNTER
Care Due:                  Date            Visit Type   Department     Provider  --------------------------------------------------------------------------------                                EP -                              PRIMARY      [facility] INTERNAL  Last Visit: 12-      CARE ([facility])   MEDICINE       Christopher Cortez                               -                              PRIMARY      [facility] INTERNAL  Next Visit: 06-      CARE ([facility])   MEDICINE       Christopher Cortez                                                            Last  Test          Frequency    Reason                     Performed    Due Date  --------------------------------------------------------------------------------    HBA1C.......  6 months...  insulin, metFORMIN.......  12- 06-    Powered by "[employer] by [email or web address]. Reference number: [id number].   4/05/2022 4:14:45 AM CDT

## 2022-04-06 RX ORDER — HYDROCHLOROTHIAZIDE 25 MG/1
TABLET ORAL
Qty: 90 TABLET | Refills: 11 | Status: SHIPPED | OUTPATIENT
Start: 2022-04-06 | End: 2022-05-27 | Stop reason: SDUPTHER

## 2022-04-06 RX ORDER — METFORMIN HYDROCHLORIDE 500 MG/1
1000 TABLET, EXTENDED RELEASE ORAL 2 TIMES DAILY WITH MEALS
Qty: 360 TABLET | Refills: 11 | Status: SHIPPED | OUTPATIENT
Start: 2022-04-06 | End: 2022-07-19

## 2022-04-06 RX ORDER — METFORMIN HYDROCHLORIDE 500 MG/1
TABLET, EXTENDED RELEASE ORAL
Qty: 360 TABLET | Refills: 0 | Status: SHIPPED | OUTPATIENT
Start: 2022-04-06 | End: 2022-04-06

## 2022-04-06 NOTE — TELEPHONE ENCOUNTER
Refill Routing Note   Medication(s) are not appropriate for processing by [facility] for the following reason(s):      - Drug-Drug Interaction (furosemide, hydroCHLOROthiazide)    [facility] action(s):  Defer  Approve Medication-related problems identified: Drug-drug interaction        --->Care Gap information included in message below if applicable.   Medication reconciliation completed: No   Automatic Epic Generated Protocol Data:        Requested Prescriptions   Pending Prescriptions Disp Refills    hydroCHLOROthiazide (HYDRODIURIL) 25 MG tablet [Pharmacy Med Name: HYDROCHLOROTHIAZIDE 25MG TABLETS] 90 tablet 0     Sig: TAKE 1/2 TABLET BY MOUTH EVERY DAY       Cardiovascular: Diuretics - Thiazide Failed - 4/5/2022  4:14 AM        Failed - Matches previous order       Previous Authorizing Provider: Christopher Cortez MD (hydroCHLOROthiazide (HYDRODIURIL) 25 MG tablet)  Previous Authorizing Provider: Christopher Cortez MD (metFORMIN (GLUCOPHAGE-XR) 500 MG ER 24hr tablet)  Previous Pharmacy: [facility] #[id number] - [place], [place] - [id number] AT [facility] [address]  Previous Pharmacy: [facility] #[id number] - [place] [place] - [id number] AT [facility] [address]            Passed - Patient is at least 18 years old        Passed - Last BP in normal range within 360 days     BP Readings from Last 3 Encounters:   03/09/22 138/78   03/09/22 130/78   12/06/21 128/78               Passed - Valid encounter within last 15 months     Recent Visits  Date Type Provider Dept   12/06/21 Office Visit Christopher Cortez MD [facility] Internal Medicine   08/17/21 Office Visit Christopher Cortez MD [facility] Internal Medicine   05/13/21 Office Visit Christopher Cortez MD [facility] Internal Medicine   02/01/21 Office Visit Christopher Cortez MD [facility] Internal Medicine   06/19/20 Office Visit Christopher Cortez MD [facility] Internal Medicine   Showing recent visits within past 720 days and meeting all other  requirements  Future Appointments  No visits were found meeting these conditions.  Showing future appointments within next 150 days and meeting all other requirements      Future Appointments              In 1 month MD Alex [facility] - Cardiology - 3rd Fl, [address]    In 2 months MD Alex [facility] Int Med Primary Care Bldg, [address] [facility] PCW    In 2 months EKG, APPT [facility] Cardiology Atrium 3rd Fl, [address]    In 2 months Lou Simmons, NP [facility] - Electrophysiology 3rd Fl, [address]    In 4 months MD [facility] - Pulmonary, [place]                Passed - No ED/Hospital visits since last PCP visit     Last PCP Visit: 12/6/2021 Last Admission: 3/5/2021 Last ED Visit: 3/1/2018          Passed - Cr is 1.39 or below and within 360 days     Lab Results   Component Value Date    CREATININE 1.3 05/05/2021    CREATININE 1.3 03/03/2021    CREATININE 1.3 10/30/2020    POCCRE 1.0 12/07/2020              Passed - K in normal range and within 360 days     POC Potassium   Date Value Ref Range Status   12/07/2020 4.5 3.5 - 5.1 mmol/L Final     Potassium   Date Value Ref Range Status   05/05/2021 4.8 3.5 - 5.1 mmol/L Final   03/03/2021 5.1 3.5 - 5.1 mmol/L Final   10/30/2020 4.2 3.5 - 5.1 mmol/L Final              Passed - Na is between 130 and 148 and within 360 days     POC Sodium   Date Value Ref Range Status   12/07/2020 136 136 - 145 mmol/L Final     Sodium   Date Value Ref Range Status   05/05/2021 144 136 - 145 mmol/L Final   03/03/2021 139 136 - 145 mmol/L Final   10/30/2020 138 136 - 145 mmol/L Final              Passed - eGFR within 360 days     Lab Results   Component Value Date    EGFRNONAA 53.4 (A) 05/05/2021    EGFRNONAA 54 (A) 03/03/2021    EGFRNONAA 53.8 (A) 10/30/2020                  metFORMIN (GLUCOPHAGE-XR) 500 MG ER 24hr tablet [Pharmacy Med Name: METFORMIN ER 500MG 24HR TABS] 360 tablet 0     Sig: TAKE 2 TABLETS(1000 MG) BY MOUTH TWICE DAILY WITH MEALS        Endocrinology:  Diabetes - Biguanides Passed - 4/5/2022  4:14 AM        Passed - Patient is at least 18 years old        Passed - Valid encounter within last 15 months     Recent Visits  Date Type Provider Dept   12/06/21 Office Visit Christopher Cortez MD [facility] Internal Medicine   08/17/21 Office Visit Christopher Cortez MD [facility] Internal Medicine   05/13/21 Office Visit Christopher Cortez MD [facility] Internal Medicine   02/01/21 Office Visit Christopher Cortez MD [facility] Internal Medicine   06/19/20 Office Visit Christopher Cortez MD [facility] Internal Medicine   Showing recent visits within past 720 days and meeting all other requirements  Future Appointments  No visits were found meeting these conditions.  Showing future appointments within next 150 days and meeting all other requirements      Future Appointments              In 1 month MD Alex Disla - Cardiology - 3rd Fl, [address]    In 2 months MD [facility] Int Med Primary Care Bldg, [facility]    In 2 months EKG, APPT [facility] Cardiology At[facility] 3rd Fl, [address]    In 2 months Lou Simmons, G[facility] - Electrophysiology 3rd Fl, [address]    In 4 months Eladio Sandoval MD [facility] - Pulmonary, [facility]                Passed - Matches previous order       Previous Authorizing Provider: Christopher Cortez MD (hydroCHLOROthiazide (HYDRODIURIL) 25 MG tablet)  Previous Authorizing Provider: Christopher Cortez MD (metFORMIN (GLUCOPHAGE-XR) 500 MG ER 24hr tablet)  Previous Pharmacy: [id number] #[id number] - [place], [place] - [id number] AT [facility] [address]  Previous Pharmacy[facility] #[id number] - [place] - [id number] AT [facility] [address]            Passed - No ED/Hospital visits since last PCP visit     Last PCP Visit: 12/6/2021 Last Admission: 3/5/2021 Last ED Visit: 3/1/2018          Passed - Cr is 1.39 or below and within 360 days     Lab Results   Component Value Date     CREATININE 1.3 05/05/2021    CREATININE 1.3 03/03/2021    CREATININE 1.3 10/30/2020    POCCRE 1.0 12/07/2020              Passed - HBA1C within 180 days     Lab Results   Component Value Date    HGBA1C 7.2 (H) 12/06/2021    HGBA1C 7.1 (H) 08/17/2021    HGBA1C 7.6 (H) 05/05/2021              Passed - eGFR is 45 or above and within 360 days     Lab Results   Component Value Date    EGFRNONAA 53.4 (A) 05/05/2021    EGFRNONAA 54 (A) 03/03/2021    EGFRNONAA 53.8 (A) 10/30/2020                      Appointments  past 12m or future 3m with PCP    Date Provider   Last Visit   12/6/2021 Christopher Cortez MD   Next Visit   6/6/2022 Christopher Cortez MD   ED visits in past 90 days: 0        Note composed:4:44 PM 04/06/2022

## 2022-04-28 NOTE — LETTER
May 2, 2018      Josue Barajas MD  [address]  [address]  [place] [address]           [facility]  [address]  [place] [address]  Phone: [phone]          Patient: Des Junior   MR Number: [id number]   YOB: 1945   Date of Visit: 5/2/2018       Dear Dr. Josue Barajas:    Thank you for referring Des Junior to me for evaluation. Attached you will find relevant portions of my assessment and plan of care.    If you have questions, please do not hesitate to call me. I look forward to following Des Junior along with you.    Sincerely,    Jose D Palma MD    Enclosure  CC:  No Recipients    If you would like to receive this communication electronically, please contact [email or web address] or [phone] to request more information on [employer] Link access.    For providers and/or their staff who would like to refer a patient to [facility], please contact us through our one-stop-shop provider referral line, [facility] , at [phone].    If you feel you have received this communication in error or would no longer like to receive these types of communications, please e-mail [email or web address]          Pt informed of below message and pt scheduled

## 2022-04-29 DIAGNOSIS — E11.65 TYPE 2 DIABETES MELLITUS WITH HYPERGLYCEMIA, WITHOUT LONG-TERM CURRENT USE OF INSULIN: ICD-10-CM

## 2022-04-29 RX ORDER — PEN NEEDLE, DIABETIC 29 G X1/2"
NEEDLE, DISPOSABLE MISCELLANEOUS
Qty: 100 EACH | Refills: 3 | Status: SHIPPED | OUTPATIENT
Start: 2022-04-29

## 2022-04-29 NOTE — TELEPHONE ENCOUNTER
Last office visit 11/2/20 , fax/patient requesting to send to [facility] for pen needles

## 2022-05-09 RX ORDER — REPAGLINIDE 2 MG/1
TABLET ORAL
Qty: 90 TABLET | Refills: 5 | Status: SHIPPED | OUTPATIENT
Start: 2022-05-09

## 2022-05-17 RX ORDER — AMLODIPINE BESYLATE 10 MG/1
TABLET ORAL
Qty: 90 TABLET | Refills: 11 | OUTPATIENT
Start: 2022-05-17

## 2022-05-17 NOTE — TELEPHONE ENCOUNTER
Care Due:                  Date            Visit Type   Department     Provider  --------------------------------------------------------------------------------                                EP -                              PRIMARY      [facility] INTERNAL  Last Visit: 12-      CARE ([facility])   MEDICINE       Christopher Cortez                               -                              PRIMARY      [facility] INTERNAL  Next Visit: 06-      CARE ([facility])   MEDICINE       Christopher Cortez                                                            Last  Test          Frequency    Reason                     Performed    Due Date  --------------------------------------------------------------------------------    CMP.........  12 months..  colchicine,                05- 04-                             hydroCHLOROthiazide,                             insulin, losartan,                             metFORMIN................    Uric Acid...  12 months..  colchicine...............  05- 04-    [facility] Embedded Care Gaps. Reference number: [id number]. 5/16/2022   11:29:52 PM CDT

## 2022-05-17 NOTE — TELEPHONE ENCOUNTER
Quick DC. Inappropriate Request    Refill Authorization Note   Des Junior  is requesting a refill authorization.  Brief Assessment and Rationale for Refill:  Quick Discontinue  Medication Therapy Plan:  PCP decreased to Amlodipine 5mg 11/21/20    Medication Reconciliation Completed:  No    Medication-related problems identified: Requires labs   Comments:     Note composed:4:57 PM 05/17/2022

## 2022-05-27 ENCOUNTER — OFFICE VISIT (OUTPATIENT)
Dept: CARDIOLOGY | Facility: CLINIC | Age: 77
End: 2022-05-27
Payer: MEDICARE

## 2022-05-27 VITALS
WEIGHT: 265.88 LBS | SYSTOLIC BLOOD PRESSURE: 174 MMHG | HEIGHT: 73 IN | BODY MASS INDEX: 35.24 KG/M2 | DIASTOLIC BLOOD PRESSURE: 97 MMHG | OXYGEN SATURATION: 98 % | HEART RATE: 75 BPM

## 2022-05-27 DIAGNOSIS — I15.2 HYPERTENSION ASSOCIATED WITH DIABETES: ICD-10-CM

## 2022-05-27 DIAGNOSIS — E11.59 HYPERTENSION ASSOCIATED WITH DIABETES: ICD-10-CM

## 2022-05-27 DIAGNOSIS — N18.31 STAGE 3A CHRONIC KIDNEY DISEASE: ICD-10-CM

## 2022-05-27 DIAGNOSIS — I51.9 ASYMPTOMATIC LV DYSFUNCTION: Primary | ICD-10-CM

## 2022-05-27 DIAGNOSIS — E11.69 HYPERLIPIDEMIA ASSOCIATED WITH TYPE 2 DIABETES MELLITUS: ICD-10-CM

## 2022-05-27 DIAGNOSIS — I70.0 THORACIC AORTIC ATHEROSCLEROSIS: ICD-10-CM

## 2022-05-27 DIAGNOSIS — I77.810 ECTATIC THORACIC AORTA: ICD-10-CM

## 2022-05-27 DIAGNOSIS — E11.65 TYPE 2 DIABETES MELLITUS WITH HYPERGLYCEMIA, WITHOUT LONG-TERM CURRENT USE OF INSULIN: ICD-10-CM

## 2022-05-27 DIAGNOSIS — E78.00 HYPERCHOLESTEROLEMIA: ICD-10-CM

## 2022-05-27 DIAGNOSIS — I10 ESSENTIAL HYPERTENSION: ICD-10-CM

## 2022-05-27 DIAGNOSIS — I48.11 LONGSTANDING PERSISTENT ATRIAL FIBRILLATION: ICD-10-CM

## 2022-05-27 DIAGNOSIS — E78.5 HYPERLIPIDEMIA ASSOCIATED WITH TYPE 2 DIABETES MELLITUS: ICD-10-CM

## 2022-05-27 DIAGNOSIS — I48.19 PERSISTENT ATRIAL FIBRILLATION: ICD-10-CM

## 2022-05-27 PROCEDURE — 99999 PR PBB SHADOW E&M-EST. PATIENT-LVL V: ICD-10-PCS | Mod: PBBFAC,,, | Performed by: INTERNAL MEDICINE

## 2022-05-27 PROCEDURE — 99999 PR PBB SHADOW E&M-EST. PATIENT-LVL V: CPT | Mod: PBBFAC,,, | Performed by: INTERNAL MEDICINE

## 2022-05-27 PROCEDURE — 99215 OFFICE O/P EST HI 40 MIN: CPT | Mod: PBBFAC | Performed by: INTERNAL MEDICINE

## 2022-05-27 PROCEDURE — 99214 PR OFFICE/OUTPT VISIT, EST, LEVL IV, 30-39 MIN: ICD-10-PCS | Mod: S$PBB,,, | Performed by: INTERNAL MEDICINE

## 2022-05-27 PROCEDURE — 99214 OFFICE O/P EST MOD 30 MIN: CPT | Mod: S$PBB,,, | Performed by: INTERNAL MEDICINE

## 2022-05-27 RX ORDER — HYDROCHLOROTHIAZIDE 25 MG/1
25 TABLET ORAL DAILY
Qty: 90 TABLET | Refills: 3 | Status: SHIPPED | OUTPATIENT
Start: 2022-05-27 | End: 2022-07-27

## 2022-05-29 NOTE — Clinical Note
Dr. Mccauley,   You are seeing him Friday so I wanted to send you a copy of my note.    Dr. Evangelista,   I recommended he establish with you when he RTC as he does not have a cardiologist at present.   Thanks!  
neck pain

## 2022-06-02 NOTE — PROGRESS NOTES
"Mr. Junior is a patient of Dr. Mccauley and was last seen in clinic 3/9/2022.      Subjective:   Patient ID:  Des Junior is a 77 y.o. male who presents for follow-up of Atrial Fibrillation  .     HPI:    Mr. Junior is a 77 y.o. male with persistent AF, HTN, HLD, PE, DM, SVT here for follow up.    Background:    PAF, persistent -> DCCV 4/2018  HTN on meds  HL on meds  unprovoked PE 2018  DM2    He started sotalol in 2018, which converted him from AF to SR. He stopped sotalol months ago as "no one would prescribe it for [him]."  Was undergoing preop risk stratification in cardiology clinic 11/11/20, when ECG showed NCT, short RP. Rx with BB increase, which he's not done yet.    He c/o tired x 1.5 wk, SOB. No palps.   Has sx when he climbs under house to do work. Sitting still, no sx.  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 Hx of PAF. However, today he's in NSR and is unclear whether he feels any different.  Agree with increase in BB.    Monitor 11/13/2020: Impression:  Sinus rhythm with episodes of symptomatic supraventricular tachycardia. Further rhythm classification is complicated by significant signal noise.    Clinic visit 12/22/2020: Symptomatic SVT identified on event monitor. Case previously discussed with Dr. Mccauley. Patient is a candidate for ablation. . Recently had knee surgery 2 weeks ago. We discussed risks, benefits, and alternatives. He would like to proceed with SVT ablation but would like to finish PT 1st.  EKG today looks like coarse AF. Unclear if persistent, as he does not have noticeable symptoms. Will obtain Holter. In interim, will increase metoprolol for more optimal rate control. He is on xarelto for CVA prophylaxis.    Holter showed persistent rate controlled AF, 6% PVC burden.   2/11/2021: After knee surgery, was found to be in AF. Possibly asymptomatic. We discussed his options, including AF and SVT ablation. He is interested in cardioversion first to determine whether he feels " symptom improvement in SR. Holter showed controlled V rates on increased metoprolol. Can discuss AF and SVT ablation at clinic follow up depending on symptoms. He is on xarelto for CVA prophylaxis.    3/5/2021: Cardioversion was successfully performed with restoration of normal sinus rhythm. Recurrences of PAF afterward. Plan amio load, then place Chas cherryor in 2 weeks, then f/u after that.    4/22/2021 event monitor: Sinus rhythm/sinus bradycardia with the later development of persistent atrial fibrillation/AFL without symptoms and with good rate control.    4/28/2021: He is one month s/p cardioversion and initiation of amiodarone. Event monitor shows that he was in SR and converted back to AF/AFL about 3 weeks after procedure. EKG today shows AF and RBBB. Reports some symptom improvement after his cardioversion (less SOB). However, he also states that he feels very well today and was surprised to find he was out of rhythm. We discussed ablation as an option if he did have symptom improvement in SR. He says he is willing to proceed if necessary, but right now his schedule is such that he would like to defer for a while. He will contact the clinic if he develops worsening symptoms. For now he will RTC in 3 mo to reassess. LEO showed normal EF. He remains on xarelto for CVA prophylaxis. Will stop amiodarone.      7/28/2021: He is here for follow up of AF. At last visit he reported his AF as asymptomatic. He continues to feel well with no limitations - remains very active.  Will continue rate control strategy. CHADSVASc 4 and he remains on xarelto for CVA prophylaxis. On metoprolol for rate control.    3/9/2022: He remains in persistent AF/AFL in rate control strategy. Echo today shows decrease in EF to 40-45% and evidence of fluid overload. He is also reporting some CHF symptoms.   Will initiate diuresis and increase metoprolol for GDMT. Will refer to cardiology for CHF management and medication optimization. SPECT  2018 negative for ischemia.   He had AF breakthrough on amiodarone (after load complete). His rates are controlled. We discussed that PVI was the only option for rhythm control at this point if AF is identified as the source of his cardiomyopathy. Patient does not want invasive procedure if possible. Consider rechecking echo in 3 months after optimized.    Update (06/10/2022):    Today he says he feels much better than last clinic visit. SOB that comes and goes. Knee pain primary complaint. No CP, palps, LH, syncope.  He is on losartan and metoprolol for GDMT.   He has lost 8lbs (intentionally).  He is going to be working more the next 3 months.     He is currently taking xarelto 20mg daily for stroke prophylaxis and denies significant bleeding episodes. He is currently being treated with metoprolol 100mg daily for HR control. Kidney function is stable, with a creatinine of 1.3 on 5/5/2021.    I have personally reviewed the patient's EKG today, which shows AF with RBBB at 70bpm. QRS is 136. QTc is 453.    Relevant Cardiac Test Results:    2D Echo (3/9/2022):  · Atrial fibrillation observed.  · The estimated ejection fraction is 40-45%. Significant beat to beat variability.  · The left ventricle is normal in size with mildly decreased systolic function.  · Normal right ventricular size with normal right ventricular systolic function.  · Mild mitral regurgitation.  · There is pulmonary hypertension.  · The estimated PA systolic pressure is 58 mmHg.  · Elevated central venous pressure (15 mmHg).    Current Outpatient Medications   Medication Sig    acetaminophen (TYLENOL) 500 MG tablet Take 1,000 mg by mouth daily as needed for Pain.    albuterol (PROAIR HFA) 90 mcg/actuation inhaler Inhale 2 puffs into the lungs every 6 (six) hours as needed for Wheezing. Rescue    amLODIPine (NORVASC) 5 MG tablet TAKE 1 TABLET(5 MG) BY MOUTH EVERY DAY    cetirizine (ZYRTEC) 10 MG tablet Take 1 tablet (10 mg total) by mouth once  "daily.    cholecalciferol, vitamin D3, (VITAMIN D3) 25 mcg (1,000 unit) capsule Take 1,000 Units by mouth once daily.    colchicine (COLCRYS) 0.6 mg tablet TAKE 1 TABLET(0.6 MG) BY MOUTH EVERY DAY    gabapentin (NEURONTIN) 100 MG capsule TAKE 1 CAPSULE BY MOUTH THREE TIMES DAILY    hydroCHLOROthiazide (HYDRODIURIL) 25 MG tablet Take 1 tablet (25 mg total) by mouth once daily.    insulin (LANTUS SOLOSTAR U-100 INSULIN) glargine 100 units/mL (3mL) SubQ pen Inject 20 Units into the skin every evening.    losartan (COZAAR) 50 MG tablet Take 1 tablet (50 mg total) by mouth once daily.    metFORMIN (GLUCOPHAGE-XR) 500 MG ER 24hr tablet Take 2 tablets (1,000 mg total) by mouth 2 (two) times daily with meals.    metoprolol succinate (TOPROL-XL) 50 MG 24 hr tablet Take 2 tablets (100 mg total) by mouth once daily.    omega-3 fatty acids-vitamin E (OMEGA-3 FISH OIL) 1,000-5 mg-unit Cap Take by mouth 2 (two) times daily. 1 Capsule Oral Every day    omeprazole (PRILOSEC OTC) 20 MG tablet Take by mouth. 1 Tablet, Delayed Release (E.C.) Oral Every day    pravastatin (PRAVACHOL) 40 MG tablet Take 1 tablet (40 mg total) by mouth once daily.    repaglinide (PRANDIN) 2 MG tablet TAKE 1 TABLET BY MOUTH THREE TIMES A DAY BEFORE MEALS    rivaroxaban (XARELTO) 20 mg Tab Take 1 tablet (20 mg total) by mouth daily with dinner or evening meal.    blood sugar diagnostic Strp 1 strip by Misc.(Non-Drug; Combo Route) route 2 (two) times daily with meals.    LANCETS & BLOOD GLUCOSE STRIPS MISC * * * Twice a day .  check glucose twice a day    pen needle, diabetic 29 gauge x 1/2" Ndle Inject insulin daily     No current facility-administered medications for this visit.     Review of Systems   Constitutional: Negative for malaise/fatigue.   Cardiovascular: Negative for chest pain, dyspnea on exertion, irregular heartbeat, leg swelling and palpitations.   Respiratory: Positive for shortness of breath (sometimes).  " "  Hematologic/Lymphatic: Negative for bleeding problem.   Skin: Negative for rash.   Musculoskeletal: Negative for myalgias.   Gastrointestinal: Negative for hematemesis, hematochezia and nausea.   Genitourinary: Negative for hematuria.   Neurological: Negative for light-headedness.   Psychiatric/Behavioral: Negative for altered mental status.   Allergic/Immunologic: Negative for persistent infections.     Objective:        /84   Pulse 70   Ht 6' 1" (1.854 m)   Wt 118.5 kg (261 lb 3.9 oz)   BMI 34.47 kg/m²     Physical Exam  Vitals and nursing note reviewed.   Constitutional:       Appearance: Normal appearance. He is well-developed.   HENT:      Head: Normocephalic.      Nose: Nose normal.   Eyes:      Pupils: Pupils are equal, round, and reactive to light.   Cardiovascular:      Rate and Rhythm: Normal rate. Rhythm irregularly irregular.   Pulmonary:      Effort: No respiratory distress.      Breath sounds: Normal breath sounds.   Musculoskeletal:         General: Normal range of motion.   Skin:     General: Skin is warm and dry.      Findings: No erythema.   Neurological:      Mental Status: He is alert and oriented to person, place, and time.   Psychiatric:         Speech: Speech normal.         Behavior: Behavior normal.       Lab Results   Component Value Date     06/06/2022    K 4.9 06/06/2022    MG 1.7 03/02/2018    BUN 31 (H) 06/06/2022    CREATININE 1.9 (H) 06/06/2022    ALT 17 06/06/2022    AST 12 06/06/2022    HGB 12.2 (L) 06/06/2022    HCT 37.3 (L) 06/06/2022    HCT 37 12/07/2020    TSH 1.237 07/30/2020    LDLCALC 47.8 (L) 06/06/2022       Recent Labs   Lab 11/11/20  1220 03/03/21  0915   INR 1.1 1.1         Assessment:     1. Longstanding persistent atrial fibrillation    2. Hypertension associated with diabetes    3. Asymptomatic LV dysfunction    4. Thoracic aortic atherosclerosis    5. Severe obesity (BMI 35.0-39.9) with comorbidity    6. Current use of long term anticoagulation  "     Plan:     In summary, Mr. Junior is a 77 y.o. male with persistent AF, HTN, HLD, PE, DM, SVT here for follow up.  Pt in rate control strategy after failed amiodarone and lack of symptoms. Controlled V rates. Negative SPECT 2018. He does not want PVI if possible.   Echo at last clinic visit showed decrease in EF to 40%-45% and CHF symptoms. Plan was for cardiac optimization with repeat echo afterward.  Today he feels much better. CHF symptoms significantly improved. Now on GDMT. CHADSVASc 4 on xarelto. Will update echo in 4 mo.    Continue current medications  Echo in 4 mo, sooner if needed  RTC 4 mo, sooner if needed    *A copy of this note has been sent to Dr. Mccauley*    Follow up in about 4 months (around 10/10/2022).    ------------------------------------------------------------------    CHRIS Aldridge, NP-C  Cardiac Electrophysiology

## 2022-06-06 ENCOUNTER — LAB VISIT (OUTPATIENT)
Dept: LAB | Facility: HOSPITAL | Age: 77
End: 2022-06-06
Attending: INTERNAL MEDICINE
Payer: MEDICARE

## 2022-06-06 ENCOUNTER — OFFICE VISIT (OUTPATIENT)
Dept: INTERNAL MEDICINE | Facility: CLINIC | Age: 77
End: 2022-06-06
Payer: MEDICARE

## 2022-06-06 VITALS
WEIGHT: 265 LBS | HEIGHT: 73 IN | DIASTOLIC BLOOD PRESSURE: 90 MMHG | OXYGEN SATURATION: 98 % | HEART RATE: 79 BPM | RESPIRATION RATE: 18 BRPM | SYSTOLIC BLOOD PRESSURE: 130 MMHG | BODY MASS INDEX: 35.12 KG/M2

## 2022-06-06 DIAGNOSIS — M10.9 GOUT, ARTHRITIS: ICD-10-CM

## 2022-06-06 DIAGNOSIS — J42 CHRONIC BRONCHITIS, UNSPECIFIED CHRONIC BRONCHITIS TYPE: ICD-10-CM

## 2022-06-06 DIAGNOSIS — N18.31 STAGE 3A CHRONIC KIDNEY DISEASE: ICD-10-CM

## 2022-06-06 DIAGNOSIS — E78.5 HYPERLIPIDEMIA, UNSPECIFIED HYPERLIPIDEMIA TYPE: ICD-10-CM

## 2022-06-06 LAB
ALBUMIN SERPL BCP-MCNC: 3.6 G/DL (ref 3.5–5.2)
ALP SERPL-CCNC: 61 U/L (ref 55–135)
ALT SERPL W/O P-5'-P-CCNC: 17 U/L (ref 10–44)
ANION GAP SERPL CALC-SCNC: 11 MMOL/L (ref 8–16)
AST SERPL-CCNC: 12 U/L (ref 10–40)
BASOPHILS # BLD AUTO: 0.03 K/UL (ref 0–0.2)
BASOPHILS NFR BLD: 0.4 % (ref 0–1.9)
BILIRUB SERPL-MCNC: 0.9 MG/DL (ref 0.1–1)
BUN SERPL-MCNC: 31 MG/DL (ref 8–23)
CALCIUM SERPL-MCNC: 9.2 MG/DL (ref 8.7–10.5)
CHLORIDE SERPL-SCNC: 107 MMOL/L (ref 95–110)
CHOLEST SERPL-MCNC: 98 MG/DL (ref 120–199)
CHOLEST/HDLC SERPL: 3 {RATIO} (ref 2–5)
CO2 SERPL-SCNC: 22 MMOL/L (ref 23–29)
CREAT SERPL-MCNC: 1.9 MG/DL (ref 0.5–1.4)
DIFFERENTIAL METHOD: ABNORMAL
EOSINOPHIL # BLD AUTO: 0.3 K/UL (ref 0–0.5)
EOSINOPHIL NFR BLD: 3.7 % (ref 0–8)
ERYTHROCYTE [DISTWIDTH] IN BLOOD BY AUTOMATED COUNT: 13.8 % (ref 11.5–14.5)
EST. GFR  (AFRICAN AMERICAN): 38.7 ML/MIN/1.73 M^2
EST. GFR  (NON AFRICAN AMERICAN): 33.5 ML/MIN/1.73 M^2
GLUCOSE SERPL-MCNC: 119 MG/DL (ref 70–110)
HCT VFR BLD AUTO: 37.3 % (ref 40–54)
HDLC SERPL-MCNC: 33 MG/DL (ref 40–75)
HDLC SERPL: 33.7 % (ref 20–50)
HGB BLD-MCNC: 12.2 G/DL (ref 14–18)
IMM GRANULOCYTES # BLD AUTO: 0.03 K/UL (ref 0–0.04)
IMM GRANULOCYTES NFR BLD AUTO: 0.4 % (ref 0–0.5)
LDLC SERPL CALC-MCNC: 47.8 MG/DL (ref 63–159)
LYMPHOCYTES # BLD AUTO: 1.5 K/UL (ref 1–4.8)
LYMPHOCYTES NFR BLD: 19.7 % (ref 18–48)
MCH RBC QN AUTO: 30.3 PG (ref 27–31)
MCHC RBC AUTO-ENTMCNC: 32.7 G/DL (ref 32–36)
MCV RBC AUTO: 93 FL (ref 82–98)
MONOCYTES # BLD AUTO: 0.5 K/UL (ref 0.3–1)
MONOCYTES NFR BLD: 5.8 % (ref 4–15)
NEUTROPHILS # BLD AUTO: 5.4 K/UL (ref 1.8–7.7)
NEUTROPHILS NFR BLD: 70 % (ref 38–73)
NONHDLC SERPL-MCNC: 65 MG/DL
NRBC BLD-RTO: 0 /100 WBC
PLATELET # BLD AUTO: 187 K/UL (ref 150–450)
PMV BLD AUTO: 12.2 FL (ref 9.2–12.9)
POTASSIUM SERPL-SCNC: 4.9 MMOL/L (ref 3.5–5.1)
PROT SERPL-MCNC: 6.4 G/DL (ref 6–8.4)
RBC # BLD AUTO: 4.02 M/UL (ref 4.6–6.2)
SODIUM SERPL-SCNC: 140 MMOL/L (ref 136–145)
TRIGL SERPL-MCNC: 86 MG/DL (ref 30–150)
URATE SERPL-MCNC: 9.3 MG/DL (ref 3.4–7)
WBC # BLD AUTO: 7.77 K/UL (ref 3.9–12.7)

## 2022-06-06 PROCEDURE — 80053 COMPREHEN METABOLIC PANEL: CPT | Performed by: INTERNAL MEDICINE

## 2022-06-06 PROCEDURE — 99999 PR PBB SHADOW E&M-EST. PATIENT-LVL V: CPT | Mod: PBBFAC,,, | Performed by: INTERNAL MEDICINE

## 2022-06-06 PROCEDURE — 99999 PR PBB SHADOW E&M-EST. PATIENT-LVL V: ICD-10-PCS | Mod: PBBFAC,,, | Performed by: INTERNAL MEDICINE

## 2022-06-06 PROCEDURE — 84550 ASSAY OF BLOOD/URIC ACID: CPT | Performed by: INTERNAL MEDICINE

## 2022-06-06 PROCEDURE — 36415 COLL VENOUS BLD VENIPUNCTURE: CPT | Performed by: INTERNAL MEDICINE

## 2022-06-06 PROCEDURE — 83036 HEMOGLOBIN GLYCOSYLATED A1C: CPT | Performed by: INTERNAL MEDICINE

## 2022-06-06 PROCEDURE — 80061 LIPID PANEL: CPT | Performed by: INTERNAL MEDICINE

## 2022-06-06 PROCEDURE — 85025 COMPLETE CBC W/AUTO DIFF WBC: CPT | Performed by: INTERNAL MEDICINE

## 2022-06-06 PROCEDURE — 99214 OFFICE O/P EST MOD 30 MIN: CPT | Mod: S$PBB,,, | Performed by: INTERNAL MEDICINE

## 2022-06-06 PROCEDURE — 99214 PR OFFICE/OUTPT VISIT, EST, LEVL IV, 30-39 MIN: ICD-10-PCS | Mod: S$PBB,,, | Performed by: INTERNAL MEDICINE

## 2022-06-06 PROCEDURE — 99215 OFFICE O/P EST HI 40 MIN: CPT | Mod: PBBFAC | Performed by: INTERNAL MEDICINE

## 2022-06-06 RX ORDER — INSULIN GLARGINE 100 [IU]/ML
20 INJECTION, SOLUTION SUBCUTANEOUS NIGHTLY
Qty: 18 ML | Refills: 11 | Status: SHIPPED | OUTPATIENT
Start: 2022-06-06

## 2022-06-06 RX ORDER — COLCHICINE 0.6 MG/1
TABLET ORAL
Qty: 30 TABLET | Refills: 11 | Status: SHIPPED | OUTPATIENT
Start: 2022-06-06

## 2022-06-06 RX ORDER — PRAVASTATIN SODIUM 40 MG/1
40 TABLET ORAL DAILY
Qty: 30 TABLET | Refills: 11 | Status: SHIPPED | OUTPATIENT
Start: 2022-06-06

## 2022-06-06 NOTE — PROGRESS NOTES
Subjective:       Patient ID: Des Junior is a 77 y.o. male.    Chief Complaint: Follow-up    Patient is here for followup for chronic conditions.    Lab Results       Component                Value               Date                       HGBA1C                   7.2 (H)             12/06/2021            No low sugars, 90 this am    Has had increased throat phlegm. Has had SOB, which is not new. Some green mucous, but denies copious mucous.    Saw cardiology recently.    Chronic R knee pains especially with walking.    Review of Systems   Constitutional: Negative for fatigue.   Respiratory: Positive for cough (no blood) and shortness of breath. Negative for chest tightness.    Cardiovascular: Negative for chest pain.   Endocrine: Positive for polyuria. Negative for polydipsia and polyphagia.   Skin: Negative for pallor.   Neurological: Negative for dizziness, tremors, seizures, speech difficulty, weakness and headaches.   Psychiatric/Behavioral: Negative for confusion. The patient is not nervous/anxious.            Objective:      Physical Exam  Vitals reviewed.   Constitutional:       General: He is not in acute distress.     Appearance: Normal appearance. He is well-developed. He is obese. He is not ill-appearing, toxic-appearing or diaphoretic.      Comments: Well appearing, breathing comfortably, speaking full sentences, no coughing during encounter   HENT:      Head: Normocephalic and atraumatic.   Eyes:      General: No scleral icterus.  Neck:      Thyroid: No thyromegaly.   Cardiovascular:      Rate and Rhythm: Normal rate.      Heart sounds: Normal heart sounds. No murmur heard.    No friction rub. No gallop.      Comments: Irregularly irregular    Pulmonary:      Effort: Pulmonary effort is normal. No respiratory distress.      Breath sounds: Normal breath sounds. No wheezing or rales.      Comments: No rhonchi, no squeaks  Abdominal:      General: Bowel sounds are normal. There is no  distension.      Palpations: Abdomen is soft. There is no mass.      Tenderness: There is no abdominal tenderness. There is no guarding or rebound.   Musculoskeletal:         General: Normal range of motion.      Cervical back: Normal range of motion.      Right lower leg: Edema present.      Left lower leg: Edema present.      Comments: R knee TKA site is CDI    bilat lower leg edema 1+   Lymphadenopathy:      Cervical: No cervical adenopathy.   Skin:     Findings: No lesion.   Neurological:      Mental Status: He is alert and oriented to person, place, and time.      Comments: No resting or intention tremor seen today   Psychiatric:         Mood and Affect: Mood normal.         Behavior: Behavior normal.         Thought Content: Thought content normal.         Judgment: Judgment normal.         Assessment:       1. Type 2 diabetes, uncontrolled, with neuropathy, onset 2003    2. Hyperlipidemia, unspecified hyperlipidemia type    3. Gout, arthritis    4. Chronic bronchitis, unspecified chronic bronchitis type    5. Stage 3a chronic kidney disease        Plan:       Des was seen today for follow-up.    Diagnoses and all orders for this visit:    Type 2 diabetes, uncontrolled, with neuropathy, onset 2003  -     insulin (LANTUS SOLOSTAR U-100 INSULIN) glargine 100 units/mL (3mL) SubQ pen; Inject 20 Units into the skin every evening.  -     CBC Auto Differential; Future  -     Comprehensive Metabolic Panel; Future  -     Lipid Panel; Future  -     Hemoglobin A1C; Future    Hyperlipidemia, unspecified hyperlipidemia type  -     pravastatin (PRAVACHOL) 40 MG tablet; Take 1 tablet (40 mg total) by mouth once daily.    Gout, arthritis  -     colchicine (COLCRYS) 0.6 mg tablet; TAKE 1 TABLET(0.6 MG) BY MOUTH EVERY DAY  -     Uric Acid; Future  No recent episodes, uses colcrys daily    Chronic bronchitis, unspecified chronic bronchitis type  Lungs clear and no copious mucous. He has an appt with Dr Sandoval his pulmonologist  in 2 months. I recommended he try to get on wt lost  I do not think he would benefit from an antibiotic at this time    Stage 3a chronic kidney disease  stable    htn -- I noticed after appt sanjay diastolic, see level at upcoming cardiology appt      Health Maintenance       Date Due Completion Date    Diabetes Urine Screening 10/30/2021 10/30/2020    COVID-19 Vaccine (4 - Booster for Pfizer series) 02/22/2022 10/22/2021    Lipid Panel 05/05/2022 5/5/2021    Hemoglobin A1c 06/06/2022 12/6/2021    Eye Exam 09/24/2022 9/24/2021 (Done)    Override on 9/24/2021: Done    Override on 8/3/2020: Done    Foot Exam 12/06/2022 12/6/2021 (Done)    Override on 12/6/2021: Done    Override on 11/2/2020: Done    TETANUS VACCINE 10/09/2028 10/9/2018      Had booster    Follow up in about 6 months (around 12/6/2022).    Future Appointments   Date Time Provider Department Center   6/10/2022 11:00 AM EKG, APPT [facility]   6/10/2022 11:30 AM Lou Simmons NP [facility] ARRHYTH [facility]   6/27/2022  1:20 PM [facility], LABORATORY [facility] LAB [facility]   8/24/2022 10:00 AM Eladio Sandoval MD [facility] PULM [facility]   12/6/2022 10:20 AM Christopher Cortez MD [facility]  [facility]

## 2022-06-07 LAB
ESTIMATED AVG GLUCOSE: 171 MG/DL (ref 68–131)
HBA1C MFR BLD: 7.6 % (ref 4–5.6)

## 2022-06-10 ENCOUNTER — HOSPITAL ENCOUNTER (OUTPATIENT)
Dept: CARDIOLOGY | Facility: CLINIC | Age: 77
Discharge: HOME | End: 2022-06-10
Payer: MEDICARE

## 2022-06-10 ENCOUNTER — OFFICE VISIT (OUTPATIENT)
Dept: ELECTROPHYSIOLOGY | Facility: CLINIC | Age: 77
End: 2022-06-10
Payer: MEDICARE

## 2022-06-10 VITALS
SYSTOLIC BLOOD PRESSURE: 132 MMHG | HEART RATE: 70 BPM | BODY MASS INDEX: 34.62 KG/M2 | DIASTOLIC BLOOD PRESSURE: 84 MMHG | WEIGHT: 261.25 LBS | HEIGHT: 73 IN

## 2022-06-10 DIAGNOSIS — E11.59 HYPERTENSION ASSOCIATED WITH DIABETES: ICD-10-CM

## 2022-06-10 DIAGNOSIS — I15.2 HYPERTENSION ASSOCIATED WITH DIABETES: ICD-10-CM

## 2022-06-10 DIAGNOSIS — I49.8 OTHER SPECIFIED CARDIAC ARRHYTHMIAS: ICD-10-CM

## 2022-06-10 DIAGNOSIS — I70.0 THORACIC AORTIC ATHEROSCLEROSIS: ICD-10-CM

## 2022-06-10 DIAGNOSIS — I51.9 ASYMPTOMATIC LV DYSFUNCTION: ICD-10-CM

## 2022-06-10 DIAGNOSIS — I48.11 LONGSTANDING PERSISTENT ATRIAL FIBRILLATION: Primary | ICD-10-CM

## 2022-06-10 DIAGNOSIS — E66.01 SEVERE OBESITY (BMI 35.0-39.9) WITH COMORBIDITY: ICD-10-CM

## 2022-06-10 DIAGNOSIS — Z79.01 CURRENT USE OF LONG TERM ANTICOAGULATION: ICD-10-CM

## 2022-06-10 PROCEDURE — 99999 PR PBB SHADOW E&M-EST. PATIENT-LVL IV: ICD-10-PCS | Mod: PBBFAC,,, | Performed by: NURSE PRACTITIONER

## 2022-06-10 PROCEDURE — 99999 PR PBB SHADOW E&M-EST. PATIENT-LVL IV: CPT | Mod: PBBFAC,,, | Performed by: NURSE PRACTITIONER

## 2022-06-10 PROCEDURE — 99214 PR OFFICE/OUTPT VISIT, EST, LEVL IV, 30-39 MIN: ICD-10-PCS | Mod: S$PBB,,, | Performed by: NURSE PRACTITIONER

## 2022-06-10 PROCEDURE — 93010 RHYTHM STRIP: ICD-10-PCS | Mod: S$PBB,,, | Performed by: INTERNAL MEDICINE

## 2022-06-10 PROCEDURE — 99214 OFFICE O/P EST MOD 30 MIN: CPT | Mod: S$PBB,,, | Performed by: NURSE PRACTITIONER

## 2022-06-10 PROCEDURE — 99214 OFFICE O/P EST MOD 30 MIN: CPT | Mod: PBBFAC | Performed by: NURSE PRACTITIONER

## 2022-06-10 PROCEDURE — 93010 ELECTROCARDIOGRAM REPORT: CPT | Mod: S$PBB,,, | Performed by: INTERNAL MEDICINE

## 2022-06-10 PROCEDURE — 93005 ELECTROCARDIOGRAM TRACING: CPT | Mod: PBBFAC | Performed by: INTERNAL MEDICINE

## 2022-06-14 ENCOUNTER — PATIENT MESSAGE (OUTPATIENT)
Dept: ELECTROPHYSIOLOGY | Facility: CLINIC | Age: 77
End: 2022-06-14
Payer: MEDICARE

## 2022-06-17 ENCOUNTER — TELEPHONE (OUTPATIENT)
Dept: INTERNAL MEDICINE | Facility: CLINIC | Age: 77
End: 2022-06-17
Payer: MEDICARE

## 2022-06-17 DIAGNOSIS — N18.32 STAGE 3B CHRONIC KIDNEY DISEASE: Primary | ICD-10-CM
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
## 2022-06-17 NOTE — TELEPHONE ENCOUNTER
Hi, please call him back and let him know that I think it is very important that he see back a cardiologist since he has had fainting episodes. If this happens again he really should go to the ER.  Let me know if patient has any more questions.  Thank you, Christopher Cortez

## 2022-06-17 NOTE — TELEPHONE ENCOUNTER
I called pt back to let him know that Dr. Cortez thinks it is very important that he see back a cardiologist since he has had fainting episodes, and that if this happens again he really should go to the ER. Pt verbalized understanding but states that he is disappointed in the cardiologist here so he will be seeking a new one in [place].

## 2022-06-17 NOTE — TELEPHONE ENCOUNTER
Hi, please call him -- his blood work from last week shows that his kidney function is somewhat low. I recommend -- we check a kidney ultrasound.  Avoiding over the counter nsaid medicines (like ibuprofen, advil, aleve), staying well hydrated, and controlling the diabetes all will help preserve his kidney function and prevent worsening of the kidney function.    Please assist in scheduling    Orders Placed This Encounter    US Retroperitoneal Complete (Kidney and     Let me know if patient has any questions.  Thank you, Christopher Cortez

## 2022-06-27 ENCOUNTER — LAB VISIT (OUTPATIENT)
Dept: LAB | Facility: HOSPITAL | Age: 77
End: 2022-06-27
Attending: INTERNAL MEDICINE
Payer: MEDICARE

## 2022-06-27 DIAGNOSIS — I10 ESSENTIAL HYPERTENSION: ICD-10-CM

## 2022-06-27 LAB
ANION GAP SERPL CALC-SCNC: 13 MMOL/L (ref 8–16)
BUN SERPL-MCNC: 26 MG/DL (ref 8–23)
CALCIUM SERPL-MCNC: 9.5 MG/DL (ref 8.7–10.5)
CHLORIDE SERPL-SCNC: 105 MMOL/L (ref 95–110)
CO2 SERPL-SCNC: 21 MMOL/L (ref 23–29)
CREAT SERPL-MCNC: 1.5 MG/DL (ref 0.5–1.4)
EST. GFR  (AFRICAN AMERICAN): 51.5 ML/MIN/1.73 M^2
EST. GFR  (NON AFRICAN AMERICAN): 44.6 ML/MIN/1.73 M^2
GLUCOSE SERPL-MCNC: 108 MG/DL (ref 70–110)
POTASSIUM SERPL-SCNC: 4.5 MMOL/L (ref 3.5–5.1)
SODIUM SERPL-SCNC: 139 MMOL/L (ref 136–145)

## 2022-06-27 PROCEDURE — 80048 BASIC METABOLIC PNL TOTAL CA: CPT | Performed by: INTERNAL MEDICINE

## 2022-06-27 PROCEDURE — 36415 COLL VENOUS BLD VENIPUNCTURE: CPT | Performed by: INTERNAL MEDICINE

## 2022-07-05 ENCOUNTER — PATIENT MESSAGE (OUTPATIENT)
Dept: CARDIOLOGY | Facility: CLINIC | Age: 77
End: 2022-07-05
Payer: MEDICARE

## 2022-07-06 ENCOUNTER — TELEPHONE (OUTPATIENT)
Dept: CARDIOLOGY | Facility: CLINIC | Age: 77
End: 2022-07-06
Payer: MEDICARE

## 2022-07-06 ENCOUNTER — TELEPHONE (OUTPATIENT)
Dept: ELECTROPHYSIOLOGY | Facility: CLINIC | Age: 77
End: 2022-07-06
Payer: MEDICARE

## 2022-07-06 DIAGNOSIS — R55 SYNCOPE, UNSPECIFIED SYNCOPE TYPE: Primary | ICD-10-CM

## 2022-07-06 DIAGNOSIS — I10 ESSENTIAL HYPERTENSION: Primary | ICD-10-CM
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
## 2022-07-07 ENCOUNTER — PATIENT MESSAGE (OUTPATIENT)
Dept: INTERNAL MEDICINE | Facility: CLINIC | Age: 77
End: 2022-07-07
Payer: MEDICARE

## 2022-07-07 ENCOUNTER — HOSPITAL ENCOUNTER (OUTPATIENT)
Dept: RADIOLOGY | Facility: HOSPITAL | Age: 77
Discharge: HOME | End: 2022-07-07
Attending: INTERNAL MEDICINE
Payer: MEDICARE

## 2022-07-07 DIAGNOSIS — N18.32 STAGE 3B CHRONIC KIDNEY DISEASE: ICD-10-CM

## 2022-07-07 PROCEDURE — 76770 US EXAM ABDO BACK WALL COMP: CPT | Mod: TC

## 2022-07-07 PROCEDURE — 76770 US RETROPERITONEAL COMPLETE: ICD-10-PCS | Mod: 26,,, | Performed by: RADIOLOGY

## 2022-07-07 PROCEDURE — 76770 US EXAM ABDO BACK WALL COMP: CPT | Mod: 26,,, | Performed by: RADIOLOGY

## 2022-07-11 ENCOUNTER — TELEPHONE (OUTPATIENT)
Dept: ELECTROPHYSIOLOGY | Facility: CLINIC | Age: 77
End: 2022-07-11
Payer: MEDICARE
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
## 2022-07-13 ENCOUNTER — PATIENT MESSAGE (OUTPATIENT)
Dept: ELECTROPHYSIOLOGY | Facility: CLINIC | Age: 77
End: 2022-07-13
Payer: MEDICARE

## 2022-07-13 DIAGNOSIS — R55 SYNCOPE, UNSPECIFIED SYNCOPE TYPE: Primary | ICD-10-CM

## 2022-07-18 ENCOUNTER — PATIENT MESSAGE (OUTPATIENT)
Dept: PULMONOLOGY | Facility: CLINIC | Age: 77
End: 2022-07-18
Payer: MEDICARE

## 2022-07-18 NOTE — TELEPHONE ENCOUNTER
No new care gaps identified.  [facility] Embedded Care Gaps. Reference number: [id number]. 7/18/2022   4:15:33 AM CDT

## 2022-07-19 NOTE — TELEPHONE ENCOUNTER
Refill Routing Note   Medication(s) are not appropriate for processing by [facility] for the following reason(s):      - Required laboratory values are abnormal    [facility] action(s):  Defer          Medication reconciliation completed: No     Appointments  past 12m or future 3m with PCP    Date Provider   Last Visit   6/6/2022 Christopher Cortez MD   Next Visit   12/6/2022 Christopher Cortez MD   ED visits in past 90 days: 0        Note composed:1:13 AM 07/19/2022

## 2022-07-20 ENCOUNTER — OFFICE VISIT (OUTPATIENT)
Dept: OTOLARYNGOLOGY | Facility: CLINIC | Age: 77
End: 2022-07-20
Payer: MEDICARE

## 2022-07-20 ENCOUNTER — LAB VISIT (OUTPATIENT)
Dept: LAB | Facility: HOSPITAL | Age: 77
End: 2022-07-20
Attending: INTERNAL MEDICINE
Payer: MEDICARE

## 2022-07-20 VITALS
HEART RATE: 66 BPM | WEIGHT: 260.56 LBS | BODY MASS INDEX: 34.53 KG/M2 | HEIGHT: 73 IN | SYSTOLIC BLOOD PRESSURE: 153 MMHG | DIASTOLIC BLOOD PRESSURE: 96 MMHG

## 2022-07-20 DIAGNOSIS — H91.90 HEARING LOSS, UNSPECIFIED HEARING LOSS TYPE, UNSPECIFIED LATERALITY: ICD-10-CM

## 2022-07-20 DIAGNOSIS — R55 SYNCOPE, UNSPECIFIED SYNCOPE TYPE: ICD-10-CM

## 2022-07-20 DIAGNOSIS — R51.9 PRESSURE IN HEAD: ICD-10-CM

## 2022-07-20 DIAGNOSIS — R26.89 IMBALANCE: Primary | ICD-10-CM

## 2022-07-20 DIAGNOSIS — I10 ESSENTIAL HYPERTENSION: ICD-10-CM

## 2022-07-20 LAB
ANION GAP SERPL CALC-SCNC: 11 MMOL/L (ref 8–16)
BUN SERPL-MCNC: 25 MG/DL (ref 8–23)
CALCIUM SERPL-MCNC: 9.9 MG/DL (ref 8.7–10.5)
CHLORIDE SERPL-SCNC: 105 MMOL/L (ref 95–110)
CO2 SERPL-SCNC: 24 MMOL/L (ref 23–29)
CREAT SERPL-MCNC: 2.1 MG/DL (ref 0.5–1.4)
EST. GFR  (AFRICAN AMERICAN): 34.3 ML/MIN/1.73 M^2
EST. GFR  (NON AFRICAN AMERICAN): 29.7 ML/MIN/1.73 M^2
GLUCOSE SERPL-MCNC: 108 MG/DL (ref 70–110)
POTASSIUM SERPL-SCNC: 5.1 MMOL/L (ref 3.5–5.1)
SODIUM SERPL-SCNC: 140 MMOL/L (ref 136–145)

## 2022-07-20 PROCEDURE — 85610 PROTHROMBIN TIME: CPT | Performed by: INTERNAL MEDICINE

## 2022-07-20 PROCEDURE — 36415 COLL VENOUS BLD VENIPUNCTURE: CPT | Mod: PO | Performed by: INTERNAL MEDICINE

## 2022-07-20 PROCEDURE — 85730 THROMBOPLASTIN TIME PARTIAL: CPT | Performed by: INTERNAL MEDICINE

## 2022-07-20 PROCEDURE — 99999 PR PBB SHADOW E&M-EST. PATIENT-LVL V: CPT | Mod: PBBFAC,,, | Performed by: OTOLARYNGOLOGY

## 2022-07-20 PROCEDURE — 99999 PR PBB SHADOW E&M-EST. PATIENT-LVL V: ICD-10-PCS | Mod: PBBFAC,,, | Performed by: OTOLARYNGOLOGY

## 2022-07-20 PROCEDURE — 85027 COMPLETE CBC AUTOMATED: CPT | Performed by: INTERNAL MEDICINE

## 2022-07-20 PROCEDURE — 99203 OFFICE O/P NEW LOW 30 MIN: CPT | Mod: S$PBB,,, | Performed by: OTOLARYNGOLOGY

## 2022-07-20 PROCEDURE — 80048 BASIC METABOLIC PNL TOTAL CA: CPT | Performed by: INTERNAL MEDICINE

## 2022-07-20 PROCEDURE — 99215 OFFICE O/P EST HI 40 MIN: CPT | Mod: PBBFAC,PO | Performed by: OTOLARYNGOLOGY

## 2022-07-20 PROCEDURE — 99203 PR OFFICE/OUTPT VISIT, NEW, LEVL III, 30-44 MIN: ICD-10-PCS | Mod: S$PBB,,, | Performed by: OTOLARYNGOLOGY

## 2022-07-20 RX ORDER — METFORMIN HYDROCHLORIDE 500 MG/1
TABLET, EXTENDED RELEASE ORAL
Qty: 360 TABLET | Refills: 11 | Status: SHIPPED | OUTPATIENT
Start: 2022-07-20

## 2022-07-20 NOTE — PATIENT INSTRUCTIONS
No evidence of any vertigo by history or on physical examination.  This nonspecific pressure in then be above the eyes which seem to come on at the same time as a syncopal event may need further workup.  Given the previous history of vertigo which appears to be completely benign nature and not consistent with any current symptoms is reasonable to repeat a hearing test since the last 1 was done 9 years ago otherwise more detailed inner ear testing is not recommended such as repeat VNG.  Vestibular / balance rehabilitation is however recommended given his ongoing work as a  and needing good score strength and balance to avoid injury.      Return for further evaluation treatment of symptoms worsen or fail to improve.

## 2022-07-20 NOTE — PROGRESS NOTES
"[facility]    Subjective:      Patient: Des Junior Patient PCP: Christopher Cortez MD         :  1945     Sex:  male      MRN:  [id number]          Date of Visit: 2022      Chief Complaint: balance problems (States wants ears checked. Pt states that he is off balance. States is having a pacemaker placed soon-[place]. States that had "problems with crystals" before, about 7 years ago.  has some pressure above his eyes as well. )      Patient ID: Des Junior is a 77 y.o. male lifelong NON-smoker with persistent AFIB, multiagent HTN and T2DM self-referred for dysequilibrium.  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 No head imaging, audiogram or vestibular testing or therapy.  Patient also has a history of complicated orthopedic surgery on the right side and still needing a left knee replacement with the 1 leg being shorter than the other.  He works actively as a  and has some concern about his balance on staging.  No relapsing hearing loss or aural fullness.  He reports a left-sided asymmetry on testing.    All symptoms really seem to start on Memorial Day weekend when he had the syncopal event.  He feels this pressure above the head I area but not actual headache.  He gets his vision checked annually.  He has a history of left-sided asymmetric hearing loss.  He had VNG testing as well as posturography an audiogram in  with findings consistent with right posterior canal BPPV treated with an Epley maneuver the.  At that time he reported having dizziness on arising from bed reclining in the bed as well as rolling over in bed and looking up none of which he has today.  Audiogram does not reveal any significant asymmetry other than perhaps some right-sided discrimination difference of 12%.        Review of Systems   HENT: Negative.    Respiratory: Positive for shortness of breath.    Gastrointestinal: Negative.    Endocrine: Negative.  "   Genitourinary: Negative.    Musculoskeletal: Negative.    Skin: Negative.    Allergic/Immunologic: Negative.    Neurological: Positive for light-headedness.   Hematological: Negative.    Psychiatric/Behavioral: Negative.         Past Medical History  He has a past medical history of Abdominal obesity, Acute deep vein thrombosis (DVT) of popliteal vein of right lower extremity, [place] (actinic keratosis), Asbestos exposure, Asymptomatic LV dysfunction, Bronchiectasis without complication, Bronchitis, chronic, mucopurulent, Cataract, Colon adenoma, Current use of long term anticoagulation, Deep vein thrombosis, ED (erectile dysfunction), Encounter for monitoring sotalol therapy, Hay fever, Hearing loss, sensorineural, History of adenomatous polyp of colon, History of pulmonary embolism, [place] (hard of hearing), Hyperlipidemia associated with type 2 diabetes mellitus, baseline , Hypertension associated with diabetes, Lung nodule, solitary- 5 mm rll, On home oxygen therapy, Osteoarthritis of right knee, Persistent atrial fibrillation, Pulmonary embolus- large involved central PA, unprovoked., Type 2 diabetes mellitus, controlled, Type 2 diabetes, controlled, with neuropathy, and Type 2 diabetes, uncontrolled, with neuropathy, onset 2003.    Family / Surgical / Social History  His family history includes Cancer in his brother and sister; Cataracts in his mother; Glaucoma in his mother; Heart disease (age of onset: 87) in his mother; Hypertension in his father; Pneumonia in his father.    Past Surgical History:   Procedure Laterality Date    COLONOSCOPY N/A 10/5/2015    Procedure: COLONOSCOPY;  Surgeon: Pro Jang MD;  Location: [address];  Service: Endoscopy;  Laterality: N/A;    KNEE ARTHROPLASTY Right 12/7/2020    Procedure: ARTHROPLASTY, KNEE:RIGHT:DEPUY-SIGMA;  Surgeon: Hua Gore III, MD;  Location[facility];  Service: Orthopedics;  Laterality: Right;    thumb surgery       TRANSESOPHAGEAL ECHOCARDIOGRAPHY N/A 3/5/2021    Procedure: ECHOCARDIOGRAM, TRANSESOPHAGEAL;  Surgeon: Shailesh Salinas III, MD;  Location: [facility] EP LAB;  Service: Cardiology;  Laterality: N/A;    TREATMENT OF CARDIAC ARRHYTHMIA N/A 3/5/2021    Procedure: CARDIOVERSION;  Surgeon: Carlos Mccauley MD;  Location: [facility] EP LAB;  Service: Cardiology;  Laterality: N/A;  AF, LEO, DCCV, MAC, DM, 3 PREP       Social History     Tobacco Use    Smoking status: Never Smoker    Smokeless tobacco: Never Used   Substance and Sexual Activity    Alcohol use: Not Currently     Comment: rare    Drug use: No    Sexual activity: Not on file       Medications  He has a current medication list which includes the following prescription(s): acetaminophen, albuterol, amlodipine, blood sugar diagnostic, cetirizine, cholecalciferol (vitamin d3), colchicine, gabapentin, hydrochlorothiazide, lantus solostar u-100 insulin, lancets/blood glucose strips, losartan, metformin, metoprolol succinate, omega-3 fish oil, omeprazole, pen needle, diabetic, pravastatin, repaglinide, and rivaroxaban.      Allergies  Review of patient's allergies indicates:   Allergen Reactions    Benicar [olmesartan]      Other reaction(s): SPOTS IN FRONT OF EYES    Codeine Nausea And Vomiting     Other reaction(s): Nausea       All medications, allergies, and past history have been reviewed.    Objective:      Vitals:  Vitals - 1 value per visit 6/10/2022 7/20/2022 7/20/2022   SYSTOLIC 132 - 153   DIASTOLIC 84 - 96   Pulse 70 - 66   Temp - - -   Resp - - -   SPO2 - - -   Weight (lb) 261.25 - 260.58   Weight (kg) 118.5 - 118.2   Height 73 - 73   BMI (Calculated) 34.5 - 34.4   VISIT REPORT - - -   Pain Score  - 0 -   Some recent data might be hidden       Body surface area is 2.47 meters squared.    Physical Exam:    GENERAL  APPEARANCE -  alert, appears stated age and cooperative  BARRIER(S) TO COMMUNICATION -  none VOICE - appropriate for age and  gender    INTEGUMENTARY  no suspicious head and neck lesions    HEENT  HEAD: Normocephalic, without obvious abnormality, atraumatic  FACE: INSPECTION - Symmetric, no signs of trauma, no suspicious lesion(s)  PALPATION -  No masses SALIVARY GLANDS - non-tender with no appreciable mass  STRENGTH - facial symmetry  NECK/THYROID: normal atraumatic, no neck masses, normal thyroid, no jvd    EYES  Normal occular alignment and mobility with no visible nystagmus at rest    EARS/NOSE/MOUTH/THROAT  EARS  PINNAE AND EXTERNAL EARS - no suspicious lesion OTOSCOPIC EXAM (surgical microscopy was not used for visualization/instrumentation): EAR EXAM - Normal ear canals, tympanic membranes and mobility, and middle ear spaces bilaterally.  HEARING - grossly intact to voice/finger rub    NOSE AND SINUSES  EXTERNAL NOSE - Grossly normal for age/sex  SEPTUM - normal/no obstruction on anterior exam without decongestion TURBINATES - within normal limits MUCOSA - within normal limits     MOUTH AND THROAT   ORAL CAVITY, LIPS, TEETH, GUMS & TONGUE - moist, no suspicious lesions  OROPHARYNX /TONSILS/PHARYNGEAL WALLS/HYPOPHARYNX - no erythema or exudates  NASOPHARYNX - limited mirror exam - unable to visualize due to anatomy/gag  LARYNX -  - limited mirror exam - unable to visualize due to anatomy/gag      CHEST AND LUNG   INSPECTION & AUSCULTATION - normal effort, no stridor    CARDIOVASCULAR  AUSCULTATION & PERIPHERAL VASCULAR - regular rate and rhythm.    NEUROLOGIC  MENTAL STATUS - alert, interactive CRANIAL NERVES - normal    MARYBEL-HALLPIKE - RIGHT - negative LEFT - negative  HEAD THRUSTS - Catch up saccades not tested   FUKUDA - not tested    LYMPHATIC  HEAD AND NECK - non-palpable; SUPRACLAVICULAR - deferred; AXILLARY - deferred; INGUINAL - deferred; LIVER/SPLEEN - deferred        Procedure(s):  None    Labs:  WBC   Date Value Ref Range Status   06/06/2022 7.77 3.90 - 12.70 K/uL Final     Hemoglobin   Date Value Ref Range Status    06/06/2022 12.2 (L) 14.0 - 18.0 g/dL Final     Platelets   Date Value Ref Range Status   06/06/2022 187 150 - 450 K/uL Final     Creatinine   Date Value Ref Range Status   06/27/2022 1.5 (H) 0.5 - 1.4 mg/dL Final     TSH   Date Value Ref Range Status   07/30/2020 1.237 0.400 - 4.000 uIU/mL Final     Glucose   Date Value Ref Range Status   06/27/2022 108 70 - 110 mg/dL Final     Hemoglobin A1C   Date Value Ref Range Status   06/06/2022 7.6 (H) 4.0 - 5.6 % Final     Comment:     ADA Screening Guidelines:  5.7-6.4%  Consistent with prediabetes  >or=6.5%  Consistent with diabetes    High levels of fetal hemoglobin interfere with the HbA1C  assay. Heterozygous hemoglobin variants (HbS, HgC, etc)do  not significantly interfere with this assay.   However, presence of multiple variants may affect accuracy.           Assessment:      Problem List Items Addressed This Visit    None     Visit Diagnoses     Imbalance    -  Primary    Hearing loss, unspecified hearing loss type, unspecified laterality        Syncope, unspecified syncope type        Pressure in head                   Plan:         No evidence of any vertigo by history or on physical examination.  This nonspecific pressure in then be above the eyes which seem to come on at the same time as a syncopal event may need further workup.  Given the previous history of vertigo which appears to be completely benign nature and not consistent with any current symptoms is reasonable to repeat a hearing test since the last 1 was done 9 years ago otherwise more detailed inner ear testing is not recommended such as repeat VNG.  Vestibular / balance rehabilitation is however recommended given his ongoing work as a  and needing good score strength and balance to avoid injury.      Return for further evaluation treatment of symptoms worsen or fail to improve.

## 2022-07-21 LAB
APTT BLDCRRT: 34.7 SEC (ref 21–32)
ERYTHROCYTE [DISTWIDTH] IN BLOOD BY AUTOMATED COUNT: 13.7 % (ref 11.5–14.5)
HCT VFR BLD AUTO: 40.8 % (ref 40–54)
HGB BLD-MCNC: 12.8 G/DL (ref 14–18)
INR PPP: 1.3 (ref 0.8–1.2)
MCH RBC QN AUTO: 30.7 PG (ref 27–31)
MCHC RBC AUTO-ENTMCNC: 31.4 G/DL (ref 32–36)
MCV RBC AUTO: 98 FL (ref 82–98)
PLATELET # BLD AUTO: 203 K/UL (ref 150–450)
PMV BLD AUTO: 13 FL (ref 9.2–12.9)
PROTHROMBIN TIME: 13.5 SEC (ref 9–12.5)
RBC # BLD AUTO: 4.17 M/UL (ref 4.6–6.2)
WBC # BLD AUTO: 7.93 K/UL (ref 3.9–12.7)

## 2022-07-22 ENCOUNTER — PATIENT MESSAGE (OUTPATIENT)
Dept: CARDIOLOGY | Facility: CLINIC | Age: 77
End: 2022-07-22
Payer: MEDICARE

## 2022-07-22 DIAGNOSIS — I10 ESSENTIAL HYPERTENSION: Primary | ICD-10-CM

## 2022-07-22 DIAGNOSIS — N17.9 ACUTE RENAL FAILURE, UNSPECIFIED ACUTE RENAL FAILURE TYPE: ICD-10-CM

## 2022-07-22 NOTE — TELEPHONE ENCOUNTER
Renal function persistently elevated, will hold hydrochlorothiazide, checking labs with pacemaker in 2 weeks. Will refer to nephrology.    Cedric

## 2022-07-25 ENCOUNTER — CLINICAL SUPPORT (OUTPATIENT)
Dept: REHABILITATION | Facility: HOSPITAL | Age: 77
End: 2022-07-25
Attending: OTOLARYNGOLOGY
Payer: MEDICARE

## 2022-07-25 DIAGNOSIS — R42 LIGHTHEADEDNESS: ICD-10-CM

## 2022-07-25 DIAGNOSIS — R26.89 IMBALANCE: ICD-10-CM

## 2022-07-25 DIAGNOSIS — R29.898 BILATERAL LEG WEAKNESS: ICD-10-CM

## 2022-07-25 PROCEDURE — 97112 NEUROMUSCULAR REEDUCATION: CPT | Mod: PN

## 2022-07-25 PROCEDURE — 97161 PT EVAL LOW COMPLEX 20 MIN: CPT | Mod: PN

## 2022-07-25 NOTE — PLAN OF CARE
[facility]  Physical Therapy Neurological Rehabilitation Initial Evaluation    Name: Des Junior  Clinic Number: [id number]    Therapy Diagnosis:   Encounter Diagnoses   Name Primary?    Imbalance     Lightheadedness      Physician: Jean-Claude Haro, *    Physician Orders: PT Eval and Treat   Medical Diagnosis from Referral: imbalance  Evaluation Date: 7/25/2022  Authorization Period Expiration: 07/20/23  Plan of Care Expiration: 09/24/22  Visit # / Visits authorized: 1/ 1    Time In: 1610  Time Out: 1650  Total Billable Time: 40 minutes    Precautions: Diabetes, Fall and PE/DVT       Subjective     Date of onset: 07/20/22  History of Current Symptoms, Des reports: 2 month onset of short duration intermittent lightheadedness; patient states the sensation lasts only a few seconds and that his symptoms are exacerbated by quick head turns, bending over, and getting out of bed; patient's condition is further complicated by episodes of syncope - current cardiac work up in place with impending pacer placement; endorses that he sees chiropractor weekly for whole body manipulations; also reports history of right benign paroxysmal positional vertigo treated with Epley maneuver.     Medical History:   Past Medical History:   Diagnosis Date    Abdominal obesity 3/15/2018    Acute deep vein thrombosis (DVT) of popliteal vein of right lower extremity 3/2/2018    AK (actinic keratosis) 11/15/2012    S/p 1st PDT face (80 min incubation) - did great! S/p PDT face - 90 min  - 12/15 - did great S/p PDT face - 90 min - 10/17 - great response    Asbestos exposure 3/2/2018    Asymptomatic LV dysfunction 3/2/2018    Bronchiectasis without complication 2/25/2019    Bronchitis, chronic, mucopurulent 3/2/2018    Cataract     Colon adenoma 8/20/2014    Current use of long term anticoagulation 4/23/2018    Deep vein thrombosis     ED (erectile dysfunction) 11/28/2012    Encounter for  monitoring sotalol therapy 6/13/2018    Hay fever     Hearing loss, sensorineural 6/25/2013    History of adenomatous polyp of colon 10/5/2015    History of pulmonary embolism 5/2/2018    [place] (hard of hearing)     Left ear    Hyperlipidemia associated with type 2 diabetes mellitus, baseline  11/28/2012    Hypertension associated with diabetes 11/28/2012    Lung nodule, solitary- 5 mm rll 3/2/2018    On home oxygen therapy 3/15/2018    Osteoarthritis of right knee 11/28/2012    Persistent atrial fibrillation 3/1/2018    Pulmonary embolus- large involved central PA, unprovoked. 3/1/2018    Type 2 diabetes mellitus, controlled 7/16/2013    Type 2 diabetes, controlled, with neuropathy 1/20/2014    Type 2 diabetes, uncontrolled, with neuropathy, onset 2003 11/28/2012     Surgical History:   Des Junior  has a past surgical history that includes thumb surgery; Colonoscopy (N/A, 10/5/2015); Knee Arthroplasty (Right, 12/7/2020); Treatment of cardiac arrhythmia (N/A, 3/5/2021); and Transesophageal echocardiography (N/A, 3/5/2021).    Medications:   Des has a current medication list which includes the following prescription(s): acetaminophen, albuterol, amlodipine, blood sugar diagnostic, cetirizine, cholecalciferol (vitamin d3), colchicine, gabapentin, hydrochlorothiazide, lantus solostar u-100 insulin, lancets/blood glucose strips, losartan, metformin, metoprolol succinate, omega-3 fish oil, omeprazole, pen needle, diabetic, pravastatin, repaglinide, and rivaroxaban.    Allergies:   Review of patient's allergies indicates:   Allergen Reactions    Benicar [olmesartan]      Other reaction(s): SPOTS IN FRONT OF EYES    Codeine Nausea And Vomiting     Other reaction(s): Nausea      Imaging: none    Prior Therapy: none; sees chiropractor weekly for whole body manipulations  Social History:  lives with his family in raised 1-story home (14 steps, has elevator)  Falls: only from syncopic events    DME: straight cane, rolling-walker   Occupation: master   Prior Level of Function: primary use of straight cane; occasional use of rolling-walker   Current Level of Function: moderate difficulty with activities of daily living     Pain: no complaints of neck pain    Pts goals: improve symptoms during general mobility; improve stability      Objective     - Follows commands: 100% of time   - Speech: no deficits     Functional Mobility:   Sit to stand: SBA    Mental status: alert, oriented to person, place, and time, normal mood, behavior, speech, dress, motor activity, and thought processes  Appearance: Casually dressed  Behavior:  calm and cooperative  Attention Span and Concentration:  Normal    Posture Alignment in sitting:   Head: forward head     Sensation: Light Touch: Impaired: sciatica right LE          Proprioception: Intact, Kinesthesia Intact         Visual/Auditory: denies changes   Tracking/Smooth Pursuits:Intact  Saccades: Intact  VOR: Impaired: mild elevation of symptoms    Coordination:   - fine motor: within functional limits   - UE coordination: within functional limits    - LE coordination:  within functional limits    ROM:   CERVICAL SPINE  Flexion 45 degrees (80-90 deg)  Extension 35 degrees (70-80 deg)  L side bend 25 degrees, R side bend 25 degrees (20-45 degrees)  L rotation 40 degrees, R rotation 40 degrees (70-90 degrees)  Are concurrent symptoms present with any of these movements = no     Modified VAS (Vertebral Artery Screen), in sitting (rotation, then extension):  R: negative  L: negative    RANGE OF MOTION--LOWER EXTREMITIES  (R) LE Hip: normal   Knee: normal   Ankle: normal    (L) LE: Hip: normal   Knee: normal   Ankle: normal    Strength: manual muscle test grades below     Lower Extremity Strength  Right LE  Left LE    Hip flexion:  3+/5 Hip flexion: 3+/5   Knee extension: 3+/5 Knee extension: 4-/5   Ankle dorsiflexion:  4-/5 Ankle dorsiflexion: 4-/5       Gait  Assessment:(if indicated)  - AD used: straight cane   - Assistance: stand by assist   - Distance: 120 feet     GAIT DEVIATIONS:  Des displays the following deviations with ambulation: decreased stance time left lower extremity = decreased stride length right lower extremity     Impairments contributing to deviations: imbalance    Endurance Deficit: limited by shortness of breath        POSITIONAL CANAL TESTING  Looking for nystagmus (slow phase followed by quick phase to the affected side for BPPV)    Veda Hallpike (posterior / CL anterior)   Right : NT   Left: NT  Horizontal Canals   Right: NT   Left: NT  Treatment Performed: n/a        CMS Impairment/Limitation/Restriction for FOTO Balance Survey    Therapist reviewed FOTO scores for Des Junior on 7/25/2022.   FOTO documents entered into [employer] - see Media section.    Limitation Score: 39%    Category: Mobility         Other: Dizziness Handicap Inventory = 40/100 (moderate handicap); Tinetti = 16/28 (high fall risk)      TREATMENT     Treatment Time In: 1640  Treatment Time Out: 1650  Total Treatment time separate from Evaluation: 10 minutes    Des participated in neuromuscular re-education activities to improve: vestibular hypofunction for 10 minutes. The following activities were included:     X 5 each seated smooth pursuits = side to side, up and down   X 5 each seated saccades = side to side, up and down   X 5 each seated VOR1 = side to side*, up and down*      * minimal increase in symptoms      Home Exercises and Patient Education Provided    Education provided:   - proper therapeutic exercise technique  - treatment plan    Written Home Exercises Provided: to be provided at future appointment.      Assessment     Des is a 77 y.o. male referred to outpatient Physical Therapy with a medical diagnosis of imbalance. Pt presents to PT with the following impairments leading to his functional decline: lightheadedness, lower extremity weakness,  imbalance. Given patient's report of elevated symptoms during head movements, history of right benign paroxysmal positional vertigo and unable to keep eyes closed during Tinetti testing likely presents with unilateral vestibular weakness.    Pt prognosis is Fair.   Pt will benefit from skilled outpatient Physical Therapy to address the deficits stated above and in the chart below, provide pt/family education, and to maximize pt's level of independence.     Plan of care discussed with patient: Yes  Pt's spiritual, cultural and educational needs considered and patient is agreeable to the plan of care and goals as stated below:     Anticipated Barriers for therapy: severity of imbalance    Medical Necessity is demonstrated by the following  History  Co-morbidities and personal factors that may impact the plan of care Co-morbidities:   diabetes and HTN    Personal Factors:   no deficits     moderate   Examination  Body Structures and Functions, activity limitations and participation restrictions that may impact the plan of care Body Regions:   head  lower extremities    Body Systems:    strength  balance  gait  transfers    Participation Restrictions:   none    Activity limitations:   Learning and applying knowledge  no deficits    General Tasks and Commands  no deficits    Communication  no deficits    Mobility  lifting and carrying objects  walking  driving (bike, car, motorcycle)    Self care  no deficits    Domestic Life  shopping  cooking  doing house work (cleaning house, washing dishes, laundry)    Interactions/Relationships  no deficits    Life Areas  no deficits    Community and Social Life  no deficits         high   Clinical Presentation stable and uncomplicated low   Decision Making/ Complexity Score: low     Goals:    Short Term Goals (4 Weeks):   1. Patient to tolerate x 10 repetitions sit to stand to improve ease of transfer.  2. Patient to tolerate use of 2# weights during lower extremity therapeutic  exercise to improve overall strength.  3. Patient to perform x 45 seconds full Romberg stance (eyes closed) without loss of balance.  4. Patient to report that quick movements of his head sometimes increases his problem.    Long Term Goals (9 Weeks):   1. Patient to demonstrate competence with home exercise program to maintain therapeutic gains.  2. Patient to improve bilateral hip MMT 1/2 grade to demo strength gains from therapeutic intervention.  3. Patient to ambulate 200 feet with straight cane and stand by assist with improved lorenza/symmetry.  4. Patient to ascend/descend 4 x 4 (6-inch) steps with straight cane and stand by assist to demonstrate safe mobility in/out of his home.      Plan     Plan of care Certification: 7/25/2022 to 09/24/2022.    Outpatient Physical Therapy 1 times weekly for 9 weeks to include the following interventions: Gait Training, Manual Therapy, Moist Heat/ Ice, Neuromuscular Re-ed, Patient Education, Self Care, Therapeutic Activities, Therapeutic Exercise and HEP .     Barrett Mitchell, PT

## 2022-07-26 ENCOUNTER — PATIENT MESSAGE (OUTPATIENT)
Dept: ELECTROPHYSIOLOGY | Facility: CLINIC | Age: 77
End: 2022-07-26
Payer: MEDICARE

## 2022-07-26 DIAGNOSIS — I47.10 SVT (SUPRAVENTRICULAR TACHYCARDIA): Primary | ICD-10-CM

## 2022-07-26 DIAGNOSIS — I48.11 LONGSTANDING PERSISTENT ATRIAL FIBRILLATION: ICD-10-CM

## 2022-07-27 ENCOUNTER — PATIENT MESSAGE (OUTPATIENT)
Dept: CARDIOLOGY | Facility: CLINIC | Age: 77
End: 2022-07-27
Payer: MEDICARE

## 2022-07-27 NOTE — TELEPHONE ENCOUNTER
Referring to Nephrology renal function not improving, holding hydrochlorothiazide at this time.    -Cedric Evangelista

## 2022-08-04 ENCOUNTER — CLINICAL SUPPORT (OUTPATIENT)
Dept: REHABILITATION | Facility: HOSPITAL | Age: 77
End: 2022-08-04
Payer: MEDICARE

## 2022-08-04 ENCOUNTER — LAB VISIT (OUTPATIENT)
Dept: LAB | Facility: HOSPITAL | Age: 77
End: 2022-08-04
Attending: INTERNAL MEDICINE
Payer: MEDICARE

## 2022-08-04 DIAGNOSIS — I48.11 LONGSTANDING PERSISTENT ATRIAL FIBRILLATION: ICD-10-CM

## 2022-08-04 DIAGNOSIS — R42 LIGHTHEADEDNESS: ICD-10-CM

## 2022-08-04 DIAGNOSIS — R26.89 IMBALANCE: Primary | ICD-10-CM

## 2022-08-04 DIAGNOSIS — I47.10 SVT (SUPRAVENTRICULAR TACHYCARDIA): ICD-10-CM

## 2022-08-04 LAB
ANION GAP SERPL CALC-SCNC: 6 MMOL/L (ref 8–16)
BUN SERPL-MCNC: 19 MG/DL (ref 8–23)
CALCIUM SERPL-MCNC: 9.8 MG/DL (ref 8.7–10.5)
CHLORIDE SERPL-SCNC: 107 MMOL/L (ref 95–110)
CO2 SERPL-SCNC: 26 MMOL/L (ref 23–29)
CREAT SERPL-MCNC: 1.9 MG/DL (ref 0.5–1.4)
EST. GFR  (NO RACE VARIABLE): 36.1 ML/MIN/1.73 M^2
GLUCOSE SERPL-MCNC: 235 MG/DL (ref 70–110)
POTASSIUM SERPL-SCNC: 4.6 MMOL/L (ref 3.5–5.1)
SODIUM SERPL-SCNC: 139 MMOL/L (ref 136–145)

## 2022-08-04 PROCEDURE — 97110 THERAPEUTIC EXERCISES: CPT | Mod: PN

## 2022-08-04 PROCEDURE — 36415 COLL VENOUS BLD VENIPUNCTURE: CPT | Mod: PO | Performed by: INTERNAL MEDICINE

## 2022-08-04 PROCEDURE — 80048 BASIC METABOLIC PNL TOTAL CA: CPT | Performed by: INTERNAL MEDICINE

## 2022-08-04 PROCEDURE — 97112 NEUROMUSCULAR REEDUCATION: CPT | Mod: PN

## 2022-08-04 NOTE — PROGRESS NOTES
[facility]   Physical Therapy Treatment Note     Name: Des Junior  Clinic Number: [id number]    Therapy Diagnosis:   Encounter Diagnoses   Name Primary?    Imbalance Yes    Lightheadedness      Physician: Jean-Claude Haro, *    Visit Date: 8/4/2022    Physician Orders: PT Eval and Treat   Medical Diagnosis from Referral: imbalance  Evaluation Date: 7/25/2022  Authorization Period Expiration: 12/31/22  Plan of Care Expiration: 09/24/22  Visit # / Visits authorized: 1/ 20     Time In: 1634  Time Out: 1719  Total Billable Time: 45 minutes     Precautions: Diabetes, Fall and PE/DVT       SUBJECTIVE     Pt reports: no real complaints of pain at rest; states had recent episode of lightheadedness after bending over to pick something up - sensation lasted about 5 minutes.  He does not have a home exercise program.  Response to previous treatment: increased pain  Functional change: none    Pain: 5/10  Location: left knee        OBJECTIVE     blood pressure in sitting = 164/98    Treatment     Des received the treatments listed below:      therapeutic exercises to develop endurance and flexibility for 10 minutes including:     X 10 minutes SciFit (level 2) to promote flexibility prior to strength/balance/mobility training      neuromuscular re-education activities to improve: Balance and Vestibular Hypofunction for 35 minutes. The following activities were included:     X 30 seconds each seated saccades = side to side, up and down   X 30 seconds each seated VOR1 = side to side*, up and down   X 3 sit to stand while hold target   X 3 lean over touch stood while hold target   10 x run of 5 cones on floor = convergence/divergence*   X 15 each standing mini squats and heel raises/ toe raises    X 15 feet each balance gait = side stepping, backwards gait   X 1 minute stand on AirEx   X 15 alternating toe taps on 3-inch stool      * minimal increase in symptoms      Patient Education and  Home Exercises     Home Exercises Provided and Patient Education Provided     Education provided:   - proper therapeutic exercise technique    Written Home Exercises Provided: to be provided at future appointment.       ASSESSMENT     Reported elevated frontal pressure after VOR1 side to side and convergence/divergence exercises; needed upper extremity support during balance training; able to tolerate AirEx training without loss of balance; had to pause several times on the SciFit due to left knee pain.              Des Is not progressing well towards his goals.   Pt prognosis is Fair.     Pt will continue to benefit from skilled outpatient physical therapy to address the deficits listed in the problem list box on initial evaluation, provide pt/family education and to maximize pt's level of independence in the home and community environment.     Pt's spiritual, cultural and educational needs considered and pt agreeable to plan of care and goals.     Anticipated barriers to physical therapy: severity of pain and imbalance    Goals:     Short Term Goals (4 Weeks):   1. Patient to tolerate x 10 repetitions sit to stand to improve ease of transfer. (NOT MET)  2. Patient to tolerate use of 2# weights during lower extremity therapeutic exercise to improve overall strength. (NOT MET)  3. Patient to perform x 45 seconds full Romberg stance (eyes closed) without loss of balance. (NOT MET)  4. Patient to report that quick movements of his head sometimes increases his problem. (NOT MET)     Long Term Goals (9 Weeks):   1. Patient to demonstrate competence with home exercise program to maintain therapeutic gains. (NOT MET)  2. Patient to improve bilateral hip MMT 1/2 grade to demo strength gains from therapeutic intervention. (NOT MET)  3. Patient to ambulate 200 feet with straight cane and stand by assist with improved lorenza/symmetry. (NOT MET)  4. Patient to ascend/descend 4 x 4 (6-inch) steps with straight cane and  stand by assist to demonstrate safe mobility in/out of his home. (NOT MET)      PLAN     Continue to advance vestibular and balance training to patient's tolerance.      Barrett Mitchell, PT

## 2022-08-05 ENCOUNTER — TELEPHONE (OUTPATIENT)
Dept: ELECTROPHYSIOLOGY | Facility: CLINIC | Age: 77
End: 2022-08-05
Payer: MEDICARE

## 2022-08-05 NOTE — TELEPHONE ENCOUNTER
Called to confirm procedure for Monday, no answer left message for him to call to office to review procedure details

## 2022-08-05 NOTE — TELEPHONE ENCOUNTER
Spoke to patient    CONFIRMED procedure arrival time of 7am    Reiterated instructions including:  -Directions to check in desk  -NPO after midnight night prior to procedure  -High importance of HOLDING last dose of xarelto was yesterday, instructed pt to only take 1/2 of his lantus dose the night prior and to hold his metformin the morning of the procedure   -Pre-procedure LABS labs reviewed, no alerts noted Cr 1.9-Dr Mccauley reviewed ok to proceed  -COVID test n/a, pt vaccinated  -Confirmed no fever, cough, or shortness of breath in the past 30 days  -Bathe night prior and morning prior to procedure with Hibiclens solution or an antibacterial soap  -Reviewed current visitor policy    Patient verbalizes understanding of above and appreciates call.

## 2022-08-06 ENCOUNTER — TELEPHONE (OUTPATIENT)
Dept: ELECTROPHYSIOLOGY | Facility: CLINIC | Age: 77
End: 2022-08-06
Payer: MEDICARE

## 2022-08-06 NOTE — TELEPHONE ENCOUNTER
Spoke with pt, informed him that due to an illness Dr Mccauley will be unable to do his procedure on Monday and that we will call him back to reschedule his procedure

## 2022-08-10 ENCOUNTER — PATIENT MESSAGE (OUTPATIENT)
Dept: ELECTROPHYSIOLOGY | Facility: CLINIC | Age: 77
End: 2022-08-10
Payer: MEDICARE

## 2022-08-10 DIAGNOSIS — I44.2 CHB (COMPLETE HEART BLOCK): ICD-10-CM

## 2022-08-10 DIAGNOSIS — R00.1 BRADYCARDIA: Primary | ICD-10-CM

## 2022-08-10 DIAGNOSIS — I48.11 LONGSTANDING PERSISTENT ATRIAL FIBRILLATION: ICD-10-CM

## 2022-08-11 ENCOUNTER — CLINICAL SUPPORT (OUTPATIENT)
Dept: REHABILITATION | Facility: HOSPITAL | Age: 77
End: 2022-08-11
Payer: MEDICARE

## 2022-08-11 DIAGNOSIS — R26.89 IMBALANCE: Primary | ICD-10-CM

## 2022-08-11 DIAGNOSIS — R42 LIGHTHEADEDNESS: ICD-10-CM

## 2022-08-11 PROCEDURE — 97112 NEUROMUSCULAR REEDUCATION: CPT | Mod: PN,CQ

## 2022-08-11 PROCEDURE — 97110 THERAPEUTIC EXERCISES: CPT | Mod: PN,CQ

## 2022-08-11 NOTE — PROGRESS NOTES
"[facility]   Physical Therapy Treatment Note     Name: Des Junior  Clinic Number: [id number]    Therapy Diagnosis:   Encounter Diagnoses   Name Primary?    Imbalance Yes    Lightheadedness      Physician: Jean-Claude Haro, *    Visit Date: 8/11/2022    Physician Orders: PT Eval and Treat   Medical Diagnosis from Referral: imbalance  Evaluation Date: 7/25/2022  Authorization Period Expiration: 12/31/22  Plan of Care Expiration: 09/24/22  Visit # / Visits authorized: 3/ 20  PTA visit # 1/5    Foto:  Eval - 61/100    Time In: 1615  Time Out: 1655  Total Billable Time: 40 minutes     Precautions: Diabetes, Fall and PE/DVT       SUBJECTIVE     Pt reports: "I don't know how much I'll be able to do this knee is still hurting me from last week"  (ie Left knee).  Pt stated he needs to have a knee replacement.  Pt stated he has been having bad headaches the last few day to the back of his head and the Left side.  Pt states it's worse right before he wakes up. Pt stated his doctor is aware.  "That's why they are putting the pacemaker in".   Pt was supposed to have pace maker paced this past Monday but it was rescheduled because the doctor was sick.  He does not have a home exercise program.  Response to previous treatment: increased pain  Functional change: none    Pain: 8/10  Location: left knee        OBJECTIVE         Treatment     Des received the treatments listed below:      therapeutic exercises to develop endurance and flexibility for 10 minutes including:    SciFit x 10' (level 2) to promote flexibility prior to strength/balance/mobility training      neuromuscular re-education activities to improve: Balance and Vestibular Hypofunction for 35 minutes. The following activities were included:     X 30 seconds each seated saccades = side to side, up and down   X 30 seconds each seated VOR1 = side to side*, up and down   X 5 sit to stand while hold target*   X 5 lean over touch " stood while hold target*   10 runs of 5 cones on floor = convergence/divergence*   X 20 each standing mini squats and heel raises/ toe raises    X 15 feet each balance gait = side stepping, backwards gait   X 1 minute stand on AirEx   X 15 alternating toe taps on 3-inch stool      * minimal increase in symptoms      Patient Education and Home Exercises     Home Exercises Provided and Patient Education Provided     Education provided:   - proper therapeutic exercise technique    Written Home Exercises Provided: to be provided at future appointment.       ASSESSMENT   Des provided good effort and participation toward therapeutic interventions today with focus on lower extremity strengthening and vestibular exercises.  Increased vestibular symptoms with several exercises reported today.  Pt also fatigued with standing exercises and having to rest after each exercises     Des Is not progressing well towards his goals.   Pt prognosis is Fair.     Pt will continue to benefit from skilled outpatient physical therapy to address the deficits listed in the problem list box on initial evaluation, provide pt/family education and to maximize pt's level of independence in the home and community environment.     Pt's spiritual, cultural and educational needs considered and pt agreeable to plan of care and goals.     Anticipated barriers to physical therapy: severity of pain and imbalance    Goals:     Short Term Goals (4 Weeks):   1. Patient to tolerate x 10 repetitions sit to stand to improve ease of transfer. (NOT MET)  2. Patient to tolerate use of 2# weights during lower extremity therapeutic exercise to improve overall strength. (NOT MET)  3. Patient to perform x 45 seconds full Romberg stance (eyes closed) without loss of balance. (NOT MET)  4. Patient to report that quick movements of his head sometimes increases his problem. (NOT MET)     Long Term Goals (9 Weeks):   1. Patient to demonstrate competence with home  exercise program to maintain therapeutic gains. (NOT MET)  2. Patient to improve bilateral hip MMT 1/2 grade to demo strength gains from therapeutic intervention. (NOT MET)  3. Patient to ambulate 200 feet with straight cane and stand by assist with improved lorenza/symmetry. (NOT MET)  4. Patient to ascend/descend 4 x 4 (6-inch) steps with straight cane and stand by assist to demonstrate safe mobility in/out of his home. (NOT MET)      PLAN   Plan of care Certification: 7/25/2022 to 09/24/2022.     Outpatient Physical Therapy 1 times weekly for 9 weeks to include the following interventions: Gait Training, Manual Therapy, Moist Heat/ Ice, Neuromuscular Re-ed, Patient Education, Self Care, Therapeutic Activities, Therapeutic Exercise and HEP .   Continue to advance vestibular and balance training to patient's tolerance.    Josie Drew, PTA

## 2022-08-16 NOTE — PROGRESS NOTES
PT/PTA met face to face to discuss pt's treatment plan and progress towards established goals. Pt will be seen by a physical therapist minimally every 6th visit or every 30 days.      Josie Drew PTA

## 2022-08-16 NOTE — PROGRESS NOTES
12/5/2018    [facility] Follow Up    Chief Complaint   Patient presents with    Pre-op Exam     Right knee replacement       HPI:   12/5/18- no cough, breathing well- tkr next week,   ]    Instructions-  With new nodule seen (old ones better)- ct chest March or so would be good- films viewed    Use inhaler/antibiotic/prednisone if bronchitis flares    Blood clot risk with surgery but should not be a problem if blood thinned asap post surgery.    Regular follow up in March or so          Sept 25, 2018-- no cough nor mucous, breathing very good.    Patient Instructions   Cultures had no bad germs in lungs.       If cough flares up- symbicort 2 twice daily.  May use if cough or wheeze or mucous.     May use Augmentin antibiotic if cough or yellow mucous.  May use prednisone if cough is excess or wheezes or short breath.     Nodules no big worries seen but got a new one.      March 22, 2018pt seen with massive pe, also has chr lung dz seen on ct with massive clots.         From my ct review:  Radiographs reviewed: view by direct vision  Very large pulm emboli bilat main arteries, 5 mm rll solid fairly smooth nodule, very min mild post lung marking increase, no calcified plaques seen, lower lungs with thickened bronchi and perhaps early bronchiectasis      from  Consult hpi and rec:    03/02/2018                                                  Admit Date: 3/1/2018  [facility]  New Patient Consult          Chief Complaint   Patient presents with    Shortness of Breath       completed CT this morning outpatient, was advised by PCP to then present to ER for possible admission          History of Present Illness:  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  Pt never smoker.  No leg trauma nor leg swelling nor pain legs.  Pt had intermittent cough with purulent mucous  August 16, 2022       Yobani Francis MD  [address]  [facility] [address]  Via In Basket      Patient: Kimberly Weston   YOB: 1952   Date of Visit: 8/16/2022       Dear Dr. Francis:    Thank you for referring Kimberly Weston to me for evaluation. Below are my notes for this visit with her.    If you have questions, please do not hesitate to call me. I look forward to following your patient along with you.      Sincerely,        Juliet Schofield MD        CC: No Recipients  Juliet Schofield MD  8/16/2022  1:20 PM  Signed    Patient ID: Kimberly is a 69 year old female.    VIDEO VISIT    After COVID, our office implemented the option of video visits    Patient's identity has been established.      Patient understands the potential risk inherent in video visits, as the assessment may be limited due to what can be seen on the screen which potentially results in an incomplete assessment    The in-person visit was converted to a Video visit   Patient was delighted w that and consented to the visit     The visit was performed via real-time, 2 way audio visual technology  This visit was performed via live interactive two-way video.      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  No inhaler use nor sob til lately.  Pt does travel with 3-4 immobilization between stops.  Fh + only in nephew with clotts in his early 50's.   Pt developed small climbing to his elevated home last 3 weeks, no syncope, no chest pain nor palpitations.  Pt was evaluated by pcp and a fib and pe found.     Pt rm air sat 94% today.          Plan: pt is stable for home soon on xarelto but would recommend bed to chair activity for next week. dvt and pe are unprovoked.  Given large clots seen and delayed presentation- anticoagg 6 month is min but recommend life long.  hypercoagulable chahal will not change length of rx.  Would ask what nephew chris showed if done.   Pt has nodule with 1 of 200 risk lung ca by Brandyn Model.   F/u ct with airway dz may be reasonable in a year?  Asbestos exposure - no impressive asbestosis seen.   Pt has symptomatic chr mucopurulent bronchitis.  Coverage for meds is limited on his insurance.  Would give course levaquin and cultures and afb x 3 for geraldo and neb rx prn 1/2 dose albuterol    Suggest f/u in 3 months or so (GERALDO can take 2 months to culture).    pe likely ppt a fib?        The chief compliant  problem is varies with instablilty at time   PFSH:  Past Medical History:   Diagnosis Date    Abdominal obesity 3/15/2018    Acute deep vein thrombosis (DVT) of popliteal vein of right lower extremity 3/2/2018    AK (actinic keratosis) 11/15/2012    S/p 1st PDT face (80 min incubation) - did great! S/p PDT face - 90 min  - 12/15 - did great S/p PDT face - 90 min - 10/17 - great response    Asbestos exposure 3/2/2018    Asymptomatic LV dysfunction 3/2/2018    Bronchitis, chronic, mucopurulent 3/2/2018    Cataract     Colon adenoma 8/20/2014    Current use of long term anticoagulation 4/23/2018    ED (erectile dysfunction) 11/28/2012    Encounter for monitoring sotalol therapy 6/13/2018    Hay fever     Hearing loss,  units 2-3 times a day   -Trulicity 4.5 mg/week  Intolerance to Jardiance w secondary infection exacerbated by her radiation cystitis hx   Tendency to diarrhea     SMBG:   Dexcom download reviewed:  Sensor Data:  14 days data reviewed   Time CGM active 93%  Average glucose 398  Glucose variability / SD:  --/12  GMI  12.8%   Target range: 0%  Low: 0  Very low: 0  High  5%  Very high  100%    Glucose trends: high all across    She wants to follow the diet, she likes to eat a lot of fruits though    Pt reports compliance to medications  She says she is depressed, this has been going on since the death of her son, says nobody is following her, she tried calling all the psych, she was able to talk a   They won't schedule her they tell her they will call her back once they get an opening   Dr Francis is aware and he has been trying to get her in      Losing weight: now she weighs 148 lbs      2. Hypothyroidism s/p LEYVA X 2  on levothyroxine 50 mcg daily     Her  has Cirrhosis         Data:  -U/S thyroid 12/2018: Slight interval decrease in the size of the enlarged heterogeneous left lobe of the thyroid.    Salivary cortisol twice in Feb 2022 ok   Did not suppress adequately to Dex in July 2021    Lab Results   Component Value Date    POCGLU 140 (A) 04/21/2020     Lab Results   Component Value Date    HGBA1C 12.5 (H) 07/16/2022    HGBA1C 10.4 (H) 03/12/2022    HGBA1C 10.6 (H) 01/12/2022     No results found for: FRCTS  Lab Results   Component Value Date    CREATININE 1.22 (H) 07/16/2022     Lab Results   Component Value Date    GFRESTIMATE 48 (L) 07/16/2022     Lab Results   Component Value Date    POTASSIUM 5.2 (H) 07/16/2022     Lab Results   Component Value Date    CALCIUM 9.6 07/16/2022     Lab Results   Component Value Date    AST 16 07/16/2022     Lab Results   Component Value Date    GPT 28 07/16/2022     Lab Results   Component Value Date    CALCLDL  07/16/2022      Comment:      Unable to Calculate LDL      Lab Results   Component Value Date    TRIGLYCERIDE 453 (H) 2022     Lab Results   Component Value Date    MALBCR 112.0 (H) 2022     Lab Results   Component Value Date    TSH 4.799 2022     No results found for: VITD25    Lab Results   Component Value Date    CORTDX 2.4 (H) 2021       ROS:  Otherwise all 12 ROS performed and are negative except for what is reported in HPI     Review  Past medical history, problem list, family medical history, surgical history and social history reviewed.     Past Medical History:   Diagnosis Date   • Diabetes (CMS/HCC)    • Endometrial carcinoma (CMS/HCC)    • Fatigue    • Microalbuminuria    • Microalbuminuria    • Right foot pain    • Thyroid nodule      Past Surgical History:   Procedure Laterality Date   • Colonoscopy     • Hysterectomy     • Pacemaker       Family History   Problem Relation Age of Onset   • Diabetes Father    • Congestive Heart Failure Father      Social History     Socioeconomic History   • Marital status: /Civil Union     Spouse name: Not on file   • Number of children: Not on file   • Years of education: Not on file   • Highest education level: Not on file   Occupational History   • Not on file   Tobacco Use   • Smoking status: Former Smoker     Packs/day: 0.00     Quit date:      Years since quittin.6   • Smokeless tobacco: Never Used   Vaping Use   • Vaping Use: never used   Substance and Sexual Activity   • Alcohol use: Not Currently   • Drug use: Never   • Sexual activity: Not Currently   Other Topics Concern   • Not on file   Social History Narrative   • Not on file     Social Determinants of Health     Financial Resource Strain: Not on file   Food Insecurity: Not on file   Transportation Needs: Not on file   Physical Activity: Not on file   Stress: Not on file   Social Connections: Not on file   Intimate Partner Violence: Not on file     ALLERGIES:   Allergen Reactions   • Adhesive   (Environmental) Other  sensorineural 6/25/2013    History of adenomatous polyp of colon 10/5/2015    History of pulmonary embolism 5/2/2018    Hyperlipidemia associated with type 2 diabetes mellitus, baseline  11/28/2012    Hypertension associated with diabetes 11/28/2012    Lung nodule, solitary- 5 mm rll 3/2/2018    On home oxygen therapy 3/15/2018    Osteoarthritis of right knee 11/28/2012    Persistent atrial fibrillation 3/1/2018    Pulmonary embolus- large involved central PA, unprovoked. 3/1/2018    Type 2 diabetes mellitus, controlled 7/16/2013    Type 2 diabetes, controlled, with neuropathy 1/20/2014    Type 2 diabetes, uncontrolled, with neuropathy, onset 2003 11/28/2012         Past Surgical History:   Procedure Laterality Date    Cardioversion N/A 4/10/2018    [facility] C[facility]    Cardioversion/Defibrillation N/A 5/9/2018    Performed by [facility] at [facility] CATH LAB    COLONOSCOPY N/A 10/5/2015    Procedure: COLONOSCOPY;  Surgeon: Pro Jang MD;  Location: [facility] ([address] [place] [address]);  Service: Endoscopy;  Laterality: N/A;    COLONOSCOPY N/A 10/5/2015    [facility]    COLONOSCOPY N/A 8/20/2014    [facility]    thumb surgery       Social History     Tobacco Use    Smoking status: Never Smoker    Smokeless tobacco: Never Used   Substance Use Topics    Alcohol use: Yes     Comment: rare    Drug use: No     Family History   Problem Relation Age of Onset    Hypertension Father     Heart disease Father         chf    Cancer Brother         colon    Cataracts Mother     Glaucoma Mother     Pneumonia Mother     Cancer Sister         lymphoma    Cancer Brother         prostate ce    Melanoma Neg Hx     Amblyopia Neg Hx     Blindness Neg Hx     Macular degeneration Neg Hx     Retinal detachment Neg Hx     Strabismus Neg Hx     Diabetes Neg Hx      Review of patient's  "allergies indicates:   Allergen Reactions    Codeine Nausea And Vomiting     Other reaction(s): Nausea    Benicar  [olmesartan]      Other reaction(s): SPOTS IN FRONT OF EYES       Performance Status:The patient's activity level is functions out of house.      Review of Systems:  a review of eleven systems covering constitutional, Eye, HEENT, Psych, Respiratory, Cardiac, GI, , Musculoskeletal, Endocrine, Dermatologic was negative except for pertinent findings as listed ABOVE and below:  pertinent positive as above, rest is good       Exam:Comprehensive exam done. /83 (BP Location: Left arm, Patient Position: Sitting)   Pulse 68   Ht 6' 1" (1.854 m)   Wt 111.2 kg (245 lb 2.4 oz)   SpO2 98% Comment: on room air  BMI 32.34 kg/m²   Exam included Vitals as listed, and patient's appearance and affect and alertness and mood, oral exam for yeast and hygiene and pharynx lesions and Mallapatti (M) score, neck with inspection for jvd and masses and thyroid abnormalities and lymph nodes (supraclavicular and infraclavicular nodes and axillary also examined and noted if abn), chest exam included symmetry and effort and fremitus and percussion and auscultation, cardiac exam included rhythm and gallops and murmur and rubs and jvd and edema, abdominal exam for mass and hepatosplenomegaly and tenderness and hernias and bowel sounds, Musculoskeletal exam with muscle tone and posture and mobility/gait and  strength, and skin for rashes and cyanosis and pallor and turgor, extremity for clubbing.  Findings were normal except for pertinent findings listed below:  M3, chest is symmetric, no distress, normal percussion, normal fremitus and good normal breath sounds  Rrr, no murmur , mild edema +1    Radiographs (ct chest and cxr) reviewed: view by direct vision  lg pe on ct earlier march-- ct chest sept 2018 new nodule noted but lungs look better otherwise.  CTA Chest Non-Coronary   Status: Final result   MyChart Results " (See Comments) and RASH     EKG TAPE     Current Outpatient Medications   Medication Sig Dispense Refill   • atorvastatin (LIPITOR) 40 MG tablet Take 1 tablet by mouth once daily 90 tablet 0   • Euthyrox 50 MCG tablet Take 1 tablet by mouth once daily 90 tablet 1   • insulin detemir (Levemir FlexTouch) 100 UNIT/ML pen-injector Inject 36 Units into the skin in the morning and 36 Units before bedtime. 75 mL 1   • digoxin (LANOXIN) 0.125 MG tablet Take 1 tablet by mouth once daily 90 tablet 1   • allopurinol (ZYLOPRIM) 300 MG tablet Take 1 tablet by mouth once daily 90 tablet 0   • metoPROLOL tartrate (LOPRESSOR) 100 MG tablet Take 1 tablet by mouth 2 times daily. 100 mg in the morning and 50 mg at night 180 tablet 3   • lisinopril (ZESTRIL) 2.5 MG tablet Take 1 tablet by mouth daily. 90 tablet 1   • Insulin Pen Needle 32G X 4 MM Misc Use to inject insulin 5 times daily. Remove needle cover(s) to expose needle before injecting. 150 each 11   • Dulaglutide (Trulicity) 4.5 MG/0.5ML Solution Pen-injector Inject 4.5 mg into the skin every 7 days. 2 mL 5   • fenofibrate (TRICOR) 54 MG tablet Take 1 tablet by mouth daily. 30 tablet 5   • NovoLOG FlexPen 100 UNIT/ML pen-injector Inject 26 units three times daily with meals. Prime 2 units before each dose. 60 mL 3   • torsemide (DEMADEX) 20 MG tablet Take 1 tablet by mouth 2 times daily. 180 tablet 0   • Eliquis 5 MG Tab TAKE 1 TABLET BY MOUTH EVERY 12 HOURS 180 tablet 3   • True Metrix Blood Glucose Test test strip USE 1 STRIP TO CHECK GLUCOSE 4 TIMES DAILY AS DIRECTED 200 each 3   • ascorbic acid (VITAMIN C) 1000 MG tablet Take 0.5 tablets by mouth daily.     • Continuous Blood Gluc  (DEXCOM G6 ) Device 1 Device 1 time. 1 Device 0   • Continuous Blood Gluc Sensor (DEXCOM G6 SENSOR) Misc 3 each every 30 days. Change sensor every 10 days 9 each 1   • Continuous Blood Gluc Transmit (DEXCOM G6 TRANSMITTER) Misc 1 each every 3 months. 1 each 1   • vitamin D,  Cholecalciferol, 25 mcg (1,000 units) capsule Take 1 capsule by mouth daily. 90 capsule 11   • ONETOUCH DELICA LANCETS FINE Misc Use to check blood sugars 4 times daily 400 each 1     No current facility-administered medications for this visit.       Physical exam    Visit Vitals  LMP 11/04/2020     GA: NAD  Normocephalic atraumatic   Non labored breathing,  No resp distress   Neuro: Alert, awake, conversive, speech ok  Psych: appropriate mood and affect  Tearful by end of visit    Assessment:    1. Type 2 diabetes Uncontrolled, w secondary neuropathy, nephropathy and nephropathy  on insulin: pt has higher insulin requirements:  Emphasize lifestyle changes  At least Yearly eye exam  SMBG x  4  per day /  CGMS  Stop the fruits   Follow the same diet as your son   Increase Novolog to 30 units Tid  Make Levemir 36 units BID if sugars stay high then increase more to 40 units BID   Keep Trulicity at 4.5 mg weekly      Will follow on sugars every week through dexcom  Reviewed w pt if she starts feeling very tired, weak then she needs to go to ER since her sugars have been running so high for a while there is a risk of more problems and dehydrations, she has been staying well hydrated  By next week if sugars stay this high I will have a low threshold to send her to ER     -1 mg Dexa suppression test July 2021 : Cortisol did not suppress well    salivary cortisol x 2 : ok in Feb 2022      2- HTN:   on Lisinopril   Keep same       3- Dyslipidemia:    on Atorva, keep same      4. Hypothyroid s/p LEYVA X 2,  continue levothyroxine 50 mcg daily      Chart reviewed along with available documents in [facility]  Labs reviewed, analyzed, interpreted and d/w pt     RTC within 1-2 months to see the Pharm D and in 3 months w endocrinologist       Time spent on call: 20  minutes       Refills sent    Juliet Schofield MD, FACE   of Clinical Medicine  Endocrinology Division, Internal Medicine Department   [facility]  Release     MyChart Status: Active Results Release   PACS Images     Show images for CTA Chest Non-Coronary   Reviewed By Medina Cortez MD on 3/1/2018 16:40   External Result Report     External Result Report   Narrative     EXAMINATION:  CTA CHEST NON CORONARY    CLINICAL HISTORY:  Chest pain, acute, PE suspected, intermed prob, positive D-dimer;Chest pain, unspecified    TECHNIQUE:  Low dose axial images, sagittal and coronal reformations were obtained from the thoracic inlet to the lung bases.  Timing was optimized to evaluate the pulmonary arteries.    COMPARISON:  Chest radiographs 02/27/2018    FINDINGS:  The visualized thyroid gland is unremarkable.  No supraclavicular lymphadenopathy is seen.  No mediastinal or hilar lymphadenopathy is seen.  No axillary lymphadenopathy is seen.  No acute esophageal abnormality is seen.    The thoracic aorta is normal in caliber.  There is mild atherosclerosis in the aortic arch.  Moderate coronary artery calcification is also noted.    Pulmonary arterial bolus timing is good.  The study is positive for pulmonary embolism.  There are filling defects within the central pulmonary arteries bilaterally with involvement of the distal aspect of the main left pulmonary artery as well as the descending right and left pulmonary artery branches.  Segmental branches involving the right middle lobe, right upper lobe, left upper lobe, lingula and bilateral lower lobes are also affected.    There is a small wedge-shaped opacity in the superior segment of the left lower lobe on axial image 108 consistent with a small pulmonary infarct.  There is flattening of the interventricular septum.  No focal alveolar consolidation is seen.  There is an approximately 5 mm pulmonary nodule within the anterior basal segment of the right lower lobe.  No pleural effusion is seen.  There is an approximately 6 mm nodule along the lateral segment of the right middle lobe.  5 mm pulmonary nodule  [facility]     noted in the superior segment of the left lower lobe.  Small ground-glass opacities in the right middle lobe laterally may represent additional small pulmonary infarcts.    No acute findings are noted in the visualized upper abdomen.  There is cholelithiasis.  Nonspecific perinephric stranding is seen bilaterally, likely chronic.    No acute osseous abnormality is seen.  There is multilevel thoracic spondylosis.  Calcific tendinitis involving the left rotator cuff is also noted.   Impression       Positive study demonstrating bilateral central and segmental pulmonary emboli, as detailed above.  Small pulmonary infarct involving the superior segment of the left lower lobe and possible small pulmonary infarcts involving the lateral segment of the right middle lobe also noted.  There is also flattening of the interventricular septum which may indicate developing right heart strain.    Small pulmonary nodules in the bilateral lungs measuring up to 6 mm.  Single 1 year follow up CT of the thorax is recommended for surveillance.    Incidental note of cholelithiasis.    This report was flagged in Epic as abnormal on 3/1/2018 at 8:14 am.    COMMUNICATION  This critical result was discovered/received at 8:15 a.m. on 03/01/2018.  The critical information above was relayed directly by me by telephone to Dr. Christopher Cortez on 03/01/2018 at 8:30 a.m..      Electronically signed by: Tita Alves MD  Date: 03/01/2018  Time: 08:32    Encounter     View Encounter              Labs reviewed    PFT was not done       Plan:  Clinical impression is apparently straight forward and impression with management as below.    Des was seen today for pre-op exam.    Diagnoses and all orders for this visit:    Asbestos exposure    History of pulmonary embolism    Current use of long term anticoagulation    Lung nodule    Bronchitis, chronic, mucopurulent        Follow-up if symptoms worsen or fail to improve.    Discussed with patient above  for education the following:      Patient Instructions   With new nodule seen (old ones better)- ct chest March or so would be good- films viewed    Use inhaler/antibiotic/prednisone if bronchitis flares    Blood clot risk with surgery but should not be a problem if blood thinned asap post surgery.    Regular follow up in March or so

## 2022-08-18 ENCOUNTER — CLINICAL SUPPORT (OUTPATIENT)
Dept: REHABILITATION | Facility: HOSPITAL | Age: 77
End: 2022-08-18
Payer: MEDICARE

## 2022-08-18 DIAGNOSIS — R42 LIGHTHEADEDNESS: ICD-10-CM

## 2022-08-18 DIAGNOSIS — R26.89 IMBALANCE: Primary | ICD-10-CM

## 2022-08-18 PROCEDURE — 97110 THERAPEUTIC EXERCISES: CPT | Mod: PN

## 2022-08-18 PROCEDURE — 97112 NEUROMUSCULAR REEDUCATION: CPT | Mod: PN

## 2022-08-18 NOTE — PROGRESS NOTES
[facility]   Physical Therapy Treatment Note     Name: Des Junior  [facility] Number: [id number]    Therapy Diagnosis:   Encounter Diagnoses   Name Primary?    Imbalance Yes    Lightheadedness      Physician: Jean-Claude Haro, *    Visit Date: 8/18/2022    Physician Orders: PT Eval and Treat   Medical Diagnosis from Referral: imbalance  Evaluation Date: 7/25/2022  Authorization Period Expiration: 12/31/22  Plan of Care Expiration: 09/24/22  Visit # / Visits authorized: 3/ 20     Time In: 1545  Time Out: 1630  Total Billable Time: 45 minutes     Precautions: Diabetes, Fall and PE/DVT       SUBJECTIVE     Pt reports: has episodes of lightheadedness when he first gets up in the morning; gets similar sensation when bending over; both sensations last a few seconds.  He does not have a home exercise program.  Response to previous treatment: decreased pain  Functional change: none    Pain: 3/10  Location: left knee        OBJECTIVE      n/a    Treatment     Des received the treatments listed below:      therapeutic exercises to develop endurance and flexibility for 17 minutes including:     X 15 each seated bilateral lower extremity therapeutic exercise (1#) = marching, long arc quad, ball squeeze, hip abduction (red theraband)     X 10 minutes SciFit (level 2.5) as an adjunct to strength/balance training      neuromuscular re-education activities to improve: Balance and Vestibular Hypofunction for 28 minutes. The following activities were included:     X 10 read letters in four-square saccade pattern    X 25 read letters while perform VOR1 = side to side, up and down   X 4 sit to stand while hold target   X 4 lean over touch stood while hold target*   10 x run of 5 cones on floor = convergence/divergence      * minimal increase in symptoms      Patient Education and Home Exercises     Home Exercises Provided and Patient Education Provided     Education provided:   - proper  therapeutic exercise technique    Written Home Exercises Provided: to be provided at future appointment.       ASSESSMENT     Reported lightheadedness and elevated frontal pressure after leaning over to the stool; seated lower extremity therapeutic exercise performed without difficulty; able to tolerate increased resistance on the SciFit; increased repetitions performed during habituation exercises; reported elevated pain levels in left knee after SciFit.              Des Is not progressing well towards his goals.   Pt prognosis is Fair.     Pt will continue to benefit from skilled outpatient physical therapy to address the deficits listed in the problem list box on initial evaluation, provide pt/family education and to maximize pt's level of independence in the home and community environment.     Pt's spiritual, cultural and educational needs considered and pt agreeable to plan of care and goals.     Anticipated barriers to physical therapy: severity of pain and imbalance    Goals:     Short Term Goals (4 Weeks):   1. Patient to tolerate x 10 repetitions sit to stand to improve ease of transfer. (NOT MET)  2. Patient to tolerate use of 2# weights during lower extremity therapeutic exercise to improve overall strength. (NOT MET)  3. Patient to perform x 45 seconds full Romberg stance (eyes closed) without loss of balance. (NOT MET)  4. Patient to report that quick movements of his head sometimes increases his problem. (NOT MET)     Long Term Goals (9 Weeks):   1. Patient to demonstrate competence with home exercise program to maintain therapeutic gains. (NOT MET)  2. Patient to improve bilateral hip MMT 1/2 grade to demo strength gains from therapeutic intervention. (NOT MET)  3. Patient to ambulate 200 feet with straight cane and stand by assist with improved lorenza/symmetry. (NOT MET)  4. Patient to ascend/descend 4 x 4 (6-inch) steps with straight cane and stand by assist to demonstrate safe mobility in/out  of his home. (NOT MET)      PLAN     Continue to advance vestibular and balance training to patient's tolerance.      Barrett Mitchell, PT

## 2022-08-23 ENCOUNTER — LAB VISIT (OUTPATIENT)
Dept: LAB | Facility: HOSPITAL | Age: 77
End: 2022-08-23
Attending: INTERNAL MEDICINE
Payer: MEDICARE

## 2022-08-23 DIAGNOSIS — I44.2 CHB (COMPLETE HEART BLOCK): ICD-10-CM

## 2022-08-23 DIAGNOSIS — I48.11 LONGSTANDING PERSISTENT ATRIAL FIBRILLATION: ICD-10-CM

## 2022-08-23 DIAGNOSIS — R00.1 BRADYCARDIA: ICD-10-CM

## 2022-08-23 LAB
ANION GAP SERPL CALC-SCNC: 14 MMOL/L (ref 8–16)
APTT BLDCRRT: 34 SEC (ref 21–32)
BUN SERPL-MCNC: 20 MG/DL (ref 8–23)
CALCIUM SERPL-MCNC: 9 MG/DL (ref 8.7–10.5)
CHLORIDE SERPL-SCNC: 105 MMOL/L (ref 95–110)
CO2 SERPL-SCNC: 21 MMOL/L (ref 23–29)
CREAT SERPL-MCNC: 1.6 MG/DL (ref 0.5–1.4)
ERYTHROCYTE [DISTWIDTH] IN BLOOD BY AUTOMATED COUNT: 13.7 % (ref 11.5–14.5)
EST. GFR  (NO RACE VARIABLE): 44.4 ML/MIN/1.73 M^2
GLUCOSE SERPL-MCNC: 96 MG/DL (ref 70–110)
HCT VFR BLD AUTO: 38.1 % (ref 40–54)
HGB BLD-MCNC: 12.3 G/DL (ref 14–18)
INR PPP: 1.2 (ref 0.8–1.2)
MCH RBC QN AUTO: 31 PG (ref 27–31)
MCHC RBC AUTO-ENTMCNC: 32.3 G/DL (ref 32–36)
MCV RBC AUTO: 96 FL (ref 82–98)
PLATELET # BLD AUTO: 192 K/UL (ref 150–450)
PMV BLD AUTO: 11.8 FL (ref 9.2–12.9)
POTASSIUM SERPL-SCNC: 4.6 MMOL/L (ref 3.5–5.1)
PROTHROMBIN TIME: 12.3 SEC (ref 9–12.5)
RBC # BLD AUTO: 3.97 M/UL (ref 4.6–6.2)
SODIUM SERPL-SCNC: 140 MMOL/L (ref 136–145)
WBC # BLD AUTO: 7.92 K/UL (ref 3.9–12.7)

## 2022-08-23 PROCEDURE — 80048 BASIC METABOLIC PNL TOTAL CA: CPT | Performed by: INTERNAL MEDICINE

## 2022-08-23 PROCEDURE — 36415 COLL VENOUS BLD VENIPUNCTURE: CPT | Performed by: INTERNAL MEDICINE

## 2022-08-23 PROCEDURE — 85610 PROTHROMBIN TIME: CPT | Performed by: INTERNAL MEDICINE

## 2022-08-23 PROCEDURE — 85730 THROMBOPLASTIN TIME PARTIAL: CPT | Performed by: INTERNAL MEDICINE

## 2022-08-23 PROCEDURE — 85027 COMPLETE CBC AUTOMATED: CPT | Performed by: INTERNAL MEDICINE

## 2022-08-24 ENCOUNTER — OFFICE VISIT (OUTPATIENT)
Dept: PULMONOLOGY | Facility: CLINIC | Age: 77
End: 2022-08-24
Payer: MEDICARE

## 2022-08-24 VITALS
WEIGHT: 264 LBS | DIASTOLIC BLOOD PRESSURE: 92 MMHG | HEART RATE: 80 BPM | SYSTOLIC BLOOD PRESSURE: 160 MMHG | OXYGEN SATURATION: 98 % | BODY MASS INDEX: 34.83 KG/M2 | RESPIRATION RATE: 19 BRPM

## 2022-08-24 DIAGNOSIS — J47.9 BRONCHIECTASIS WITHOUT COMPLICATION: Primary | ICD-10-CM

## 2022-08-24 DIAGNOSIS — R91.1 LUNG NODULE: ICD-10-CM

## 2022-08-24 DIAGNOSIS — R06.09 DOE (DYSPNEA ON EXERTION): ICD-10-CM

## 2022-08-24 PROCEDURE — 99214 PR OFFICE/OUTPT VISIT, EST, LEVL IV, 30-39 MIN: ICD-10-PCS | Mod: S$PBB,,, | Performed by: INTERNAL MEDICINE

## 2022-08-24 PROCEDURE — 99999 PR PBB SHADOW E&M-EST. PATIENT-LVL V: ICD-10-PCS | Mod: PBBFAC,,, | Performed by: INTERNAL MEDICINE

## 2022-08-24 PROCEDURE — 99214 OFFICE O/P EST MOD 30 MIN: CPT | Mod: S$PBB,,, | Performed by: INTERNAL MEDICINE

## 2022-08-24 PROCEDURE — 99215 OFFICE O/P EST HI 40 MIN: CPT | Mod: PBBFAC,PO | Performed by: INTERNAL MEDICINE

## 2022-08-24 PROCEDURE — 99999 PR PBB SHADOW E&M-EST. PATIENT-LVL V: CPT | Mod: PBBFAC,,, | Performed by: INTERNAL MEDICINE

## 2022-08-24 RX ORDER — PREDNISONE 20 MG/1
TABLET ORAL
Qty: 12 TABLET | Refills: 0 | Status: SHIPPED | OUTPATIENT
Start: 2022-08-24

## 2022-08-24 RX ORDER — HYDROCHLOROTHIAZIDE 25 MG/1
TABLET ORAL
COMMUNITY
Start: 2022-07-28

## 2022-08-24 RX ORDER — ALBUTEROL SULFATE 90 UG/1
2 AEROSOL, METERED RESPIRATORY (INHALATION) EVERY 6 HOURS PRN
Qty: 18 G | Refills: 6 | Status: SHIPPED | OUTPATIENT
Start: 2022-08-24

## 2022-08-24 RX ORDER — AMOXICILLIN AND CLAVULANATE POTASSIUM 875; 125 MG/1; MG/1
1 TABLET, FILM COATED ORAL 2 TIMES DAILY
Qty: 14 TABLET | Refills: 0 | Status: SHIPPED | OUTPATIENT
Start: 2022-08-24 | End: 2022-08-31

## 2022-08-24 NOTE — PROGRESS NOTES
8/24/2022    [facility] Follow Up    Chief Complaint   Patient presents with    Bronchiectasis    Follow-up    Pre-op Exam     Will have his pace maker put in inext week        HPI:   8/24/2022   Pt here for f/u lung nodules, bronchiectasis, and sob.    for pacer next wk. Will use prn lung meds.  Will have some sob and bp up with recent syncope -- should have improvement with pacer next wk.  Had gotten some asbestos compensation.  Ct chest 12/2021 with stable ild/bronchiectasis and lung nodules since 2018-- viewed directly and discussed  with pt.    8/24/2020 - did well with covid epidemic, diabetes with hgba1c 7.4,  For right tkr soon.  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    Patient Instructions   Your lungs are in fairly good shape, you have minimal scarring and minimal chronic bronchial problems - would expect no issues with knee operation.       Use trelegy daily, may use albuterol as needed-   May use prednisone if wheezes.  May use augmentin antibiotic if yellow mucous.     Lung nodules and scarring are stable - would check ct November next year and f/u.  Sooner if concerned.    Ultrasound kidney recommended for abn findings right kidney-- will get back on phone.      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  Had summer cold, no abx ,no prednisone, uses symbicort.  sinsues drainage ppt problem. Worked asbestos past and lung nodules seen. Had pe and still xarelto- lg pe.  No fh lung ca, asbestos exposure.    Patient Instructions   For sinus infection- augmentin- may work for bronchitis if needed.    Try trelegy oce daily in place of symbicort as trial- use best medication.      Could follow up ct chest but nodules seen thus far are not cancer by scans since blood clot.      Renewed  stadol and refer to cardiology  2/25/2019 did not get knee surg as sugar too high, had bronchitis last wk with  green mucous 2 wks ago.  Patient Instructions   No positive cultures, still nodules- but from march to September nodules look better.    symbicort prevents bronchitis.  Use augmentin if bronchitis  Use albuterol inhaler as needed.  Check ct chest in 3 months    12/5/18- no cough, breathing well- tkr next week,   ]  Instructions-  With new nodule seen (old ones better)- ct chest March or so would be good- films viewed  Use inhaler/antibiotic/prednisone if bronchitis flares  Blood clot risk with surgery but should not be a problem if blood thinned asap post surgery.  Regular follow up in March or so    Sept 25, 2018-- no cough nor mucous, breathing very good.    Patient Instructions   Cultures had no bad germs in lungs.     If cough flares up- symbicort 2 twice daily.  May use if cough or wheeze or mucous.   May use Augmentin antibiotic if cough or yellow mucous.  May use prednisone if cough is excess or wheezes or short breath.   Nodules no big worries seen but got a new one.      March 22, 2018pt seen with massive pe, also has chr lung dz seen on ct with massive clots.     From my ct review:  Radiographs reviewed: view by direct vision  Very large pulm emboli bilat main arteries, 5 mm rll solid fairly smooth nodule, very min mild post lung marking increase, no calcified plaques seen, lower lungs with thickened bronchi and perhaps early bronchiectasis      from  Consult hpi and rec:    03/02/2018                                                  Admit Date: 3/1/2018  Des Junior  New Patient Consult          Chief Complaint   Patient presents with    Shortness of Breath       completed CT this morning outpatient, was advised by PCP to then present to ER for possible admission          History of Present Illness:  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  Pt never smoker.  No leg trauma nor  leg swelling nor pain legs.  Pt had intermittent cough with purulent mucous production last yr- has had seasonal allergies with occ nocturnal wheezes.  No inhaler use nor sob til lately.  Pt does travel with 3-4 immobilization between stops.  Fh + only in nephew with clotts in his early 50's.   Pt developed small climbing to his elevated home last 3 weeks, no syncope, no chest pain nor palpitations.  Pt was evaluated by pcp and a fib and pe found.     Pt rm air sat 94% today.          Plan: pt is stable for home soon on xarelto but would recommend bed to chair activity for next week. dvt and pe are unprovoked.  Given large clots seen and delayed presentation- anticoagg 6 month is min but recommend life long.  hypercoagulable chahal will not change length of rx.  Would ask what nephew chris showed if done.   Pt has nodule with 1 of 200 risk lung ca by Brandyn Model.   F/u ct with airway dz may be reasonable in a year?  Asbestos exposure - no impressive asbestosis seen.   Pt has symptomatic chr mucopurulent bronchitis.  Coverage for meds is limited on his insurance.  Would give course levaquin and cultures and afb x 3 for geraldo and neb rx prn 1/2 dose albuterol    Suggest f/u in 3 months or so (GERALDO can take 2 months to culture).    pe likely ppt a fib?        The chief compliant  problem is varies with instablilty at time   PFSH:  Past Medical History:   Diagnosis Date    Abdominal obesity 3/15/2018    Acute deep vein thrombosis (DVT) of popliteal vein of right lower extremity 3/2/2018    AK (actinic keratosis) 11/15/2012    S/p 1st PDT face (80 min incubation) - did great! S/p PDT face - 90 min  - 12/15 - did great S/p PDT face - 90 min - 10/17 - great response    Asbestos exposure 3/2/2018    Asymptomatic LV dysfunction 3/2/2018    Bronchiectasis without complication 2/25/2019    Bronchitis, chronic, mucopurulent 3/2/2018    Cataract     Colon adenoma 8/20/2014    Current use of long term anticoagulation 4/23/2018     Deep vein thrombosis     ED (erectile dysfunction) 11/28/2012    Encounter for monitoring sotalol therapy 6/13/2018    Hay fever     Hearing loss, sensorineural 6/25/2013    History of adenomatous polyp of colon 10/5/2015    History of pulmonary embolism 5/2/2018    [place] (hard of hearing)     Left ear    Hyperlipidemia associated with type 2 diabetes mellitus, baseline  11/28/2012    Hypertension associated with diabetes 11/28/2012    Lung nodule, solitary- 5 mm rll 3/2/2018    On home oxygen therapy 3/15/2018    Osteoarthritis of right knee 11/28/2012    Persistent atrial fibrillation 3/1/2018    Pulmonary embolus- large involved central PA, unprovoked. 3/1/2018    Type 2 diabetes mellitus, controlled 7/16/2013    Type 2 diabetes, controlled, with neuropathy 1/20/2014    Type 2 diabetes, uncontrolled, with neuropathy, onset 2003 11/28/2012         Past Surgical History:   Procedure Laterality Date    COLONOSCOPY N/A 10/5/2015    Procedure: COLONOSCOPY;  Surgeon: Pro Jang MD;  Location: [facility] ENDO ([address] [place] [address]);  Service: Endoscopy;  Laterality: N/A;    KNEE ARTHROPLASTY Right 12/7/2020    Procedure: ARTHROPLASTY, KNEE:RIGHT:DEPUY-SIGMA;  Surgeon: Hua Gore III, MD;  Location[facility] OR;  Service: Orthopedics;  Laterality: Right;    thumb surgery      TRANSESOPHAGEAL ECHOCARDIOGRAPHY N/A 3/5/2021    Procedure: ECHOCARDIOGRAM, TRANSESOPHAGEAL;  Surgeon: Shailesh Salinas III, MD;  Location: [facility] EP LAB;  Service: Cardiology;  Laterality: N/A;    TREATMENT OF CARDIAC ARRHYTHMIA N/A 3/5/2021    Procedure: CARDIOVERSION;  Surgeon: Carlos Mccauley MD;  Location: [facility] EP LAB;  Service: Cardiology;  Laterality: N/A;  AF, LEO, DCCV, MAC, DM, 3 PREP     Social History     Tobacco Use    Smoking status: Never Smoker    Smokeless tobacco: Never Used   Substance Use Topics    Alcohol use: Not Currently     Comment: rare    Drug use: No     Family History   Problem Relation Age of  Onset    Hypertension Father     Pneumonia Father     Cancer Brother         colon    Cataracts Mother     Glaucoma Mother     Heart disease Mother 87        CHF    Cancer Sister         lymphoma    Melanoma Neg Hx     Amblyopia Neg Hx     Blindness Neg Hx     Macular degeneration Neg Hx     Retinal detachment Neg Hx     Strabismus Neg Hx     Diabetes Neg Hx      Review of patient's allergies indicates:   Allergen Reactions    Codeine Nausea And Vomiting     Other reaction(s): Nausea    Benicar  [olmesartan]      Other reaction(s): SPOTS IN FRONT OF EYES       Performance Status:The patient's activity level is functions out of house.      Review of Systems:  a review of eleven systems covering constitutional, Eye, HEENT, Psych, Respiratory, Cardiac, GI, , Musculoskeletal, Endocrine, Dermatologic was negative except for pertinent findings as listed ABOVE and below:  pertinent positive as above, rest is good       Exam:Comprehensive exam done. BP (!) 160/92 Comment: sitting right arm Dr Sandoval  Pulse 80   Resp 19   Wt 119.7 kg (264 lb)   SpO2 98%   BMI 34.83 kg/m²   Exam included Vitals as listed, and patient's appearance and affect and alertness and mood, oral exam for yeast and hygiene and pharynx lesions and Mallapatti (M) score, neck with inspection for jvd and masses and thyroid abnormalities and lymph nodes (supraclavicular and infraclavicular nodes and axillary also examined and noted if abn), chest exam included symmetry and effort and fremitus and percussion and auscultation, cardiac exam included rhythm and gallops and murmur and rubs and jvd and edema, abdominal exam for mass and hepatosplenomegaly and tenderness and hernias and bowel sounds, Musculoskeletal exam with muscle tone and posture and mobility/gait and  strength, and skin for rashes and cyanosis and pallor and turgor, extremity for clubbing.  Findings were normal except for pertinent findings listed below:  M3, chest is  symmetric, no distress, normal percussion, normal fremitus and good normal breath sounds  Rrr, no murmur , mild edema +1    Radiographs (ct chest and cxr) reviewed: view by direct vision  lg pe on ct earlier march-- ct chest sept 2018 new nodule noted but lungs look better otherwise- old nodules better.  CTA Chest Non-Coronary   Status: Final result   MyChart Results Release     MyChart Status: Active Results Release   PACS Images     Show images for CTA Chest Non-Coronary   Reviewed By Medina Cortez MD on 3/1/2018 16:40   External Result Report     External Result Report   Narrative     EXAMINATION:  CTA CHEST NON CORONARY    CLINICAL HISTORY:  Chest pain, acute, PE suspected, intermed prob, positive D-dimer;Chest pain, unspecified    TECHNIQUE:  Low dose axial images, sagittal and coronal reformations were obtained from the thoracic inlet to the lung bases.  Timing was optimized to evaluate the pulmonary arteries.    COMPARISON:  Chest radiographs 02/27/2018    FINDINGS:  The visualized thyroid gland is unremarkable.  No supraclavicular lymphadenopathy is seen.  No mediastinal or hilar lymphadenopathy is seen.  No axillary lymphadenopathy is seen.  No acute esophageal abnormality is seen.    The thoracic aorta is normal in caliber.  There is mild atherosclerosis in the aortic arch.  Moderate coronary artery calcification is also noted.    Pulmonary arterial bolus timing is good.  The study is positive for pulmonary embolism.  There are filling defects within the central pulmonary arteries bilaterally with involvement of the distal aspect of the main left pulmonary artery as well as the descending right and left pulmonary artery branches.  Segmental branches involving the right middle lobe, right upper lobe, left upper lobe, lingula and bilateral lower lobes are also affected.    There is a small wedge-shaped opacity in the superior segment of the left lower lobe on axial image 108 consistent with a  small pulmonary infarct.  There is flattening of the interventricular septum.  No focal alveolar consolidation is seen.  There is an approximately 5 mm pulmonary nodule within the anterior basal segment of the right lower lobe.  No pleural effusion is seen.  There is an approximately 6 mm nodule along the lateral segment of the right middle lobe.  5 mm pulmonary nodule noted in the superior segment of the left lower lobe.  Small ground-glass opacities in the right middle lobe laterally may represent additional small pulmonary infarcts.    No acute findings are noted in the visualized upper abdomen.  There is cholelithiasis.  Nonspecific perinephric stranding is seen bilaterally, likely chronic.    No acute osseous abnormality is seen.  There is multilevel thoracic spondylosis.  Calcific tendinitis involving the left rotator cuff is also noted.   Impression       Positive study demonstrating bilateral central and segmental pulmonary emboli, as detailed above.  Small pulmonary infarct involving the superior segment of the left lower lobe and possible small pulmonary infarcts involving the lateral segment of the right middle lobe also noted.  There is also flattening of the interventricular septum which may indicate developing right heart strain.    Small pulmonary nodules in the bilateral lungs measuring up to 6 mm.  Single 1 year follow up CT of the thorax is recommended for surveillance.    Incidental note of cholelithiasis.    This report was flagged in Epic as abnormal on 3/1/2018 at 8:14 am.    COMMUNICATION  This critical result was discovered/received at 8:15 a.m. on 03/01/2018.  The critical information above was relayed directly by me by telephone to Dr. Christopher Cortez on 03/01/2018 at 8:30 a.m..      Electronically signed by: Tita Alves MD  Date: 03/01/2018  Time: 08:32    Encounter     View Encounter              Labs reviewed    PFT was not done       Plan:  Clinical impression is apparently straight  forward and impression with management as below.    Des was seen today for bronchiectasis, follow-up and pre-op exam.    Diagnoses and all orders for this visit:    Bronchiectasis without complication  -     albuterol (PROAIR HFA) 90 mcg/actuation inhaler; Inhale 2 puffs into the lungs every 6 (six) hours as needed for Wheezing. Rescue  -     amoxicillin-clavulanate 875-125mg (AUGMENTIN) 875-125 mg per tablet; Take 1 tablet by mouth 2 (two) times daily. for 7 days  -     predniSONE (DELTASONE) 20 MG tablet; Take one daily for 3 days and may repeat for shortness of breath.    Lung nodule    Type 2 diabetes, uncontrolled, with neuropathy    VELASCO (dyspnea on exertion)        Follow up in about 1 year (around 8/24/2023), or if symptoms worsen or fail to improve.    Discussed with patient above for education the following:      Patient Instructions   Ct chest was good December 2021-- viewed and discussed - no f/u needed for nodules.    Some lung tissue disease and bronchiectasis seen in 12/2021-- stable from 2018.      Use albuterol as neeeded  Use augmentin for yellow mucous    May use prednisone if bad cough.        Pt's visit needed 32 minutes

## 2022-08-24 NOTE — PATIENT INSTRUCTIONS
Ct chest was good December 2021-- viewed and discussed - no f/u needed for nodules.    Some lung tissue disease and bronchiectasis seen in 12/2021-- stable from 2018.      Use albuterol as neeeded  Use augmentin for yellow mucous    May use prednisone if bad cough.

## 2022-08-29 ENCOUNTER — TELEPHONE (OUTPATIENT)
Dept: ELECTROPHYSIOLOGY | Facility: CLINIC | Age: 77
End: 2022-08-29
Payer: MEDICARE

## 2022-08-29 ENCOUNTER — ANESTHESIA EVENT (OUTPATIENT)
Dept: MEDSURG UNIT | Facility: HOSPITAL | Age: 77
End: 2022-08-29
Payer: MEDICARE

## 2022-08-29 NOTE — TELEPHONE ENCOUNTER
Spoke to  patient    CONFIRMED procedure arrival time of  7am    Reiterated instructions including:  -Directions to check in desk  -NPO after midnight night prior to procedure  -High importance of HOLDING  xarelto 3 days prior, reports last dose was Thursday evening, reminded pt to only take 1/2 of his lantus dose tonight, and to hold his metformin in the morning    --Pre-procedure LABS  reviewed, no alerts noted  -COVID test  n/a, pt vaccinated  -Confirmed no fever, cough, or shortness of breath in the past 30 days  --Bathe night prior and morning prior to procedure with Hibiclens solution or an antibacterial soap  -Reviewed current visitor policy    Patient verbalized understanding of above and appreciated the call.

## 2022-08-30 ENCOUNTER — HOSPITAL ENCOUNTER (OUTPATIENT)
Facility: HOSPITAL | Age: 77
Discharge: HOME | End: 2022-08-30
Attending: INTERNAL MEDICINE | Admitting: INTERNAL MEDICINE
Payer: MEDICARE

## 2022-08-30 ENCOUNTER — ANESTHESIA (OUTPATIENT)
Dept: MEDSURG UNIT | Facility: HOSPITAL | Age: 77
End: 2022-08-30
Payer: MEDICARE

## 2022-08-30 VITALS
WEIGHT: 264 LBS | RESPIRATION RATE: 16 BRPM | DIASTOLIC BLOOD PRESSURE: 98 MMHG | HEART RATE: 83 BPM | BODY MASS INDEX: 34.99 KG/M2 | OXYGEN SATURATION: 94 % | TEMPERATURE: 98 F | HEIGHT: 73 IN | SYSTOLIC BLOOD PRESSURE: 149 MMHG

## 2022-08-30 DIAGNOSIS — I49.9 ARRHYTHMIA: ICD-10-CM

## 2022-08-30 DIAGNOSIS — I45.2 RBBB (RIGHT BUNDLE BRANCH BLOCK WITH LEFT ANTERIOR FASCICULAR BLOCK): ICD-10-CM

## 2022-08-30 DIAGNOSIS — R55 SYNCOPE, UNSPECIFIED SYNCOPE TYPE: Primary | ICD-10-CM

## 2022-08-30 DIAGNOSIS — I51.9 ASYMPTOMATIC LV DYSFUNCTION: ICD-10-CM

## 2022-08-30 DIAGNOSIS — Z95.9 CARDIAC DEVICE IN SITU: ICD-10-CM

## 2022-08-30 DIAGNOSIS — I48.11 LONGSTANDING PERSISTENT ATRIAL FIBRILLATION: ICD-10-CM

## 2022-08-30 LAB
POCT GLUCOSE: 119 MG/DL (ref 70–110)
POCT GLUCOSE: 134 MG/DL (ref 70–110)

## 2022-08-30 PROCEDURE — 93005 ELECTROCARDIOGRAM TRACING: CPT

## 2022-08-30 PROCEDURE — 99220 PR INITIAL OBSERVATION CARE,LEVL III: CPT | Mod: 57,,, | Performed by: INTERNAL MEDICINE

## 2022-08-30 PROCEDURE — 25000003 PHARM REV CODE 250: Performed by: INTERNAL MEDICINE

## 2022-08-30 PROCEDURE — 93005 ELECTROCARDIOGRAM TRACING: CPT | Mod: 59

## 2022-08-30 PROCEDURE — 93010 ELECTROCARDIOGRAM REPORT: CPT | Mod: ,,, | Performed by: INTERNAL MEDICINE

## 2022-08-30 PROCEDURE — C1785 PMKR, DUAL, RATE-RESP: HCPCS | Performed by: INTERNAL MEDICINE

## 2022-08-30 PROCEDURE — 63600175 PHARM REV CODE 636 W HCPCS: Performed by: STUDENT IN AN ORGANIZED HEALTH CARE EDUCATION/TRAINING PROGRAM

## 2022-08-30 PROCEDURE — A4216 STERILE WATER/SALINE, 10 ML: HCPCS | Performed by: STUDENT IN AN ORGANIZED HEALTH CARE EDUCATION/TRAINING PROGRAM

## 2022-08-30 PROCEDURE — 25000003 PHARM REV CODE 250: Performed by: STUDENT IN AN ORGANIZED HEALTH CARE EDUCATION/TRAINING PROGRAM

## 2022-08-30 PROCEDURE — 33207 INSERT HEART PM VENTRICULAR: CPT | Mod: KX,,, | Performed by: INTERNAL MEDICINE

## 2022-08-30 PROCEDURE — 37000008 HC ANESTHESIA 1ST 15 MINUTES: Performed by: INTERNAL MEDICINE

## 2022-08-30 PROCEDURE — 33207 PR INSER HART PACER XVENOUS VENTR: ICD-10-PCS | Mod: KX,,, | Performed by: INTERNAL MEDICINE

## 2022-08-30 PROCEDURE — 82962 GLUCOSE BLOOD TEST: CPT | Performed by: INTERNAL MEDICINE

## 2022-08-30 PROCEDURE — D9220A PRA ANESTHESIA: Mod: CRNA,,, | Performed by: STUDENT IN AN ORGANIZED HEALTH CARE EDUCATION/TRAINING PROGRAM

## 2022-08-30 PROCEDURE — 99220 PR INITIAL OBSERVATION CARE,LEVL III: ICD-10-PCS | Mod: 57,,, | Performed by: INTERNAL MEDICINE

## 2022-08-30 PROCEDURE — 93010 EKG 12-LEAD: ICD-10-PCS | Mod: ,,, | Performed by: INTERNAL MEDICINE

## 2022-08-30 PROCEDURE — C1894 INTRO/SHEATH, NON-LASER: HCPCS | Performed by: INTERNAL MEDICINE

## 2022-08-30 PROCEDURE — D9220A PRA ANESTHESIA: ICD-10-PCS | Mod: CRNA,,, | Performed by: STUDENT IN AN ORGANIZED HEALTH CARE EDUCATION/TRAINING PROGRAM

## 2022-08-30 PROCEDURE — 63600175 PHARM REV CODE 636 W HCPCS: Performed by: INTERNAL MEDICINE

## 2022-08-30 PROCEDURE — D9220A PRA ANESTHESIA: ICD-10-PCS | Mod: ANES,,, | Performed by: STUDENT IN AN ORGANIZED HEALTH CARE EDUCATION/TRAINING PROGRAM

## 2022-08-30 PROCEDURE — 94761 N-INVAS EAR/PLS OXIMETRY MLT: CPT

## 2022-08-30 PROCEDURE — 37000009 HC ANESTHESIA EA ADD 15 MINS: Performed by: INTERNAL MEDICINE

## 2022-08-30 PROCEDURE — C1898 LEAD, PMKR, OTHER THAN TRANS: HCPCS | Performed by: INTERNAL MEDICINE

## 2022-08-30 PROCEDURE — D9220A PRA ANESTHESIA: Mod: ANES,,, | Performed by: STUDENT IN AN ORGANIZED HEALTH CARE EDUCATION/TRAINING PROGRAM

## 2022-08-30 PROCEDURE — 33207 INSERT HEART PM VENTRICULAR: CPT | Performed by: INTERNAL MEDICINE

## 2022-08-30 DEVICE — PULSE GENERATOR SSIR
Type: IMPLANTABLE DEVICE | Site: CHEST | Status: FUNCTIONAL
Brand: ASSURITY MRI™

## 2022-08-30 DEVICE — PACING LEAD
Type: IMPLANTABLE DEVICE | Site: HEART | Status: FUNCTIONAL
Brand: TENDRIL™

## 2022-08-30 RX ORDER — ONDANSETRON 2 MG/ML
INJECTION INTRAMUSCULAR; INTRAVENOUS
Status: DISCONTINUED | OUTPATIENT
Start: 2022-08-30 | End: 2022-08-30

## 2022-08-30 RX ORDER — LIDOCAINE HYDROCHLORIDE 10 MG/ML
INJECTION INFILTRATION; PERINEURAL
Status: DISCONTINUED | OUTPATIENT
Start: 2022-08-30 | End: 2022-08-30 | Stop reason: HOSPADM

## 2022-08-30 RX ORDER — CEFAZOLIN SODIUM/WATER 2 G/20 ML
2 SYRINGE (ML) INTRAVENOUS
Status: ACTIVE | OUTPATIENT
Start: 2022-08-30

## 2022-08-30 RX ORDER — HALOPERIDOL 5 MG/ML
0.5 INJECTION INTRAMUSCULAR EVERY 10 MIN PRN
Status: DISCONTINUED | OUTPATIENT
Start: 2022-08-30 | End: 2022-08-30 | Stop reason: HOSPADM

## 2022-08-30 RX ORDER — FENTANYL CITRATE 50 UG/ML
INJECTION, SOLUTION INTRAMUSCULAR; INTRAVENOUS
Status: DISCONTINUED | OUTPATIENT
Start: 2022-08-30 | End: 2022-08-30

## 2022-08-30 RX ORDER — PROPOFOL 10 MG/ML
VIAL (ML) INTRAVENOUS CONTINUOUS PRN
Status: DISCONTINUED | OUTPATIENT
Start: 2022-08-30 | End: 2022-08-30

## 2022-08-30 RX ORDER — VANCOMYCIN HYDROCHLORIDE 1 G/20ML
INJECTION, POWDER, LYOPHILIZED, FOR SOLUTION INTRAVENOUS
Status: DISCONTINUED | OUTPATIENT
Start: 2022-08-30 | End: 2022-08-30 | Stop reason: HOSPADM

## 2022-08-30 RX ORDER — DOXYCYCLINE HYCLATE 100 MG
100 TABLET ORAL 2 TIMES DAILY
Qty: 10 TABLET | Refills: 0 | Status: SHIPPED | OUTPATIENT
Start: 2022-08-30 | End: 2022-09-04

## 2022-08-30 RX ORDER — DEXAMETHASONE SODIUM PHOSPHATE 4 MG/ML
INJECTION, SOLUTION INTRA-ARTICULAR; INTRALESIONAL; INTRAMUSCULAR; INTRAVENOUS; SOFT TISSUE
Status: DISCONTINUED | OUTPATIENT
Start: 2022-08-30 | End: 2022-08-30

## 2022-08-30 RX ORDER — CEFAZOLIN SODIUM 1 G/3ML
INJECTION, POWDER, FOR SOLUTION INTRAMUSCULAR; INTRAVENOUS
Status: DISCONTINUED | OUTPATIENT
Start: 2022-08-30 | End: 2022-08-30

## 2022-08-30 RX ORDER — BUPIVACAINE HYDROCHLORIDE 2.5 MG/ML
INJECTION, SOLUTION EPIDURAL; INFILTRATION; INTRACAUDAL
Status: DISCONTINUED | OUTPATIENT
Start: 2022-08-30 | End: 2022-08-30 | Stop reason: HOSPADM

## 2022-08-30 RX ORDER — PROPOFOL 10 MG/ML
VIAL (ML) INTRAVENOUS
Status: DISCONTINUED | OUTPATIENT
Start: 2022-08-30 | End: 2022-08-30

## 2022-08-30 RX ORDER — LIDOCAINE HYDROCHLORIDE 20 MG/ML
INJECTION, SOLUTION EPIDURAL; INFILTRATION; INTRACAUDAL; PERINEURAL
Status: DISCONTINUED | OUTPATIENT
Start: 2022-08-30 | End: 2022-08-30

## 2022-08-30 RX ORDER — ACETAMINOPHEN 325 MG/1
650 TABLET ORAL EVERY 4 HOURS PRN
Status: DISCONTINUED | OUTPATIENT
Start: 2022-08-30 | End: 2022-08-30 | Stop reason: HOSPADM

## 2022-08-30 RX ORDER — SODIUM CHLORIDE 0.9 G/100ML
IRRIGANT IRRIGATION
Status: DISCONTINUED | OUTPATIENT
Start: 2022-08-30 | End: 2022-08-30 | Stop reason: HOSPADM

## 2022-08-30 RX ORDER — FENTANYL CITRATE 50 UG/ML
25 INJECTION, SOLUTION INTRAMUSCULAR; INTRAVENOUS EVERY 5 MIN PRN
Status: DISCONTINUED | OUTPATIENT
Start: 2022-08-30 | End: 2022-08-30 | Stop reason: HOSPADM

## 2022-08-30 RX ORDER — SODIUM CHLORIDE 0.9 % (FLUSH) 0.9 %
10 SYRINGE (ML) INJECTION
Status: DISCONTINUED | OUTPATIENT
Start: 2022-08-30 | End: 2022-08-30 | Stop reason: HOSPADM

## 2022-08-30 RX ADMIN — SODIUM CHLORIDE, SODIUM GLUCONATE, SODIUM ACETATE, POTASSIUM CHLORIDE, MAGNESIUM CHLORIDE, SODIUM PHOSPHATE, DIBASIC, AND POTASSIUM PHOSPHATE: .53; .5; .37; .037; .03; .012; .00082 INJECTION, SOLUTION INTRAVENOUS at 09:08

## 2022-08-30 RX ADMIN — CEFAZOLIN SODIUM 2 G: 1 INJECTION, POWDER, FOR SOLUTION INTRAMUSCULAR; INTRAVENOUS at 10:08

## 2022-08-30 RX ADMIN — DEXAMETHASONE SODIUM PHOSPHATE 4 MG: 4 INJECTION, SOLUTION INTRAMUSCULAR; INTRAVENOUS at 10:08

## 2022-08-30 RX ADMIN — ONDANSETRON 4 MG: 2 INJECTION INTRAMUSCULAR; INTRAVENOUS at 11:08

## 2022-08-30 RX ADMIN — FENTANYL CITRATE 25 MCG: 50 INJECTION, SOLUTION INTRAMUSCULAR; INTRAVENOUS at 10:08

## 2022-08-30 RX ADMIN — SODIUM CHLORIDE 30 MCG/MIN: 9 INJECTION, SOLUTION INTRAVENOUS at 10:08

## 2022-08-30 RX ADMIN — Medication 100 MCG/KG/MIN: at 10:08

## 2022-08-30 RX ADMIN — DEXMEDETOMIDINE HYDROCHLORIDE 8 MCG: 100 INJECTION, SOLUTION, CONCENTRATE INTRAVENOUS at 10:08

## 2022-08-30 RX ADMIN — DEXMEDETOMIDINE HYDROCHLORIDE 4 MCG: 100 INJECTION, SOLUTION, CONCENTRATE INTRAVENOUS at 10:08

## 2022-08-30 RX ADMIN — PROPOFOL 30 MG: 10 INJECTION, EMULSION INTRAVENOUS at 10:08

## 2022-08-30 RX ADMIN — ACETAMINOPHEN 650 MG: 325 TABLET ORAL at 03:08

## 2022-08-30 RX ADMIN — LIDOCAINE HYDROCHLORIDE 100 MG: 20 INJECTION, SOLUTION EPIDURAL; INFILTRATION; INTRACAUDAL; PERINEURAL at 10:08

## 2022-08-30 NOTE — DISCHARGE SUMMARY
"[facility] - [facility]iology  Cardiac Electrophysiology  Discharge Summary        Patient Name: Des Junior   MRN: [id number]   Admission Date: 8/30/2022    Hospital Length of Stay: 0   Discharge Date: 08/30/2022    Attending Physician: Carlos Mccauley MD   Discharging Provider: Jose Franz MD      HPI:   Mr. Junior is a 77 y.o. male with persistent AF, HTN, HLD, PE, DM, SVT here for follow up.     Background:     PAF, persistent -> DCCV 4/2018  HTN on meds  HL on meds  unprovoked PE 2018  DM2     He started sotalol in 2018, which converted him from AF to SR. He stopped sotalol months ago as "no one would prescribe it for [him]."  Was undergoing preop risk stratification in cardiology clinic 11/11/20, when ECG showed NCT, short RP. Rx with BB increase, which he's not done yet.        Monitor 11/13/2020: Impression:  Sinus rhythm with episodes of symptomatic supraventricular tachycardia. Further rhythm classification is complicated by significant signal noise.     Clinic visit 12/22/2020: Symptomatic SVT identified on event monitor. Case previously discussed with Dr. Mccauley. Patient is a candidate for ablation. . Recently had knee surgery 2 weeks ago. We discussed risks, benefits, and alternatives. He would like to proceed with SVT ablation but would like to finish PT 1st.  EKG today looks like coarse AF. Unclear if persistent, as he does not have noticeable symptoms. Will obtain Holter. In interim, will increase metoprolol for more optimal rate control. He is on xarelto for CVA prophylaxis.     Holter showed persistent rate controlled AF, 6% PVC burden.   2/11/2021: After knee surgery, was found to be in AF. Possibly asymptomatic. We discussed his options, including AF and SVT ablation. He is interested in cardioversion first to determine whether he feels symptom improvement in SR. Holter showed controlled V rates on increased metoprolol. Can discuss AF and SVT ablation at clinic follow up depending on symptoms. He is " on xarelto for CVA prophylaxis.     He has underwent multiple cardioversions in the past and been maintained on xarelto. He has deferred any invasive procedures including PVI. Most recently he had unexplained syncope over the past few months with underlying bifascicular block. He was offered PPM and agreed to proceed. He presents today for the aforementioned procedure. He reports generally feeling well, denies chest pain, palpitations, dyspnea.     Hospital Course:   The patient was brought to the EP lab in the fasting state. Prepped and draped in sterile fashion. Safety timeout was performed. Sedation administered by anesthesia staff. Lidocaine used for local anesthetic. Fluoroscopic guided axillary access utilized. Guide/J Wire advanced and confirmed in IVC. Pocket made. Peel away sheath advanced over J wire. RV lead advanced into RV. R waves and injury pattern adequate. Lead fixed into place and sutured in place. Pocket washed using antibiotic solution. Leads connected to generator. Generator placed into pocket, sutured in place, and washed with antibiotic solution. Deep layer closed with interrupted 3-0 suture. Intermediate layer closed with running 3-0 suture. Superficial layer closed with running 4-0 suture. Skin closed with Dermabond. Aquacel Ag dressing placed. He completed 3 hours of bedrest and ambulated without difficulty. He was discharged home in stable condition. Given prescription for 5 days of PO antibiotics at discharge.       Follow up:   Follow up with device clinic in 1 week   Follow up with Dr. Mccauley in 4 months     Disposition:   Home or Self Care         Medication List        START taking these medications      doxycycline 100 MG tablet  Commonly known as: VIBRA-TABS  Take 1 tablet (100 mg total) by mouth 2 (two) times daily. for 5 days            CHANGE how you take these medications      XARELTO 20 mg Tab  Generic drug: rivaroxaban  Take 1 tablet (20 mg total) by mouth daily with dinner or  "evening meal. Start taking once antibiotics are completed (on 9/4/22)  What changed: additional instructions            CONTINUE taking these medications      acetaminophen 500 MG tablet  Commonly known as: TYLENOL     albuterol 90 mcg/actuation inhaler  Commonly known as: PROAIR HFA  Inhale 2 puffs into the lungs every 6 (six) hours as needed for Wheezing. Rescue     amLODIPine 5 MG tablet  Commonly known as: NORVASC  TAKE 1 TABLET(5 MG) BY MOUTH EVERY DAY     amoxicillin-clavulanate 875-125mg 875-125 mg per tablet  Commonly known as: AUGMENTIN  Take 1 tablet by mouth 2 (two) times daily. for 7 days     blood sugar diagnostic Strp  1 strip by Misc.(Non-Drug; Combo Route) route 2 (two) times daily with meals.     cetirizine 10 MG tablet  Commonly known as: ZYRTEC  Take 1 tablet (10 mg total) by mouth once daily.     cholecalciferol (vitamin D3) 25 mcg (1,000 unit) capsule  Commonly known as: VITAMIN D3     colchicine 0.6 mg tablet  Commonly known as: COLCRYS  TAKE 1 TABLET(0.6 MG) BY MOUTH EVERY DAY     gabapentin 100 MG capsule  Commonly known as: NEURONTIN  TAKE 1 CAPSULE BY MOUTH THREE TIMES DAILY     hydroCHLOROthiazide 25 MG tablet  Commonly known as: HYDRODIURIL     LANCETS & BLOOD GLUCOSE STRIPS [facility]     LANTUS SOLOSTAR U-100 INSULIN glargine 100 units/mL SubQ pen  Generic drug: insulin  Inject 20 Units into the skin every evening.     losartan 50 MG tablet  Commonly known as: COZAAR  Take 1 tablet (50 mg total) by mouth once daily.     metFORMIN 500 MG ER 24hr tablet  Commonly known as: GLUCOPHAGE-XR  TAKE 2 TABLETS(1000 MG) BY MOUTH TWICE DAILY WITH MEALS     metoprolol succinate 50 MG 24 hr tablet  Commonly known as: TOPROL-XL  Take 2 tablets (100 mg total) by mouth once daily.     Omega-3 Fish OiL 1,000-5 mg-unit Cap  Generic drug: omega-3 fatty acids-vitamin E     omeprazole 20 MG tablet  Commonly known as: PRILOSEC OTC     pen needle, diabetic 29 gauge x 1/2" Ndle  Inject insulin daily     pravastatin " 40 MG tablet  Commonly known as: PRAVACHOL  Take 1 tablet (40 mg total) by mouth once daily.     predniSONE 20 MG tablet  Commonly known as: DELTASONE  Take one daily for 3 days and may repeat for shortness of breath.     repaglinide 2 MG tablet  Commonly known as: PRANDIN  TAKE 1 TABLET BY MOUTH THREE TIMES A DAY BEFORE MEALS               Where to Get Your Medications        These medications were sent to [facility] [address] [address]      Hours: Mon-Fri 7a-7p, Sat-Sun 10a-4p Phone: [phone]   doxycycline 100 MG tablet  XARELTO 20 mg Tab           Jose Franz MD  [facility]  Cardiovascular Disease, PGY-V

## 2022-08-30 NOTE — ANESTHESIA PREPROCEDURE EVALUATION
08/30/2022  Des Junior is a 77 y.o., male.  Pre-operative evaluation for Procedure(s) (LRB):  INSERTION, ELECTRODE LEAD, CARDIAC PACEMAKER, 1 ELECTRODE LEAD (Left)    Des Junior is a 77 y.o. male     Patient Active Problem List   Diagnosis    AK (actinic keratosis)    Type 2 diabetes, uncontrolled, with neuropathy, onset 2003    Hyperlipidemia associated with type 2 diabetes mellitus, baseline     Hypertension associated with diabetes    ED (erectile dysfunction)    Severe obesity (BMI 35.0-39.9) with comorbidity    Hearing loss, sensorineural    Type 2 diabetes, controlled, with neuropathy    History of adenomatous polyp of colon    Asbestos exposure    Asymptomatic LV dysfunction    Abdominal obesity    Current use of long term anticoagulation    History of pulmonary embolism    Lung nodule    Longstanding persistent atrial fibrillation    Bronchiectasis without complication    Lung nodule, solitary- 5 mm rll    Thoracic aortic atherosclerosis    Type 2 diabetes mellitus with hyperglycemia    Hypercholesterolemia    Gastroesophageal reflux disease without esophagitis    Hypovitaminosis D    Chronic sinus complaints    CKD (chronic kidney disease) stage 3, GFR 30-59 ml/min    Anemia    Right knee pain    SVT (supraventricular tachycardia)    Persistent atrial fibrillation    Ectatic thoracic aorta    Chronic bronchitis    Lightheadedness    Imbalance    Bilateral leg weakness    VELASCO (dyspnea on exertion)       Review of patient's allergies indicates:   Allergen Reactions    Benicar [olmesartan]      Other reaction(s): SPOTS IN FRONT OF EYES    Codeine Nausea And Vomiting     Other reaction(s): Nausea       No current facility-administered medications on file prior to encounter.     Current Outpatient Medications on File Prior to Encounter   Medication  "Sig Dispense Refill    acetaminophen (TYLENOL) 500 MG tablet Take 1,000 mg by mouth daily as needed for Pain.      amLODIPine (NORVASC) 5 MG tablet TAKE 1 TABLET(5 MG) BY MOUTH EVERY DAY 90 tablet 3    cholecalciferol, vitamin D3, (VITAMIN D3) 25 mcg (1,000 unit) capsule Take 1,000 Units by mouth once daily.      gabapentin (NEURONTIN) 100 MG capsule TAKE 1 CAPSULE BY MOUTH THREE TIMES DAILY 270 capsule 1    insulin (LANTUS SOLOSTAR U-100 INSULIN) glargine 100 units/mL (3mL) SubQ pen Inject 20 Units into the skin every evening. 18 mL 11    losartan (COZAAR) 50 MG tablet Take 1 tablet (50 mg total) by mouth once daily. 90 tablet 11    metoprolol succinate (TOPROL-XL) 50 MG 24 hr tablet Take 2 tablets (100 mg total) by mouth once daily. 180 tablet 2    omega-3 fatty acids-vitamin E (OMEGA-3 FISH OIL) 1,000-5 mg-unit Cap Take by mouth 2 (two) times daily. 1 Capsule Oral Every day      omeprazole (PRILOSEC OTC) 20 MG tablet Take by mouth. 1 Tablet, Delayed Release (E.C.) Oral Every day      pravastatin (PRAVACHOL) 40 MG tablet Take 1 tablet (40 mg total) by mouth once daily. 30 tablet 11    repaglinide (PRANDIN) 2 MG tablet TAKE 1 TABLET BY MOUTH THREE TIMES A DAY BEFORE MEALS 90 tablet 5    blood sugar diagnostic Strp 1 strip by Misc.(Non-Drug; Combo Route) route 2 (two) times daily with meals. 100 strip 11    cetirizine (ZYRTEC) 10 MG tablet Take 1 tablet (10 mg total) by mouth once daily. 20 tablet 3    colchicine (COLCRYS) 0.6 mg tablet TAKE 1 TABLET(0.6 MG) BY MOUTH EVERY DAY 30 tablet 11    LANCETS & BLOOD GLUCOSE STRIPS MISC * * * Twice a day .  check glucose twice a day      pen needle, diabetic 29 gauge x 1/2" Ndle Inject insulin daily 100 each 3    rivaroxaban (XARELTO) 20 mg Tab Take 1 tablet (20 mg total) by mouth daily with dinner or evening meal. 30 tablet 11       Past Surgical History:   Procedure Laterality Date    COLONOSCOPY N/A 10/05/2015    Procedure: COLONOSCOPY;  Surgeon: Pro TENA" MD Suhail;  Location: [facility] ENDO (4TH FLR);  Service: Endoscopy;  Laterality: N/A;    JOINT REPLACEMENT      KNEE ARTHROPLASTY Right 12/07/2020    Procedure: ARTHROPLASTY, KNEE:RIGHT:DEPUY-SIGMA;  Surgeon: Hua Gore III, MD;  Location: [facility] OR;  Service: Orthopedics;  Laterality: Right;    thumb surgery      TRANSESOPHAGEAL ECHOCARDIOGRAPHY N/A 03/05/2021    Procedure: ECHOCARDIOGRAM, TRANSESOPHAGEAL;  Surgeon: Shailesh Salinas III, MD;  Location: [facility] EP LAB;  Service: Cardiology;  Laterality: N/A;    TREATMENT OF CARDIAC ARRHYTHMIA N/A 03/05/2021    Procedure: CARDIOVERSION;  Surgeon: Carlos Mccauley MD;  Location: [facility] EP LAB;  Service: Cardiology;  Laterality: N/A;  AF, LEO, DCCV, MAC, DM, 3 PREP       Social History     Socioeconomic History    Marital status:    Occupational History    Occupation: Self emplyed   Tobacco Use    Smoking status: Never    Smokeless tobacco: Never   Substance and Sexual Activity    Alcohol use: Not Currently     Comment: rare    Drug use: No   Social History Narrative    Master . 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     Social Determinants of Health     Financial Resource Strain: Low Risk     Difficulty of Paying Living Expenses: Not very hard   Food Insecurity: No Food Insecurity    Worried About Running Out of Food in the Last Year: Never true    Ran Out of Food in the Last Year: Never true   Transportation Needs: No Transportation Needs    Lack of Transportation (Medical): No    Lack of Transportation (Non-Medical): No   Physical Activity: Insufficiently Active    Days of Exercise per Week: 1 day    Minutes of Exercise per Session: 10 min   Stress: No Stress Concern Present    Feeling of Stress : Not at all   Social Connections: Unknown    Frequency of Communication with Friends and Family: More than three times a week    Frequency of Social Gatherings with Friends and Family: More than three times a week    Active  Member of Clubs or Organizations: No    Attends Club or Organization Meetings: Never    Marital Status:    Housing Stability: Low Risk     Unable to Pay for Housing in the Last Year: No    Number of Places Lived in the Last Year: 1    Unstable Housing in the Last Year: No         CBC: No results for input(s): WBC, RBC, HGB, HCT, PLT, MCV, MCH, MCHC in the last 72 hours.    CMP: No results for input(s): NA, K, CL, CO2, BUN, CREATININE, GLU, MG, PHOS, CALCIUM, ALBUMIN, PROT, ALKPHOS, ALT, AST, BILITOT in the last 72 hours.    INR  No results for input(s): PT, INR, PROTIME, APTT in the last 72 hours.        Diagnostic Studies:      EKD Echo:  Results for orders placed or performed in visit on 18   2D echo with color flow doppler   Result Value Ref Range    EF + QEF 43 (A) 55 - 65    Mitral Valve Regurgitation TRIVIAL TO MILD     Diastolic Dysfunction Yes (A)     Est. PA Systolic Pressure 28.79     Tricuspid Valve Regurgitation TRIVIAL TO MILD            Pre-op Assessment    I have reviewed the Patient Summary Reports.     I have reviewed the Nursing Notes. I have reviewed the NPO Status.   I have reviewed the Medications.     Review of Systems  Cardiovascular:   Hypertension VELASCO    Pulmonary:   Shortness of breath    Renal/:   Chronic Renal Disease    Hepatic/GI:   GERD    Musculoskeletal:   Arthritis     Endocrine:   Diabetes        Physical Exam    Airway:  Mallampati: II           Anesthesia Plan  Type of Anesthesia, risks & benefits discussed:    Anesthesia Type: Gen ETT, Gen Supraglottic Airway, Gen Natural Airway  Intra-op Monitoring Plan: Standard ASA Monitors  Post Op Pain Control Plan: multimodal analgesia  Induction:  IV  Airway Plan: Direct, Post-Induction  Informed Consent: Informed consent signed with the Patient and all parties understand the risks and agree with anesthesia plan.  All questions answered.   ASA Score: 3  Day of Surgery Review of History & Physical: H&P Update  referred to the surgeon/provider.    Ready For Surgery From Anesthesia Perspective.     .

## 2022-08-30 NOTE — TRANSFER OF CARE
"Anesthesia Transfer of Care Note    Patient: Des Junior    Procedure(s) Performed: Procedure(s) (LRB):  INSERTION, ELECTRODE LEAD, CARDIAC PACEMAKER, 1 ELECTRODE LEAD (Left)    Patient location: PACU    Anesthesia Type: general    Transport from OR: Transported from OR on 6-10 L/min O2 by face mask with adequate spontaneous ventilation    Post pain: adequate analgesia    Post assessment: no apparent anesthetic complications and tolerated procedure well    Post vital signs: stable    Level of consciousness: awake    Nausea/Vomiting: no nausea/vomiting    Complications: none    Transfer of care protocol was followed      Last vitals:   Visit Vitals  BP (!) 153/81 (BP Location: Right arm, Patient Position: Lying)   Pulse 75   Temp 36.3 °C (97.3 °F) (Temporal)   Resp 16   Ht 6' 1" (1.854 m)   Wt 119.7 kg (264 lb)   SpO2 99%   BMI 34.83 kg/m²     "

## 2022-08-30 NOTE — H&P
"[facility]&  Cardiology      Des Junior  YOB: 1945  Medical Record Number:  [id number]  Attending Physician:  Carlos Mccauley MD   Date of Admission: 8/30/2022       Hospital Day:  0  Current Principal Problem:  <principal problem not specified>      History     Cc:     HPI  Mr. Junior is a 77 y.o. male with persistent AF, HTN, HLD, PE, DM, SVT here for follow up.     Background:     PAF, persistent -> DCCV 4/2018  HTN on meds  HL on meds  unprovoked PE 2018  DM2     He started sotalol in 2018, which converted him from AF to SR. He stopped sotalol months ago as "no one would prescribe it for [him]."  Was undergoing preop risk stratification in cardiology clinic 11/11/20, when ECG showed NCT, short RP. Rx with BB increase, which he's not done yet.       Monitor 11/13/2020: Impression:  Sinus rhythm with episodes of symptomatic supraventricular tachycardia. Further rhythm classification is complicated by significant signal noise.     Clinic visit 12/22/2020: Symptomatic SVT identified on event monitor. Case previously discussed with Dr. Mccauley. Patient is a candidate for ablation. . Recently had knee surgery 2 weeks ago. We discussed risks, benefits, and alternatives. He would like to proceed with SVT ablation but would like to finish PT 1st.  EKG today looks like coarse AF. Unclear if persistent, as he does not have noticeable symptoms. Will obtain Holter. In interim, will increase metoprolol for more optimal rate control. He is on xarelto for CVA prophylaxis.     Holter showed persistent rate controlled AF, 6% PVC burden.   2/11/2021: After knee surgery, was found to be in AF. Possibly asymptomatic. We discussed his options, including AF and SVT ablation. He is interested in cardioversion first to determine whether he feels symptom improvement in SR. Holter showed controlled V rates on increased metoprolol. Can discuss AF and SVT ablation at clinic follow up depending " on symptoms. He is on xarelto for CVA prophylaxis.     He has underwent multiple cardioversions in the past and been maintained on xarelto. He has deferred any invasive procedures including PVI. Most recently he had unexplained syncope over the past few months with underlying bifascicular block. He was offered PPM and agreed to proceed. He presents today for the aforementioned procedure. He reports generally feeling well, denies chest pain, palpitations, dyspnea.       Medications - Outpatient  Prior to Admission medications    Medication Sig Start Date End Date Taking? Authorizing Provider   acetaminophen (TYLENOL) 500 MG tablet Take 1,000 mg by mouth daily as needed for Pain.   Yes Historical Provider   albuterol (PROAIR HFA) 90 mcg/actuation inhaler Inhale 2 puffs into the lungs every 6 (six) hours as needed for Wheezing. Rescue 8/24/22  Yes Eladio Sandoval MD   amLODIPine (NORVASC) 5 MG tablet TAKE 1 TABLET(5 MG) BY MOUTH EVERY DAY 11/2/21  Yes Christopher Cortez MD   cholecalciferol, vitamin D3, (VITAMIN D3) 25 mcg (1,000 unit) capsule Take 1,000 Units by mouth once daily.   Yes Historical Provider   gabapentin (NEURONTIN) 100 MG capsule TAKE 1 CAPSULE BY MOUTH THREE TIMES DAILY 10/11/21  Yes YASIR Bowens PA-C   hydroCHLOROthiazide (HYDRODIURIL) 25 MG tablet  7/28/22  Yes Historical Provider   insulin (LANTUS SOLOSTAR U-100 INSULIN) glargine 100 units/mL (3mL) SubQ pen Inject 20 Units into the skin every evening. 6/6/22  Yes Christopher Cortez MD   losartan (COZAAR) 50 MG tablet Take 1 tablet (50 mg total) by mouth once daily. 3/11/22  Yes Christopher Cortez MD   metoprolol succinate (TOPROL-XL) 50 MG 24 hr tablet Take 2 tablets (100 mg total) by mouth once daily. 3/9/22  Yes Lou Simmons NP   omega-3 fatty acids-vitamin E (OMEGA-3 FISH OIL) 1,000-5 mg-unit Cap Take by mouth 2 (two) times daily. 1 Capsule Oral Every day   Yes Historical Provider   omeprazole (PRILOSEC OTC) 20 MG tablet Take by mouth. 1 Tablet,  "Delayed Release (E.C.) Oral Every day   Yes Historical Provider   pravastatin (PRAVACHOL) 40 MG tablet Take 1 tablet (40 mg total) by mouth once daily. 6/6/22  Yes Christopher Cortez MD   repaglinide (PRANDIN) 2 MG tablet TAKE 1 TABLET BY MOUTH THREE TIMES A DAY BEFORE MEALS 5/9/22  Yes Christopher Cortez MD   amoxicillin-clavulanate 875-125mg (AUGMENTIN) 875-125 mg per tablet Take 1 tablet by mouth 2 (two) times daily. for 7 days 8/24/22 8/31/22  Eladio Sandoval MD   blood sugar diagnostic Strp 1 strip by Misc.(Non-Drug; Combo Route) route 2 (two) times daily with meals. 2/24/17   Mark Mchugh MD   cetirizine (ZYRTEC) 10 MG tablet Take 1 tablet (10 mg total) by mouth once daily. 4/7/21   Vandana Ochoa MD   colchicine (COLCRYS) 0.6 mg tablet TAKE 1 TABLET(0.6 MG) BY MOUTH EVERY DAY 6/6/22   Christopher Cortez MD   LANCETS & BLOOD GLUCOSE STRIPS MISC * * * Twice a day .  check glucose twice a day    Historical Provider   metFORMIN (GLUCOPHAGE-XR) 500 MG ER 24hr tablet TAKE 2 TABLETS(1000 MG) BY MOUTH TWICE DAILY WITH MEALS 7/20/22   hCristopher Cortez MD   pen needle, diabetic 29 gauge x 1/2" Ndle Inject insulin daily 4/29/22   YASIR Bowens PA-C   predniSONE (DELTASONE) 20 MG tablet Take one daily for 3 days and may repeat for shortness of breath. 8/24/22   Eladio Sandoval MD   rivaroxaban (XARELTO) 20 mg Tab Take 1 tablet (20 mg total) by mouth daily with dinner or evening meal. 3/9/22   Lou Simmons NP         Medications - Current  Scheduled Meds:   sodium chloride 0.9%  1,000 mL Intravenous Once     Continuous Infusions:  PRN Meds:.ceFAZolin (ANCEF) IVPB      Allergies  Review of patient's allergies indicates:   Allergen Reactions    Benicar [olmesartan]      Other reaction(s): SPOTS IN FRONT OF EYES    Codeine Nausea And Vomiting     Other reaction(s): Nausea         Past Medical History  Past Medical History:   Diagnosis Date    Abdominal obesity 03/15/2018    Acute deep vein thrombosis (DVT) of " popliteal vein of right lower extremity 03/02/2018    AK (actinic keratosis) 11/15/2012    S/p 1st PDT face (80 min incubation) - did great! S/p PDT face - 90 min  - 12/15 - did great S/p PDT face - 90 min - 10/17 - great response    Anticoagulant long-term use     Asbestos exposure 03/02/2018    Asymptomatic LV dysfunction 03/02/2018    Bronchiectasis without complication 02/25/2019    Bronchitis, chronic, mucopurulent 03/02/2018    Cataract     Colon adenoma 08/20/2014    Current use of long term anticoagulation 04/23/2018    Deep vein thrombosis     ED (erectile dysfunction) 11/28/2012    Encounter for monitoring sotalol therapy 06/13/2018    Hay fever     Hearing loss, sensorineural 06/25/2013    History of adenomatous polyp of colon 10/05/2015    History of pulmonary embolism 05/02/2018    [place] (hard of hearing)     Left ear    Hyperlipidemia associated with type 2 diabetes mellitus, baseline  11/28/2012    Hypertension associated with diabetes 11/28/2012    Lung nodule, solitary- 5 mm rll 03/02/2018    On home oxygen therapy 03/15/2018    Osteoarthritis of right knee 11/28/2012    Persistent atrial fibrillation 03/01/2018    Pulmonary embolus- large involved central PA, unprovoked. 03/01/2018    Type 2 diabetes mellitus, controlled 07/16/2013    Type 2 diabetes, controlled, with neuropathy 01/20/2014    Type 2 diabetes, uncontrolled, with neuropathy, onset 2003 11/28/2012         Past Surgical History  Past Surgical History:   Procedure Laterality Date    COLONOSCOPY N/A 10/05/2015    Procedure: COLONOSCOPY;  Surgeon: Pro Jang MD;  Location: [facility] ENDO ([address] [place] [address]);  Service: Endoscopy;  Laterality: N/A;    JOINT REPLACEMENT      KNEE ARTHROPLASTY Right 12/07/2020    Procedure: ARTHROPLASTY, KNEE:RIGHT:DEPUY-SIGMA;  Surgeon: Hua Gore III, MD;  Location: [facility];  Service: Orthopedics;  Laterality: Right;    thumb surgery      TRANSESOPHAGEAL ECHOCARDIOGRAPHY N/A 03/05/2021    Procedure:  ECHOCARDIOGRAM, TRANSESOPHAGEAL;  Surgeon: Shailesh Salinas III, MD;  Location: [facility] EP LAB;  Service: Cardiology;  Laterality: N/A;    TREATMENT OF CARDIAC ARRHYTHMIA N/A 03/05/2021    Procedure: CARDIOVERSION;  Surgeon: Carlos Mccauley MD;  Location: [facility] EP LAB;  Service: Cardiology;  Laterality: N/A;  AF, LEO, DCCV, MAC, DM, 3 PREP         Social History  Social History     Socioeconomic History    Marital status:    Occupational History    Occupation: Self emplyed   Tobacco Use    Smoking status: Never    Smokeless tobacco: Never   Substance and Sexual Activity    Alcohol use: Not Currently     Comment: rare    Drug use: No   Social History Narrative    Master . 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     Social Determinants of Health     Financial Resource Strain: Low Risk     Difficulty of Paying Living Expenses: Not very hard   Food Insecurity: No Food Insecurity    Worried About Running Out of Food in the Last Year: Never true    Ran Out of Food in the Last Year: Never true   Transportation Needs: No Transportation Needs    Lack of Transportation (Medical): No    Lack of Transportation (Non-Medical): No   Physical Activity: Insufficiently Active    Days of Exercise per Week: 1 day    Minutes of Exercise per Session: 10 min   Stress: No Stress Concern Present    Feeling of Stress : Not at all   Social Connections: Unknown    Frequency of Communication with Friends and Family: More than three times a week    Frequency of Social Gatherings with Friends and Family: More than three times a week    Active Member of Clubs or Organizations: No    Attends Club or Organization Meetings: Never    Marital Status:    Housing Stability: Low Risk     Unable to Pay for Housing in the Last Year: No    Number of Places Lived in the Last Year: 1    Unstable Housing in the Last Year: No         ROS  10 point ROS performed and negative except as stated in HPI     Physical Examination          Vital Signs  Vitals  Temp: 99.1 °F (37.3 °C)  Temp src: Temporal  Pulse: 97  Heart Rate Source: Monitor  Resp: 18  SpO2: 97 %  O2 Device (Oxygen Therapy): room air  BP: (!) 176/108  BP Location: Right arm  BP Method: Automatic  Patient Position: Lying            24 Hour VS Range    Temp:  [99.1 °F (37.3 °C)]   Pulse:  [97]   Resp:  [18]   BP: (164-176)/()   SpO2:  [97 %]   No intake or output data in the 24 hours ending 08/30/22 0758      Physical Exam:   Constitutional: no acute distress  HEENT: NCAT, EOMI, no scleral icterus  Cardiovascular: Irregularly irregular rhythm. 2+ carotid, radial, DP pulses bilaterally    Pulmonary: Normal respiratory effort   Abdomen: nontender, non-distended   Neuro: alert and oriented, no focal deficits  Extremities: warm, no edema   MSK: no deformities  Integument: intact, no rashes       Data       Recent Labs   Lab 08/23/22  0914   WBC 7.92   HGB 12.3*   HCT 38.1*           Recent Labs   Lab 08/23/22  0914   INR 1.2        Recent Labs   Lab 08/23/22  0914      K 4.6      CO2 21*   BUN 20   CREATININE 1.6*   ANIONGAP 14   CALCIUM 9.0        No results for input(s): PROT, ALBUMIN, BILITOT, ALKPHOS, AST, ALT in the last 168 hours.     No results for input(s): TROPONINI in the last 168 hours.     BNP (pg/mL)   Date Value   02/27/2018 339 (H)       No results for input(s): LABBLOO in the last 168 hours.       ECG:  Likely atrial fibrillation with controlled ventricular response    RBBB + LAFB (bifascicular block)        Assessment & Plan   Mr. Junior is a 77 y.o. male with persistent AF, HTN, HLD, PE, DM, with recent syncope in the setting of bifascicular block. Presents today for PPM with Dr. Mccauley.     Syncope  Bifascicular block:   Risks/benefits/alternatives to PPM placement discussed with patient and he agrees to proceed. Consents signed.   -To EP lab for PPM with Dr. Garrick Franz MD  [facility]   Cardiovascular Disease  PGY-V

## 2022-08-30 NOTE — ANESTHESIA POSTPROCEDURE EVALUATION
Anesthesia Post Evaluation    Patient: Des Junior    Procedure(s) Performed: Procedure(s) (LRB):  INSERTION, ELECTRODE LEAD, CARDIAC PACEMAKER, 1 ELECTRODE LEAD (Left)    Final Anesthesia Type: general      Patient location during evaluation: PACU  Patient participation: Yes- Able to Participate  Level of consciousness: awake and alert, awake and oriented  Post-procedure vital signs: reviewed and stable  Pain management: adequate  Airway patency: patent    PONV status at discharge: No PONV  Anesthetic complications: no      Cardiovascular status: blood pressure returned to baseline, hemodynamically stable and stable  Respiratory status: unassisted, spontaneous ventilation and room air  Hydration status: euvolemic  Follow-up not needed.          Vitals Value Taken Time   /96 08/30/22 1401   Temp 36.5 °C (97.7 °F) 08/30/22 1230   Pulse 76 08/30/22 1403   Resp 14 08/30/22 1403   SpO2 97 % 08/30/22 1403   Vitals shown include unvalidated device data.      No case tracking events are documented in the log.      Pain/Diego Score: Diego Score: 10 (8/30/2022  1:30 PM)

## 2022-08-30 NOTE — Clinical Note
A generator pocket was made at the left chest with blunt dissection, electrocautery and sharp dissection.

## 2022-08-30 NOTE — Clinical Note
The chest was prepped. The site was prepped with ChloraPrep. The site was clipped. The patient was draped. The patient was positioned supine. The patient was secured using safety straps, with ulnar pads and to an armboard.

## 2022-08-30 NOTE — PLAN OF CARE
Received report from ANUJA Lanier. Patient s/p lead placement, AAOx3. VSS, no c/o pain or discomfort at this time, resp even and unlabored. Dressing to chest wall is CDI. No active bleeding. No hematoma noted. Post procedure protocol reviewed with patient and patient's family. Understanding verbalized. Family members at bedside. Nurse call bell within reach. Will continue to monitor per post procedure protocol.

## 2022-08-30 NOTE — PROGRESS NOTES
Dr. Franz in to speak with patient regarding dressing and device. Ok to discharge home at this point. Piv removed. Discharge instruction already reviewed with this patient by ANUJA Vincent. Discharged off floor via wheelchair. No complaints or complications noted at this time.

## 2022-08-30 NOTE — PLAN OF CARE
Received pt to floor from home accompanied by spouse.  AAO x 4. Denies pain or discomfort. Respirations even and unlabored. No distress noted. Pt stable.  Admit assessment complete. IV x 2 placed.  Pt oriented to room and call bell placed within reach.  Will continue to monitor.

## 2022-08-30 NOTE — Clinical Note
The patient's elbows and knees were padded, heels floated, warming blanket given, and safety strap applied.

## 2022-08-30 NOTE — NURSING TRANSFER
Nursing Transfer Note      8/30/2022     Reason patient is being transferred: post op    Transfer To: SSCU 8     Transfer via stretcher    Transfer with cardiac monitoring    Transported by PCT     Medicines sent: None    Any special needs or follow-up needed: None    Chart send with patient: Yes    Notified: spouse    Patient reassessed at: 8/30/2022 [id number]    Upon arrival to floor: cardiac monitor applied, patient oriented to room, call bell in reach, and bed in lowest position

## 2022-08-31 ENCOUNTER — TELEPHONE (OUTPATIENT)
Dept: ELECTROPHYSIOLOGY | Facility: CLINIC | Age: 77
End: 2022-08-31
Payer: MEDICARE

## 2022-08-31 DIAGNOSIS — R55 SYNCOPE, UNSPECIFIED SYNCOPE TYPE: Primary | ICD-10-CM

## 2022-08-31 DIAGNOSIS — I48.11 LONGSTANDING PERSISTENT ATRIAL FIBRILLATION: ICD-10-CM

## 2022-08-31 RX ORDER — METOPROLOL SUCCINATE 50 MG/1
100 TABLET, EXTENDED RELEASE ORAL DAILY
Qty: 180 TABLET | Refills: 2 | Status: SHIPPED | OUTPATIENT
Start: 2022-08-31

## 2022-08-31 NOTE — TELEPHONE ENCOUNTER
Spoke with pt to inform him of appointments re-scheduled, due to him having a procedure. New appointment confirmed

## 2022-08-31 NOTE — TELEPHONE ENCOUNTER
----- Message from Junaid Isaac MA sent at 8/31/2022  8:36 AM CDT -----  Regarding: pain meds  Pt wants to know if he was suppose to get any pain medicine

## 2022-08-31 NOTE — TELEPHONE ENCOUNTER
Called pt no answer left message that no pain medication was prescribed after the procedure that he can use x-strength tylenol and ice for any pain/soreness and to call the office back for any further questions

## 2022-09-06 ENCOUNTER — TELEPHONE (OUTPATIENT)
Dept: ELECTROPHYSIOLOGY | Facility: CLINIC | Age: 77
End: 2022-09-06
Payer: MEDICARE

## 2022-09-06 ENCOUNTER — CLINICAL SUPPORT (OUTPATIENT)
Dept: CARDIOLOGY | Facility: HOSPITAL | Age: 77
End: 2022-09-06
Attending: INTERNAL MEDICINE
Payer: MEDICARE

## 2022-09-06 DIAGNOSIS — R55 SYNCOPE, UNSPECIFIED SYNCOPE TYPE: ICD-10-CM

## 2022-09-06 PROCEDURE — 93279 CARDIAC DEVICE CHECK - IN CLINIC & HOSPITAL: ICD-10-PCS | Mod: 26,,, | Performed by: INTERNAL MEDICINE

## 2022-09-06 PROCEDURE — 93279 PRGRMG DEV EVAL PM/LDLS PM: CPT | Mod: 26,,, | Performed by: INTERNAL MEDICINE

## 2022-09-06 PROCEDURE — 93279 PRGRMG DEV EVAL PM/LDLS PM: CPT

## 2022-11-11 DIAGNOSIS — E11.65 TYPE 2 DIABETES MELLITUS WITH HYPERGLYCEMIA, WITHOUT LONG-TERM CURRENT USE OF INSULIN: ICD-10-CM

## 2022-11-11 RX ORDER — GABAPENTIN 100 MG/1
CAPSULE ORAL
Qty: 270 CAPSULE | Refills: 1 | OUTPATIENT
Start: 2022-11-11

## 2023-03-16 NOTE — ADDENDUM NOTE
Addended by: DIANA JEAN on: 2/28/2018 09:23 AM     Modules accepted: Orders     Paramedian Forehead Flap Text: A decision was made to reconstruct the defect utilizing an interpolation axial flap and a staged reconstruction.  A telfa template was made of the defect.  This telfa template was then used to outline the paramedian forehead pedicle flap.  The donor area for the pedicle flap was then injected with anesthesia.  The flap was excised through the skin and subcutaneous tissue down to the layer of the underlying musculature.  The pedicle flap was carefully excised within this deep plane to maintain its blood supply.  The edges of the donor site were undermined.   The donor site was closed in a primary fashion.  The pedicle was then rotated into position and sutured.  Once the tube was sutured into place, adequate blood supply was confirmed with blanching and refill.  The pedicle was then wrapped with xeroform gauze and dressed appropriately with a telfa and gauze bandage to ensure continued blood supply and protect the attached pedicle.

## 2024-12-12 NOTE — ADDENDUM NOTE
Addended by: SANTOS HURST on: 2/24/2017 09:42 AM     Modules accepted: Orders    
Statement Selected

## (undated) DEVICE — TAPE SILK 3IN

## (undated) DEVICE — DRAPE SURG W/TWL 17 5/8X23

## (undated) DEVICE — ALCOHOL 70% ISOP W/GREEN 16OZ

## (undated) DEVICE — SEE MEDLINE ITEM 146298

## (undated) DEVICE — PAD KNEE POLAR XL

## (undated) DEVICE — BRUSH SCRUB HIBICLENS 4%

## (undated) DEVICE — SPONGE GAUZE 16PLY 4X4

## (undated) DEVICE — GLOVE BIOGEL SKINSENSE PI 8.0

## (undated) DEVICE — SUT MCRYL PLUS 4-0 PS2 27IN

## (undated) DEVICE — SYS REVOLUTION CEMENT MIXING

## (undated) DEVICE — BLADE DUAL CUT SAG 35X64X.89MM

## (undated) DEVICE — DRAPE INCISE IOBAN 2 23X33IN

## (undated) DEVICE — BLADE RECIP RIBBED

## (undated) DEVICE — SOL IRR NACL .9% 3000ML

## (undated) DEVICE — SUT 1 36IN COATED VICRYL UN

## (undated) DEVICE — SYS KNEE EPAK PIN ATTUNE
Type: IMPLANTABLE DEVICE | Site: KNEE | Status: NON-FUNCTIONAL
Removed: 2020-12-07

## (undated) DEVICE — DRAPE INCISE IOBAN 2 23X17IN

## (undated) DEVICE — PAD DEFIB CADENCE ADULT R2

## (undated) DEVICE — PAD RADIOLUCENT STAT ADULT

## (undated) DEVICE — SUT 2/0 36IN COATED VICRYL

## (undated) DEVICE — CONTAINER SPECIMEN STRL 4OZ

## (undated) DEVICE — PACK PACER PERMANENT

## (undated) DEVICE — SEE MEDLINE ITEM 157144

## (undated) DEVICE — DRESSING TEGADERM 4.4X5IN

## (undated) DEVICE — ADHESIVE DERMABOND ADVANCED

## (undated) DEVICE — SOL BETADINE 5%

## (undated) DEVICE — ELECTRODE REM PLYHSV RETURN 9

## (undated) DEVICE — UNDERGLOVES BIOGEL PI SIZE 8.5

## (undated) DEVICE — NDL 18GA X1 1/2 REG BEVEL

## (undated) DEVICE — DRESSING AQUACEL AG RBBN 2X45

## (undated) DEVICE — DRESSING TRANS 4X4 TEGADERM

## (undated) DEVICE — SEE MEDLINE ITEM 157131

## (undated) DEVICE — GAUZE SPONGE 4X4 12PLY

## (undated) DEVICE — SYR 50CC LL

## (undated) DEVICE — CATH SUCTION 10FR

## (undated) DEVICE — TOWEL OR XRAY WHITE 17X26IN

## (undated) DEVICE — MASK FLYTE HOOD PEEL AWAY

## (undated) DEVICE — BANDAGE ACE ELASTIC 6"

## (undated) DEVICE — BLADE SAGITTAL 18 X 1.27 X 90M

## (undated) DEVICE — DRESSING TELFA N ADH 3X8

## (undated) DEVICE — KIT TOTAL KNEE TKOFG

## (undated) DEVICE — PUMP COLD THERAPY

## (undated) DEVICE — SHEATH SAFESHEATH II ULTRA 6FR

## (undated) DEVICE — SLING SWATHE UNIVERSAL FOAM

## (undated) DEVICE — KIT IRR SUCTION HND PIECE

## (undated) DEVICE — MARKER SKIN STND TIP BLUE BARR